# Patient Record
Sex: FEMALE | Race: WHITE | Employment: OTHER | URBAN - NONMETROPOLITAN AREA
[De-identification: names, ages, dates, MRNs, and addresses within clinical notes are randomized per-mention and may not be internally consistent; named-entity substitution may affect disease eponyms.]

---

## 2017-01-18 ENCOUNTER — OFFICE VISIT (OUTPATIENT)
Dept: PRIMARY CARE CLINIC | Age: 82
End: 2017-01-18
Payer: MEDICARE

## 2017-01-18 VITALS
SYSTOLIC BLOOD PRESSURE: 120 MMHG | WEIGHT: 118 LBS | TEMPERATURE: 98 F | BODY MASS INDEX: 20.25 KG/M2 | RESPIRATION RATE: 20 BRPM | OXYGEN SATURATION: 98 % | DIASTOLIC BLOOD PRESSURE: 78 MMHG

## 2017-01-18 DIAGNOSIS — M81.0 OSTEOPOROSIS: ICD-10-CM

## 2017-01-18 DIAGNOSIS — E11.9 TYPE 2 DIABETES MELLITUS WITHOUT COMPLICATION, WITHOUT LONG-TERM CURRENT USE OF INSULIN (HCC): Primary | ICD-10-CM

## 2017-01-18 DIAGNOSIS — E55.9 VITAMIN D DEFICIENCY: ICD-10-CM

## 2017-01-18 DIAGNOSIS — K22.70 BARRETT'S ESOPHAGUS WITHOUT DYSPLASIA: ICD-10-CM

## 2017-01-18 DIAGNOSIS — F33.42 RECURRENT MAJOR DEPRESSIVE DISORDER, IN FULL REMISSION (HCC): ICD-10-CM

## 2017-01-18 DIAGNOSIS — Z86.69 H/O: BELL'S PALSY: ICD-10-CM

## 2017-01-18 PROCEDURE — G8484 FLU IMMUNIZE NO ADMIN: HCPCS | Performed by: FAMILY MEDICINE

## 2017-01-18 PROCEDURE — 82044 UR ALBUMIN SEMIQUANTITATIVE: CPT | Performed by: FAMILY MEDICINE

## 2017-01-18 PROCEDURE — 4005F PHARM THX FOR OP RXD: CPT | Performed by: FAMILY MEDICINE

## 2017-01-18 PROCEDURE — G8419 CALC BMI OUT NRM PARAM NOF/U: HCPCS | Performed by: FAMILY MEDICINE

## 2017-01-18 PROCEDURE — G8427 DOCREV CUR MEDS BY ELIG CLIN: HCPCS | Performed by: FAMILY MEDICINE

## 2017-01-18 PROCEDURE — 1036F TOBACCO NON-USER: CPT | Performed by: FAMILY MEDICINE

## 2017-01-18 PROCEDURE — 99214 OFFICE O/P EST MOD 30 MIN: CPT | Performed by: FAMILY MEDICINE

## 2017-01-18 PROCEDURE — 4040F PNEUMOC VAC/ADMIN/RCVD: CPT | Performed by: FAMILY MEDICINE

## 2017-01-18 PROCEDURE — 1123F ACP DISCUSS/DSCN MKR DOCD: CPT | Performed by: FAMILY MEDICINE

## 2017-01-18 PROCEDURE — G8400 PT W/DXA NO RESULTS DOC: HCPCS | Performed by: FAMILY MEDICINE

## 2017-01-18 PROCEDURE — 1090F PRES/ABSN URINE INCON ASSESS: CPT | Performed by: FAMILY MEDICINE

## 2017-01-18 RX ORDER — VITAMIN B COMPLEX
TABLET ORAL
Qty: 180 TABLET | Refills: 5 | Status: SHIPPED | OUTPATIENT
Start: 2017-01-18 | End: 2017-02-06 | Stop reason: SDUPTHER

## 2017-01-18 RX ORDER — IBANDRONATE SODIUM 150 MG/1
150 TABLET, FILM COATED ORAL
Qty: 30 TABLET | Refills: 3 | Status: CANCELLED | OUTPATIENT
Start: 2017-01-18

## 2017-01-18 ASSESSMENT — ENCOUNTER SYMPTOMS
SORE THROAT: 0
CHEST TIGHTNESS: 0
TROUBLE SWALLOWING: 0
EYE PAIN: 0
VOMITING: 0
CONSTIPATION: 0
COUGH: 0
COLOR CHANGE: 0
SHORTNESS OF BREATH: 0
WHEEZING: 0
DIARRHEA: 0
NAUSEA: 0
ABDOMINAL PAIN: 0
RHINORRHEA: 0
PHOTOPHOBIA: 0

## 2017-01-19 ENCOUNTER — TELEPHONE (OUTPATIENT)
Dept: GASTROENTEROLOGY | Age: 82
End: 2017-01-19

## 2017-01-19 RX ORDER — ERGOCALCIFEROL 1.25 MG/1
CAPSULE ORAL
Qty: 12 CAPSULE | Refills: 3 | Status: SHIPPED | OUTPATIENT
Start: 2017-01-19 | End: 2017-09-08 | Stop reason: SDUPTHER

## 2017-01-20 LAB
CREATININE URINE POCT: 200
MICROALBUMIN/CREAT 24H UR: 10 MG/G{CREAT}

## 2017-02-03 ENCOUNTER — TELEPHONE (OUTPATIENT)
Dept: PRIMARY CARE CLINIC | Age: 82
End: 2017-02-03

## 2017-02-03 DIAGNOSIS — E63.9 NUTRITION DISORDER: Primary | ICD-10-CM

## 2017-02-06 DIAGNOSIS — E11.9 DIABETES MELLITUS TYPE 2, NONINSULIN DEPENDENT (HCC): Primary | ICD-10-CM

## 2017-02-06 RX ORDER — VITAMIN B COMPLEX
TABLET ORAL
Qty: 180 TABLET | Refills: 5 | Status: SHIPPED | OUTPATIENT
Start: 2017-02-06 | End: 2017-09-08 | Stop reason: SDUPTHER

## 2017-02-21 ENCOUNTER — OFFICE VISIT (OUTPATIENT)
Dept: PRIMARY CARE CLINIC | Age: 82
End: 2017-02-21
Payer: MEDICARE

## 2017-02-21 DIAGNOSIS — E53.8 VITAMIN B 12 DEFICIENCY: Primary | ICD-10-CM

## 2017-02-21 PROCEDURE — 96372 THER/PROPH/DIAG INJ SC/IM: CPT | Performed by: FAMILY MEDICINE

## 2017-02-21 RX ORDER — CYANOCOBALAMIN 1000 UG/ML
1000 INJECTION INTRAMUSCULAR; SUBCUTANEOUS ONCE
Status: COMPLETED | OUTPATIENT
Start: 2017-02-21 | End: 2017-02-21

## 2017-02-21 RX ADMIN — CYANOCOBALAMIN 1000 MCG: 1000 INJECTION INTRAMUSCULAR; SUBCUTANEOUS at 08:43

## 2017-03-28 ENCOUNTER — OFFICE VISIT (OUTPATIENT)
Dept: PRIMARY CARE CLINIC | Age: 82
End: 2017-03-28
Payer: MEDICARE

## 2017-03-28 VITALS
OXYGEN SATURATION: 98 % | WEIGHT: 118 LBS | RESPIRATION RATE: 20 BRPM | HEIGHT: 64 IN | DIASTOLIC BLOOD PRESSURE: 64 MMHG | HEART RATE: 88 BPM | TEMPERATURE: 98 F | BODY MASS INDEX: 20.14 KG/M2 | SYSTOLIC BLOOD PRESSURE: 122 MMHG

## 2017-03-28 DIAGNOSIS — Z00.00 ROUTINE GENERAL MEDICAL EXAMINATION AT A HEALTH CARE FACILITY: Primary | ICD-10-CM

## 2017-03-28 DIAGNOSIS — E53.8 VITAMIN B12 DEFICIENCY: ICD-10-CM

## 2017-03-28 DIAGNOSIS — R26.81 GAIT INSTABILITY: ICD-10-CM

## 2017-03-28 DIAGNOSIS — E11.9 TYPE 2 DIABETES MELLITUS WITHOUT COMPLICATION, WITHOUT LONG-TERM CURRENT USE OF INSULIN (HCC): ICD-10-CM

## 2017-03-28 DIAGNOSIS — I10 ESSENTIAL HYPERTENSION: ICD-10-CM

## 2017-03-28 DIAGNOSIS — M25.559 PELVIC JOINT PAIN, UNSPECIFIED LATERALITY: ICD-10-CM

## 2017-03-28 LAB — HBA1C MFR BLD: 6 %

## 2017-03-28 PROCEDURE — 99214 OFFICE O/P EST MOD 30 MIN: CPT | Performed by: FAMILY MEDICINE

## 2017-03-28 PROCEDURE — 1123F ACP DISCUSS/DSCN MKR DOCD: CPT | Performed by: FAMILY MEDICINE

## 2017-03-28 PROCEDURE — G0439 PPPS, SUBSEQ VISIT: HCPCS | Performed by: FAMILY MEDICINE

## 2017-03-28 PROCEDURE — 4040F PNEUMOC VAC/ADMIN/RCVD: CPT | Performed by: FAMILY MEDICINE

## 2017-03-28 PROCEDURE — 96372 THER/PROPH/DIAG INJ SC/IM: CPT | Performed by: FAMILY MEDICINE

## 2017-03-28 PROCEDURE — G8484 FLU IMMUNIZE NO ADMIN: HCPCS | Performed by: FAMILY MEDICINE

## 2017-03-28 PROCEDURE — G8400 PT W/DXA NO RESULTS DOC: HCPCS | Performed by: FAMILY MEDICINE

## 2017-03-28 PROCEDURE — 1090F PRES/ABSN URINE INCON ASSESS: CPT | Performed by: FAMILY MEDICINE

## 2017-03-28 PROCEDURE — 1036F TOBACCO NON-USER: CPT | Performed by: FAMILY MEDICINE

## 2017-03-28 PROCEDURE — G8418 CALC BMI BLW LOW PARAM F/U: HCPCS | Performed by: FAMILY MEDICINE

## 2017-03-28 PROCEDURE — 83036 HEMOGLOBIN GLYCOSYLATED A1C: CPT | Performed by: FAMILY MEDICINE

## 2017-03-28 PROCEDURE — G8427 DOCREV CUR MEDS BY ELIG CLIN: HCPCS | Performed by: FAMILY MEDICINE

## 2017-03-28 RX ORDER — ATENOLOL 25 MG/1
12.5 TABLET ORAL DAILY
Qty: 30 TABLET | Refills: 0
Start: 2017-03-28 | End: 2017-09-08 | Stop reason: SDUPTHER

## 2017-03-28 RX ORDER — SIMVASTATIN 10 MG
10 TABLET ORAL EVERY EVENING
Qty: 90 TABLET | Refills: 1 | Status: SHIPPED | OUTPATIENT
Start: 2017-03-28 | End: 2017-03-28

## 2017-03-28 RX ORDER — CYANOCOBALAMIN 1000 UG/ML
1000 INJECTION INTRAMUSCULAR; SUBCUTANEOUS ONCE
Status: COMPLETED | OUTPATIENT
Start: 2017-03-28 | End: 2017-03-28

## 2017-03-28 RX ORDER — SIMVASTATIN 10 MG
10 TABLET ORAL EVERY EVENING
Qty: 30 TABLET | Refills: 3 | Status: SHIPPED | OUTPATIENT
Start: 2017-03-28 | End: 2017-09-08

## 2017-03-28 RX ADMIN — CYANOCOBALAMIN 1000 MCG: 1000 INJECTION INTRAMUSCULAR; SUBCUTANEOUS at 14:32

## 2017-03-29 PROBLEM — I10 ESSENTIAL HYPERTENSION: Status: ACTIVE | Noted: 2017-03-29

## 2017-03-29 PROBLEM — E53.8 VITAMIN B12 DEFICIENCY: Status: ACTIVE | Noted: 2017-03-29

## 2017-03-29 ASSESSMENT — ENCOUNTER SYMPTOMS
CHEST TIGHTNESS: 0
SORE THROAT: 0
COLOR CHANGE: 0
EYE PAIN: 0
WHEEZING: 0
RHINORRHEA: 0
CONSTIPATION: 0
COUGH: 0
NAUSEA: 0
VOMITING: 0
SHORTNESS OF BREATH: 0
DIARRHEA: 0
TROUBLE SWALLOWING: 0
ABDOMINAL PAIN: 0
PHOTOPHOBIA: 0

## 2017-05-11 ENCOUNTER — NURSE ONLY (OUTPATIENT)
Dept: PRIMARY CARE CLINIC | Age: 82
End: 2017-05-11
Payer: MEDICARE

## 2017-05-11 DIAGNOSIS — E53.8 VITAMIN B12 DEFICIENCY: Primary | ICD-10-CM

## 2017-05-11 PROCEDURE — 96372 THER/PROPH/DIAG INJ SC/IM: CPT | Performed by: NURSE PRACTITIONER

## 2017-05-11 RX ORDER — CYANOCOBALAMIN 1000 UG/ML
1000 INJECTION INTRAMUSCULAR; SUBCUTANEOUS ONCE
Status: COMPLETED | OUTPATIENT
Start: 2017-05-11 | End: 2017-05-11

## 2017-05-11 RX ADMIN — CYANOCOBALAMIN 1000 MCG: 1000 INJECTION INTRAMUSCULAR; SUBCUTANEOUS at 13:12

## 2017-08-25 DIAGNOSIS — Z12.31 ENCOUNTER FOR SCREENING MAMMOGRAM FOR MALIGNANT NEOPLASM OF BREAST: ICD-10-CM

## 2017-08-25 DIAGNOSIS — E11.9 TYPE 2 DIABETES MELLITUS WITHOUT COMPLICATION, WITHOUT LONG-TERM CURRENT USE OF INSULIN (HCC): ICD-10-CM

## 2017-08-25 DIAGNOSIS — E53.8 VITAMIN B12 DEFICIENCY: ICD-10-CM

## 2017-08-25 LAB
CREATININE URINE: 25.9 MG/DL (ref 4.2–622)
MICROALBUMIN UR-MCNC: <1.2 MG/DL (ref 0–19)
MICROALBUMIN/CREAT UR-RTO: NORMAL MG/G
VITAMIN B-12: 284 PG/ML (ref 211–946)

## 2017-08-29 ENCOUNTER — TELEPHONE (OUTPATIENT)
Dept: PRIMARY CARE CLINIC | Age: 82
End: 2017-08-29

## 2017-08-30 ENCOUNTER — OFFICE VISIT (OUTPATIENT)
Dept: PRIMARY CARE CLINIC | Age: 82
End: 2017-08-30
Payer: MEDICARE

## 2017-08-30 DIAGNOSIS — E53.8 VITAMIN B12 DEFICIENCY: Primary | ICD-10-CM

## 2017-08-30 PROCEDURE — 96372 THER/PROPH/DIAG INJ SC/IM: CPT | Performed by: NURSE PRACTITIONER

## 2017-08-30 RX ORDER — CYANOCOBALAMIN 1000 UG/ML
1000 INJECTION INTRAMUSCULAR; SUBCUTANEOUS ONCE
Status: COMPLETED | OUTPATIENT
Start: 2017-08-30 | End: 2017-08-30

## 2017-08-30 RX ADMIN — CYANOCOBALAMIN 1000 MCG: 1000 INJECTION INTRAMUSCULAR; SUBCUTANEOUS at 08:16

## 2017-09-01 RX ORDER — NITROGLYCERIN 0.3 MG/1
TABLET SUBLINGUAL
Qty: 30 TABLET | Refills: 0 | Status: SHIPPED | OUTPATIENT
Start: 2017-09-01 | End: 2017-10-09 | Stop reason: ALTCHOICE

## 2017-09-08 ENCOUNTER — OFFICE VISIT (OUTPATIENT)
Dept: PRIMARY CARE CLINIC | Age: 82
End: 2017-09-08
Payer: MEDICARE

## 2017-09-08 VITALS
SYSTOLIC BLOOD PRESSURE: 112 MMHG | OXYGEN SATURATION: 93 % | DIASTOLIC BLOOD PRESSURE: 82 MMHG | HEIGHT: 64 IN | WEIGHT: 124 LBS | TEMPERATURE: 97.7 F | HEART RATE: 67 BPM | BODY MASS INDEX: 21.17 KG/M2

## 2017-09-08 DIAGNOSIS — E53.8 VITAMIN B12 DEFICIENCY: Primary | ICD-10-CM

## 2017-09-08 DIAGNOSIS — E11.9 TYPE 2 DIABETES MELLITUS WITHOUT COMPLICATION, WITHOUT LONG-TERM CURRENT USE OF INSULIN (HCC): ICD-10-CM

## 2017-09-08 DIAGNOSIS — G51.0 BELL'S PALSY: ICD-10-CM

## 2017-09-08 PROCEDURE — G8400 PT W/DXA NO RESULTS DOC: HCPCS | Performed by: FAMILY MEDICINE

## 2017-09-08 PROCEDURE — G8427 DOCREV CUR MEDS BY ELIG CLIN: HCPCS | Performed by: FAMILY MEDICINE

## 2017-09-08 PROCEDURE — 4040F PNEUMOC VAC/ADMIN/RCVD: CPT | Performed by: FAMILY MEDICINE

## 2017-09-08 PROCEDURE — 1090F PRES/ABSN URINE INCON ASSESS: CPT | Performed by: FAMILY MEDICINE

## 2017-09-08 PROCEDURE — 1123F ACP DISCUSS/DSCN MKR DOCD: CPT | Performed by: FAMILY MEDICINE

## 2017-09-08 PROCEDURE — G8420 CALC BMI NORM PARAMETERS: HCPCS | Performed by: FAMILY MEDICINE

## 2017-09-08 PROCEDURE — 1036F TOBACCO NON-USER: CPT | Performed by: FAMILY MEDICINE

## 2017-09-08 PROCEDURE — 99214 OFFICE O/P EST MOD 30 MIN: CPT | Performed by: FAMILY MEDICINE

## 2017-09-08 RX ORDER — ATENOLOL 25 MG/1
12.5 TABLET ORAL DAILY
Qty: 30 TABLET | Refills: 0 | Status: ON HOLD | OUTPATIENT
Start: 2017-09-08 | End: 2020-12-22 | Stop reason: HOSPADM

## 2017-09-08 RX ORDER — NITROGLYCERIN 0.4 MG/1
0.4 TABLET SUBLINGUAL EVERY 5 MIN PRN
Qty: 25 TABLET | Refills: 3 | Status: SHIPPED | OUTPATIENT
Start: 2017-09-08

## 2017-09-08 RX ORDER — VITAMIN B COMPLEX
TABLET ORAL
Qty: 180 TABLET | Refills: 5 | Status: SHIPPED | OUTPATIENT
Start: 2017-09-08 | End: 2020-03-24 | Stop reason: SDUPTHER

## 2017-09-08 RX ORDER — SIMVASTATIN 10 MG
10 TABLET ORAL EVERY EVENING
Qty: 90 TABLET | Refills: 3 | Status: SHIPPED | OUTPATIENT
Start: 2017-09-08 | End: 2018-02-09 | Stop reason: SDUPTHER

## 2017-09-08 RX ORDER — PREDNISONE 20 MG/1
TABLET ORAL
Qty: 30 TABLET | Refills: 0 | Status: SHIPPED | OUTPATIENT
Start: 2017-09-08 | End: 2018-05-02

## 2017-09-08 RX ORDER — CYANOCOBALAMIN 1000 UG/ML
INJECTION INTRAMUSCULAR; SUBCUTANEOUS
Qty: 10 ML | Refills: 0 | Status: SHIPPED | OUTPATIENT
Start: 2017-09-08 | End: 2018-05-02

## 2017-09-08 RX ORDER — ERGOCALCIFEROL 1.25 MG/1
CAPSULE ORAL
Qty: 12 CAPSULE | Refills: 3 | Status: SHIPPED | OUTPATIENT
Start: 2017-09-08 | End: 2020-03-09

## 2017-09-08 ASSESSMENT — ENCOUNTER SYMPTOMS
EYE ITCHING: 0
SHORTNESS OF BREATH: 0
ABDOMINAL PAIN: 0
NAUSEA: 0
COLOR CHANGE: 0
SORE THROAT: 0
RHINORRHEA: 0
COUGH: 0

## 2017-10-04 ENCOUNTER — TELEPHONE (OUTPATIENT)
Dept: CARDIOLOGY | Facility: CLINIC | Age: 82
End: 2017-10-04

## 2017-10-04 NOTE — TELEPHONE ENCOUNTER
PT CAME IN OFFICE TO DAY ASK IF WE COULD CLEAR HER BP  MACHINE FROM HOME. SHE FEELS HER  HR IS RUNNING LOW.  I ADVISED HER TO CALL THE NUMBER ON THE BOX TO CLEAR MACHINE.  HER BP /72 HR 59 98% O2.      SHE STATES SHE FEELS FLUTTERS SOMETIMES.      IS GOING OUT OF TOWN AT THE END OF THE MONTH TILL THE FIRST OF THE YEAR.    APPT MADE WITH TATIANA FOR NEXT WEEK

## 2017-10-05 ENCOUNTER — OFFICE VISIT (OUTPATIENT)
Dept: PRIMARY CARE CLINIC | Age: 82
End: 2017-10-05
Payer: MEDICARE

## 2017-10-05 DIAGNOSIS — Z23 FLU VACCINE NEED: Primary | ICD-10-CM

## 2017-10-05 PROCEDURE — 1036F TOBACCO NON-USER: CPT | Performed by: FAMILY MEDICINE

## 2017-10-05 PROCEDURE — 90688 IIV4 VACCINE SPLT 0.5 ML IM: CPT | Performed by: FAMILY MEDICINE

## 2017-10-05 PROCEDURE — G0008 ADMIN INFLUENZA VIRUS VAC: HCPCS | Performed by: FAMILY MEDICINE

## 2017-10-05 NOTE — PROGRESS NOTES
After obtaining consent, and per orders of Dr. Allison Sullivan, injection of Flu given in Left deltoid by David Navarro. Patient instructed to remain in clinic for 20 minutes afterwards, and to report any adverse reaction to me immediately.

## 2017-10-09 ENCOUNTER — OFFICE VISIT (OUTPATIENT)
Dept: CARDIOLOGY | Facility: CLINIC | Age: 82
End: 2017-10-09

## 2017-10-09 ENCOUNTER — OFFICE VISIT (OUTPATIENT)
Dept: PRIMARY CARE CLINIC | Age: 82
End: 2017-10-09
Payer: MEDICARE

## 2017-10-09 VITALS
HEIGHT: 56 IN | BODY MASS INDEX: 27.67 KG/M2 | SYSTOLIC BLOOD PRESSURE: 150 MMHG | HEART RATE: 55 BPM | WEIGHT: 123 LBS | DIASTOLIC BLOOD PRESSURE: 78 MMHG

## 2017-10-09 DIAGNOSIS — R00.2 PALPITATIONS: ICD-10-CM

## 2017-10-09 DIAGNOSIS — R94.31 ABNORMAL ECG: ICD-10-CM

## 2017-10-09 DIAGNOSIS — E11.8 TYPE 2 DIABETES MELLITUS WITH COMPLICATION, UNSPECIFIED LONG TERM INSULIN USE STATUS: ICD-10-CM

## 2017-10-09 DIAGNOSIS — E78.2 MIXED HYPERLIPIDEMIA: Primary | ICD-10-CM

## 2017-10-09 DIAGNOSIS — I10 ESSENTIAL HYPERTENSION: ICD-10-CM

## 2017-10-09 DIAGNOSIS — E53.8 VITAMIN B12 DEFICIENCY: Primary | ICD-10-CM

## 2017-10-09 DIAGNOSIS — R07.2 PRECORDIAL PAIN: ICD-10-CM

## 2017-10-09 PROCEDURE — 99214 OFFICE O/P EST MOD 30 MIN: CPT | Performed by: INTERNAL MEDICINE

## 2017-10-09 PROCEDURE — G8428 CUR MEDS NOT DOCUMENT: HCPCS | Performed by: FAMILY MEDICINE

## 2017-10-09 PROCEDURE — 4040F PNEUMOC VAC/ADMIN/RCVD: CPT | Performed by: FAMILY MEDICINE

## 2017-10-09 PROCEDURE — 96372 THER/PROPH/DIAG INJ SC/IM: CPT | Performed by: FAMILY MEDICINE

## 2017-10-09 PROCEDURE — G8420 CALC BMI NORM PARAMETERS: HCPCS | Performed by: FAMILY MEDICINE

## 2017-10-09 PROCEDURE — 93000 ELECTROCARDIOGRAM COMPLETE: CPT | Performed by: INTERNAL MEDICINE

## 2017-10-09 RX ORDER — ERGOCALCIFEROL 1.25 MG/1
CAPSULE ORAL WEEKLY
COMMUNITY
Start: 2017-09-08 | End: 2018-09-18 | Stop reason: ALTCHOICE

## 2017-10-09 RX ORDER — VENLAFAXINE HYDROCHLORIDE 37.5 MG/1
37.5 CAPSULE, EXTENDED RELEASE ORAL DAILY
COMMUNITY

## 2017-10-09 RX ORDER — CYANOCOBALAMIN 1000 UG/ML
1000 INJECTION INTRAMUSCULAR; SUBCUTANEOUS ONCE
Status: COMPLETED | OUTPATIENT
Start: 2017-10-09 | End: 2017-10-09

## 2017-10-09 RX ORDER — SIMVASTATIN 10 MG
10 TABLET ORAL DAILY
COMMUNITY
Start: 2017-09-08 | End: 2020-12-30 | Stop reason: ALTCHOICE

## 2017-10-09 RX ORDER — MULTIVIT-MIN 60/IRON FUM/FOLIC 27 MG-1 MG
TABLET ORAL
Refills: 3 | COMMUNITY
Start: 2017-10-06 | End: 2017-10-09 | Stop reason: SDUPTHER

## 2017-10-09 RX ADMIN — CYANOCOBALAMIN 1000 MCG: 1000 INJECTION INTRAMUSCULAR; SUBCUTANEOUS at 15:27

## 2017-10-09 NOTE — PROGRESS NOTES
After obtaining consent, and per orders of Dr. Pita Jones, injection of B-12 (2 of 4) given in Left upper quad. gluteus by Moon Mendez. Patient instructed to remain in clinic for 20 minutes afterwards, and to report any adverse reaction to me immediately.

## 2017-10-09 NOTE — PROGRESS NOTES
Dalia Betts  [id number]  1935  81 y.o.  female    Referring Provider: ELIZABETH Isaac    Reason for Follow-up Visit:coronary artery disease   Chest pain at rest , moderate substernal, pressure like. Lasts less than 5 minutes  No diaphoresis  No nausea  No radiation  Occurs at rest  Mild associated dyspnea    Compliant with medications  Abnormal stress test in past with small ischemia on MPS      History of present illness:  Dalia Betts is a 81 y.o. yo female with history of Essential Hypertension   who presents today for   Chief Complaint   Patient presents with   • Chest Pain     1 yr f/u - discomfort   .    History  Past Medical History:   Diagnosis Date   • Bell palsy    • Chest pain    • Diabetes mellitus    • HLD (hyperlipidemia)    • HTN (hypertension)    • MI (myocardial infarction)    • Palpitations    ,   Past Surgical History:   Procedure Laterality Date   • APPENDECTOMY     • CHOLECYSTECTOMY     • ELBOW FUSION     • EYE SURGERY     • HERNIA REPAIR     • HYSTERECTOMY     • SINUSOTOMY     ,   Family History   Problem Relation Age of Onset   • Pancreatitis Mother    • Heart disease Father    ,   Social History   Substance Use Topics   • Smoking status: Never Smoker   • Smokeless tobacco: Never Used   • Alcohol use No   ,     Medications  Current Outpatient Prescriptions   Medication Sig Dispense Refill   • atenolol (TENORMIN) 25 MG tablet Take 12.5 mg by mouth Daily. 12.5 mg daily     • donepezil (ARICEPT) 5 MG tablet Take 5 mg by mouth As Needed.     • Fructose-Dextrose-Phosphor Acd (EMETROL PO) Take 10 mL by mouth As Needed.     • gabapentin (NEURONTIN) 300 MG capsule Take 300 mg by mouth Daily.     • LORazepam (ATIVAN) 0.5 MG tablet Take 0.5 mg by mouth Every 8 (Eight) Hours As Needed for anxiety.     • metFORMIN (GLUCOPHAGE) 500 MG tablet Take 500 mg by mouth Daily.     • nitroglycerin (NITROSTAT) 0.4 MG SL tablet Place 0.4 mg under the tongue Every 5 (Five) Minutes As Needed for chest  "pain. Take no more than 3 doses in 15 minutes.     • polyethyl glycol-propyl glycol (SYSTANE) 0.4-0.3 % solution ophthalmic solution Every 1 (One) Hour As Needed.     • PRENATAL VIT-FE FUMARATE-FA PO Take  by mouth.     • Psyllium 30.9 % powder Take  by mouth As Needed.     • Red Yeast Rice Extract (RED YEAST RICE PO) Take 600 mg by mouth Daily.     • simvastatin (ZOCOR) 10 MG tablet Take 10 mg by mouth Daily.     • venlafaxine XR (EFFEXOR-XR) 37.5 MG 24 hr capsule Take 37.5 mg by mouth Daily.     • vitamin D (ERGOCALCIFEROL) 05258 units capsule capsule 1 (One) Time Per Week.       No current facility-administered medications for this visit.        Allergies:  Phenergan [promethazine hcl]    Review of Systems  Review of Systems   Constitution: Negative.   HENT: Negative.    Eyes: Negative.    Cardiovascular: Positive for chest pain. Negative for claudication, cyanosis, dyspnea on exertion, irregular heartbeat, leg swelling, near-syncope, orthopnea, palpitations, paroxysmal nocturnal dyspnea and syncope.   Respiratory: Negative.    Endocrine: Negative.    Hematologic/Lymphatic: Negative.    Skin: Negative.    Gastrointestinal: Negative for anorexia.   Genitourinary: Negative.    Neurological: Negative.    Psychiatric/Behavioral: Negative.        Objective     Physical Exam:  /78  Pulse 55  Ht 56\" (142.2 cm)  Wt 123 lb (55.8 kg)  BMI 27.58 kg/m2  Physical Exam   Constitutional: She appears well-developed.   HENT:   Head: Normocephalic.   Neck: Normal carotid pulses and no JVD present. No tracheal tenderness present. Carotid bruit is not present. No tracheal deviation and no edema present.   Cardiovascular: Regular rhythm and normal pulses.    Murmur heard.   Systolic murmur is present with a grade of 1/6   Pulmonary/Chest: Effort normal. No stridor.   Abdominal: Soft.   Neurological: She is alert. She has normal strength. No cranial nerve deficit or sensory deficit.   Skin: Skin is warm.   Psychiatric: She " has a normal mood and affect. Her speech is normal and behavior is normal.       Results Review:       ECG 12 Lead  Date/Time: 10/9/2017 9:44 AM  Performed by: GEORGETTE SIMPSON  Authorized by: GEORGETTE SIMPSON   Comparison: compared with previous ECG from 4/28/2016  Comparison to previous ECG: New inferior T wave changes  Rhythm: sinus bradycardia  Conduction: conduction normal  ST Segments: ST segments normal  QRS axis: normal  Clinical impression: non-specific ECG            Assessment/Plan   Dalia was seen today for chest pain.    Diagnoses and all orders for this visit:    Mixed hyperlipidemia    Essential hypertension    Palpitations    Precordial pain  -     Adult Stress Echo W/ Cont or Stress Agent if Necessary Per Protocol; Future    Abnormal ECG  -     Adult Stress Echo W/ Cont or Stress Agent if Necessary Per Protocol; Future    Abnormal ECG inferior T wave changes  -     Adult Stress Echo W/ Cont or Stress Agent if Necessary Per Protocol; Future    Type 2 diabetes mellitus with complication, unspecified long term insulin use status    Other orders  -     ECG 12 Lead         No significant pedal edema. Compliant with medications and diet. Latest labs and medications reviewed.    Plan:      Start ECASA 81 mg daily regimen given risk factors and monitor for any signs of bleeding including red or dark stools. Fall precautions.    Patient is asked to monitor BP at home or work, several times per month and return with written values at next office visit.  Stress echo (Treadmill stress echo test ,says can easily run on treadmill with no fall or injury risk)   She refuses chemical stress test  Close follow up with you as scheduled.  Intensive factor modifications.  See order list.    Counseled regarding disease appropriate diet, fluid, caffeine, stimulants and sodium intake as well as importance of compliance to diet, exercise and regular follow up.  Avoid NSAIDS and COX2 inhibitors. Use Acetaminophen PRN.    Return in  about 4 weeks (around 11/6/2017).

## 2017-10-16 ENCOUNTER — HOSPITAL ENCOUNTER (OUTPATIENT)
Dept: CARDIOLOGY | Facility: HOSPITAL | Age: 82
Discharge: HOME | End: 2017-10-16
Attending: INTERNAL MEDICINE | Admitting: INTERNAL MEDICINE

## 2017-10-16 VITALS — DIASTOLIC BLOOD PRESSURE: 74 MMHG | SYSTOLIC BLOOD PRESSURE: 141 MMHG | HEART RATE: 47 BPM

## 2017-10-16 DIAGNOSIS — R07.2 PRECORDIAL PAIN: ICD-10-CM

## 2017-10-16 DIAGNOSIS — R94.31 ABNORMAL ECG: ICD-10-CM

## 2017-10-16 LAB
BH CV STRESS BP STAGE 1: NORMAL
BH CV STRESS DURATION MIN STAGE 1: 3
BH CV STRESS DURATION SEC STAGE 1: 0
BH CV STRESS ECHO POST STRESS EJECTION FRACTION EF: 65 %
BH CV STRESS GRADE STAGE 1: 10
BH CV STRESS HR STAGE 1: 87
BH CV STRESS METS STAGE 1: 2.3
BH CV STRESS PROTOCOL 1: NORMAL
BH CV STRESS PROTOCOL 2 BP STAGE 1: NORMAL
BH CV STRESS PROTOCOL 2 BP STAGE 2: NORMAL
BH CV STRESS PROTOCOL 2 BP STAGE 3: 102
BH CV STRESS PROTOCOL 2 BP STAGE 4: 117
BH CV STRESS PROTOCOL 2 BP STAGE 5: 120
BH CV STRESS PROTOCOL 2 DOSE DOBUTAMINE STAGE 1: 10
BH CV STRESS PROTOCOL 2 DOSE DOBUTAMINE STAGE 2: 20
BH CV STRESS PROTOCOL 2 DOSE DOBUTAMINE STAGE 3: 30
BH CV STRESS PROTOCOL 2 DOSE DOBUTAMINE STAGE 4: 40
BH CV STRESS PROTOCOL 2 DURATION MIN STAGE 1: 3
BH CV STRESS PROTOCOL 2 DURATION MIN STAGE 2: 3
BH CV STRESS PROTOCOL 2 DURATION MIN STAGE 3: 3
BH CV STRESS PROTOCOL 2 DURATION MIN STAGE 4: 3
BH CV STRESS PROTOCOL 2 DURATION MIN STAGE 5: 2 MIN
BH CV STRESS PROTOCOL 2 DURATION SEC STAGE 1: 0
BH CV STRESS PROTOCOL 2 DURATION SEC STAGE 2: 0
BH CV STRESS PROTOCOL 2 DURATION SEC STAGE 3: 0
BH CV STRESS PROTOCOL 2 DURATION SEC STAGE 4: 0
BH CV STRESS PROTOCOL 2 DURATION SEC STAGE 5: 42 SEC
BH CV STRESS PROTOCOL 2 HR STAGE 1: 49
BH CV STRESS PROTOCOL 2 HR STAGE 2: 57
BH CV STRESS PROTOCOL 2 HR STAGE 3: NORMAL
BH CV STRESS PROTOCOL 2 HR STAGE 4: NORMAL
BH CV STRESS PROTOCOL 2 HR STAGE 5: NORMAL BPM
BH CV STRESS PROTOCOL 2 STAGE 1: 1
BH CV STRESS PROTOCOL 2 STAGE 2: 2
BH CV STRESS PROTOCOL 2 STAGE 3: 3
BH CV STRESS PROTOCOL 2 STAGE 4: 4
BH CV STRESS PROTOCOL 2 STAGE 5: 5
BH CV STRESS PROTOCOL 2: NORMAL
BH CV STRESS RECOVERY BP: NORMAL MMHG
BH CV STRESS RECOVERY HR: 79 BPM
BH CV STRESS SPEED STAGE 1: 1.7
BH CV STRESS STAGE 1: 1
LV EF 2D ECHO EST: 55 %
MAXIMAL PREDICTED HEART RATE: 140 BPM
PERCENT MAX PREDICTED HR: 85.71 %
STRESS BASELINE BP: NORMAL MMHG
STRESS BASELINE HR: 46 BPM
STRESS PERCENT HR: 101 %
STRESS POST EXERCISE DUR MIN: 14 MIN
STRESS POST EXERCISE DUR SEC: 42 SEC
STRESS POST PEAK BP: NORMAL MMHG
STRESS POST PEAK HR: 120 BPM
STRESS TARGET HR: 119 BPM

## 2017-10-16 PROCEDURE — 93350 STRESS TTE ONLY: CPT | Performed by: INTERNAL MEDICINE

## 2017-10-16 PROCEDURE — 93017 CV STRESS TEST TRACING ONLY: CPT

## 2017-10-16 PROCEDURE — 93352 ADMIN ECG CONTRAST AGENT: CPT | Performed by: INTERNAL MEDICINE

## 2017-10-16 PROCEDURE — 25010000002 PERFLUTREN 6.52 MG/ML SUSPENSION: Performed by: INTERNAL MEDICINE

## 2017-10-16 PROCEDURE — 93018 CV STRESS TEST I&R ONLY: CPT | Performed by: INTERNAL MEDICINE

## 2017-10-16 PROCEDURE — 93350 STRESS TTE ONLY: CPT

## 2017-10-16 PROCEDURE — 25010000003 DOBUTAMINE PER 250 MG: Performed by: INTERNAL MEDICINE

## 2017-10-16 RX ORDER — DOBUTAMINE HYDROCHLORIDE 100 MG/100ML
10-50 INJECTION INTRAVENOUS CONTINUOUS
Status: DISCONTINUED | OUTPATIENT
Start: 2017-10-16 | End: 2017-10-17 | Stop reason: HOSPADM

## 2017-10-16 RX ADMIN — ATROPINE SULFATE 2 MG: 0.1 INJECTION, SOLUTION INTRAVENOUS at 10:22

## 2017-10-16 RX ADMIN — PERFLUTREN 8.48 MG: 6.52 INJECTION, SUSPENSION INTRAVENOUS at 09:20

## 2017-10-16 RX ADMIN — DOBUTAMINE 10 MCG/KG/MIN: 12.5 INJECTION, SOLUTION, CONCENTRATE INTRAVENOUS at 10:10

## 2017-10-18 ENCOUNTER — OFFICE VISIT (OUTPATIENT)
Dept: PRIMARY CARE CLINIC | Age: 82
End: 2017-10-18
Payer: MEDICARE

## 2017-10-18 DIAGNOSIS — E53.8 VITAMIN B12 DEFICIENCY: Primary | ICD-10-CM

## 2017-10-18 PROCEDURE — 96372 THER/PROPH/DIAG INJ SC/IM: CPT | Performed by: FAMILY MEDICINE

## 2017-10-18 RX ORDER — CYANOCOBALAMIN 1000 UG/ML
1000 INJECTION INTRAMUSCULAR; SUBCUTANEOUS ONCE
Status: COMPLETED | OUTPATIENT
Start: 2017-10-18 | End: 2017-10-18

## 2017-10-18 RX ADMIN — CYANOCOBALAMIN 1000 MCG: 1000 INJECTION INTRAMUSCULAR; SUBCUTANEOUS at 08:19

## 2017-10-18 NOTE — PROGRESS NOTES
After obtaining consent, and per orders of Dr. Popeye Padilla, injection of B-12 given in Right upper quad. gluteus by Fredis Treviño. Patient instructed to remain in clinic for 20 minutes afterwards, and to report any adverse reaction to me immediately.

## 2017-10-25 ENCOUNTER — OFFICE VISIT (OUTPATIENT)
Dept: PRIMARY CARE CLINIC | Age: 82
End: 2017-10-25

## 2017-10-25 DIAGNOSIS — E53.8 VITAMIN B 12 DEFICIENCY: Primary | ICD-10-CM

## 2017-10-30 ENCOUNTER — OFFICE VISIT (OUTPATIENT)
Dept: CARDIOLOGY | Facility: CLINIC | Age: 82
End: 2017-10-30

## 2017-10-30 VITALS
WEIGHT: 124 LBS | DIASTOLIC BLOOD PRESSURE: 72 MMHG | BODY MASS INDEX: 27.9 KG/M2 | HEIGHT: 56 IN | SYSTOLIC BLOOD PRESSURE: 132 MMHG | HEART RATE: 68 BPM | OXYGEN SATURATION: 96 %

## 2017-10-30 DIAGNOSIS — R07.2 PRECORDIAL PAIN: ICD-10-CM

## 2017-10-30 DIAGNOSIS — E11.8 TYPE 2 DIABETES MELLITUS WITH COMPLICATION, UNSPECIFIED LONG TERM INSULIN USE STATUS: ICD-10-CM

## 2017-10-30 DIAGNOSIS — R00.2 PALPITATIONS: ICD-10-CM

## 2017-10-30 DIAGNOSIS — K21.9 GASTROESOPHAGEAL REFLUX DISEASE WITHOUT ESOPHAGITIS: ICD-10-CM

## 2017-10-30 DIAGNOSIS — R94.31 ABNORMAL ECG: ICD-10-CM

## 2017-10-30 DIAGNOSIS — I10 ESSENTIAL HYPERTENSION: ICD-10-CM

## 2017-10-30 DIAGNOSIS — E78.2 MIXED HYPERLIPIDEMIA: Primary | ICD-10-CM

## 2017-10-30 PROCEDURE — 99214 OFFICE O/P EST MOD 30 MIN: CPT | Performed by: INTERNAL MEDICINE

## 2017-10-30 RX ORDER — DONEPEZIL HYDROCHLORIDE 10 MG/1
TABLET, FILM COATED ORAL
COMMUNITY
Start: 2017-10-23 | End: 2017-10-30 | Stop reason: DRUGHIGH

## 2017-10-30 NOTE — PROGRESS NOTES
Dalia Betts  [id number]  1935  81 y.o.  female    Referring Provider: Les Henderson MD    Reason for Follow-up Visit:Here for follow up after cardiac testing.    Subjective    Overall feeling well   No chest pain or shortness of breath   No palpitations  Compliant with medications and dietary advice  Good effort tolerance  Palpitations much better on beta blocker  No presyncope or syncope      Compliant with medications  Abnormal stress test in past with small ischemia on MPS in past      History of present illness:  Dalia Betts is a 81 y.o. yo female with history of Essential Hypertension   who presents today for   Chief Complaint   Patient presents with   • Coronary Artery Disease     3 wk f/u - results   .    History  Past Medical History:   Diagnosis Date   • Bell palsy    • Chest pain    • Diabetes mellitus    • HLD (hyperlipidemia)    • HTN (hypertension)    • MI (myocardial infarction)    • Palpitations    ,   Past Surgical History:   Procedure Laterality Date   • APPENDECTOMY     • CHOLECYSTECTOMY     • ELBOW FUSION     • EYE SURGERY     • HERNIA REPAIR     • HYSTERECTOMY     • SINUSOTOMY     ,   Family History   Problem Relation Age of Onset   • Pancreatitis Mother    • Heart disease Father    ,   Social History   Substance Use Topics   • Smoking status: Never Smoker   • Smokeless tobacco: Never Used   • Alcohol use No   ,     Medications  Current Outpatient Prescriptions   Medication Sig Dispense Refill   • donepezil (ARICEPT) 5 MG tablet Take 5 mg by mouth As Needed.     • Fructose-Dextrose-Phosphor Acd (EMETROL PO) Take 10 mL by mouth As Needed.     • gabapentin (NEURONTIN) 300 MG capsule Take 300 mg by mouth Daily.     • LORazepam (ATIVAN) 0.5 MG tablet Take 0.5 mg by mouth Every 8 (Eight) Hours As Needed for anxiety.     • metFORMIN (GLUCOPHAGE) 500 MG tablet Take 500 mg by mouth Daily.     • nitroglycerin (NITROSTAT) 0.4 MG SL tablet Place 0.4 mg under the tongue Every 5 (Five) Minutes As  "Needed for chest pain. Take no more than 3 doses in 15 minutes.     • polyethyl glycol-propyl glycol (SYSTANE) 0.4-0.3 % solution ophthalmic solution Every 1 (One) Hour As Needed.     • PRENATAL VIT-FE FUMARATE-FA PO Take  by mouth.     • Psyllium 30.9 % powder Take  by mouth As Needed.     • Red Yeast Rice Extract (RED YEAST RICE PO) Take 600 mg by mouth Daily.     • simvastatin (ZOCOR) 10 MG tablet Take 10 mg by mouth Daily.     • venlafaxine XR (EFFEXOR-XR) 37.5 MG 24 hr capsule Take 37.5 mg by mouth Daily.     • vitamin D (ERGOCALCIFEROL) 22208 units capsule capsule 1 (One) Time Per Week.     • metoprolol tartrate (LOPRESSOR) 25 MG tablet Take 1 tablet by mouth 2 (Two) Times a Day. 180 tablet 3     No current facility-administered medications for this visit.        Allergies:  Phenergan [promethazine hcl]    Review of Systems  Review of Systems   Constitution: Negative.   HENT: Negative.    Eyes: Negative.    Cardiovascular: Positive for chest pain. Negative for claudication, cyanosis, dyspnea on exertion, irregular heartbeat, leg swelling, near-syncope, orthopnea, palpitations, paroxysmal nocturnal dyspnea and syncope.   Respiratory: Negative.    Endocrine: Negative.    Hematologic/Lymphatic: Negative.    Skin: Negative.    Gastrointestinal: Negative for anorexia.   Genitourinary: Negative.    Neurological: Negative.    Psychiatric/Behavioral: Negative.        Objective     Physical Exam:  /72  Pulse 68  Ht 56\" (142.2 cm)  Wt 124 lb (56.2 kg)  SpO2 96%  BMI 27.8 kg/m2  Physical Exam   Constitutional: She appears well-developed.   HENT:   Head: Normocephalic.   Neck: Normal carotid pulses and no JVD present. No tracheal tenderness present. Carotid bruit is not present. No tracheal deviation and no edema present.   Cardiovascular: Regular rhythm and normal pulses.    Murmur heard.   Systolic murmur is present with a grade of 1/6   Pulmonary/Chest: Effort normal. No stridor.   Abdominal: Soft. "   Neurological: She is alert. She has normal strength. No cranial nerve deficit or sensory deficit.   Skin: Skin is warm.   Psychiatric: She has a normal mood and affect. Her speech is normal and behavior is normal.       Results Review:     Procedures    Assessment/Plan   Dalia was seen today for coronary artery disease.    Diagnoses and all orders for this visit:    Mixed hyperlipidemia    Essential hypertension    Palpitations    Type 2 diabetes mellitus with complication, unspecified long term insulin use status    Precordial pain    Abnormal ECG    Abnormal ECG inferior T wave changes    Gastroesophageal reflux disease without esophagitis    Other orders  -     metoprolol tartrate (LOPRESSOR) 25 MG tablet; Take 1 tablet by mouth 2 (Two) Times a Day.        No significant pedal edema. Compliant with medications and diet. Latest labs and medications reviewed.  Low risk stress echo with normal LVEF by echo cardiogram.       Plan:    No additional cardiac testing required at this point in time.   Suggest GI workup for non cardiac chest pain  Start over the counter antacids. Start Zantac 150 mg BID.  Start ECASA 81 mg daily regimen given risk factors and monitor for any signs of bleeding including red or dark stools. Fall precautions.    Patient is asked to monitor BP at home or work, several times per month and return with written values at next office visit.  Close follow up with you as scheduled.  Intensive factor modifications.  See order list.    Counseled regarding disease appropriate diet, fluid, caffeine, stimulants and sodium intake as well as importance of compliance to diet, exercise and regular follow up.  Avoid NSAIDS and COX2 inhibitors. Use Acetaminophen PRN.  Small frequent meals  Do not eat for 4 hours prior to sleeping  Avoid excessive spicy and hot food intake, restrict caffeine and chocolate intake  Sleep with head end of bed elevated 6 inches   · Change the factors that you can control. Ask your  caregiver for guidance concerning weight loss, quitting smoking, and alcohol consumption.  · Avoid foods and drinks that make your symptoms worse, such as:  ¨ Caffeine or alcoholic drinks.  ¨ Chocolate.  ¨ Peppermint or mint flavorings.  ¨ Garlic and onions.  ¨ Spicy foods.  ¨ Citrus fruits, such as oranges, jaret, or limes.  ¨ Tomato-based foods such as sauce, chili, salsa, and pizza.  ¨ Fried and fatty foods.  · Avoid lying down for the 3 hours prior to your bedtime or prior to taking a nap.  · Eat small, frequent meals instead of large meals.  · Wear loose-fitting clothing. Do not wear anything tight around your waist that causes pressure on your stomach.  · Raise the head of your bed 6 to 8 inches with wood blocks to help you sleep. Extra pillows will not help.  · Only take over-the-counter or prescription medicines for pain, discomfort, or fever as directed by your caregiver.  · Do not take aspirin, ibuprofen, or other nonsteroidal anti-inflammatory drugs (NSAIDs).     Return in about 6 months (around 4/30/2018).

## 2017-10-31 ENCOUNTER — TELEPHONE (OUTPATIENT)
Dept: CARDIOLOGY | Facility: CLINIC | Age: 82
End: 2017-10-31

## 2017-11-01 RX ORDER — ATENOLOL 25 MG/1
25 TABLET ORAL DAILY
Qty: 90 TABLET | Refills: 3 | Status: SHIPPED | OUTPATIENT
Start: 2017-11-01 | End: 2018-09-18 | Stop reason: SDUPTHER

## 2017-11-03 ENCOUNTER — OFFICE VISIT (OUTPATIENT)
Dept: PRIMARY CARE CLINIC | Age: 82
End: 2017-11-03
Payer: MEDICARE

## 2017-11-03 VITALS
BODY MASS INDEX: 22.71 KG/M2 | HEIGHT: 64 IN | OXYGEN SATURATION: 99 % | RESPIRATION RATE: 18 BRPM | TEMPERATURE: 97.4 F | WEIGHT: 133 LBS | HEART RATE: 52 BPM | DIASTOLIC BLOOD PRESSURE: 74 MMHG | SYSTOLIC BLOOD PRESSURE: 110 MMHG

## 2017-11-03 DIAGNOSIS — Z86.69 HISTORY OF BELL'S PALSY: ICD-10-CM

## 2017-11-03 DIAGNOSIS — E11.9 TYPE 2 DIABETES MELLITUS WITHOUT COMPLICATION, WITHOUT LONG-TERM CURRENT USE OF INSULIN (HCC): Primary | ICD-10-CM

## 2017-11-03 DIAGNOSIS — E53.8 VITAMIN B12 DEFICIENCY: ICD-10-CM

## 2017-11-03 LAB — HBA1C MFR BLD: 6.6 %

## 2017-11-03 PROCEDURE — G8484 FLU IMMUNIZE NO ADMIN: HCPCS | Performed by: NURSE PRACTITIONER

## 2017-11-03 PROCEDURE — 1036F TOBACCO NON-USER: CPT | Performed by: NURSE PRACTITIONER

## 2017-11-03 PROCEDURE — G8400 PT W/DXA NO RESULTS DOC: HCPCS | Performed by: NURSE PRACTITIONER

## 2017-11-03 PROCEDURE — 1090F PRES/ABSN URINE INCON ASSESS: CPT | Performed by: NURSE PRACTITIONER

## 2017-11-03 PROCEDURE — G8427 DOCREV CUR MEDS BY ELIG CLIN: HCPCS | Performed by: NURSE PRACTITIONER

## 2017-11-03 PROCEDURE — 1123F ACP DISCUSS/DSCN MKR DOCD: CPT | Performed by: NURSE PRACTITIONER

## 2017-11-03 PROCEDURE — 83036 HEMOGLOBIN GLYCOSYLATED A1C: CPT | Performed by: NURSE PRACTITIONER

## 2017-11-03 PROCEDURE — 4040F PNEUMOC VAC/ADMIN/RCVD: CPT | Performed by: NURSE PRACTITIONER

## 2017-11-03 PROCEDURE — G8420 CALC BMI NORM PARAMETERS: HCPCS | Performed by: NURSE PRACTITIONER

## 2017-11-03 PROCEDURE — 96372 THER/PROPH/DIAG INJ SC/IM: CPT | Performed by: NURSE PRACTITIONER

## 2017-11-03 PROCEDURE — 99214 OFFICE O/P EST MOD 30 MIN: CPT | Performed by: NURSE PRACTITIONER

## 2017-11-03 RX ORDER — CYANOCOBALAMIN 1000 UG/ML
1000 INJECTION INTRAMUSCULAR; SUBCUTANEOUS ONCE
Qty: 1 ML | Refills: 3 | Status: SHIPPED | OUTPATIENT
Start: 2017-11-03 | End: 2018-05-02

## 2017-11-03 RX ORDER — CYANOCOBALAMIN 1000 UG/ML
1000 INJECTION INTRAMUSCULAR; SUBCUTANEOUS ONCE
Status: COMPLETED | OUTPATIENT
Start: 2017-11-03 | End: 2017-11-03

## 2017-11-03 RX ADMIN — CYANOCOBALAMIN 1000 MCG: 1000 INJECTION INTRAMUSCULAR; SUBCUTANEOUS at 08:51

## 2017-11-03 NOTE — PROGRESS NOTES
After obtaining consent, and per orders of DILIA Velez, injection of cyanocobalamin 1,000mcg given in left deltoid by Marsha . Patient instructed to remain in clinic for 20 minutes afterwards, and to report any adverse reaction to me immediately.

## 2017-11-06 ASSESSMENT — ENCOUNTER SYMPTOMS
COLOR CHANGE: 0
COUGH: 0
RHINORRHEA: 0
SHORTNESS OF BREATH: 0
ABDOMINAL PAIN: 0
NAUSEA: 0
SORE THROAT: 0
EYE ITCHING: 0

## 2017-12-12 RX ORDER — CYANOCOBALAMIN 1000 UG/ML
1000 INJECTION INTRAMUSCULAR; SUBCUTANEOUS ONCE
Status: DISCONTINUED | OUTPATIENT
Start: 2017-12-12 | End: 2019-09-24

## 2018-01-05 ENCOUNTER — TELEPHONE (OUTPATIENT)
Dept: PRIMARY CARE CLINIC | Age: 83
End: 2018-01-05

## 2018-01-08 ENCOUNTER — HOSPITAL ENCOUNTER (OUTPATIENT)
Dept: DIABETES SERVICES | Facility: HOSPITAL | Age: 83
Discharge: HOME | End: 2018-01-08

## 2018-02-05 ENCOUNTER — TELEPHONE (OUTPATIENT)
Dept: PRIMARY CARE CLINIC | Age: 83
End: 2018-02-05

## 2018-02-05 RX ORDER — CYANOCOBALAMIN 1000 UG/ML
INJECTION INTRAMUSCULAR; SUBCUTANEOUS
Qty: 10 ML | Refills: 0 | Status: CANCELLED | OUTPATIENT
Start: 2018-02-05

## 2018-02-05 NOTE — TELEPHONE ENCOUNTER
Pt came into the office stating that she is going to [facility] this weekend. She will be gone for about a month. She is needing a script for her B 12 shot and also a script to administer the B-12 shot. Vol plus vitamin script. She also needs simvastatin (ZOCOR) 10 MG tablet. She is leaving on Saturday and needs these written scripts as soon as possible.

## 2018-02-06 RX ORDER — MULTIVIT-MIN 60/IRON FUM/FOLIC 27 MG-1 MG
1 TABLET ORAL DAILY
Qty: 30 TABLET | Refills: 5 | Status: SHIPPED | OUTPATIENT
Start: 2018-02-06 | End: 2018-02-09 | Stop reason: SDUPTHER

## 2018-02-06 RX ORDER — CYANOCOBALAMIN 1000 UG/ML
1000 INJECTION INTRAMUSCULAR; SUBCUTANEOUS
Qty: 10 ML | Refills: 0 | Status: SHIPPED | OUTPATIENT
Start: 2018-02-06 | End: 2018-02-09 | Stop reason: SDUPTHER

## 2018-02-06 RX ORDER — SIMVASTATIN 10 MG
10 TABLET ORAL EVERY EVENING
Qty: 90 TABLET | Refills: 3 | Status: CANCELLED | OUTPATIENT
Start: 2018-02-06

## 2018-02-06 RX ORDER — OSELTAMIVIR PHOSPHATE 75 MG/1
75 CAPSULE ORAL DAILY
Qty: 10 CAPSULE | Refills: 0 | Status: SHIPPED | OUTPATIENT
Start: 2018-02-06 | End: 2018-02-16

## 2018-02-09 RX ORDER — MULTIVIT-MIN 60/IRON FUM/FOLIC 27 MG-1 MG
1 TABLET ORAL DAILY
Qty: 30 TABLET | Refills: 5 | Status: SHIPPED | OUTPATIENT
Start: 2018-02-09 | End: 2018-02-09 | Stop reason: SDUPTHER

## 2018-02-09 RX ORDER — CYANOCOBALAMIN 1000 UG/ML
1000 INJECTION INTRAMUSCULAR; SUBCUTANEOUS
Qty: 10 ML | Refills: 0 | Status: SHIPPED | OUTPATIENT
Start: 2018-02-09 | End: 2019-09-24

## 2018-02-09 RX ORDER — MULTIVIT-MIN 60/IRON FUM/FOLIC 27 MG-1 MG
1 TABLET ORAL DAILY
Qty: 30 TABLET | Refills: 5 | Status: SHIPPED | OUTPATIENT
Start: 2018-02-09 | End: 2020-06-21

## 2018-02-09 RX ORDER — SIMVASTATIN 10 MG
10 TABLET ORAL EVERY EVENING
Qty: 90 TABLET | Refills: 3 | Status: SHIPPED | OUTPATIENT
Start: 2018-02-09 | End: 2018-02-09 | Stop reason: SDUPTHER

## 2018-02-09 RX ORDER — SIMVASTATIN 10 MG
10 TABLET ORAL EVERY EVENING
Qty: 90 TABLET | Refills: 3 | Status: SHIPPED | OUTPATIENT
Start: 2018-02-09 | End: 2018-05-02 | Stop reason: SDUPTHER

## 2018-02-26 ENCOUNTER — TELEPHONE (OUTPATIENT)
Dept: PRIMARY CARE CLINIC | Age: 83
End: 2018-02-26

## 2018-02-26 NOTE — TELEPHONE ENCOUNTER
[facility] called,patient on vacation and she would like the pharmacy to give her Vit B shot.  Informed that is fine by office

## 2018-03-10 LAB
AVERAGE GLUCOSE: 152
HBA1C MFR BLD: 6.6 %

## 2018-05-01 ENCOUNTER — OFFICE VISIT (OUTPATIENT)
Dept: CARDIOLOGY | Facility: CLINIC | Age: 83
End: 2018-05-01

## 2018-05-01 VITALS
SYSTOLIC BLOOD PRESSURE: 122 MMHG | BODY MASS INDEX: 21.51 KG/M2 | DIASTOLIC BLOOD PRESSURE: 60 MMHG | HEIGHT: 63 IN | HEART RATE: 98 BPM | WEIGHT: 121.4 LBS

## 2018-05-01 DIAGNOSIS — R00.1 SINUS BRADYCARDIA: ICD-10-CM

## 2018-05-01 DIAGNOSIS — I10 ESSENTIAL HYPERTENSION: ICD-10-CM

## 2018-05-01 DIAGNOSIS — E11.9 CONTROLLED TYPE 2 DIABETES MELLITUS WITHOUT COMPLICATION, WITHOUT LONG-TERM CURRENT USE OF INSULIN (HCC): ICD-10-CM

## 2018-05-01 DIAGNOSIS — R94.31 ABNORMAL ECG: ICD-10-CM

## 2018-05-01 DIAGNOSIS — E78.2 MIXED HYPERLIPIDEMIA: Primary | ICD-10-CM

## 2018-05-01 DIAGNOSIS — R00.2 PALPITATIONS: ICD-10-CM

## 2018-05-01 DIAGNOSIS — K21.9 GASTROESOPHAGEAL REFLUX DISEASE WITHOUT ESOPHAGITIS: ICD-10-CM

## 2018-05-01 PROCEDURE — 99214 OFFICE O/P EST MOD 30 MIN: CPT | Performed by: INTERNAL MEDICINE

## 2018-05-01 PROCEDURE — 93000 ELECTROCARDIOGRAM COMPLETE: CPT | Performed by: INTERNAL MEDICINE

## 2018-05-01 RX ORDER — MEMANTINE HYDROCHLORIDE 5 MG/1
5 TABLET ORAL DAILY
COMMUNITY
Start: 2018-01-23

## 2018-05-01 RX ORDER — ASPIRIN 81 MG/1
81 TABLET ORAL DAILY
COMMUNITY

## 2018-05-01 NOTE — PROGRESS NOTES
Dalia Betts  [id number]  1935  82 y.o.  female    Referring Provider: Les Henderson MD    Reason for Follow-up Visit: Here for routine follow up  Prior palpitations  Essential Hypertension       Subjective    Overall feeling well   No chest pain or shortness of breath   Occasional palpitations  No significant pedal edema  Compliant with medications and dietary advice  Good effort tolerance  No presyncope or syncope  Compliant with medications  Good effort tolerance          History of present illness:  Dalia Betts is a 82 y.o. yo female with history of Essential Hypertension   who presents today for   Chief Complaint   Patient presents with   • Coronary Artery Disease     6 Month follow up    .    History  Past Medical History:   Diagnosis Date   • Bell palsy    • Chest pain    • Diabetes mellitus    • HLD (hyperlipidemia)    • HTN (hypertension)    • MI (myocardial infarction)    • Palpitations    ,   Past Surgical History:   Procedure Laterality Date   • APPENDECTOMY     • CHOLECYSTECTOMY     • ELBOW FUSION     • EYE SURGERY     • HERNIA REPAIR     • HYSTERECTOMY     • SINUSOTOMY     ,   Family History   Problem Relation Age of Onset   • Pancreatitis Mother    • Heart disease Father    ,   Social History   Substance Use Topics   • Smoking status: Never Smoker   • Smokeless tobacco: Never Used   • Alcohol use No   ,     Medications  Current Outpatient Prescriptions   Medication Sig Dispense Refill   • aspirin 81 MG EC tablet Take 81 mg by mouth Daily.     • atenolol (TENORMIN) 25 MG tablet Take 1 tablet by mouth Daily. 90 tablet 3   • Cyanocobalamin (VITAMIN B12 SL) Place  under the tongue.     • donepezil (ARICEPT) 5 MG tablet Take 5 mg by mouth As Needed.     • Fructose-Dextrose-Phosphor Acd (EMETROL PO) Take 10 mL by mouth As Needed.     • gabapentin (NEURONTIN) 300 MG capsule Take 300 mg by mouth Daily.     • LORazepam (ATIVAN) 0.5 MG tablet Take 0.5 mg by mouth Every 8 (Eight) Hours As Needed for  "anxiety.     • memantine (NAMENDA) 5 MG tablet      • metFORMIN (GLUCOPHAGE) 500 MG tablet Take 500 mg by mouth Daily.     • nitroglycerin (NITROSTAT) 0.4 MG SL tablet Place 0.4 mg under the tongue Every 5 (Five) Minutes As Needed for chest pain. Take no more than 3 doses in 15 minutes.     • polyethyl glycol-propyl glycol (SYSTANE) 0.4-0.3 % solution ophthalmic solution Every 1 (One) Hour As Needed.     • PRENATAL VIT-FE FUMARATE-FA PO Take  by mouth.     • Psyllium 30.9 % powder Take  by mouth As Needed.     • simvastatin (ZOCOR) 10 MG tablet Take 10 mg by mouth Daily.     • venlafaxine XR (EFFEXOR-XR) 37.5 MG 24 hr capsule Take 37.5 mg by mouth Daily.     • vitamin D (ERGOCALCIFEROL) 42103 units capsule capsule 1 (One) Time Per Week.     • Red Yeast Rice Extract (RED YEAST RICE PO) Take 600 mg by mouth Daily.       No current facility-administered medications for this visit.        Allergies:  Phenergan [promethazine hcl]    Review of Systems  Review of Systems   Constitution: Negative.   HENT: Negative.    Eyes: Negative.    Cardiovascular: Positive for palpitations. Negative for chest pain, claudication, cyanosis, dyspnea on exertion, irregular heartbeat, leg swelling, near-syncope, orthopnea, paroxysmal nocturnal dyspnea and syncope.   Respiratory: Negative.    Endocrine: Negative.    Hematologic/Lymphatic: Negative.    Skin: Negative.    Gastrointestinal: Negative for anorexia.   Genitourinary: Negative.    Neurological: Negative.    Psychiatric/Behavioral: Negative.        Objective     Physical Exam:  /60 (BP Location: Left arm, Patient Position: Sitting)   Pulse 98   Ht 160 cm (63\")   Wt 55.1 kg (121 lb 6.4 oz)   BMI 21.51 kg/m²   Physical Exam   Constitutional: She appears well-developed.   HENT:   Head: Normocephalic.   Neck: Normal carotid pulses and no JVD present. No tracheal tenderness present. Carotid bruit is not present. No tracheal deviation and no edema present.   Cardiovascular: " Regular rhythm and normal pulses.    Murmur heard.   Systolic murmur is present with a grade of 1/6   Pulmonary/Chest: Effort normal. No stridor.   Abdominal: Soft.   Neurological: She is alert. She has normal strength. No cranial nerve deficit or sensory deficit.   Skin: Skin is warm.   Psychiatric: She has a normal mood and affect. Her speech is normal and behavior is normal.       Results Review:       ECG 12 Lead  Date/Time: 5/1/2018 9:31 AM  Performed by: GEORGETTE SIMPSON  Authorized by: GEORGETTE SIMPSON   Comparison: compared with previous ECG from 10/9/2017  Comparison to previous ECG: Sinus rate decreased from 55 to 46 beats per minute   Rhythm: sinus bradycardia  Rate: bradycardic  Conduction: conduction normal  ST Segments: ST segments normal  T Waves: T waves normal  QRS axis: normal  Other: no other findings  Clinical impression: abnormal ECG            Assessment/Plan   Dalia was seen today for coronary artery disease.    Diagnoses and all orders for this visit:    Mixed hyperlipidemia  -     Lipid Panel; Future    Essential hypertension  -     ECG 12 Lead  -     CBC & Differential; Future  -     Comprehensive Metabolic Panel; Future    Sinus bradycardia    Palpitations  -     ECG 12 Lead  -     TSH; Future  -     Holter Monitor - 24 Hour; Future    Controlled type 2 diabetes mellitus without complication, without long-term current use of insulin  -     Hemoglobin A1c; Future    Gastroesophageal reflux disease without esophagitis    Abnormal ECG        No significant pedal edema. Compliant with medications and diet. Latest labs and medications reviewed.  Low risk stress echo with normal LVEF by echo cardiogram.       Plan:    Decrease Atenolol from 25 to 12.5 mg daily    Holter in 2 weeks    Low salt/ HTN/ Heart healthy carbohydrate restricted cardiac diet as applicable to this patient's current diagnoses.   This handout has relevant information regarding shopping for food, preparing meals, what to eat at  "restaurants, tracking of food intake, information regarding sodium intake and salt content, how to read food labels, knowing what to eat, tips reagarding physical activity, calorie count and calorie expenditure. What foods to avoid. Information regarding alcoholic drinks along with \"good\" and \"bad\" fats.  Relevant printed educational materials given pertinent to above diagnoses      The patient is advised to reduce or avoid caffeine or other cardiac stimulants.      The patient was advised that NSAID-type medications have three  very important potential side effects: cardiovascular complications, gastrointestinal irritation including hemorrhage and renal injuries.  Do not use anti-inflammatories such as Motrin/ibuprofen, Alleve/naprosyn, Mobic and like medications Use Tylenol instead.The patient expresses understanding of these issues and questions were answered.   Monitor for any signs of bleeding including red or dark stools. Fall precautions.       Monitor for any signs of bleeding including red or dark stools. Fall precautions.   Patient is asked to monitor BP at home or work, several times per month and return with written values at next office visit.     Orders Placed This Encounter   Procedures   • Comprehensive Metabolic Panel     Standing Status:   Future     Standing Expiration Date:   5/1/2019   • Hemoglobin A1c     Standing Status:   Future     Standing Expiration Date:   5/1/2019   • TSH     Standing Status:   Future     Standing Expiration Date:   5/1/2019   • Lipid Panel     Standing Status:   Future     Standing Expiration Date:   5/1/2019   • Holter Monitor - 24 Hour     Standing Status:   Future     Standing Expiration Date:   5/1/2019     Order Specific Question:   Reason for exam?     Answer:   Palpitations   • ECG 12 Lead     This order was created via procedure documentation   • CBC & Differential     Standing Status:   Future     Standing Expiration Date:   5/1/2019     Order Specific " Question:   Manual Differential     Answer:   No    Patient wants me to order labs    Gave a copy of my notes and relevant tests/ prior ECG etc for the patient to review and follow specific advise and relevant findings if any, prognosis, along with my current and future plans.       Return in about 8 weeks (around 6/26/2018).

## 2018-05-02 ENCOUNTER — OFFICE VISIT (OUTPATIENT)
Dept: PRIMARY CARE CLINIC | Age: 83
End: 2018-05-02
Payer: MEDICARE

## 2018-05-02 ENCOUNTER — LAB (OUTPATIENT)
Dept: LAB | Facility: HOSPITAL | Age: 83
End: 2018-05-02
Attending: INTERNAL MEDICINE

## 2018-05-02 VITALS
HEIGHT: 63 IN | BODY MASS INDEX: 21.44 KG/M2 | DIASTOLIC BLOOD PRESSURE: 80 MMHG | OXYGEN SATURATION: 98 % | SYSTOLIC BLOOD PRESSURE: 110 MMHG | HEART RATE: 53 BPM | TEMPERATURE: 98 F | RESPIRATION RATE: 20 BRPM | WEIGHT: 121 LBS

## 2018-05-02 DIAGNOSIS — K59.04 CHRONIC IDIOPATHIC CONSTIPATION: ICD-10-CM

## 2018-05-02 DIAGNOSIS — E11.9 CONTROLLED TYPE 2 DIABETES MELLITUS WITHOUT COMPLICATION, WITHOUT LONG-TERM CURRENT USE OF INSULIN (HCC): ICD-10-CM

## 2018-05-02 DIAGNOSIS — M21.612 BILATERAL BUNIONS: ICD-10-CM

## 2018-05-02 DIAGNOSIS — R00.2 PALPITATIONS: ICD-10-CM

## 2018-05-02 DIAGNOSIS — E11.42 DIABETIC PERIPHERAL NEUROPATHY ASSOCIATED WITH TYPE 2 DIABETES MELLITUS (HCC): ICD-10-CM

## 2018-05-02 DIAGNOSIS — Z00.00 ROUTINE GENERAL MEDICAL EXAMINATION AT A HEALTH CARE FACILITY: Primary | ICD-10-CM

## 2018-05-02 DIAGNOSIS — M21.611 BILATERAL BUNIONS: ICD-10-CM

## 2018-05-02 DIAGNOSIS — R09.89 POOR PERIPHERAL CIRCULATION: ICD-10-CM

## 2018-05-02 DIAGNOSIS — I10 ESSENTIAL HYPERTENSION: ICD-10-CM

## 2018-05-02 DIAGNOSIS — E78.2 MIXED HYPERLIPIDEMIA: ICD-10-CM

## 2018-05-02 LAB
ALBUMIN SERPL-MCNC: 4.4 G/DL (ref 3.5–5)
ALBUMIN/GLOB SERPL: 1.6 G/DL (ref 1.1–2.5)
ALP SERPL-CCNC: 51 U/L (ref 24–120)
ALT SERPL W P-5'-P-CCNC: 22 U/L (ref 0–54)
ANION GAP SERPL CALCULATED.3IONS-SCNC: 10 MMOL/L (ref 4–13)
ARTICHOKE IGE QN: 81 MG/DL (ref 0–99)
AST SERPL-CCNC: 25 U/L (ref 7–45)
BASOPHILS # BLD AUTO: 0.04 10*3/MM3 (ref 0–0.2)
BASOPHILS NFR BLD AUTO: 0.4 % (ref 0–2)
BILIRUB SERPL-MCNC: 0.4 MG/DL (ref 0.1–1)
BUN BLD-MCNC: 14 MG/DL (ref 5–21)
BUN/CREAT SERPL: 18.9 (ref 7–25)
CALCIUM SPEC-SCNC: 9.8 MG/DL (ref 8.4–10.4)
CHLORIDE SERPL-SCNC: 104 MMOL/L (ref 98–110)
CHOLEST SERPL-MCNC: 165 MG/DL (ref 130–200)
CO2 SERPL-SCNC: 29 MMOL/L (ref 24–31)
CREAT BLD-MCNC: 0.74 MG/DL (ref 0.5–1.4)
DEPRECATED RDW RBC AUTO: 43.1 FL (ref 40–54)
EOSINOPHIL # BLD AUTO: 0.05 10*3/MM3 (ref 0–0.7)
EOSINOPHIL NFR BLD AUTO: 0.5 % (ref 0–4)
ERYTHROCYTE [DISTWIDTH] IN BLOOD BY AUTOMATED COUNT: 13.2 % (ref 12–15)
GFR SERPL CREATININE-BSD FRML MDRD: 75 ML/MIN/1.73
GLOBULIN UR ELPH-MCNC: 2.7 GM/DL
GLUCOSE BLD-MCNC: 101 MG/DL (ref 70–100)
HBA1C MFR BLD: 6 %
HCT VFR BLD AUTO: 40.2 % (ref 37–47)
HDLC SERPL-MCNC: 50 MG/DL
HGB BLD-MCNC: 13.1 G/DL (ref 12–16)
IMM GRANULOCYTES # BLD: 0.03 10*3/MM3 (ref 0–0.03)
IMM GRANULOCYTES NFR BLD: 0.3 % (ref 0–5)
LDLC/HDLC SERPL: 1.18 {RATIO}
LYMPHOCYTES # BLD AUTO: 4.26 10*3/MM3 (ref 0.72–4.86)
LYMPHOCYTES NFR BLD AUTO: 46.7 % (ref 15–45)
MCH RBC QN AUTO: 29.1 PG (ref 28–32)
MCHC RBC AUTO-ENTMCNC: 32.6 G/DL (ref 33–36)
MCV RBC AUTO: 89.3 FL (ref 82–98)
MONOCYTES # BLD AUTO: 0.72 10*3/MM3 (ref 0.19–1.3)
MONOCYTES NFR BLD AUTO: 7.9 % (ref 4–12)
NEUTROPHILS # BLD AUTO: 4.02 10*3/MM3 (ref 1.87–8.4)
NEUTROPHILS NFR BLD AUTO: 44.2 % (ref 39–78)
NRBC BLD MANUAL-RTO: 0 /100 WBC (ref 0–0)
PLATELET # BLD AUTO: 301 10*3/MM3 (ref 130–400)
PMV BLD AUTO: 11 FL (ref 6–12)
POTASSIUM BLD-SCNC: 4.1 MMOL/L (ref 3.5–5.3)
PROT SERPL-MCNC: 7.1 G/DL (ref 6.3–8.7)
RBC # BLD AUTO: 4.5 10*6/MM3 (ref 4.2–5.4)
SODIUM BLD-SCNC: 143 MMOL/L (ref 135–145)
TRIGL SERPL-MCNC: 281 MG/DL (ref 0–149)
TSH SERPL DL<=0.05 MIU/L-ACNC: 1.97 MIU/ML (ref 0.47–4.68)
WBC NRBC COR # BLD: 9.12 10*3/MM3 (ref 4.8–10.8)

## 2018-05-02 PROCEDURE — G0439 PPPS, SUBSEQ VISIT: HCPCS | Performed by: FAMILY MEDICINE

## 2018-05-02 PROCEDURE — 36415 COLL VENOUS BLD VENIPUNCTURE: CPT

## 2018-05-02 PROCEDURE — 85025 COMPLETE CBC W/AUTO DIFF WBC: CPT | Performed by: INTERNAL MEDICINE

## 2018-05-02 PROCEDURE — G8400 PT W/DXA NO RESULTS DOC: HCPCS | Performed by: FAMILY MEDICINE

## 2018-05-02 PROCEDURE — 83036 HEMOGLOBIN GLYCOSYLATED A1C: CPT | Performed by: FAMILY MEDICINE

## 2018-05-02 PROCEDURE — 1123F ACP DISCUSS/DSCN MKR DOCD: CPT | Performed by: FAMILY MEDICINE

## 2018-05-02 PROCEDURE — G8420 CALC BMI NORM PARAMETERS: HCPCS | Performed by: FAMILY MEDICINE

## 2018-05-02 PROCEDURE — 84443 ASSAY THYROID STIM HORMONE: CPT | Performed by: INTERNAL MEDICINE

## 2018-05-02 PROCEDURE — 4040F PNEUMOC VAC/ADMIN/RCVD: CPT | Performed by: FAMILY MEDICINE

## 2018-05-02 PROCEDURE — G8427 DOCREV CUR MEDS BY ELIG CLIN: HCPCS | Performed by: FAMILY MEDICINE

## 2018-05-02 PROCEDURE — 80053 COMPREHEN METABOLIC PANEL: CPT | Performed by: INTERNAL MEDICINE

## 2018-05-02 PROCEDURE — 83036 HEMOGLOBIN GLYCOSYLATED A1C: CPT | Performed by: INTERNAL MEDICINE

## 2018-05-02 PROCEDURE — 99213 OFFICE O/P EST LOW 20 MIN: CPT | Performed by: FAMILY MEDICINE

## 2018-05-02 PROCEDURE — 1090F PRES/ABSN URINE INCON ASSESS: CPT | Performed by: FAMILY MEDICINE

## 2018-05-02 PROCEDURE — 1036F TOBACCO NON-USER: CPT | Performed by: FAMILY MEDICINE

## 2018-05-02 PROCEDURE — 80061 LIPID PANEL: CPT | Performed by: INTERNAL MEDICINE

## 2018-05-02 RX ORDER — SIMVASTATIN 10 MG
10 TABLET ORAL EVERY EVENING
Qty: 90 TABLET | Refills: 3 | Status: SHIPPED | OUTPATIENT
Start: 2018-05-02 | End: 2019-03-27 | Stop reason: SDUPTHER

## 2018-05-02 ASSESSMENT — ENCOUNTER SYMPTOMS
CONSTIPATION: 0
SORE THROAT: 0
COUGH: 0
RHINORRHEA: 0
CHEST TIGHTNESS: 0
ABDOMINAL PAIN: 0
COLOR CHANGE: 0
VOMITING: 0
EYE ITCHING: 0
WHEEZING: 0
DIARRHEA: 0
NAUSEA: 0
SHORTNESS OF BREATH: 0

## 2018-05-02 ASSESSMENT — PATIENT HEALTH QUESTIONNAIRE - PHQ9: SUM OF ALL RESPONSES TO PHQ QUESTIONS 1-9: 0

## 2018-05-02 ASSESSMENT — ANXIETY QUESTIONNAIRES: GAD7 TOTAL SCORE: 0

## 2018-05-02 ASSESSMENT — LIFESTYLE VARIABLES: HOW OFTEN DO YOU HAVE A DRINK CONTAINING ALCOHOL: 0

## 2018-06-01 DIAGNOSIS — E13.8 DIABETES MELLITUS OF OTHER TYPE WITH COMPLICATION, UNSPECIFIED LONG TERM INSULIN USE STATUS: Primary | ICD-10-CM

## 2018-07-23 ENCOUNTER — TELEPHONE (OUTPATIENT)
Dept: CARDIOLOGY | Facility: CLINIC | Age: 83
End: 2018-07-23

## 2018-07-23 NOTE — TELEPHONE ENCOUNTER
PT WOULD LIKE TO KNOW THE HOLTER RESULTS DONE ON 07/11/2018.  RESULTS ARE IN MEDIA TAB    PLEASE ADVISE

## 2018-07-24 NOTE — TELEPHONE ENCOUNTER
· Average HR: 71. Min HR: 44. Max HR: 115.     · The predominant rhythm noted during the testing period was sinus rhythm.  · Total ectopy burden during the monitoring period; PVCs:  22      and APCs: 200  · Total ectopy burden during the monitoring period; PVCs:  <0.1% and APCs: 0.2 %          · No significant  ventricular tachy or edi arrhythmia.  · 1    supraventricular tachycardia episodes  7  beats at maximum rate of   124   BPM at 4:33 pm  · No correlated arrhythmia  · No significant pauses  · Entire report was reviewed    Keep follow up as scheduled or PRN sooner if any new or worsening problem.

## 2018-08-07 RX ORDER — CYANOCOBALAMIN 1000 UG/ML
INJECTION INTRAMUSCULAR; SUBCUTANEOUS
Qty: 10 ML | Refills: 0 | Status: SHIPPED | OUTPATIENT
Start: 2018-08-07 | End: 2019-09-24

## 2018-09-18 ENCOUNTER — OFFICE VISIT (OUTPATIENT)
Dept: CARDIOLOGY | Facility: CLINIC | Age: 83
End: 2018-09-18

## 2018-09-18 VITALS
DIASTOLIC BLOOD PRESSURE: 69 MMHG | WEIGHT: 121 LBS | OXYGEN SATURATION: 98 % | HEIGHT: 64 IN | SYSTOLIC BLOOD PRESSURE: 130 MMHG | BODY MASS INDEX: 20.66 KG/M2 | HEART RATE: 57 BPM

## 2018-09-18 DIAGNOSIS — R00.1 SINUS BRADYCARDIA: ICD-10-CM

## 2018-09-18 DIAGNOSIS — R94.31 ABNORMAL ECG: ICD-10-CM

## 2018-09-18 DIAGNOSIS — K21.9 GASTROESOPHAGEAL REFLUX DISEASE WITHOUT ESOPHAGITIS: ICD-10-CM

## 2018-09-18 DIAGNOSIS — E78.2 MIXED HYPERLIPIDEMIA: ICD-10-CM

## 2018-09-18 DIAGNOSIS — E11.9 CONTROLLED TYPE 2 DIABETES MELLITUS WITHOUT COMPLICATION, WITHOUT LONG-TERM CURRENT USE OF INSULIN (HCC): ICD-10-CM

## 2018-09-18 DIAGNOSIS — R00.2 PALPITATIONS: Primary | ICD-10-CM

## 2018-09-18 DIAGNOSIS — I10 ESSENTIAL HYPERTENSION: ICD-10-CM

## 2018-09-18 PROCEDURE — 93000 ELECTROCARDIOGRAM COMPLETE: CPT | Performed by: INTERNAL MEDICINE

## 2018-09-18 PROCEDURE — 99214 OFFICE O/P EST MOD 30 MIN: CPT | Performed by: INTERNAL MEDICINE

## 2018-09-18 RX ORDER — ATENOLOL 25 MG/1
25 TABLET ORAL DAILY
Qty: 90 TABLET | Refills: 3 | Status: SHIPPED | OUTPATIENT
Start: 2018-09-18 | End: 2019-09-23 | Stop reason: SDUPTHER

## 2018-09-18 RX ORDER — NITROGLYCERIN 0.4 MG/1
0.4 TABLET SUBLINGUAL
Qty: 100 TABLET | Refills: 11 | Status: SHIPPED | OUTPATIENT
Start: 2018-09-18 | End: 2020-04-27

## 2018-09-18 NOTE — PROGRESS NOTES
Dalia Betts  [id number]  1935  82 y.o.  female    Referring Provider: Les Henderson MD    Reason for Follow-up Visit: Here for routine follow up  Prior palpitations, now better  Essential Hypertension       Subjective    Overall feeling well   No chest pain or shortness of breath   Occasional palpitations  No significant pedal edema  Compliant with medications and dietary advice  Good effort tolerance  No presyncope or syncope  Compliant with medications  Good effort tolerance      History of present illness:  Dalia Betts is a 82 y.o. yo female with history of Essential Hypertension   who presents today for   Chief Complaint   Patient presents with   • Palpitations     4 mo f/u - recent holter & labs   .    History  Past Medical History:   Diagnosis Date   • Bell palsy    • Chest pain    • Diabetes mellitus (CMS/HCC)    • HLD (hyperlipidemia)    • HTN (hypertension)    • MI (myocardial infarction)    • Palpitations    ,   Past Surgical History:   Procedure Laterality Date   • APPENDECTOMY     • CHOLECYSTECTOMY     • ELBOW FUSION     • EYE SURGERY      cataract extraction x 2   • HERNIA REPAIR     • HYSTERECTOMY     • SINUSOTOMY     ,   Family History   Problem Relation Age of Onset   • Pancreatitis Mother    • Heart disease Father    ,   Social History   Substance Use Topics   • Smoking status: Never Smoker   • Smokeless tobacco: Never Used   • Alcohol use No   ,     Medications  Current Outpatient Prescriptions   Medication Sig Dispense Refill   • aspirin 81 MG EC tablet Take 81 mg by mouth Daily.     • atenolol (TENORMIN) 25 MG tablet Take 1 tablet by mouth Daily. 90 tablet 3   • Cyanocobalamin (B-12 COMPLIANCE INJECTION IJ) Inject  as directed Every 30 (Thirty) Days.     • donepezil (ARICEPT) 5 MG tablet Take 5 mg by mouth As Needed.     • Fructose-Dextrose-Phosphor Acd (EMETROL PO) Take 10 mL by mouth As Needed.     • gabapentin (NEURONTIN) 300 MG capsule Take 300 mg by mouth Daily.     •  "LORazepam (ATIVAN) 0.5 MG tablet Take 0.5 mg by mouth Every 8 (Eight) Hours As Needed for anxiety.     • memantine (NAMENDA) 5 MG tablet Take 5 mg by mouth Daily.     • metFORMIN (GLUCOPHAGE) 500 MG tablet Take 500 mg by mouth Daily.     • nitroglycerin (NITROSTAT) 0.4 MG SL tablet Place 1 tablet under the tongue Every 5 (Five) Minutes As Needed for Chest Pain. Take no more than 3 doses in 15 minutes. 100 tablet 11   • polyethyl glycol-propyl glycol (SYSTANE) 0.4-0.3 % solution ophthalmic solution Every 1 (One) Hour As Needed.     • PRENATAL VIT-FE FUMARATE-FA PO Take  by mouth 1 (One) Time Per Week.     • Psyllium 30.9 % powder Take  by mouth As Needed.     • simvastatin (ZOCOR) 10 MG tablet Take 10 mg by mouth Daily.     • venlafaxine XR (EFFEXOR-XR) 37.5 MG 24 hr capsule Take 37.5 mg by mouth Daily.       No current facility-administered medications for this visit.        Allergies:  Phenergan [promethazine hcl]    Review of Systems  Review of Systems   Constitution: Negative for weakness and malaise/fatigue.   HENT: Negative.    Eyes: Negative.    Cardiovascular: Positive for palpitations. Negative for chest pain, claudication, cyanosis, dyspnea on exertion, irregular heartbeat, leg swelling, near-syncope, orthopnea, paroxysmal nocturnal dyspnea and syncope.   Respiratory: Negative.    Endocrine: Negative.    Hematologic/Lymphatic: Negative.    Skin: Negative.    Gastrointestinal: Negative for anorexia.   Genitourinary: Negative.    Psychiatric/Behavioral: Negative.        Objective     Physical Exam:  /69   Pulse 57   Ht 162.6 cm (64\")   Wt 54.9 kg (121 lb)   SpO2 98%   BMI 20.77 kg/m²   Physical Exam   Constitutional: She appears well-developed.   HENT:   Head: Normocephalic.   Neck: Normal carotid pulses and no JVD present. No tracheal tenderness present. Carotid bruit is not present. No tracheal deviation and no edema present.   Cardiovascular: Regular rhythm and normal pulses.    Murmur heard.   " Systolic murmur is present with a grade of 1/6   Pulmonary/Chest: Effort normal. No stridor.   Abdominal: Soft.   Neurological: She is alert. She has normal strength. No cranial nerve deficit or sensory deficit.   Skin: Skin is warm.   Psychiatric: She has a normal mood and affect. Her speech is normal and behavior is normal.       Results Review:       ECG 12 Lead  Date/Time: 9/18/2018 9:19 AM  Performed by: GEORGETTE SIMPSON  Authorized by: GEORGETTE SIMPSON   Comparison: compared with previous ECG from 5/1/2018  Similar to previous ECG  Comparison to previous ECG: Ventricular rate increased from  46  to 51    beats per minute    Rhythm: sinus bradycardia  Rate: bradycardic  Conduction: conduction normal  ST Segments: ST segments normal  T Waves: T waves normal  QRS axis: normal  Other: no other findings  Clinical impression: abnormal ECG            Assessment/Plan   Dalia was seen today for palpitations.    Diagnoses and all orders for this visit:    Palpitations    Abnormal ECG    Sinus bradycardia    Mixed hyperlipidemia    Controlled type 2 diabetes mellitus without complication, without long-term current use of insulin (CMS/[facility])    Gastroesophageal reflux disease without esophagitis    Essential hypertension    Other orders  -     ECG 12 Lead  -     atenolol (TENORMIN) 25 MG tablet; Take 1 tablet by mouth Daily.  -     nitroglycerin (NITROSTAT) 0.4 MG SL tablet; Place 1 tablet under the tongue Every 5 (Five) Minutes As Needed for Chest Pain. Take no more than 3 doses in 15 minutes.        No significant pedal edema. Compliant with medications and diet. Latest labs and medications reviewed.  Low risk stress echo with normal LVEF by echo cardiogram.       Plan:    She is now taking Atenolol 25 daily and at times 12.5 mg daily       Low salt/ HTN/ Heart healthy carbohydrate restricted cardiac diet as applicable to this patient's current diagnoses.   This handout has relevant information regarding shopping for food,  "preparing meals, what to eat at restaurants, tracking of food intake, information regarding sodium intake and salt content, how to read food labels, knowing what to eat, tips reagarding physical activity, calorie count and calorie expenditure. What foods to avoid. Information regarding alcoholic drinks along with \"good\" and \"bad\" fats.  Reiterated prior recommendations     The patient is advised to reduce or avoid caffeine or other cardiac stimulants.     The patient was advised that NSAID-type medications have three  very important potential side effects: cardiovascular complications, gastrointestinal irritation including hemorrhage and renal injuries.  Do not use anti-inflammatories such as Motrin/ibuprofen, Alleve/naprosyn, Mobic and like medications Use Tylenol instead.The patient expresses understanding of these issues and questions were answered.   Monitor for any signs of bleeding including red or dark stools. Fall precautions.       Monitor for any signs of bleeding including red or dark stools. Fall precautions.   Patient is asked to monitor BP at home or work, several times per month and return with written values at next office visit.     Advised staying uptodate with immunizations per established standard guidelines.    Reviewed available prior notes, consults, prior visits, laboratory findings, radiology And cardiology relevant reports. Updated chart as applicable. I have reviewed the patient's medical history in detail and updated the computerized patient record as relevant.      Offered to give patient  a copy of my notes and relevant tests/ prior ECG etc for the patient to review and follow specific advise and relevant findings if any, prognosis, along with my current and future plans.      Questions were encouraged, asked and answered to the patient's  understanding and satisfaction. Questions if any regarding current medications and side effects, need for refills and importance of compliance to " medications stressed.    Reviewed available prior notes, consults, prior visits, laboratory findings, radiology and cardiology relevant reports. Updated chart as applicable. I have reviewed the patient's medical history in detail and updated the computerized patient record as relevant.    Updated patient regarding any new or relevant abnormalities on review of records or any new findings on physical exam. Mentioned to patient about purpose of visit and desirable health short and long term goals and objectives.    Monitor CBC, CMP, TSH (as indicated) and Lipid Panel by primary     No additional cardiac testing required at this point in time.           Return in about 6 months (around 3/18/2019).

## 2018-12-07 ENCOUNTER — OFFICE VISIT (OUTPATIENT)
Dept: PRIMARY CARE CLINIC | Age: 83
End: 2018-12-07
Payer: MEDICARE

## 2018-12-07 VITALS
OXYGEN SATURATION: 93 % | HEART RATE: 63 BPM | RESPIRATION RATE: 18 BRPM | HEIGHT: 64 IN | BODY MASS INDEX: 20.49 KG/M2 | SYSTOLIC BLOOD PRESSURE: 110 MMHG | WEIGHT: 120 LBS | TEMPERATURE: 98 F | DIASTOLIC BLOOD PRESSURE: 68 MMHG

## 2018-12-07 DIAGNOSIS — R14.0 GASSINESS: ICD-10-CM

## 2018-12-07 DIAGNOSIS — I10 ESSENTIAL HYPERTENSION: ICD-10-CM

## 2018-12-07 DIAGNOSIS — E11.42 DIABETIC PERIPHERAL NEUROPATHY ASSOCIATED WITH TYPE 2 DIABETES MELLITUS (HCC): ICD-10-CM

## 2018-12-07 DIAGNOSIS — K57.30 DIVERTICULOSIS OF LARGE INTESTINE WITHOUT HEMORRHAGE: ICD-10-CM

## 2018-12-07 DIAGNOSIS — R09.89 POOR PERIPHERAL CIRCULATION: Primary | ICD-10-CM

## 2018-12-07 PROCEDURE — 1123F ACP DISCUSS/DSCN MKR DOCD: CPT | Performed by: FAMILY MEDICINE

## 2018-12-07 PROCEDURE — 99214 OFFICE O/P EST MOD 30 MIN: CPT | Performed by: FAMILY MEDICINE

## 2018-12-07 PROCEDURE — 1101F PT FALLS ASSESS-DOCD LE1/YR: CPT | Performed by: FAMILY MEDICINE

## 2018-12-07 PROCEDURE — G8428 CUR MEDS NOT DOCUMENT: HCPCS | Performed by: FAMILY MEDICINE

## 2018-12-07 PROCEDURE — G8400 PT W/DXA NO RESULTS DOC: HCPCS | Performed by: FAMILY MEDICINE

## 2018-12-07 PROCEDURE — 4040F PNEUMOC VAC/ADMIN/RCVD: CPT | Performed by: FAMILY MEDICINE

## 2018-12-07 PROCEDURE — G8420 CALC BMI NORM PARAMETERS: HCPCS | Performed by: FAMILY MEDICINE

## 2018-12-07 PROCEDURE — G8482 FLU IMMUNIZE ORDER/ADMIN: HCPCS | Performed by: FAMILY MEDICINE

## 2018-12-07 PROCEDURE — 1090F PRES/ABSN URINE INCON ASSESS: CPT | Performed by: FAMILY MEDICINE

## 2018-12-07 PROCEDURE — 1036F TOBACCO NON-USER: CPT | Performed by: FAMILY MEDICINE

## 2018-12-07 RX ORDER — LORAZEPAM 0.5 MG/1
0.5 TABLET ORAL
COMMUNITY
End: 2018-12-07

## 2018-12-07 RX ORDER — DONEPEZIL HYDROCHLORIDE 5 MG/1
5 TABLET, FILM COATED ORAL DAILY
COMMUNITY

## 2018-12-07 RX ORDER — DOCUSATE SODIUM 100 MG/1
100 CAPSULE, LIQUID FILLED ORAL 2 TIMES DAILY
Qty: 60 CAPSULE | Refills: 5 | Status: SHIPPED | OUTPATIENT
Start: 2018-12-07 | End: 2019-01-06

## 2018-12-07 ASSESSMENT — ENCOUNTER SYMPTOMS
CONSTIPATION: 1
WHEEZING: 0
NAUSEA: 0
CHEST TIGHTNESS: 0
SHORTNESS OF BREATH: 0
VOMITING: 0
COUGH: 0
DIARRHEA: 0
ABDOMINAL PAIN: 1

## 2018-12-07 NOTE — PROGRESS NOTES
Dwight Melendez is a 81 y.o. female who presents today for   Chief Complaint   Patient presents with    Other       HPI  Patient is here for f/u circulation, dibaetes, and htn. Those are doing well. She is c/o GI issues w/ cramping and gassiness. Notes that she has h/o diverticular disease. No current bleeding. H/o mult abd surgeries. Notes inc belching and flatuence     No change in PMH, family, social, or surgical history unless mentioned above. Review of Systems   Constitutional: Negative for chills and fever. Respiratory: Negative for cough, chest tightness, shortness of breath and wheezing. Cardiovascular: Negative for chest pain, palpitations and leg swelling. Gastrointestinal: Positive for abdominal pain and constipation. Negative for diarrhea, nausea and vomiting. Genitourinary: Negative for difficulty urinating, dysuria and frequency. Past Medical History:   Diagnosis Date    Abdominal pain     Anxiety     Dr Sukhjinder Harper Bell's palsy 2003    Colon polyp     Depression     Dr Sukhjinder Harper Diverticulosis     Female bladder prolapse     Hernia, abdominal     History of adenomatous polyp of colon     Hypertriglyceridemia     Neuropathy     Osteopenia     Type II or unspecified type diabetes mellitus without mention of complication, not stated as uncontrolled     Vitamin B 12 deficiency        Current Outpatient Prescriptions   Medication Sig Dispense Refill    docusate sodium (COLACE) 100 MG capsule Take 1 capsule by mouth 2 times daily 60 capsule 5    triamcinolone (KENALOG) 0.1 % ointment Apply topically 2 times daily for 7 days 80 g 1    atenolol (TENORMIN) 25 MG tablet Take 0.5 tablets by mouth daily 30 tablet 0    venlafaxine (EFFEXOR-XR) 37.5 MG XR capsule Take 37.5 mg by mouth daily.       gabapentin (NEURONTIN) 300 MG capsule Take 300 mg by mouth nightly       donepezil (ARICEPT) 5 MG tablet Take 5 mg by mouth      Cyanocobalamin (B-12) 1000 MCG/ML KIT Administer IM

## 2018-12-14 DIAGNOSIS — R09.89 POOR PERIPHERAL CIRCULATION: ICD-10-CM

## 2018-12-14 DIAGNOSIS — I10 ESSENTIAL HYPERTENSION: ICD-10-CM

## 2018-12-14 DIAGNOSIS — E11.42 DIABETIC PERIPHERAL NEUROPATHY ASSOCIATED WITH TYPE 2 DIABETES MELLITUS (HCC): ICD-10-CM

## 2018-12-14 LAB
ALBUMIN SERPL-MCNC: 4.3 G/DL (ref 3.5–5.2)
ALP BLD-CCNC: 55 U/L (ref 35–104)
ALT SERPL-CCNC: 10 U/L (ref 5–33)
ANION GAP SERPL CALCULATED.3IONS-SCNC: 15 MMOL/L (ref 7–19)
AST SERPL-CCNC: 10 U/L (ref 5–32)
BILIRUB SERPL-MCNC: <0.2 MG/DL (ref 0.2–1.2)
BUN BLDV-MCNC: 11 MG/DL (ref 8–23)
CALCIUM SERPL-MCNC: 10.3 MG/DL (ref 8.8–10.2)
CHLORIDE BLD-SCNC: 102 MMOL/L (ref 98–111)
CHOLESTEROL, TOTAL: 171 MG/DL (ref 160–199)
CO2: 27 MMOL/L (ref 22–29)
CREAT SERPL-MCNC: 0.7 MG/DL (ref 0.5–0.9)
GFR NON-AFRICAN AMERICAN: >60
GLUCOSE BLD-MCNC: 123 MG/DL (ref 74–109)
HBA1C MFR BLD: 6.3 % (ref 4–6)
HCT VFR BLD CALC: 43 % (ref 37–47)
HDLC SERPL-MCNC: 51 MG/DL (ref 65–121)
HEMOGLOBIN: 13.6 G/DL (ref 12–16)
LDL CHOLESTEROL CALCULATED: 81 MG/DL
MCH RBC QN AUTO: 29.8 PG (ref 27–31)
MCHC RBC AUTO-ENTMCNC: 31.6 G/DL (ref 33–37)
MCV RBC AUTO: 94.3 FL (ref 81–99)
PDW BLD-RTO: 13 % (ref 11.5–14.5)
PLATELET # BLD: 372 K/UL (ref 130–400)
PMV BLD AUTO: 10 FL (ref 9.4–12.3)
POTASSIUM SERPL-SCNC: 4.6 MMOL/L (ref 3.5–5)
RBC # BLD: 4.56 M/UL (ref 4.2–5.4)
SODIUM BLD-SCNC: 144 MMOL/L (ref 136–145)
TOTAL PROTEIN: 7.5 G/DL (ref 6.6–8.7)
TRIGL SERPL-MCNC: 195 MG/DL (ref 0–149)
WBC # BLD: 8 K/UL (ref 4.8–10.8)

## 2018-12-15 LAB — TSH, 3RD GENERATION: 3.34 MU/L (ref 0.3–4)

## 2018-12-18 PROCEDURE — 90662 IIV NO PRSV INCREASED AG IM: CPT | Performed by: FAMILY MEDICINE

## 2018-12-18 PROCEDURE — G0008 ADMIN INFLUENZA VIRUS VAC: HCPCS | Performed by: FAMILY MEDICINE

## 2018-12-18 NOTE — PROGRESS NOTES
After obtaining consent, and per orders of Dr Juaquin Barrios , injection of influenza  was given in the Left Deltoid  by Darren Bernal  Pt tolerated well. Pt had no reaction. Pt was released from our office. Clinical judgement was used in reviewing patients medications, allergies, medical history, HM and instruction/education on the above injection. All questions where answered.

## 2019-01-08 DIAGNOSIS — E53.8 VITAMIN B 12 DEFICIENCY: Primary | ICD-10-CM

## 2019-01-17 ENCOUNTER — OFFICE VISIT (OUTPATIENT)
Dept: PRIMARY CARE CLINIC | Age: 84
End: 2019-01-17
Payer: MEDICARE

## 2019-01-17 DIAGNOSIS — E53.9 VITAMIN B DEFICIENCY: Primary | ICD-10-CM

## 2019-01-17 PROCEDURE — 96372 THER/PROPH/DIAG INJ SC/IM: CPT | Performed by: FAMILY MEDICINE

## 2019-01-17 RX ORDER — CYANOCOBALAMIN 1000 UG/ML
1000 INJECTION INTRAMUSCULAR; SUBCUTANEOUS ONCE
Status: COMPLETED | OUTPATIENT
Start: 2019-01-17 | End: 2019-01-17

## 2019-01-17 RX ADMIN — CYANOCOBALAMIN 1000 MCG: 1000 INJECTION INTRAMUSCULAR; SUBCUTANEOUS at 14:11

## 2019-01-28 ENCOUNTER — TELEPHONE (OUTPATIENT)
Dept: PRIMARY CARE CLINIC | Age: 84
End: 2019-01-28

## 2019-01-30 RX ORDER — ERGOCALCIFEROL 1.25 MG/1
50000 CAPSULE ORAL WEEKLY
Qty: 12 CAPSULE | Refills: 1 | Status: SHIPPED | OUTPATIENT
Start: 2019-01-30 | End: 2019-07-30 | Stop reason: SDUPTHER

## 2019-02-11 ENCOUNTER — OFFICE VISIT (OUTPATIENT)
Dept: PRIMARY CARE CLINIC | Age: 84
End: 2019-02-11
Payer: MEDICARE

## 2019-02-11 DIAGNOSIS — E53.8 VITAMIN B 12 DEFICIENCY: ICD-10-CM

## 2019-02-11 DIAGNOSIS — E53.8 VITAMIN B12 DEFICIENCY: Primary | ICD-10-CM

## 2019-02-25 PROCEDURE — 96372 THER/PROPH/DIAG INJ SC/IM: CPT | Performed by: FAMILY MEDICINE

## 2019-02-25 RX ADMIN — CYANOCOBALAMIN 1000 MCG: 1000 INJECTION INTRAMUSCULAR; SUBCUTANEOUS at 10:30

## 2019-02-27 RX ORDER — CYANOCOBALAMIN 1000 UG/ML
1000 INJECTION INTRAMUSCULAR; SUBCUTANEOUS ONCE
Status: COMPLETED | OUTPATIENT
Start: 2019-02-27 | End: 2019-02-25

## 2019-03-22 ENCOUNTER — OFFICE VISIT (OUTPATIENT)
Dept: PRIMARY CARE CLINIC | Age: 84
End: 2019-03-22
Payer: MEDICARE

## 2019-03-22 DIAGNOSIS — E53.8 B12 DEFICIENCY: Primary | ICD-10-CM

## 2019-03-22 PROCEDURE — 96372 THER/PROPH/DIAG INJ SC/IM: CPT | Performed by: NURSE PRACTITIONER

## 2019-03-22 RX ORDER — CYANOCOBALAMIN 1000 UG/ML
1000 INJECTION INTRAMUSCULAR; SUBCUTANEOUS ONCE
Status: COMPLETED | OUTPATIENT
Start: 2019-03-22 | End: 2019-03-22

## 2019-03-22 RX ADMIN — CYANOCOBALAMIN 1000 MCG: 1000 INJECTION INTRAMUSCULAR; SUBCUTANEOUS at 11:54

## 2019-03-27 ENCOUNTER — OFFICE VISIT (OUTPATIENT)
Dept: PRIMARY CARE CLINIC | Age: 84
End: 2019-03-27
Payer: MEDICARE

## 2019-03-27 VITALS
RESPIRATION RATE: 14 BRPM | TEMPERATURE: 97 F | HEART RATE: 59 BPM | BODY MASS INDEX: 21.51 KG/M2 | SYSTOLIC BLOOD PRESSURE: 108 MMHG | HEIGHT: 64 IN | WEIGHT: 126 LBS | DIASTOLIC BLOOD PRESSURE: 70 MMHG | OXYGEN SATURATION: 98 %

## 2019-03-27 DIAGNOSIS — Z86.69 HISTORY OF BELL'S PALSY: ICD-10-CM

## 2019-03-27 DIAGNOSIS — E11.42 DIABETIC PERIPHERAL NEUROPATHY ASSOCIATED WITH TYPE 2 DIABETES MELLITUS (HCC): Primary | ICD-10-CM

## 2019-03-27 DIAGNOSIS — R09.89 POOR PERIPHERAL CIRCULATION: ICD-10-CM

## 2019-03-27 DIAGNOSIS — R09.81 NASAL CONGESTION: ICD-10-CM

## 2019-03-27 DIAGNOSIS — E53.8 VITAMIN B 12 DEFICIENCY: ICD-10-CM

## 2019-03-27 PROCEDURE — G8420 CALC BMI NORM PARAMETERS: HCPCS | Performed by: FAMILY MEDICINE

## 2019-03-27 PROCEDURE — G8400 PT W/DXA NO RESULTS DOC: HCPCS | Performed by: FAMILY MEDICINE

## 2019-03-27 PROCEDURE — 4040F PNEUMOC VAC/ADMIN/RCVD: CPT | Performed by: FAMILY MEDICINE

## 2019-03-27 PROCEDURE — G8482 FLU IMMUNIZE ORDER/ADMIN: HCPCS | Performed by: FAMILY MEDICINE

## 2019-03-27 PROCEDURE — 1036F TOBACCO NON-USER: CPT | Performed by: FAMILY MEDICINE

## 2019-03-27 PROCEDURE — 1123F ACP DISCUSS/DSCN MKR DOCD: CPT | Performed by: FAMILY MEDICINE

## 2019-03-27 PROCEDURE — 99214 OFFICE O/P EST MOD 30 MIN: CPT | Performed by: FAMILY MEDICINE

## 2019-03-27 PROCEDURE — 1090F PRES/ABSN URINE INCON ASSESS: CPT | Performed by: FAMILY MEDICINE

## 2019-03-27 PROCEDURE — G8427 DOCREV CUR MEDS BY ELIG CLIN: HCPCS | Performed by: FAMILY MEDICINE

## 2019-03-27 RX ORDER — SIMVASTATIN 10 MG
10 TABLET ORAL EVERY EVENING
Qty: 90 TABLET | Refills: 3 | Status: SHIPPED | OUTPATIENT
Start: 2019-03-27 | End: 2019-03-27 | Stop reason: SDUPTHER

## 2019-03-27 RX ORDER — SIMVASTATIN 10 MG
10 TABLET ORAL EVERY EVENING
Qty: 15 TABLET | Refills: 0 | Status: SHIPPED | OUTPATIENT
Start: 2019-03-27 | End: 2019-05-06 | Stop reason: SDUPTHER

## 2019-03-27 RX ORDER — MEMANTINE HYDROCHLORIDE 5 MG/1
5 TABLET ORAL DAILY
COMMUNITY
Start: 2018-01-23

## 2019-03-27 RX ORDER — CYANOCOBALAMIN 1000 UG/ML
1000 INJECTION INTRAMUSCULAR; SUBCUTANEOUS ONCE
Qty: 1 ML | Refills: 0 | Status: SHIPPED | OUTPATIENT
Start: 2019-03-27 | End: 2019-03-27

## 2019-03-27 RX ORDER — FOLIC ACID 1 MG/1
1 TABLET ORAL DAILY
Qty: 90 TABLET | Refills: 1 | Status: SHIPPED | OUTPATIENT
Start: 2019-03-27 | End: 2020-01-24 | Stop reason: SDUPTHER

## 2019-03-27 RX ORDER — CYANOCOBALAMIN 1000 UG/ML
1000 INJECTION INTRAMUSCULAR; SUBCUTANEOUS
Qty: 10 ML | Refills: 5 | Status: SHIPPED | OUTPATIENT
Start: 2019-03-27 | End: 2019-09-24

## 2019-03-27 RX ORDER — DOCUSATE SODIUM 100 MG/1
CAPSULE, LIQUID FILLED ORAL
Refills: 5 | Status: ON HOLD | COMMUNITY
Start: 2019-03-06 | End: 2020-12-04 | Stop reason: SDUPTHER

## 2019-03-27 ASSESSMENT — PATIENT HEALTH QUESTIONNAIRE - PHQ9
SUM OF ALL RESPONSES TO PHQ9 QUESTIONS 1 & 2: 0
SUM OF ALL RESPONSES TO PHQ QUESTIONS 1-9: 0
1. LITTLE INTEREST OR PLEASURE IN DOING THINGS: 0
2. FEELING DOWN, DEPRESSED OR HOPELESS: 0
SUM OF ALL RESPONSES TO PHQ QUESTIONS 1-9: 0

## 2019-03-27 ASSESSMENT — ENCOUNTER SYMPTOMS
CHEST TIGHTNESS: 0
WHEEZING: 0
NAUSEA: 0
ABDOMINAL PAIN: 0
COUGH: 0
DIARRHEA: 0
SHORTNESS OF BREATH: 0
CONSTIPATION: 0
VOMITING: 0

## 2019-04-03 ENCOUNTER — TELEPHONE (OUTPATIENT)
Dept: PRIMARY CARE CLINIC | Age: 84
End: 2019-04-03

## 2019-04-03 DIAGNOSIS — Z23 NEED FOR PROPHYLACTIC VACCINATION AND INOCULATION AGAINST VARICELLA: Primary | ICD-10-CM

## 2019-04-03 NOTE — TELEPHONE ENCOUNTER
Patient wanted to know if we faxed the [employer] Sh[employer] paperwork to [employer]? I didn't see where we did, so I told her I would ask.

## 2019-04-09 ENCOUNTER — OFFICE VISIT (OUTPATIENT)
Dept: CARDIOLOGY | Facility: CLINIC | Age: 84
End: 2019-04-09

## 2019-04-09 VITALS
WEIGHT: 123 LBS | OXYGEN SATURATION: 97 % | DIASTOLIC BLOOD PRESSURE: 59 MMHG | HEART RATE: 67 BPM | SYSTOLIC BLOOD PRESSURE: 118 MMHG | HEIGHT: 64 IN | BODY MASS INDEX: 21 KG/M2

## 2019-04-09 DIAGNOSIS — R00.2 PALPITATIONS: ICD-10-CM

## 2019-04-09 DIAGNOSIS — R00.1 SINUS BRADYCARDIA: ICD-10-CM

## 2019-04-09 DIAGNOSIS — I10 ESSENTIAL HYPERTENSION: ICD-10-CM

## 2019-04-09 DIAGNOSIS — K21.9 GASTROESOPHAGEAL REFLUX DISEASE WITHOUT ESOPHAGITIS: ICD-10-CM

## 2019-04-09 DIAGNOSIS — E78.2 MIXED HYPERLIPIDEMIA: ICD-10-CM

## 2019-04-09 DIAGNOSIS — R94.31 ABNORMAL ECG: Primary | ICD-10-CM

## 2019-04-09 DIAGNOSIS — J33.9 NASAL POLYP: ICD-10-CM

## 2019-04-09 DIAGNOSIS — E11.9 CONTROLLED TYPE 2 DIABETES MELLITUS WITHOUT COMPLICATION, WITHOUT LONG-TERM CURRENT USE OF INSULIN (HCC): ICD-10-CM

## 2019-04-09 PROCEDURE — 93000 ELECTROCARDIOGRAM COMPLETE: CPT | Performed by: INTERNAL MEDICINE

## 2019-04-09 PROCEDURE — 99213 OFFICE O/P EST LOW 20 MIN: CPT | Performed by: INTERNAL MEDICINE

## 2019-04-09 RX ORDER — ERGOCALCIFEROL 1.25 MG/1
50000 CAPSULE ORAL WEEKLY
COMMUNITY

## 2019-04-09 NOTE — PROGRESS NOTES
aDlia Betts  [id number]  1935  83 y.o.  female    Referring Provider: Les Henderson MD    Reason for Follow-up Visit: Here for routine follow up      Prior palpitations, now better  Essential Hypertension  Walking much more       Subjective    Overall feeling well   No chest pain or shortness of breath     No palpitations  No significant pedal edema    Compliant with medications and dietary advice  Good effort tolerance    No presyncope or syncope  Compliant with medications     Wants to see ENT for right nasal polyp    BP well controlled at home.       History of present illness:  Dalia Betts is a 83 y.o. yo female with history of Essential Hypertension   who presents today for   Chief Complaint   Patient presents with   • Palpitations     6 mo f/u   .    History  Past Medical History:   Diagnosis Date   • Bell palsy    • Chest pain    • Diabetes mellitus (CMS/HCC)    • HLD (hyperlipidemia)    • HTN (hypertension)    • MI (myocardial infarction) (CMS/HCC)    • Palpitations    ,   Past Surgical History:   Procedure Laterality Date   • APPENDECTOMY     • CHOLECYSTECTOMY     • ELBOW FUSION     • EYE SURGERY      cataract extraction x 2   • HERNIA REPAIR     • HYSTERECTOMY     • SINUSOTOMY     ,   Family History   Problem Relation Age of Onset   • Pancreatitis Mother    • Heart disease Father    ,   Social History     Tobacco Use   • Smoking status: Never Smoker   • Smokeless tobacco: Never Used   Substance Use Topics   • Alcohol use: No   • Drug use: No   ,     Medications  Current Outpatient Medications   Medication Sig Dispense Refill   • aspirin 81 MG EC tablet Take 81 mg by mouth Daily.     • atenolol (TENORMIN) 25 MG tablet Take 1 tablet by mouth Daily. 90 tablet 3   • Bisacodyl (DUCODYL PO) Take  by mouth.     • Cyanocobalamin (B-12 COMPLIANCE INJECTION IJ) Inject  as directed Every 30 (Thirty) Days.     • donepezil (ARICEPT) 5 MG tablet Take 5 mg by mouth As Needed.     • FOLIC ACID PO Take  by  "mouth.     • Fructose-Dextrose-Phosphor Acd (EMETROL PO) Take 10 mL by mouth As Needed.     • gabapentin (NEURONTIN) 300 MG capsule Take 300 mg by mouth Daily.     • LORazepam (ATIVAN) 0.5 MG tablet Take 0.5 mg by mouth Every 8 (Eight) Hours As Needed for anxiety.     • memantine (NAMENDA) 5 MG tablet Take 5 mg by mouth Daily.     • metFORMIN (GLUCOPHAGE) 500 MG tablet Take 500 mg by mouth Daily.     • nitroglycerin (NITROSTAT) 0.4 MG SL tablet Place 1 tablet under the tongue Every 5 (Five) Minutes As Needed for Chest Pain. Take no more than 3 doses in 15 minutes. 100 tablet 11   • polyethyl glycol-propyl glycol (SYSTANE) 0.4-0.3 % solution ophthalmic solution Every 1 (One) Hour As Needed.     • Psyllium 30.9 % powder Take  by mouth As Needed.     • simvastatin (ZOCOR) 10 MG tablet Take 10 mg by mouth Daily.     • venlafaxine XR (EFFEXOR-XR) 37.5 MG 24 hr capsule Take 37.5 mg by mouth Daily.     • vitamin D (ERGOCALCIFEROL) 35891 units capsule capsule Take 50,000 Units by mouth 1 (One) Time Per Week.       No current facility-administered medications for this visit.        Allergies:  Phenergan [promethazine hcl]    Review of Systems  Review of Systems   Constitution: Negative for weakness and malaise/fatigue.   HENT: Negative.    Eyes: Negative.    Cardiovascular: Positive for palpitations. Negative for chest pain, claudication, cyanosis, dyspnea on exertion, irregular heartbeat, leg swelling, near-syncope, orthopnea, paroxysmal nocturnal dyspnea and syncope.   Respiratory: Negative.    Endocrine: Negative.    Hematologic/Lymphatic: Negative.    Skin: Negative.    Gastrointestinal: Negative for anorexia.   Genitourinary: Negative.    Psychiatric/Behavioral: Negative.        Objective     Physical Exam:  /59   Pulse 67   Ht 162.6 cm (64\")   Wt 55.8 kg (123 lb)   SpO2 97%   BMI 21.11 kg/m²   Physical Exam   Constitutional: She appears well-developed.   HENT:   Head: Normocephalic.   Neck: Normal carotid " pulses and no JVD present. No tracheal tenderness present. Carotid bruit is not present. No tracheal deviation and no edema present.   Cardiovascular: Regular rhythm and normal pulses.   Murmur heard.   Systolic murmur is present with a grade of 1/6.  Pulmonary/Chest: Effort normal. No stridor.   Abdominal: Soft.   Neurological: She is alert. She has normal strength. No cranial nerve deficit or sensory deficit.   Skin: Skin is warm.   Psychiatric: She has a normal mood and affect. Her speech is normal and behavior is normal.       Results Review:    Results for orders placed during the hospital encounter of 10/16/17   Adult Stress Echo W/ Cont or Stress Agent if Necessary Per Protocol    Narrative · Left ventricular systolic function is normal. Estimated EF = 55%.  · Normal stress echo with no significant echocardiographic evidence for   myocardial ischemia.             ECG 12 Lead  Date/Time: 4/9/2019 10:14 AM  Performed by: Selvin Cordova MD  Authorized by: Selvin Cordova MD   Comparison: compared with previous ECG from 9/18/2018  Comparison to previous ECG: Ventricular rate increased from   51   to    58   beats per minute   Rate: bradycardic  Conduction: conduction normal  ST Segments: ST segments normal  T Waves: T waves normal  QRS axis: normal    Clinical impression: abnormal EKG            Assessment/Plan   Dalia was seen today for palpitations.    Diagnoses and all orders for this visit:    Abnormal ECG    Controlled type 2 diabetes mellitus without complication, without long-term current use of insulin (CMS/[facility])    Mixed hyperlipidemia    Essential hypertension    Palpitations    Sinus bradycardia    Gastroesophageal reflux disease without esophagitis    Nasal polyp  -     Ambulatory Referral to ENT (Otolaryngology)    Other orders  -     ECG 12 Lead           Plan:    Continue taking current dose of Atenolol     _________________________________________________________________________________________________________________________________________  Health maintenance and recommendations      Low salt/ HTN/ Heart healthy carbohydrate restricted cardiac diet   The patient is advised to reduce or avoid caffeine or other cardiac stimulants.     The patient was advised that NSAID-type medications        Monitor for any signs of bleeding including red or dark stools. Fall precautions.        Advised staying uptodate with immunizations per established standard guidelines.      Offered to give patient  a copy      Questions were encouraged, asked and answered to the patient's  understanding and satisfaction. Questions if any regarding current medications and side effects, need for refills and importance of compliance to medications stressed.    Reviewed available prior notes, consults, prior visits, laboratory findings, radiology and cardiology relevant reports. Updated chart as applicable. I have reviewed the patient's medical history in detail and updated the computerized patient record as relevant.      Updated patient regarding any new or relevant abnormalities on review of records or any new findings on physical exam. Mentioned to patient about purpose of visit and desirable health short and long term goals and objectives.    Primary to monitor CBC CMP Lipid panel and TSH as applicable    ___________________________________________________________________________________________________________________________________________              Return in about 6 months (around 10/9/2019).  Per patient request

## 2019-04-12 NOTE — TELEPHONE ENCOUNTER
[employer] called and said that the patient needs a Diabetic Shoe Exam. Pt said she already had one done, but I didn't see it in the progress notes from her last visit with Dr Vaishali Landrum.

## 2019-05-21 ENCOUNTER — OFFICE VISIT (OUTPATIENT)
Dept: PRIMARY CARE CLINIC | Age: 84
End: 2019-05-21
Payer: MEDICARE

## 2019-05-21 VITALS
DIASTOLIC BLOOD PRESSURE: 80 MMHG | HEIGHT: 65 IN | TEMPERATURE: 98 F | WEIGHT: 122 LBS | OXYGEN SATURATION: 98 % | BODY MASS INDEX: 20.33 KG/M2 | SYSTOLIC BLOOD PRESSURE: 128 MMHG | HEART RATE: 72 BPM

## 2019-05-21 DIAGNOSIS — Z00.00 ROUTINE GENERAL MEDICAL EXAMINATION AT A HEALTH CARE FACILITY: Primary | ICD-10-CM

## 2019-05-21 DIAGNOSIS — Z71.89 ADVANCE CARE PLANNING: ICD-10-CM

## 2019-05-21 PROCEDURE — 1123F ACP DISCUSS/DSCN MKR DOCD: CPT | Performed by: NURSE PRACTITIONER

## 2019-05-21 PROCEDURE — G0438 PPPS, INITIAL VISIT: HCPCS | Performed by: NURSE PRACTITIONER

## 2019-05-21 PROCEDURE — 96372 THER/PROPH/DIAG INJ SC/IM: CPT | Performed by: NURSE PRACTITIONER

## 2019-05-21 PROCEDURE — 99497 ADVNCD CARE PLAN 30 MIN: CPT | Performed by: NURSE PRACTITIONER

## 2019-05-21 PROCEDURE — 4040F PNEUMOC VAC/ADMIN/RCVD: CPT | Performed by: NURSE PRACTITIONER

## 2019-05-21 RX ORDER — CYANOCOBALAMIN 1000 UG/ML
1000 INJECTION INTRAMUSCULAR; SUBCUTANEOUS ONCE
Qty: 1 ML | Refills: 0 | Status: CANCELLED | OUTPATIENT
Start: 2019-05-21 | End: 2019-05-21

## 2019-05-21 RX ORDER — CYANOCOBALAMIN 1000 UG/ML
1000 INJECTION INTRAMUSCULAR; SUBCUTANEOUS ONCE
Status: COMPLETED | OUTPATIENT
Start: 2019-05-21 | End: 2019-05-21

## 2019-05-21 RX ADMIN — CYANOCOBALAMIN 1000 MCG: 1000 INJECTION INTRAMUSCULAR; SUBCUTANEOUS at 07:38

## 2019-05-21 ASSESSMENT — ANXIETY QUESTIONNAIRES: GAD7 TOTAL SCORE: 0

## 2019-05-21 ASSESSMENT — PATIENT HEALTH QUESTIONNAIRE - PHQ9
SUM OF ALL RESPONSES TO PHQ QUESTIONS 1-9: 0
SUM OF ALL RESPONSES TO PHQ QUESTIONS 1-9: 0

## 2019-05-21 ASSESSMENT — LIFESTYLE VARIABLES: HOW OFTEN DO YOU HAVE A DRINK CONTAINING ALCOHOL: 0

## 2019-05-21 NOTE — PROGRESS NOTES
Medicare Annual Wellness Visit  Name: Kari Bertrand Date: 2019   MRN: [id number] Sex: Female   Age: 81 y.o. Ethnicity: Non-/Non    : 1935 Race: Jey Wilkerson is here for Medicare AWV    Screenings for behavioral, psychosocial and functional/safety risks, and cognitive dysfunction are all negative except as indicated below. These results, as well as other patient data from the [address] form, are documented in Flowsheets linked to this Encounter. Allergies   Allergen Reactions    Phenergan [Promethazine] Other (See Comments)     Makes \"Crazy\"    Promethazine Hcl    et  Prior to Visit Medications    Medication Sig Taking? Authorizing Provider   simvastatin (ZOCOR) 10 MG tablet Take 1 tablet by mouth every evening Yes Hyun Boudreaux MD   memantine (NAMENDA) 5 MG tablet Take 5 mg by mouth daily Yes Historical Provider, MD   docusate sodium (COLACE) 100 MG capsule  Yes Historical Provider, MD   folic acid (FOLVITE) 1 MG tablet Take 1 tablet by mouth daily Yes Hyun Boudreaux MD   sitaGLIPtan-metformin (JANUMET)  MG per tablet Take 1 tablet by mouth daily Yes Hyun Boudreaux MD   donepezil (ARICEPT) 5 MG tablet Take 5 mg by mouth Yes Historical Provider, MD   Cyanocobalamin (B-12) 1000 MCG/ML KIT Administer IM once a month. Needles and syringe size 27 gauge 1/2\" - 12 needles Yes Hyun Boudreaux MD   cyanocobalamin 1000 MCG/ML injection Administer 1 mL intramuscular once per month.  Yes Hyun Boudreaux MD   metFORMIN (GLUCOPHAGE) 500 MG tablet Take 1 tablet by mouth daily (with breakfast) Yes Hyun Boudreaux MD   Diabetic Shoe MISC by Does not apply route Diabetic shoes bilateral Yes Hyun Boudreaux MD   vitamin D (ERGOCALCIFEROL) 30581 units CAPS capsule TAKE 1 CAPSULE ONCE A WEEK Yes Hyun Boudreaux MD   Calcium Carb-Cholecalciferol (CALCIUM-VITAMIN D) 500-400 MG-UNIT TABS Take 2 tabs daily Yes Hyun Boudreaux MD   atenolol (TENORMIN) 25 MG tablet Take 0.5 tablets by mouth daily Yes Frederick Gonzales MD   nitroGLYCERIN (NITROSTAT) 0.4 MG SL tablet Place 1 tablet under the tongue every 5 minutes as needed for Chest pain up to max of 3 total doses. If no relief after 1 dose, call 911. Yes Frederick Gonzales MD   Red Yeast Rice 600 MG CAPS Take by mouth Yes Historical Provider, MD   triamcinolone (KENALOG) 0.1 % cream Apply topically 2 times daily. Yes DILIA Osuna CNP   Prenatal Vit-Fe Fumarate-FA (PRENATAL PO) Take by mouth once a week  Yes Historical Provider, MD   Psyllium (METAMUCIL PO) Take by mouth daily Yes Historical Provider, MD   Fructose-Dextrose-Phosphor Acd (EMETROL PO) Take 10 mLs by mouth daily as needed (nausea)  Yes Historical Provider, MD   Tetrahydrozoline HCl (EYE DROPS OP) Apply to eye Indications: systane  Yes Historical Provider, MD   venlafaxine (EFFEXOR-XR) 37.5 MG XR capsule Take 37.5 mg by mouth daily. Yes Historical Provider, MD   gabapentin (NEURONTIN) 300 MG capsule Take 300 mg by mouth nightly  Yes Historical Provider, MD   zoster recombinant adjuvanted vaccine (SHINGRIX) 50 MCG/0.5ML SUSR injection 50 MCG IM then repeat 2-6 months.   Frederick Gonzales MD   Diabetic Shoe MISC by Does not apply route Diabetic shoes bilateral  Frederick Gonzales MD   cyanocobalamin 1000 MCG/ML injection Inject 1 mL into the muscle every 30 days  Frederick Gonzales MD   vitamin D (ERGOCALCIFEROL) 71507 units CAPS capsule Take 1 capsule by mouth once a week  Freedrick Gonzales MD   cyanocobalamin 1000 MCG/ML injection Inject 1 mL into the muscle every 30 days  Frederick Gonzales MD   prenatal vitamin (VOL-PLUS) 27-1 MG TABS TABLET Take 1 tablet by mouth daily  Frederick Gonzales MD   UNABLE TO FIND Take 1 tablet by mouth daily  DILIA Osuna CNP    by mouth every evening Yes Frederick Gonzales MD   memantine (NAMENDA) 5 MG tablet Take 5 mg by mouth daily Yes Historical Provider, MD   docusate sodium (COLACE) 100 MG capsule  Yes Historical Provider, MD   folic acid (FOLVITE) 1 MG tablet Take 1 tablet by mouth daily Yes Frederick Gonzales MD   sitaGLIPtan-metformin (JANUMET)  MG per tablet Take 1 tablet by mouth daily Yes Frederick Gonzales MD   donepezil (ARICEPT) 5 MG tablet Take 5 mg by mouth Yes Historical Provider, MD   Cyanocobalamin (B-12) 1000 MCG/ML KIT Administer IM once a month. Needles and syringe size 27 gauge 1/2\" - 12 needles Yes Frederick Gonzales MD   cyanocobalamin 1000 MCG/ML injection Administer 1 mL intramuscular once per month. Yes Frederick Gonzales MD   metFORMIN (GLUCOPHAGE) 500 MG tablet Take 1 tablet by mouth daily (with breakfast) Yes Frederick Gonzales MD   Diabetic Shoe MISC by Does not apply route Diabetic shoes bilateral Yes Frederick Gonzales MD   vitamin D (ERGOCALCIFEROL) 06052 units CAPS capsule TAKE 1 CAPSULE ONCE A WEEK Yes Frederick Gonzales MD   Calcium Carb-Cholecalciferol (CALCIUM-VITAMIN D) 500-400 MG-UNIT TABS Take 2 tabs daily Yes Frederick Gonzales MD   atenolol (TENORMIN) 25 MG tablet Take 0.5 tablets by mouth daily Yes Frederick Gonzales MD   nitroGLYCERIN (NITROSTAT) 0.4 MG SL tablet Place 1 tablet under the tongue every 5 minutes as needed for Chest pain up to max of 3 total doses. If no relief after 1 dose, call 911. Yes Frederick Gonzales MD   Red Yeast Rice 600 MG CAPS Take by mouth Yes Historical Provider, MD   triamcinolone (KENALOG) 0.1 % cream Apply topically 2 times daily. Yes Nu Yao APRN - CNP   Prenatal Vit-Fe Fumarate-FA (PRENATAL PO) Take by mouth once a week  Yes Historical Provider, MD   Psyllium (METAMUCIL PO) Take by mouth daily Yes Historical Provider, MD   Fructose-Dextrose-Phosphor Acd (EMETROL PO) Take 10 mLs by mouth daily as needed (nausea)  Yes Historical Provider, MD   Tetrahydrozoline HCl (EYE DROPS OP) Apply to eye Indications: systane  Yes Historical Provider, MD   venlafaxine (EFFEXOR-XR) 37.5 MG XR capsule Take 37.5 mg by mouth daily.  Yes Historical Provider, MD   gabapentin (NEURONTIN) 300 MG capsule Take 300 mg by mouth nightly  Yes Historical Provider, MD   zoster recombinant adjuvanted vaccine (SHINGRIX) 50 MCG/0.5ML SUSR injection 50 MCG IM then repeat 2-6 months.   Kameron Hightower MD   Diabetic Shoe MISC by Does not apply route Diabetic shoes bilateral  Kameron Hightower MD   cyanocobalamin 1000 MCG/ML injection Inject 1 mL into the muscle every 30 days  Kameron Hightower MD   vitamin D (ERGOCALCIFEROL) 15015 units CAPS capsule Take 1 capsule by mouth once a week  Kameron Hightower MD   cyanocobalamin 1000 MCG/ML injection Inject 1 mL into the muscle every 30 days  Kameron Hightower MD   prenatal vitamin (VOL-PLUS) 27-1 MG TABS TABLET Take 1 tablet by mouth daily  Kameron Hightower MD   UNABLE TO FIND Take 1 tablet by mouth daily  Mohit Gomez, APRN - CNP      Diagnosis Date    Abdominal pain     Anxiety     Dr Adam Barcenas Reis's palsy 2003    Colon polyp     Depression     Dr Adam Barcenas Diverticulosis     Female bladder prolapse     Hernia, abdominal     History of adenomatous polyp of colon     Hypertriglyceridemia     Neuropathy     Osteopenia     Type II or unspecified type diabetes mellitus without mention of complication, not stated as uncontrolled     Vitamin B 12 deficiency      Past Surgical History:   Procedure Laterality Date    APPENDECTOMY      BLADDER REPAIR  1984    states stiched her bladder up but was not a suspension-Dr Garcia    CATARACT REMOVAL  12/2014    CATARACT REMOVAL  01/2015    CHOLECYSTECTOMY  age 22    COLONOSCOPY  02/15/2012    Dr Inocencio SALCIDO, diverticulosis coli, small internal hemorrhoids, 5 yr recall    COLONOSCOPY  3/1998    negative    COLONOSCOPY  5/1/07     Dr Alfonso Mcmahon, diverticulosis coli/small internal hemorrhoids    COLONOSCOPY  9/16/04    Dr Blood-diverticulosis coli, ileocecal valve lipoma    COLONOSCOPY N/A 1/25/2016    Dr Inez SALCIDO, 5 yr recall   Celio Munoz Bilateral     cataract   [place]    Dr Bright Casas,    [employer] Brise[employer]da Anguiano partner   [address] [facility]    total-Dr. José Limon NASAL POLYP SURGERY      UMBILICAL HERNIA REPAIR N/A 2/15/2016    OPEN REPAIR OF RECURRENT INCISIONAL HERNIA WITH MESH  performed [facility] ENDOSCOPY  02/16/2012    Dr White-Barretts/gastritis    UPPER GASTROINTESTINAL ENDOSCOPY  2/1998    Dr Karen Anguiano, negative    UPPER GASTROINTESTINAL ENDOSCOPY  1/2/07    Dr Karen Anguiano, gastritis    UPPER GASTROINTESTINAL ENDOSCOPY N/A 1/11/2016     Lum Hench junction w/minimal chronic inflammation       Family History   Problem Relation Age of Onset    Cancer Mother         Pancreatic    Other Mother     Cancer Father         melanoma    Colon Cancer Neg Hx     Colon Polyps Neg Hx     Esophageal Cancer Neg Hx     Liver Cancer Neg Hx     Liver Disease Neg Hx     Rectal Cancer Neg Hx     Stomach Cancer Neg Hx        CareTeam (Including outside providers/suppliers regularly involved in providing care):   Patient Care Team:  Danilo Alvarez MD as PCP - General (Family Medicine)  Danilo Alvarez MD as PCP - [facility] Attributed Provider  DILIA Silva as Nurse Practitioner (Family Nurse Practitioner)  Yordy Spencer MD as Consulting Physician (Cardiology)    Wt Readings from Last 3 Encounters:   05/21/19 122 lb (55.3 kg)   03/27/19 126 lb (57.2 kg)   12/07/18 120 lb (54.4 kg)     Vitals:    05/21/19 0653   BP: 128/80   Pulse: 72   Temp: 98 °F (36.7 °C)   TempSrc: Temporal   SpO2: 98%   Weight: 122 lb (55.3 kg)   Height: 5' 5\" (1.651 m)     Body mass index is 20.3 kg/m². Based upon direct observation of the patient, evaluation of cognition reveals recent and remote memory intact.     General Appearance: alert and oriented to person, place and time, well developed and well- nourished, in no acute distress  Skin: warm and dry, no rash or erythema  Head: normocephalic and atraumatic  Eyes: pupils equal, round, and reactive to light, extraocular eye movements intact, conjunctivae normal  ENT: tympanic membrane, external ear and ear canal normal bilaterally, nose without deformity, nasal mucosa and turbinates normal without polyps  Neck: supple and non-tender without mass, no thyromegaly or thyroid nodules, no cervical lymphadenopathy  Pulmonary/Chest: clear to auscultation bilaterally- no wheezes, rales or rhonchi, normal air movement, no respiratory distress  Cardiovascular: normal rate, regular rhythm, normal S1 and S2, no murmurs, rubs, clicks, or gallops, distal pulses intact, no carotid bruits  Abdomen: soft, non-tender, non-distended, normal bowel sounds, no masses or organomegaly  Extremities: no cyanosis, clubbing or edema  Musculoskeletal: normal range of motion, no joint swelling, deformity or tenderness  Neurologic: reflexes normal and symmetric, no cranial nerve deficit, gait, coordination and speech normal    Patient's complete Health Risk Assessment and screening values have been reviewed and are found in Flowsheets. The following problems were reviewed today and where indicated follow up appointments were made and/or referrals ordered.     Positive Risk Factor Screenings with Interventions:     Hearing/Vision:  Hearing/Vision  Do you or your family notice any trouble with your hearing?: (!) Yes  Do you have difficulty driving, watching TV, or doing any of your daily activities because of your eyesight?: No  Have you had an eye exam within the past year?: Yes  Hearing/Vision Interventions:  · Hearing concerns:  patient declines any further evaluation/treatment for hearing issues    Safety:  Safety  Do you have working smoke detectors?: Yes  Have all throw rugs been removed or fastened?: Yes  Do you have non-slip mats in all bathtubs?: (!) No  Do all of your stairways have a railing or banister?: Yes  Are your doorways, halls and stairs free of clutter?: Yes  Do you always fasten your seatbelt when you are in a car?: Yes  Safety Interventions:  · Home safety tips provided    Personalized Preventive Plan   Current Health Maintenance Status  Immunization History   Administered Date(s) Administered    Influenza Whole 10/21/2015    Influenza, High Dose (Fluzone 65 yrs and older) 12/18/2018    Influenza, Genetta Kiang, 3 Years and older, IM (Fluzone 3 yrs and older or Afluria 5 yrs and older) 10/13/2016, 10/05/2017    Pneumococcal 13-valent Conjugate ([id number]) 10/21/2015, 10/18/2016    Pneumococcal Polysaccharide ([id number]) 11/08/2017    Tdap (Boostrix, Adacel) 12/18/2016        Health Maintenance   Topic Date Due    Shingles Vaccine (1 of 2) 11/21/1986    Potassium monitoring  12/14/2019    Creatinine monitoring  12/14/2019    Colon cancer screen colonoscopy  01/25/2021    DTaP/Tdap/Td vaccine (2 - Td) 12/18/2026    DEXA (modify frequency per FRAX score)  Completed    Flu vaccine  Completed    Pneumococcal 65+ years Vaccine  Completed     Recommendations for Preventive Services Due: see orders and patient instructions/AVS.  . Recommended screening schedule for the next 5-10 years is provided to the patient in written form: see Patient Instructions/AVS.      Advance Care Planning   Advanced Care Planning: Discussed the patients choices for care and treatment in case of a health event that adversely affects decision-making abilities. Also discussed the patients long-term treatment options. Reviewed the process of designating a Health Care Surrogate as defined by the [place] [address] [address]. Reviewed the [facility] Will Directive process and the kinds of life-sustaining treatments the patient would like to receive should they become terminally ill or permanently unconscious. Explained the state requirement to complete the forms in the presence of two eligible witnesses OR in the presence of a . Patient was asked to provide a copy of the signed forms to the practice office.   Time spent (minutes): 15

## 2019-05-21 NOTE — PROGRESS NOTES
After obtaining consent, and per orders of [facility], injection of B12 given in Left deltoid by Agustin Otero. Patient instructed to remain in clinic for 20 minutes afterwards, and to report any adverse reaction to me immediately.

## 2019-05-21 NOTE — PATIENT INSTRUCTIONS
Advance Directives: Care Instructions  Your Care Instructions  An advance directive is a legal way to state your wishes at the end of your life. It tells your family and your doctor what to do if you can no longer say what you want. There are two main types of advance directives. You can change them any time that your wishes change. · A living will tells your family and your doctor your wishes about life support and other treatment. · A durable power of  for health care lets you name a person to make treatment decisions for you when you can't speak for yourself. This person is called a health care agent. If you do not have an advance directive, decisions about your medical care may be made by a doctor or a  who doesn't know you. It may help to think of an advance directive as a gift to the people who care for you. If you have one, they won't have to make tough decisions by themselves. Follow-up care is a key part of your treatment and safety. Be sure to make and go to all appointments, and call your doctor if you are having problems. It's also a good idea to know your test results and keep a list of the medicines you take. How can you care for yourself at home? · Discuss your wishes with your loved ones and your doctor. This way, there are no surprises. · Many states have a unique form. Or you might use a universal form that has been approved by many states. This kind of form can sometimes be completed and stored online. Your electronic copy will then be available wherever you have a connection to the Internet. In most cases, doctors will respect your wishes even if you have a form from a different state. · You don't need a  to do an advance directive. But you may want to get legal advice. · Think about these questions when you prepare an advance directive:  ? Who do you want to make decisions about your medical care if you are not able to?  Many people choose a family member or close friend. ? Do you know enough about life support methods that might be used? If not, talk to your doctor so you understand. ? What are you most afraid of that might happen? You might be afraid of having pain, losing your independence, or being kept alive by machines. ? Where would you prefer to die? Choices include your home, a hospital, or a nursing home. ? Would you like to have information about hospice care to support you and your family? ? Do you want to donate organs when you die? ? Do you want certain Church practices performed before you die? If so, put your wishes in the advance directive. · Read your advance directive every year, and make changes as needed. When should you call for help? Be sure to contact your doctor if you have any questions. Where can you learn more? Go to [email or web address] and sign in to your [employer] account. Enter R264 in the [employer] box to learn more about \"Advance Directives: Care Instructions. \"     If you do not have an account, please click on the \"Sign Up Now\" link. Current as of: April 18, 2018  Content Version: 12.0  © 6231-5607 Healthwise, Incorporated. Care instructions adapted under license by [facility] ([facility]). If you have questions about a medical condition or this instruction, always ask your healthcare professional. [address] any warranty or liability for your use of this information. Personalized Preventive Plan for Monica Session - 5/21/2019  Medicare offers a range of preventive health benefits. Some of the tests and screenings are paid in full while other may be subject to a deductible, co-insurance, and/or copay. Some of these benefits include a comprehensive review of your medical history including lifestyle, illnesses that may run in your family, and various assessments and screenings as appropriate.     After reviewing your medical record and screening and assessments performed today your provider may have ordered immunizations, labs, imaging, and/or referrals for you. A list of these orders (if applicable) as well as your Preventive Care list are included within your After Visit Summary for your review. Other Preventive Recommendations:    · A preventive eye exam performed by an eye specialist is recommended every 1-2 years to screen for glaucoma; cataracts, macular degeneration, and other eye disorders. · A preventive dental visit is recommended every 6 months. · Try to get at least 150 minutes of exercise per week or 10,000 steps per day on a pedometer . · Order or download the FREE \"Exercise & Physical Activity: Your Everyday Guide\" from [employer]. Call 5-148.718.1427 or search [employer] online. · You need 8311-7634 mg of calcium and 9245-6141 IU of vitamin D per day. It is possible to meet your calcium requirement with diet alone, but a vitamin D supplement is usually necessary to meet this goal.  · When exposed to the sun, use a sunscreen that protects against both UVA and UVB radiation with an SPF of 30 or greater. Reapply every 2 to 3 hours or after sweating, drying off with a towel, or swimming. · Always wear a seat belt when traveling in a car. Always wear a helmet when riding a bicycle.

## 2019-07-29 NOTE — TELEPHONE ENCOUNTER
Ivy Estrella called to request a refill on her medication.       Last office visit : 5/21/2019   Next office visit : 9/18/2019     Requested Prescriptions     Signed Prescriptions Disp Refills    metFORMIN (GLUCOPHAGE) 500 MG tablet 90 tablet 0     Sig: Take 1 tablet by mouth daily (with breakfast)     Authorizing Provider: Chidi Moore     Ordering User: Cantondarlyn Nava MA

## 2019-07-30 RX ORDER — ERGOCALCIFEROL 1.25 MG/1
50000 CAPSULE ORAL WEEKLY
Qty: 12 CAPSULE | Refills: 1 | Status: SHIPPED | OUTPATIENT
Start: 2019-07-30 | End: 2020-03-09

## 2019-08-08 RX ORDER — SIMVASTATIN 10 MG
10 TABLET ORAL EVERY EVENING
Qty: 30 TABLET | Refills: 2 | Status: SHIPPED | OUTPATIENT
Start: 2019-08-08 | End: 2019-09-24 | Stop reason: SDUPTHER

## 2019-09-24 ENCOUNTER — OFFICE VISIT (OUTPATIENT)
Dept: PRIMARY CARE CLINIC | Age: 84
End: 2019-09-24
Payer: MEDICARE

## 2019-09-24 VITALS
SYSTOLIC BLOOD PRESSURE: 130 MMHG | BODY MASS INDEX: 19.66 KG/M2 | HEART RATE: 66 BPM | RESPIRATION RATE: 20 BRPM | DIASTOLIC BLOOD PRESSURE: 72 MMHG | WEIGHT: 118 LBS | OXYGEN SATURATION: 98 % | HEIGHT: 65 IN

## 2019-09-24 DIAGNOSIS — R53.82 CHRONIC FATIGUE: ICD-10-CM

## 2019-09-24 DIAGNOSIS — E11.42 DIABETIC PERIPHERAL NEUROPATHY ASSOCIATED WITH TYPE 2 DIABETES MELLITUS (HCC): ICD-10-CM

## 2019-09-24 DIAGNOSIS — R07.81 RIB PAIN ON LEFT SIDE: ICD-10-CM

## 2019-09-24 DIAGNOSIS — F02.80 LATE ONSET ALZHEIMER'S DISEASE WITHOUT BEHAVIORAL DISTURBANCE (HCC): ICD-10-CM

## 2019-09-24 DIAGNOSIS — K59.09 CHRONIC CONSTIPATION: ICD-10-CM

## 2019-09-24 DIAGNOSIS — R53.82 CHRONIC FATIGUE: Primary | ICD-10-CM

## 2019-09-24 DIAGNOSIS — R09.89 POOR PERIPHERAL CIRCULATION: ICD-10-CM

## 2019-09-24 DIAGNOSIS — I10 ESSENTIAL HYPERTENSION: ICD-10-CM

## 2019-09-24 DIAGNOSIS — E55.9 VITAMIN D DEFICIENCY: ICD-10-CM

## 2019-09-24 DIAGNOSIS — L72.3 SEBACEOUS CYST: ICD-10-CM

## 2019-09-24 DIAGNOSIS — G30.1 LATE ONSET ALZHEIMER'S DISEASE WITHOUT BEHAVIORAL DISTURBANCE (HCC): ICD-10-CM

## 2019-09-24 LAB
ALBUMIN SERPL-MCNC: 4.4 G/DL (ref 3.5–5.2)
ALP BLD-CCNC: 72 U/L (ref 35–104)
ALT SERPL-CCNC: 11 U/L (ref 5–33)
ANION GAP SERPL CALCULATED.3IONS-SCNC: 13 MMOL/L (ref 7–19)
AST SERPL-CCNC: 12 U/L (ref 5–32)
BILIRUB SERPL-MCNC: 0.3 MG/DL (ref 0.2–1.2)
BUN BLDV-MCNC: 11 MG/DL (ref 8–23)
CALCIUM SERPL-MCNC: 10.1 MG/DL (ref 8.8–10.2)
CHLORIDE BLD-SCNC: 107 MMOL/L (ref 98–111)
CHOLESTEROL, TOTAL: 175 MG/DL (ref 160–199)
CO2: 24 MMOL/L (ref 22–29)
CREAT SERPL-MCNC: 0.7 MG/DL (ref 0.5–0.9)
FOLATE: >20 NG/ML (ref 4.8–37.3)
GFR NON-AFRICAN AMERICAN: >60
GLUCOSE BLD-MCNC: 138 MG/DL (ref 74–109)
HBA1C MFR BLD: 6.3 % (ref 4–6)
HCT VFR BLD CALC: 42.9 % (ref 37–47)
HDLC SERPL-MCNC: 52 MG/DL (ref 65–121)
HEMOGLOBIN: 13 G/DL (ref 12–16)
LDL CHOLESTEROL CALCULATED: 75 MG/DL
MCH RBC QN AUTO: 30 PG (ref 27–31)
MCHC RBC AUTO-ENTMCNC: 30.3 G/DL (ref 33–37)
MCV RBC AUTO: 98.8 FL (ref 81–99)
PDW BLD-RTO: 13.6 % (ref 11.5–14.5)
PLATELET # BLD: 284 K/UL (ref 130–400)
PMV BLD AUTO: 10.5 FL (ref 9.4–12.3)
POTASSIUM SERPL-SCNC: 4.3 MMOL/L (ref 3.5–5)
RBC # BLD: 4.34 M/UL (ref 4.2–5.4)
SODIUM BLD-SCNC: 144 MMOL/L (ref 136–145)
TOTAL PROTEIN: 7.1 G/DL (ref 6.6–8.7)
TRIGL SERPL-MCNC: 242 MG/DL (ref 0–149)
VITAMIN B-12: >2000 PG/ML (ref 211–946)
VITAMIN D 25-HYDROXY: 45.7 NG/ML
WBC # BLD: 8.1 K/UL (ref 4.8–10.8)

## 2019-09-24 PROCEDURE — G8420 CALC BMI NORM PARAMETERS: HCPCS | Performed by: FAMILY MEDICINE

## 2019-09-24 PROCEDURE — 96372 THER/PROPH/DIAG INJ SC/IM: CPT | Performed by: FAMILY MEDICINE

## 2019-09-24 PROCEDURE — 4040F PNEUMOC VAC/ADMIN/RCVD: CPT | Performed by: FAMILY MEDICINE

## 2019-09-24 PROCEDURE — 1123F ACP DISCUSS/DSCN MKR DOCD: CPT | Performed by: FAMILY MEDICINE

## 2019-09-24 PROCEDURE — 99214 OFFICE O/P EST MOD 30 MIN: CPT | Performed by: FAMILY MEDICINE

## 2019-09-24 PROCEDURE — G8400 PT W/DXA NO RESULTS DOC: HCPCS | Performed by: FAMILY MEDICINE

## 2019-09-24 PROCEDURE — 1090F PRES/ABSN URINE INCON ASSESS: CPT | Performed by: FAMILY MEDICINE

## 2019-09-24 PROCEDURE — G8427 DOCREV CUR MEDS BY ELIG CLIN: HCPCS | Performed by: FAMILY MEDICINE

## 2019-09-24 PROCEDURE — 1036F TOBACCO NON-USER: CPT | Performed by: FAMILY MEDICINE

## 2019-09-24 RX ORDER — CYANOCOBALAMIN 1000 UG/ML
1000 INJECTION INTRAMUSCULAR; SUBCUTANEOUS ONCE
Status: COMPLETED | OUTPATIENT
Start: 2019-09-24 | End: 2019-09-24

## 2019-09-24 RX ORDER — SIMVASTATIN 10 MG
10 TABLET ORAL EVERY EVENING
Qty: 30 TABLET | Refills: 2 | Status: SHIPPED | OUTPATIENT
Start: 2019-09-24 | End: 2020-01-24 | Stop reason: SDUPTHER

## 2019-09-24 RX ORDER — ATENOLOL 25 MG/1
TABLET ORAL
Qty: 90 TABLET | Refills: 3 | Status: SHIPPED | OUTPATIENT
Start: 2019-09-24 | End: 2020-09-29

## 2019-09-24 RX ADMIN — CYANOCOBALAMIN 1000 MCG: 1000 INJECTION INTRAMUSCULAR; SUBCUTANEOUS at 08:55

## 2019-09-24 ASSESSMENT — ENCOUNTER SYMPTOMS
WHEEZING: 0
NAUSEA: 0
SHORTNESS OF BREATH: 0
ABDOMINAL PAIN: 1
VOMITING: 0
CONSTIPATION: 1
CHEST TIGHTNESS: 0
COUGH: 0
DIARRHEA: 0

## 2019-09-24 NOTE — PROGRESS NOTES
Disease Neg Hx     Rectal Cancer Neg Hx     Stomach Cancer Neg Hx        /72   Pulse 66   Resp 20   Ht 5' 5\" (1.651 m)   Wt 118 lb (53.5 kg)   SpO2 98%   BMI 19.64 kg/m²     Physical Exam   Constitutional: She is oriented to person, place, and time. She appears well-developed and well-nourished. No distress. HENT:   Head: Normocephalic and atraumatic. Cardiovascular: Normal rate, regular rhythm and normal heart sounds. No murmur heard. Pulmonary/Chest: Effort normal and breath sounds normal. No respiratory distress. She has no wheezes. She has no rales. Chest wall is not dull to percussion. She exhibits bony tenderness (mild L lower nipple line ribs ). She exhibits no swelling. Abdominal: Soft. Bowel sounds are normal. She exhibits no distension. There is tenderness in the left upper quadrant. There is no CVA tenderness. Musculoskeletal:   No pretibial edema b/l. Neurological: She is alert and oriented to person, place, and time. Skin: Skin is warm and dry. She is not diaphoretic. No cyanosis. Sebaceious cyst on scalp: 2 mm raised mass that is about 1 in wide. Psychiatric: Her speech is normal and behavior is normal. Judgment normal. Her mood appears anxious. Cognition and memory are normal. She expresses no homicidal and no suicidal ideation. Nursing note and vitals reviewed. Assessment:    ICD-10-CM    1. Chronic fatigue R53.82 Vitamin B12 & Folate   2. 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) E11.42 POCT glycosylated hemoglobin (Hb A1C)   3. Sebaceous cyst L72.3    4. Chronic constipation K59.09    5. Vitamin D deficiency E55.9 Vitamin D 25 Hydroxy   6. Rib pain on left side R07.81    7. Late onset Alzheimer's disease without behavioral disturbance G30.1     F02.80        Plan:   Extensive reassurance today as patient has extensive anxiety about her health. Her physical health overall is greater than the average 81year-old.

## 2019-09-24 NOTE — PATIENT INSTRUCTIONS
Get your labs checked in [address] of [address] building. Please arrive 15 minutes early to next follow up appointment in 6 months or schedule an appointment sooner if needed.

## 2019-09-26 LAB — TSH, 3RD GENERATION: 2.75 MU/L (ref 0.3–4)

## 2019-10-07 ENCOUNTER — OFFICE VISIT (OUTPATIENT)
Dept: PRIMARY CARE CLINIC | Age: 84
End: 2019-10-07
Payer: MEDICARE

## 2019-10-07 VITALS
WEIGHT: 121 LBS | HEART RATE: 61 BPM | BODY MASS INDEX: 20.14 KG/M2 | OXYGEN SATURATION: 99 % | DIASTOLIC BLOOD PRESSURE: 62 MMHG | RESPIRATION RATE: 20 BRPM | TEMPERATURE: 98 F | SYSTOLIC BLOOD PRESSURE: 100 MMHG

## 2019-10-07 DIAGNOSIS — Z23 NEED FOR INFLUENZA VACCINATION: ICD-10-CM

## 2019-10-07 DIAGNOSIS — R41.0 ACUTE DELIRIUM: Primary | ICD-10-CM

## 2019-10-07 PROCEDURE — 1036F TOBACCO NON-USER: CPT | Performed by: FAMILY MEDICINE

## 2019-10-07 PROCEDURE — G8427 DOCREV CUR MEDS BY ELIG CLIN: HCPCS | Performed by: FAMILY MEDICINE

## 2019-10-07 PROCEDURE — G0008 ADMIN INFLUENZA VIRUS VAC: HCPCS | Performed by: FAMILY MEDICINE

## 2019-10-07 PROCEDURE — G8420 CALC BMI NORM PARAMETERS: HCPCS | Performed by: FAMILY MEDICINE

## 2019-10-07 PROCEDURE — 99213 OFFICE O/P EST LOW 20 MIN: CPT | Performed by: FAMILY MEDICINE

## 2019-10-07 PROCEDURE — 1090F PRES/ABSN URINE INCON ASSESS: CPT | Performed by: FAMILY MEDICINE

## 2019-10-07 PROCEDURE — 90686 IIV4 VACC NO PRSV 0.5 ML IM: CPT | Performed by: FAMILY MEDICINE

## 2019-10-07 PROCEDURE — G8400 PT W/DXA NO RESULTS DOC: HCPCS | Performed by: FAMILY MEDICINE

## 2019-10-07 PROCEDURE — G8482 FLU IMMUNIZE ORDER/ADMIN: HCPCS | Performed by: FAMILY MEDICINE

## 2019-10-07 PROCEDURE — 81002 URINALYSIS NONAUTO W/O SCOPE: CPT | Performed by: FAMILY MEDICINE

## 2019-10-07 PROCEDURE — 1123F ACP DISCUSS/DSCN MKR DOCD: CPT | Performed by: FAMILY MEDICINE

## 2019-10-07 PROCEDURE — 4040F PNEUMOC VAC/ADMIN/RCVD: CPT | Performed by: FAMILY MEDICINE

## 2019-10-07 ASSESSMENT — ENCOUNTER SYMPTOMS
WHEEZING: 0
SHORTNESS OF BREATH: 0
NAUSEA: 0
ABDOMINAL PAIN: 0
COUGH: 0
VOMITING: 0
CONSTIPATION: 0
CHEST TIGHTNESS: 0
DIARRHEA: 0

## 2019-10-08 LAB
BILIRUBIN, POC: NORMAL
BLOOD URINE, POC: NORMAL
CLARITY, POC: CLEAR
COLOR, POC: YELLOW
GLUCOSE URINE, POC: NORMAL
KETONES, POC: NORMAL
LEUKOCYTE EST, POC: NORMAL
NITRITE, POC: NORMAL
PH, POC: 5.5
PROTEIN, POC: NORMAL
SPECIFIC GRAVITY, POC: 1.01
UROBILINOGEN, POC: 0.2

## 2019-10-30 ENCOUNTER — OFFICE VISIT (OUTPATIENT)
Dept: CARDIOLOGY | Facility: CLINIC | Age: 84
End: 2019-10-30

## 2019-10-30 VITALS
BODY MASS INDEX: 19.97 KG/M2 | DIASTOLIC BLOOD PRESSURE: 64 MMHG | SYSTOLIC BLOOD PRESSURE: 138 MMHG | WEIGHT: 117 LBS | OXYGEN SATURATION: 99 % | HEART RATE: 48 BPM | HEIGHT: 64 IN

## 2019-10-30 DIAGNOSIS — R00.1 SINUS BRADYCARDIA: Primary | ICD-10-CM

## 2019-10-30 DIAGNOSIS — E78.2 MIXED HYPERLIPIDEMIA: ICD-10-CM

## 2019-10-30 DIAGNOSIS — R00.2 PALPITATIONS: ICD-10-CM

## 2019-10-30 DIAGNOSIS — K21.9 GASTROESOPHAGEAL REFLUX DISEASE WITHOUT ESOPHAGITIS: ICD-10-CM

## 2019-10-30 DIAGNOSIS — E53.8 VITAMIN B 12 DEFICIENCY: Primary | ICD-10-CM

## 2019-10-30 DIAGNOSIS — I10 ESSENTIAL HYPERTENSION: ICD-10-CM

## 2019-10-30 DIAGNOSIS — R94.31 ABNORMAL ECG: ICD-10-CM

## 2019-10-30 DIAGNOSIS — E53.8 VITAMIN B 12 DEFICIENCY: ICD-10-CM

## 2019-10-30 DIAGNOSIS — E11.9 CONTROLLED TYPE 2 DIABETES MELLITUS WITHOUT COMPLICATION, WITHOUT LONG-TERM CURRENT USE OF INSULIN (HCC): ICD-10-CM

## 2019-10-30 LAB — VITAMIN B-12: 426 PG/ML (ref 211–946)

## 2019-10-30 PROCEDURE — 93000 ELECTROCARDIOGRAM COMPLETE: CPT | Performed by: INTERNAL MEDICINE

## 2019-10-30 PROCEDURE — 99214 OFFICE O/P EST MOD 30 MIN: CPT | Performed by: INTERNAL MEDICINE

## 2019-10-30 NOTE — PROGRESS NOTES
Dalia Betts  [id number]  1935  83 y.o.  female    Referring Provider: Les Henderson MD    Reason for Follow-up Visit: Here for routine follow up      Prior palpitations, now better  Essential Hypertension  Walking as before       Subjective    Feels same overall as during last visit  No new events or complaints since last visit   Overall the patient feels no major change from baseline symptoms   Similar symptoms as during last visit      No chest pain or shortness of breath     No significant pedal edema    Compliant with medications and dietary advice  Good effort tolerance    No presyncope or syncope  Compliant with medication    BP well controlled at home.       Occasional palpitations, once every several weeks lasting for less than 1 minute  No associated symptoms of dizziness, weakness, chest pain,  shortness of breath      Abnormal ECG  With new inferior T wave abnormalities        History of present illness:  Dalia Betts is a 83 y.o. yo female with history of Essential Hypertension   who presents today for   Chief Complaint   Patient presents with   • Palpitations     6 MON FU    • Migraine   .    History  Past Medical History:   Diagnosis Date   • Bell palsy    • Chest pain    • Diabetes mellitus (CMS/HCC)    • HLD (hyperlipidemia)    • HTN (hypertension)    • MI (myocardial infarction) (CMS/HCC)    • Palpitations    ,   Past Surgical History:   Procedure Laterality Date   • APPENDECTOMY     • CHOLECYSTECTOMY     • ELBOW FUSION     • EYE SURGERY      cataract extraction x 2   • HERNIA REPAIR     • HYSTERECTOMY     • SINUSOTOMY     ,   Family History   Problem Relation Age of Onset   • Pancreatitis Mother    • Heart disease Father    ,   Social History     Tobacco Use   • Smoking status: Never Smoker   • Smokeless tobacco: Never Used   Substance Use Topics   • Alcohol use: No   • Drug use: No   ,     Medications  Current Outpatient Medications   Medication Sig Dispense Refill   • aspirin 81 MG  EC tablet Take 81 mg by mouth 2 (Two) Times a Day.     • atenolol (TENORMIN) 25 MG tablet TAKE ONE TABLET BY MOUTH EVERY DAY 90 tablet 3   • Bisacodyl (DUCODYL PO) Take  by mouth.     • Cyanocobalamin (B-12 COMPLIANCE INJECTION IJ) Inject  as directed Every 30 (Thirty) Days.     • donepezil (ARICEPT) 5 MG tablet Take 5 mg by mouth As Needed.     • FOLIC ACID PO Take  by mouth.     • Fructose-Dextrose-Phosphor Acd (EMETROL PO) Take 10 mL by mouth As Needed.     • gabapentin (NEURONTIN) 300 MG capsule Take 300 mg by mouth Daily.     • LORazepam (ATIVAN) 0.5 MG tablet Take 0.5 mg by mouth Every 8 (Eight) Hours As Needed for anxiety.     • memantine (NAMENDA) 5 MG tablet Take 5 mg by mouth Daily.     • metFORMIN (GLUCOPHAGE) 500 MG tablet Take 500 mg by mouth Daily.     • nitroglycerin (NITROSTAT) 0.4 MG SL tablet Place 1 tablet under the tongue Every 5 (Five) Minutes As Needed for Chest Pain. Take no more than 3 doses in 15 minutes. 100 tablet 11   • polyethyl glycol-propyl glycol (SYSTANE) 0.4-0.3 % solution ophthalmic solution Every 1 (One) Hour As Needed.     • Psyllium 30.9 % powder Take  by mouth As Needed.     • simvastatin (ZOCOR) 10 MG tablet Take 10 mg by mouth Daily.     • venlafaxine XR (EFFEXOR-XR) 37.5 MG 24 hr capsule Take 37.5 mg by mouth Daily.     • vitamin D (ERGOCALCIFEROL) 92290 units capsule capsule Take 50,000 Units by mouth 1 (One) Time Per Week.       No current facility-administered medications for this visit.        Allergies:  Phenergan [promethazine hcl]    Review of Systems  Review of Systems   Constitution: Negative for weakness and malaise/fatigue.   HENT: Negative.    Eyes: Negative.    Cardiovascular: Positive for palpitations. Negative for chest pain, claudication, cyanosis, dyspnea on exertion, irregular heartbeat, leg swelling, near-syncope, orthopnea, paroxysmal nocturnal dyspnea and syncope.   Respiratory: Negative.    Endocrine: Negative.    Hematologic/Lymphatic: Negative.   "  Skin: Negative.    Gastrointestinal: Negative for anorexia.   Genitourinary: Negative.    Psychiatric/Behavioral: Negative.        Objective     Physical Exam:  /64   Pulse (!) 48   Ht 162.6 cm (64.02\")   Wt 53.1 kg (117 lb)   SpO2 99%   BMI 20.07 kg/m²   Physical Exam   Constitutional: She appears well-developed and well-nourished.   HENT:   Head: Normocephalic.   Eyes: Lids are normal.   Neck: Normal carotid pulses and no JVD present. No tracheal tenderness present. Carotid bruit is not present. No tracheal deviation and no edema present.   Cardiovascular: Regular rhythm, S1 normal, S2 normal and normal pulses.   Murmur heard.   Systolic murmur is present with a grade of 1/6.  Pulmonary/Chest: Effort normal. No stridor.   Abdominal: Soft. Normal appearance. She exhibits no distension. There is no hepatosplenomegaly. There is no tenderness.   Neurological: She is alert. She has normal strength. No cranial nerve deficit or sensory deficit.   Skin: Skin is warm.   Psychiatric: She has a normal mood and affect. Her speech is normal and behavior is normal. Thought content normal. Cognition and memory are normal.       Results Review:    Results for orders placed during the hospital encounter of 10/16/17   Adult Stress Echo W/ Cont or Stress Agent if Necessary Per Protocol    Narrative · Left ventricular systolic function is normal. Estimated EF = 55%.  · Normal stress echo with no significant echocardiographic evidence for   myocardial ischemia.             ECG 12 Lead  Date/Time: 10/30/2019 11:01 AM  Performed by: Selvin Cordova MD  Authorized by: Selvin Cordova MD   Comparison: compared with previous ECG from 4/9/2019  Comparison to previous ECG: new T wave inversion inferiorly  Ventricular rate decreased from  58    to    49   beats per minute    Rhythm: sinus bradycardia  Rate: bradycardic  Conduction: conduction normal  ST Segments: ST segments normal  T inversion: III and aVF  QRS axis: " normal    Clinical impression: abnormal EKG            Assessment/Plan   Dalia was seen today for palpitations and migraine.    Diagnoses and all orders for this visit:    Sinus bradycardia    Palpitations  -     Holter Monitor - 72 Hour Up To 21 Days; Future    Essential hypertension    Mixed hyperlipidemia    Controlled type 2 diabetes mellitus without complication, without long-term current use of insulin (CMS/[facility])    Abnormal ECG  -     Adult Stress Echo W/ Cont or Stress Agent if Necessary Per Protocol; Future    Gastroesophageal reflux disease without esophagitis    Other orders  -     ECG 12 Lead           Plan:    Orders Placed This Encounter   Procedures   • Holter Monitor - 72 Hour Up To 21 Days     Standing Status:   Future     Standing Expiration Date:   10/29/2020     Order Specific Question:   Reason for exam?     Answer:   Palpitations   • ECG 12 Lead     This order was created via procedure documentation   • Adult Stress Echo W/ Cont or Stress Agent if Necessary Per Protocol     Standing Status:   Future     Standing Expiration Date:   10/29/2020     Order Specific Question:   What stress agent will be used?     Answer:   Dobutamine     Order Specific Question:   Reason for Dobutamine?     Answer:   Unable to Exercise     Order Specific Question:   Reason for exam?     Answer:   Abnormal EKG        _________________________________________________________________________________________________________________________________________  Health maintenance and recommendations    Similar recommendations as last visit       Offered to give patient  a copy      Questions were encouraged, asked and answered to the patient's  understanding and satisfaction. Questions if any regarding current medications and side effects, need for refills and importance of compliance to medications stressed.    Reviewed available prior notes, consults, prior visits, laboratory findings, radiology and cardiology relevant  reports. Updated chart as applicable. I have reviewed the patient's medical history in detail and updated the computerized patient record as relevant.      Updated patient regarding any new or relevant abnormalities on review of records or any new findings on physical exam. Mentioned to patient about purpose of visit and desirable health short and long term goals and objectives.    Primary to monitor CBC CMP Lipid panel and TSH as applicable    ___________________________________________________________________________________________________________________________________________              Return in about 3 months (around 1/30/2020).

## 2019-11-01 ENCOUNTER — OFFICE VISIT (OUTPATIENT)
Dept: PRIMARY CARE CLINIC | Age: 84
End: 2019-11-01
Payer: MEDICARE

## 2019-11-01 DIAGNOSIS — E53.8 VITAMIN B 12 DEFICIENCY: Primary | ICD-10-CM

## 2019-11-01 PROCEDURE — 96372 THER/PROPH/DIAG INJ SC/IM: CPT | Performed by: NURSE PRACTITIONER

## 2019-11-01 RX ORDER — CYANOCOBALAMIN 1000 UG/ML
1000 INJECTION INTRAMUSCULAR; SUBCUTANEOUS ONCE
Status: COMPLETED | OUTPATIENT
Start: 2019-11-01 | End: 2019-11-01

## 2019-11-01 RX ADMIN — CYANOCOBALAMIN 1000 MCG: 1000 INJECTION INTRAMUSCULAR; SUBCUTANEOUS at 13:20

## 2020-01-13 ENCOUNTER — OFFICE VISIT (OUTPATIENT)
Dept: PRIMARY CARE CLINIC | Age: 85
End: 2020-01-13
Payer: MEDICARE

## 2020-01-13 ENCOUNTER — APPOINTMENT (OUTPATIENT)
Dept: CARDIOLOGY | Facility: HOSPITAL | Age: 85
End: 2020-01-13

## 2020-01-13 PROCEDURE — 96372 THER/PROPH/DIAG INJ SC/IM: CPT | Performed by: NURSE PRACTITIONER

## 2020-01-13 RX ORDER — CYANOCOBALAMIN 1000 UG/ML
1000 INJECTION INTRAMUSCULAR; SUBCUTANEOUS ONCE
Status: COMPLETED | OUTPATIENT
Start: 2020-01-13 | End: 2020-01-13

## 2020-01-13 RX ADMIN — CYANOCOBALAMIN 1000 MCG: 1000 INJECTION INTRAMUSCULAR; SUBCUTANEOUS at 08:18

## 2020-01-13 NOTE — PROGRESS NOTES
After obtaining consent, and per orders of Jethro Tompkins, APRN injection of B-12 given in Right hip by Annabel Cheadle. Patient instructed to remain in clinic for 20 minutes afterwards, and to report any adverse reaction to me immediately.

## 2020-01-15 ENCOUNTER — APPOINTMENT (OUTPATIENT)
Dept: CARDIOLOGY | Facility: HOSPITAL | Age: 85
End: 2020-01-15

## 2020-01-24 RX ORDER — SIMVASTATIN 10 MG
10 TABLET ORAL EVERY EVENING
Qty: 90 TABLET | Refills: 0 | Status: SHIPPED | OUTPATIENT
Start: 2020-01-24 | End: 2020-03-24 | Stop reason: SDUPTHER

## 2020-01-24 RX ORDER — FOLIC ACID 1 MG/1
1 TABLET ORAL DAILY
Qty: 90 TABLET | Refills: 0 | Status: SHIPPED | OUTPATIENT
Start: 2020-01-24 | End: 2020-03-24 | Stop reason: SDUPTHER

## 2020-01-30 ENCOUNTER — OFFICE VISIT (OUTPATIENT)
Dept: CARDIOLOGY | Facility: CLINIC | Age: 85
End: 2020-01-30

## 2020-01-30 ENCOUNTER — DOCUMENTATION (OUTPATIENT)
Dept: CARDIOLOGY | Facility: CLINIC | Age: 85
End: 2020-01-30

## 2020-01-30 VITALS
DIASTOLIC BLOOD PRESSURE: 60 MMHG | BODY MASS INDEX: 20.16 KG/M2 | OXYGEN SATURATION: 99 % | SYSTOLIC BLOOD PRESSURE: 130 MMHG | WEIGHT: 121 LBS | HEIGHT: 65 IN | HEART RATE: 52 BPM

## 2020-01-30 DIAGNOSIS — R00.2 PALPITATIONS: ICD-10-CM

## 2020-01-30 DIAGNOSIS — E11.9 CONTROLLED TYPE 2 DIABETES MELLITUS WITHOUT COMPLICATION, WITHOUT LONG-TERM CURRENT USE OF INSULIN (HCC): ICD-10-CM

## 2020-01-30 DIAGNOSIS — R10.9 ABDOMINAL PAIN, UNSPECIFIED ABDOMINAL LOCATION: ICD-10-CM

## 2020-01-30 DIAGNOSIS — I10 ESSENTIAL HYPERTENSION: ICD-10-CM

## 2020-01-30 DIAGNOSIS — R94.31 ABNORMAL ECG: Primary | ICD-10-CM

## 2020-01-30 DIAGNOSIS — E78.2 MIXED HYPERLIPIDEMIA: ICD-10-CM

## 2020-01-30 DIAGNOSIS — R00.1 SINUS BRADYCARDIA: ICD-10-CM

## 2020-01-30 PROCEDURE — 93000 ELECTROCARDIOGRAM COMPLETE: CPT | Performed by: INTERNAL MEDICINE

## 2020-01-30 PROCEDURE — 99214 OFFICE O/P EST MOD 30 MIN: CPT | Performed by: INTERNAL MEDICINE

## 2020-01-30 NOTE — PROGRESS NOTES
Patient cancelled the heart monitor appt. Patient stated she was out of town and denied wanting to wear the monitor at that time. Patient just wants to have an EKG instead. She does not think the monitor is needed at this time.

## 2020-01-30 NOTE — PROGRESS NOTES
Patient Cancelled her Heart monitor report, patient was out of town and denied wearing the monitor. She stated she that she just wanted and EKG instead.

## 2020-01-30 NOTE — PROGRESS NOTES
Dalia Betts  [id number]  1935  84 y.o.  female    Referring Provider: Les Henderson MD    Reason for Follow-up Visit: Here for routine follow up   Prior palpitations, now better  Essential Hypertension  Walking as before except got tangled up and fell and bruised her right arm   No fractures and seen in ortho institute       Subjective    Feels same overall as during last visit  No new events or complaints since last visit   Overall the patient feels no major change from baseline symptoms   Similar symptoms as during last visit      No chest pain or shortness of breath     No significant pedal edema    Compliant with medications and dietary advice  Good effort tolerance    No presyncope or syncope  Compliant with medication    BP well controlled at home.       Much less  palpitations, once every several weeks lasting for less than 1 minute  No associated symptoms of dizziness, weakness, chest pain,  shortness of breath      Abnormal ECG  With new inferior T wave abnormalities  She declined stress test and Echo    Some midline intermittent abdominal pain and she is very worried that she has an abdominal aortic aneurysm       History of present illness:  Dalia Betts is a 84 y.o. yo female with history of Essential Hypertension   who presents today for   Chief Complaint   Patient presents with   • Palpitations     2 month follow up    .    History  Past Medical History:   Diagnosis Date   • Bell palsy    • Chest pain    • Diabetes mellitus (CMS/HCC)    • HLD (hyperlipidemia)    • HTN (hypertension)    • MI (myocardial infarction) (CMS/HCC)    • Palpitations    ,   Past Surgical History:   Procedure Laterality Date   • APPENDECTOMY     • CHOLECYSTECTOMY     • ELBOW FUSION     • EYE SURGERY      cataract extraction x 2   • HERNIA REPAIR     • HYSTERECTOMY     • SINUSOTOMY     ,   Family History   Problem Relation Age of Onset   • Pancreatitis Mother    • Heart disease Father    ,   Social History      Tobacco Use   • Smoking status: Never Smoker   • Smokeless tobacco: Never Used   Substance Use Topics   • Alcohol use: No   • Drug use: No   ,     Medications  Current Outpatient Medications   Medication Sig Dispense Refill   • aspirin 81 MG EC tablet Take 81 mg by mouth 2 (Two) Times a Day.     • atenolol (TENORMIN) 25 MG tablet TAKE ONE TABLET BY MOUTH EVERY DAY 90 tablet 3   • Bisacodyl (DUCODYL PO) Take  by mouth.     • Cyanocobalamin (B-12 COMPLIANCE INJECTION IJ) Inject  as directed Every 30 (Thirty) Days.     • donepezil (ARICEPT) 5 MG tablet Take 5 mg by mouth As Needed.     • FOLIC ACID PO Take  by mouth.     • Fructose-Dextrose-Phosphor Acd (EMETROL PO) Take 10 mL by mouth As Needed.     • gabapentin (NEURONTIN) 300 MG capsule Take 300 mg by mouth Daily.     • LORazepam (ATIVAN) 0.5 MG tablet Take 0.5 mg by mouth Every 8 (Eight) Hours As Needed for anxiety.     • memantine (NAMENDA) 5 MG tablet Take 5 mg by mouth Daily.     • metFORMIN (GLUCOPHAGE) 500 MG tablet Take 500 mg by mouth Daily.     • nitroglycerin (NITROSTAT) 0.4 MG SL tablet Place 1 tablet under the tongue Every 5 (Five) Minutes As Needed for Chest Pain. Take no more than 3 doses in 15 minutes. 100 tablet 11   • polyethyl glycol-propyl glycol (SYSTANE) 0.4-0.3 % solution ophthalmic solution Every 1 (One) Hour As Needed.     • Psyllium 30.9 % powder Take  by mouth As Needed.     • simvastatin (ZOCOR) 10 MG tablet Take 10 mg by mouth Daily.     • venlafaxine XR (EFFEXOR-XR) 37.5 MG 24 hr capsule Take 37.5 mg by mouth Daily.     • vitamin D (ERGOCALCIFEROL) 52062 units capsule capsule Take 50,000 Units by mouth 1 (One) Time Per Week.       No current facility-administered medications for this visit.        Allergies:  Phenergan [promethazine hcl]    Review of Systems  Review of Systems   Constitution: Negative for malaise/fatigue.   HENT: Negative.    Eyes: Negative.    Cardiovascular: Positive for palpitations. Negative for chest pain,  "claudication, cyanosis, dyspnea on exertion, irregular heartbeat, leg swelling, near-syncope, orthopnea, paroxysmal nocturnal dyspnea and syncope.   Respiratory: Negative.    Endocrine: Negative.    Hematologic/Lymphatic: Negative.    Skin: Negative.    Gastrointestinal: Positive for abdominal pain. Negative for anorexia.   Genitourinary: Negative.    Neurological: Negative for weakness.   Psychiatric/Behavioral: Negative.        Objective     Physical Exam:  /60   Pulse 52   Ht 165.1 cm (65\")   Wt 54.9 kg (121 lb)   SpO2 99%   BMI 20.14 kg/m²   Physical Exam   Constitutional: She appears well-developed and well-nourished.   HENT:   Head: Normocephalic.   Eyes: Lids are normal.   Neck: Normal carotid pulses and no JVD present. No tracheal tenderness present. Carotid bruit is not present. No tracheal deviation and no edema present.   Cardiovascular: Regular rhythm, S1 normal, S2 normal and normal pulses.   Murmur heard.   Systolic murmur is present with a grade of 1/6.  Pulmonary/Chest: Effort normal. No stridor.   Abdominal: Soft. Normal appearance. She exhibits no distension. There is no hepatosplenomegaly. There is no tenderness.   Neurological: She is alert. She has normal strength. No cranial nerve deficit or sensory deficit.   Skin: Skin is warm.   Psychiatric: She has a normal mood and affect. Her speech is normal and behavior is normal. Thought content normal. Cognition and memory are normal.       Results Review:    Results for orders placed during the hospital encounter of 10/16/17   Adult Stress Echo W/ Cont or Stress Agent if Necessary Per Protocol    Narrative · Left ventricular systolic function is normal. Estimated EF = 55%.  · Normal stress echo with no significant echocardiographic evidence for   myocardial ischemia.             ECG 12 Lead  Date/Time: 1/30/2020 11:47 AM  Performed by: Selvin Cordova MD  Authorized by: Selvin Cordova MD   Comparison: compared with previous ECG from " 10/30/2019  Similar to previous ECG  Rhythm: sinus bradycardia  Rate: bradycardic  Conduction: conduction normal  T inversion: III and aVF  QRS axis: normal  Other findings: left ventricular hypertrophy with strain    Clinical impression: abnormal EKG            Assessment/Plan   Dalia was seen today for palpitations.    Diagnoses and all orders for this visit:    Abnormal ECG    Controlled type 2 diabetes mellitus without complication, without long-term current use of insulin (CMS/[facility])    Mixed hyperlipidemia    Essential hypertension    Palpitations    Sinus bradycardia    Abdominal pain, unspecified abdominal location  -     US AAA Screen Limited; Future    Other orders  -     ECG 12 Lead           Plan:        Orders Placed This Encounter   Procedures   • US AAA Screen Limited     Standing Status:   Future     Standing Expiration Date:   1/30/2021     Order Specific Question:   Is the patient a male between 65-75 years old and have smoked at least 100 cigarettes in their lifetime OR a male or female Medicare patient who has a family history of abdominal aoritc aneurysm?     Answer:   No     Order Specific Question:   Reason for Exam:     Answer:   abdominal pain   • ECG 12 Lead     This order was created via procedure documentation      She wants to defer any cardiac tests       _________________________________________________________________________________________________________________________________________  Health maintenance and recommendations    Similar recommendations as last visit       Offered to give patient  a copy      Questions were encouraged, asked and answered to the patient's  understanding and satisfaction. Questions if any regarding current medications and side effects, need for refills and importance of compliance to medications stressed.    Reviewed available prior notes, consults, prior visits, laboratory findings, radiology and cardiology relevant reports. Updated chart as applicable.  I have reviewed the patient's medical history in detail and updated the computerized patient record as relevant.      Updated patient regarding any new or relevant abnormalities on review of records or any new findings on physical exam. Mentioned to patient about purpose of visit and desirable health short and long term goals and objectives.    Primary to monitor CBC CMP Lipid panel and TSH as applicable    ___________________________________________________________________________________________________________________________________________         Return in about 6 months (around 7/30/2020).

## 2020-02-06 ENCOUNTER — APPOINTMENT (OUTPATIENT)
Dept: ULTRASOUND IMAGING | Facility: HOSPITAL | Age: 85
End: 2020-02-06

## 2020-02-11 ENCOUNTER — OFFICE VISIT (OUTPATIENT)
Dept: PRIMARY CARE CLINIC | Age: 85
End: 2020-02-11
Payer: MEDICARE

## 2020-02-11 PROCEDURE — 96372 THER/PROPH/DIAG INJ SC/IM: CPT | Performed by: FAMILY MEDICINE

## 2020-02-11 RX ORDER — CYANOCOBALAMIN 1000 UG/ML
1000 INJECTION INTRAMUSCULAR; SUBCUTANEOUS ONCE
Status: COMPLETED | OUTPATIENT
Start: 2020-02-11 | End: 2020-02-11

## 2020-02-11 RX ADMIN — CYANOCOBALAMIN 1000 MCG: 1000 INJECTION INTRAMUSCULAR; SUBCUTANEOUS at 07:56

## 2020-03-09 RX ORDER — ERGOCALCIFEROL 1.25 MG/1
50000 CAPSULE ORAL WEEKLY
Qty: 12 CAPSULE | Refills: 1 | Status: SHIPPED | OUTPATIENT
Start: 2020-03-09 | End: 2020-09-29

## 2020-03-24 ENCOUNTER — OFFICE VISIT (OUTPATIENT)
Dept: PRIMARY CARE CLINIC | Age: 85
End: 2020-03-24
Payer: MEDICARE

## 2020-03-24 PROCEDURE — 96372 THER/PROPH/DIAG INJ SC/IM: CPT | Performed by: FAMILY MEDICINE

## 2020-03-24 RX ORDER — PRENATAL WITH FERROUS FUM AND FOLIC ACID 3080; 920; 120; 400; 22; 1.84; 3; 20; 10; 1; 12; 200; 27; 25; 2 [IU]/1; [IU]/1; MG/1; [IU]/1; MG/1; MG/1; MG/1; MG/1; MG/1; MG/1; UG/1; MG/1; MG/1; MG/1; MG/1
1 TABLET ORAL WEEKLY
Qty: 90 TABLET | Refills: 0 | Status: ON HOLD | OUTPATIENT
Start: 2020-03-24 | End: 2020-12-22 | Stop reason: SDUPTHER

## 2020-03-24 RX ORDER — SIMVASTATIN 10 MG
10 TABLET ORAL EVERY EVENING
Qty: 90 TABLET | Refills: 0 | Status: SHIPPED | OUTPATIENT
Start: 2020-03-24 | End: 2020-06-08 | Stop reason: SDUPTHER

## 2020-03-24 RX ORDER — SITAGLIPTIN AND METFORMIN HYDROCHLORIDE 1000; 50 MG/1; MG/1
1 TABLET, FILM COATED, EXTENDED RELEASE ORAL DAILY
Qty: 90 TABLET | Refills: 1 | Status: SHIPPED | OUTPATIENT
Start: 2020-03-24 | End: 2020-06-08 | Stop reason: ALTCHOICE

## 2020-03-24 RX ORDER — VITAMIN B COMPLEX
TABLET ORAL
Qty: 180 TABLET | Refills: 5 | Status: ON HOLD | OUTPATIENT
Start: 2020-03-24 | End: 2020-06-22 | Stop reason: ALTCHOICE

## 2020-03-24 RX ORDER — CYANOCOBALAMIN 1000 UG/ML
1000 INJECTION INTRAMUSCULAR; SUBCUTANEOUS ONCE
Status: COMPLETED | OUTPATIENT
Start: 2020-03-24 | End: 2020-03-24

## 2020-03-24 RX ORDER — FOLIC ACID 1 MG/1
1 TABLET ORAL DAILY
Qty: 90 TABLET | Refills: 0 | Status: SHIPPED | OUTPATIENT
Start: 2020-03-24 | End: 2020-09-23 | Stop reason: SDUPTHER

## 2020-03-24 RX ADMIN — CYANOCOBALAMIN 1000 MCG: 1000 INJECTION INTRAMUSCULAR; SUBCUTANEOUS at 08:42

## 2020-03-24 NOTE — PROGRESS NOTES
Pt stated \"It is detrimental to my health that I have Janumet to take when I have trouble urinating\"  She requested a refill on Rima Millard she stated that she gets the 1000 mg tablets and cuts them in half and only takes them when she urinates a lot. Please advise if ok to refill     Requested Prescriptions     Pending Prescriptions Disp Refills    SITagliptin-metFORMIN (JANUMET XR)  MG TB24 per extended release tablet 90 tablet 1     Sig: Take 1 tablet by mouth daily     Signed Prescriptions Disp Refills    blood glucose test strips (ASCENSIA AUTODISC VI;ONE TOUCH ULTRA TEST VI) strip 100 each 3     Si each by In Vitro route daily As needed.  metFORMIN (GLUCOPHAGE) 500 MG tablet 90 tablet 0     Sig: Take 1 tablet by mouth daily (with breakfast)    simvastatin (ZOCOR) 10 MG tablet 90 tablet 0     Sig: Take 1 tablet by mouth every evening    folic acid (FOLVITE) 1 MG tablet 90 tablet 0     Sig: Take 1 tablet by mouth daily    Calcium Carb-Cholecalciferol (CALCIUM-VITAMIN D) 500-400 MG-UNIT TABS 180 tablet 5     Sig: Take 2 tabs daily    Prenatal Vit-Fe Fumarate-FA (PRENATAL VITAMIN) 27-1 MG TABS tablet 90 tablet 0     Sig: Take 1 tablet by mouth once a week           Pt has a wound on her left buttocks she stated \"I been taking care of it for about 2 months or longer now\"  Offered pt a wound care referral and pt stated \"no thank you  I will wait for my appointment with Dr Marilyn Anderson"  Offered to set pt up with my chart e-visit or Video visit both were declined. Future Appointments   Date Time Provider Ernie Plascencia   3/24/2020  8:45 AM Faviola Aden MD [facility]   2020  7:30 AM [facility]   2020  9:00 [facility]      After obtaining consent, and per orders of Dr Lia Lopez , injection of B12 was given in the R Gluteal by Glady[employer]ine Safe  Pt tolerated well. Pt had no reaction. Pt was released from our office.

## 2020-03-31 ENCOUNTER — HOSPITAL ENCOUNTER (OUTPATIENT)
Dept: WOUND CARE | Age: 85
Discharge: HOME | End: 2020-03-31
Payer: MEDICARE

## 2020-03-31 VITALS
SYSTOLIC BLOOD PRESSURE: 129 MMHG | DIASTOLIC BLOOD PRESSURE: 65 MMHG | WEIGHT: 120 LBS | TEMPERATURE: 96.8 F | HEART RATE: 55 BPM | HEIGHT: 65 IN | BODY MASS INDEX: 19.99 KG/M2 | RESPIRATION RATE: 20 BRPM

## 2020-03-31 PROBLEM — S31.829A: Status: ACTIVE | Noted: 2020-03-31

## 2020-03-31 PROCEDURE — 99213 OFFICE O/P EST LOW 20 MIN: CPT

## 2020-03-31 PROCEDURE — 99213 OFFICE O/P EST LOW 20 MIN: CPT | Performed by: NURSE PRACTITIONER

## 2020-03-31 RX ORDER — LORAZEPAM 0.5 MG/1
0.5 TABLET ORAL DAILY PRN
Status: ON HOLD | COMMUNITY
Start: 2020-03-24 | End: 2020-12-22 | Stop reason: HOSPADM

## 2020-03-31 ASSESSMENT — VISUAL ACUITY: OU: 1

## 2020-03-31 NOTE — PROGRESS NOTES
1.25 MG (81916 UT) CAPS capsule Take 1 capsule by mouth once a week 12 capsule 1    memantine (NAMENDA) 5 MG tablet Take 5 mg by mouth daily      docusate sodium (COLACE) 100 MG capsule   5    donepezil (ARICEPT) 5 MG tablet Take 5 mg by mouth      prenatal vitamin (VOL-PLUS) 27-1 MG TABS TABLET Take 1 tablet by mouth daily 30 tablet 5    atenolol (TENORMIN) 25 MG tablet Take 0.5 tablets by mouth daily 30 tablet 0    Red Yeast Rice 600 MG CAPS Take by mouth      Psyllium (METAMUCIL PO) Take by mouth daily      Tetrahydrozoline HCl (EYE DROPS OP) Apply to eye Indications: systane       venlafaxine (EFFEXOR-XR) 37.5 MG XR capsule Take 37.5 mg by mouth daily.  gabapentin (NEURONTIN) 300 MG capsule Take 300 mg by mouth nightly       LORazepam (ATIVAN) 0.5 MG tablet Take 1 tablet by mouth daily as needed.  blood glucose test strips (ASCENSIA AUTODISC VI;ONE TOUCH ULTRA TEST VI) strip 1 each by In Vitro route daily As needed. 100 each 3    zoster recombinant adjuvanted vaccine (SHINGRIX) 50 MCG/0.5ML SUSR injection 50 MCG IM then repeat 2-6 months. 0.5 mL 1    Diabetic Shoe MISC by Does not apply route Diabetic shoes bilateral 1 each 0    Diabetic Shoe MISC by Does not apply route Diabetic shoes bilateral 1 each 0    nitroGLYCERIN (NITROSTAT) 0.4 MG SL tablet Place 1 tablet under the tongue every 5 minutes as needed for Chest pain up to max of 3 total doses. If no relief after 1 dose, call 911. 25 tablet 3    UNABLE TO FIND Take 1 tablet by mouth daily 90 tablet 3    triamcinolone (KENALOG) 0.1 % cream Apply topically 2 times daily. 80 g 5    Fructose-Dextrose-Phosphor Acd (EMETROL PO) Take 10 mLs by mouth daily as needed (nausea)        No current facility-administered medications for this encounter.       Allergies: Phenergan [promethazine] and Promethazine hcl  Past Medical History:   Diagnosis Date    Abdominal pain     Anxiety     Dr Milagros Levy Reis's palsy 2003    Colon polyp     Depression     Dr Gerri Pizano Diverticulosis     Female bladder prolapse     Hernia, abdominal     History of adenomatous polyp of colon     Hypertriglyceridemia     Neuropathy     Osteopenia     Type II or unspecified type diabetes mellitus without mention of complication, not stated as uncontrolled     Vitamin B 12 deficiency        Past Surgical History:   Procedure Laterality Date    APPENDECTOMY      BLADDER REPAIR  1984    states stiched her bladder up but was not a suspension-Dr Garcia    CATARACT REMOVAL  12/2014    CATARACT REMOVAL  01/2015    CHOLECYSTECTOMY  age 22    COLONOSCOPY  02/15/2012    Dr Bryan SALCIDO, diverticulosis coli, small internal hemorrhoids, 5 yr recall    COLONOSCOPY  3/1998    negative    COLONOSCOPY  5/1/07     Dr Talon Ashley, diverticulosis coli/small internal hemorrhoids    COLONOSCOPY  9/16/04    Dr Blood-diverticulosis coli, ileocecal valve lipoma    COLONOSCOPY N/A 1/25/2016    Dr Paul SALCIDO, 5 yr recall   Alphia Filler Bilateral     cataract   Mliss Aliya Ashley partner   [address] N/A 2/15/2016    OPEN REPAIR OF RECURRENT INCISIONAL HERNIA WITH MESH  performed by Mel Rowe MD at [facility] ENDOSCOPY  02/16/2012    Dr White-Barretts/gastritis    UPPER GASTROINTESTINAL ENDOSCOPY  2/1998    Dr Talon Ashley, negative    UPPER GASTROINTESTINAL ENDOSCOPY  1/2/07    Dr Talon Ashley, gastritis    UPPER GASTROINTESTINAL ENDOSCOPY N/A 1/11/2016    Dr Debbie Michelle junction w/minimal chronic inflammation     Family History   Problem Relation Age of Onset    Cancer Mother         Pancreatic    Other Mother     Cancer Father         melanoma    Colon Cancer Neg Hx     Colon Polyps Neg Hx     Esophageal Cancer Neg Hx     Liver Cancer Neg Hx     Liver Disease

## 2020-04-08 NOTE — PROGRESS NOTES
I was able to contact the patient via telephone. She states that her wound is healed up, she is so grateful for her care. She was using Calmoseptine topically to her wound on her buttock. We will not make a future appointment but she can come as needed for the future.

## 2020-04-27 RX ORDER — NITROGLYCERIN 0.4 MG/1
0.4 TABLET SUBLINGUAL
Qty: 100 TABLET | Refills: 11 | Status: SHIPPED | OUTPATIENT
Start: 2020-04-27 | End: 2021-12-07 | Stop reason: SDUPTHER

## 2020-05-05 ENCOUNTER — VIRTUAL VISIT (OUTPATIENT)
Dept: PRIMARY CARE CLINIC | Age: 85
End: 2020-05-05
Payer: MEDICARE

## 2020-05-05 VITALS — BODY MASS INDEX: 19.99 KG/M2 | HEIGHT: 65 IN | WEIGHT: 120 LBS

## 2020-05-05 PROCEDURE — 1036F TOBACCO NON-USER: CPT | Performed by: FAMILY MEDICINE

## 2020-05-05 PROCEDURE — 1090F PRES/ABSN URINE INCON ASSESS: CPT | Performed by: FAMILY MEDICINE

## 2020-05-05 PROCEDURE — G8420 CALC BMI NORM PARAMETERS: HCPCS | Performed by: FAMILY MEDICINE

## 2020-05-05 PROCEDURE — 1123F ACP DISCUSS/DSCN MKR DOCD: CPT | Performed by: FAMILY MEDICINE

## 2020-05-05 PROCEDURE — G8400 PT W/DXA NO RESULTS DOC: HCPCS | Performed by: FAMILY MEDICINE

## 2020-05-05 PROCEDURE — G8427 DOCREV CUR MEDS BY ELIG CLIN: HCPCS | Performed by: FAMILY MEDICINE

## 2020-05-05 PROCEDURE — 99214 OFFICE O/P EST MOD 30 MIN: CPT | Performed by: FAMILY MEDICINE

## 2020-05-05 PROCEDURE — 4040F PNEUMOC VAC/ADMIN/RCVD: CPT | Performed by: FAMILY MEDICINE

## 2020-05-05 RX ORDER — LANCETS 30 GAUGE
1 EACH MISCELLANEOUS 3 TIMES DAILY
Qty: 600 EACH | Refills: 1 | Status: ON HOLD | OUTPATIENT
Start: 2020-05-05 | End: 2020-12-22 | Stop reason: SDUPTHER

## 2020-05-05 ASSESSMENT — PATIENT HEALTH QUESTIONNAIRE - PHQ9
SUM OF ALL RESPONSES TO PHQ QUESTIONS 1-9: 0
1. LITTLE INTEREST OR PLEASURE IN DOING THINGS: 0
SUM OF ALL RESPONSES TO PHQ QUESTIONS 1-9: 0
2. FEELING DOWN, DEPRESSED OR HOPELESS: 0
SUM OF ALL RESPONSES TO PHQ9 QUESTIONS 1 & 2: 0

## 2020-05-05 ASSESSMENT — ENCOUNTER SYMPTOMS
WHEEZING: 0
CONSTIPATION: 0
ABDOMINAL PAIN: 0
COUGH: 0
DIARRHEA: 0
SHORTNESS OF BREATH: 0
CHEST TIGHTNESS: 0
VOMITING: 0
NAUSEA: 0

## 2020-05-05 NOTE — PROGRESS NOTES
2020    TELEHEALTH EVALUATION -- Audio/Visual (During ZJHPE-54 public health emergency)    HPI:    Roly Steen (:  1935) has requested an audio/video evaluation for the following concern(s):    Patient presents today via video visit for f/u diabetes. She is doing well but has multiple concerns on COVID and diabetes. She notes that she has had some issues w/ diverticular disease but no recent issues. She has been controlled on her a1c. Notes her DM shoes are due this May. She has bunions and lack of sensation of the feet. Review of Systems   Constitutional: Negative for chills and fever. Respiratory: Negative for cough, chest tightness, shortness of breath and wheezing. Cardiovascular: Negative for chest pain, palpitations and leg swelling. Gastrointestinal: Negative for abdominal pain, constipation, diarrhea, nausea and vomiting. Genitourinary: Negative for difficulty urinating, dysuria and frequency. Prior to Visit Medications    Medication Sig Taking? Authorizing Provider   blood glucose test strips (ASCENSIA AUTODISC VI;ONE TOUCH ULTRA TEST VI) strip 1 each by In Vitro route daily As needed. Yes Terry Ryan MD   Lancets MISC 1 each by Does not apply route 3 times daily Yes Terry Ryan MD   Diabetic Shoe MISC by Does not apply route DISPENSE ONE PAIR DIABETIC SHOES AND THREE PAIRS OF HEAT MOLDED INSERTS Yes Terry Ryan MD   LORazepam (ATIVAN) 0.5 MG tablet Take 1 tablet by mouth daily as needed. Yes Historical Provider, MD   blood glucose test strips (ASCENSIA AUTODISC VI;ONE TOUCH ULTRA TEST VI) strip 1 each by In Vitro route daily As needed.  Yes Terry Ryan MD   simvastatin (ZOCOR) 10 MG tablet Take 1 tablet by mouth every evening Yes Terry Ryan MD   folic acid (FOLVITE) 1 MG tablet Take 1 tablet by mouth daily Yes Terry Ryan MD   Calcium Carb-Cholecalciferol (CALCIUM-VITAMIN D) 500-400 MG-UNIT TABS Take 2 tabs daily Yes Terry Ryan MD   Prenatal Vit-Fe Fumarate-FA (PRENATAL VITAMIN) 27-1 MG TABS tablet Take 1 tablet by mouth once a week Yes Vani Rossi MD   SITagliptin-metFORMIN (JANUMET XR)  MG TB24 per extended release tablet Take 1 tablet by mouth daily Yes Vani Rossi MD   vitamin D (ERGOCALCIFEROL) 1.25 MG (76294 UT) CAPS capsule Take 1 capsule by mouth once a week Yes Vani Rossi MD   memantine (NAMENDA) 5 MG tablet Take 5 mg by mouth daily Yes Historical Provider, MD   docusate sodium (COLACE) 100 MG capsule  Yes Historical Provider, MD   Diabetic Shoe MISC by Does not apply route Diabetic shoes bilateral Yes Vani Rossi MD   donepezil (ARICEPT) 5 MG tablet Take 5 mg by mouth Yes Historical Provider, MD   Diabetic Shoe MISC by Does not apply route Diabetic shoes bilateral Yes Vani Rossi MD   prenatal vitamin (VOL-PLUS) 27-1 MG TABS TABLET Take 1 tablet by mouth daily Yes Vani Rossi MD   atenolol (TENORMIN) 25 MG tablet Take 0.5 tablets by mouth daily Yes Vani Rossi MD   nitroGLYCERIN (NITROSTAT) 0.4 MG SL tablet Place 1 tablet under the tongue every 5 minutes as needed for Chest pain up to max of 3 total doses. If no relief after 1 dose, call 911. Yes Vani Rossi MD   UNABLE TO FIND Take 1 tablet by mouth daily Yes DILIA Carson CNP   Red Yeast Rice 600 MG CAPS Take by mouth Yes Historical Provider, MD   triamcinolone (KENALOG) 0.1 % cream Apply topically 2 times daily. Yes DILIA Carson CNP   Psyllium (METAMUCIL PO) Take by mouth daily Yes Historical Provider, MD   Fructose-Dextrose-Phosphor Acd (EMETROL PO) Take 10 mLs by mouth daily as needed (nausea)  Yes Historical Provider, MD   Tetrahydrozoline HCl (EYE DROPS OP) Apply to eye Indications: systane  Yes Historical Provider, MD   venlafaxine (EFFEXOR-XR) 37.5 MG XR capsule Take 37.5 mg by mouth daily.  Yes Historical Provider, MD   gabapentin (NEURONTIN) 300 MG capsule Take 300 mg by mouth nightly  Yes Historical Provider, MD       Social History     Tobacco Use    Smoking status: Never Smoker    Smokeless tobacco: Never Used   Substance Use Topics    Alcohol use: No    Drug use: No        Allergies   Allergen Reactions    Phenergan [Promethazine] Other (See Comments)     Makes \"Crazy\"    Promethazine Hcl    ,   Past Medical History:   Diagnosis Date    Abdominal pain     Anxiety     Dr Rajan Blackman palsy 2003    Colon polyp     Depression     Dr Gerri Pizano Diverticulosis     Female bladder prolapse     Hernia, abdominal     History of adenomatous polyp of colon     Hypertriglyceridemia     Neuropathy     Osteopenia     Type II or unspecified type diabetes mellitus without mention of complication, not stated as uncontrolled     Vitamin B 12 deficiency    ,   Past Surgical History:   Procedure Laterality Date    APPENDECTOMY      BLADDER REPAIR  1984    states stiched her bladder up but was not a suspension-Dr Garcia    CATARACT REMOVAL  12/2014    CATARACT REMOVAL  01/2015    CHOLECYSTECTOMY  age 22    COLONOSCOPY  02/15/2012    Dr Bryan SALCIDO, diverticulosis coli, small internal hemorrhoids, 5 yr recall    COLONOSCOPY  3/1998    negative    COLONOSCOPY  5/1/07     Dr Talon Ashley, diverticulosis coli/small internal hemorrhoids    COLONOSCOPY  9/16/04    Dr Blood-diverticulosis coli, ileocecal valve lipoma    COLONOSCOPY N/A 1/25/2016    Dr Isma SALCIDO, 5 yr recall   Alphia Filler Bilateral     [facility]    Dr Trang Ashley partner   [address] N/A 2/15/2016    OPEN REPAIR OF RECURRENT INCISIONAL HERNIA WITH MESH  performed by Mel Rowe MD at [address]  02/16/2012    Dr White-Barretts/gastritis    UPPER GASTROINTESTINAL ENDOSCOPY  2/1998    Dr Talon Ashley, negative    UPPER GASTROINTESTINAL ENDOSCOPY D 25 Hydroxy   4. 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) E11.42 Vitamin B12 & Folate   5. Essential hypertension I10    6. Poor peripheral circulation R09.89    7. Vitamin B 12 deficiency E53.8    8. Counseling, unspecified Z71.9        Patient received approximately 5 minutes of counseling on COVID-19 pandemic in regards to hygiene, symptoms, following public guidelines, and precautions to take. Chronic conditions controlled, bowel health discussed and continue current diet changes. Pt agrees to continue tx for her DM and I willcontinue to monitor. Needs DM shoes and exam is considered the same as pt's don't have improvement in sensation, bunions are present and she benefits from shoes. As of such, in my medical expert opinion, pt should continue shoes without in person exam due to COVID crisis and risk of COVID transmission in face to face encounter. Orders Placed This Encounter   Medications    blood glucose test strips (ASCENSIA AUTODISC VI;ONE TOUCH ULTRA TEST VI) strip     Si each by In Vitro route daily As needed. Dispense:  100 each     Refill:  3    Lancets MISC     Si each by Does not apply route 3 times daily     Dispense:  600 each     Refill:  1    Diabetic Shoe MISC     Sig: by Does not apply route DISPENSE ONE PAIR DIABETIC SHOES AND THREE PAIRS OF HEAT MOLDED INSERTS     Dispense:  1 each     Refill:  0       Orders Placed This Encounter   Procedures    Vitamin D 25 Hydroxy     Standing Status:   Future     Standing Expiration Date:   2021    Vitamin B12 & Folate     Standing Status:   Future     Standing Expiration Date:   2021       Return in about 3 months (around 2020) for medicare AWV, foot exam - OV. Tanja Ferrera is a 81 y.o. female being evaluated by a Virtual Visit (video visit) encounter to address concerns as mentioned above. A caregiver was present when appropriate.  Due to this being a TeleHealth encounter (During TQYFQ-06 public health emergency), evaluation of the following organ systems was limited: Vitals/Constitutional/EENT/Resp/CV/GI//MS/Neuro/Skin/Heme-Lymph-Imm. Pursuant to the emergency declaration under the [address] [place] [address], [address] [place] [address] and the [employer] and [employer] Act, this Virtual Visit was conducted with patient's (and/or legal guardian's) consent, to reduce the patient's risk of exposure to COVID-19 and provide necessary medical care. The patient (and/or legal guardian) has also been advised to contact this office for worsening conditions or problems, and seek emergency medical treatment and/or call 911 if deemed necessary. Services were provided through a video synchronous discussion virtually to substitute for in-person clinic visit. Patient and provider were located at their individual homes or provider at secure site for business. --Ying Michaels MD on 5/5/2020 at 8:55 AM    An electronic signature was used to authenticate this note.

## 2020-05-06 NOTE — TELEPHONE ENCOUNTER
PT NOTIFIED   mouth daily Yes Historical Provider, MD   multivitamin [facility]) per tablet Take 1 tablet by mouth daily. Yes Historical Provider, MD   vitamin B-12 (CYANOCOBALAMIN) 1000 MCG tablet Take 1,000 mcg by mouth daily. Yes Historical Provider, MD   FISH OIL Take 1 capsule by mouth daily  Yes Historical Provider, MD       Social History     Tobacco Use    Smoking status: Never Smoker    Smokeless tobacco: Never Used   Substance Use Topics    Alcohol use: Yes     Comment: Moderate     Drug use: No        No Known Allergies,   Past Medical History:   Diagnosis Date    BPH (benign prostatic hyperplasia)     GERD (gastroesophageal reflux disease)     Headache(784.0)     Schatzki's ring     Varicose veins    ,   Past Surgical History:   Procedure Laterality Date    COLONOSCOPY  2007 and  2015  2018    fam  hx  of  sibling    nanci     KNEE ARTHROSCOPY Left 1998   ,   Social History     Tobacco Use    Smoking status: Never Smoker    Smokeless tobacco: Never Used   Substance Use Topics    Alcohol use: Yes     Comment: Moderate     Drug use: No   ,   Family History   Problem Relation Age of Onset    Cancer Sister         colon  cancer    Cancer Brother         colon cancer    Heart Disease Mother     Stroke Mother     Diabetes Father        PHYSICAL EXAMINATION:  Constitutional: [x] Appears well-developed and well-nourished [x] No apparent distress      [] Abnormal-   Mental status  [x] Alert and awake  [x] Oriented to person/place/time [x]Able to follow commands      Eyes:  EOM    [x]  Normal  [] Abnormal-  Sclera  [x]  Normal  [] Abnormal -         Discharge [x]  None visible  [] Abnormal -    HENT:   [x] Normocephalic, atraumatic.   [] Abnormal   [x] Mouth/Throat: Mucous membranes are moist.     External Ears [x] Normal  [] Abnormal-     Neck: [x] No visualized mass     Pulmonary/Chest: [x] Respiratory effort normal.  [x] No visualized signs of difficulty breathing or respiratory distress        [] mLs into the muscle once for 1 dose Bring vial to biopsy appointment. Dispense: 2 mL; Refill: 0  - ciprofloxacin (CIPRO) 500 MG tablet; Take 1 tablet by mouth 2 times daily for 3 days  Dispense: 6 tablet; Refill: 0      Return in about 1 month (around 6/6/2020) for TRUS biopsy. Lillette Nyhan is a 60 y.o. male being evaluated by a Virtual Visit (video visit) encounter to address concerns as mentioned above. A caregiver was present when appropriate. Due to this being a TeleHealth encounter (During [place] [address] public health emergency), evaluation of the following organ systems was limited: Vitals/Constitutional/EENT/Resp/CV/GI//MS/Neuro/Skin/Heme-Lymph-Imm. Pursuant to the emergency declaration under the [address] [place] [address], [address] [place] [address] authority and the [employer] and [employer] Act, this Virtual Visit was conducted with patient's (and/or legal guardian's) consent, to reduce the patient's risk of exposure to COVID-19 and provide necessary medical care. The patient (and/or legal guardian) has also been advised to contact this office for worsening conditions or problems, and seek emergency medical treatment and/or call 911 if deemed necessary. Patient identification was verified at the start of the visit: Yes    Total time spent on this encounter: 25 min    Services were provided through a video synchronous discussion virtually to substitute for in-person clinic visit. Patient and provider were located at their individual homes. --RONNELL Alva CNP on 5/6/2020 at 8:57 AM    An electronic signature was used to authenticate this note.

## 2020-05-06 NOTE — TELEPHONE ENCOUNTER
Cher Pallas called requesting a refill of the below medication which has been pended for you:     Requested Prescriptions     Signed Prescriptions Disp Refills    blood glucose test strips (ASCENSIA AUTODISC VI;ONE TOUCH ULTRA TEST VI) strip 100 each 3     Si each by In Vitro route daily As needed.      Authorizing Provider: Jerilyn Acuna     Ordering User: Jocy Sims       Last Appointment Date: 3/24/2020  Next Appointment Date: 2020    Allergies   Allergen Reactions    Phenergan [Promethazine] Other (See Comments)     Makes \"Crazy\"    Promethazine Hcl

## 2020-06-08 ENCOUNTER — VIRTUAL VISIT (OUTPATIENT)
Dept: PRIMARY CARE CLINIC | Age: 85
End: 2020-06-08
Payer: MEDICARE

## 2020-06-08 PROBLEM — I73.9 PERIPHERAL VASCULAR DISEASE, UNSPECIFIED (HCC): Status: ACTIVE | Noted: 2020-06-08

## 2020-06-08 PROCEDURE — 1090F PRES/ABSN URINE INCON ASSESS: CPT | Performed by: FAMILY MEDICINE

## 2020-06-08 PROCEDURE — 1036F TOBACCO NON-USER: CPT | Performed by: FAMILY MEDICINE

## 2020-06-08 PROCEDURE — 4040F PNEUMOC VAC/ADMIN/RCVD: CPT | Performed by: FAMILY MEDICINE

## 2020-06-08 PROCEDURE — 99214 OFFICE O/P EST MOD 30 MIN: CPT | Performed by: FAMILY MEDICINE

## 2020-06-08 PROCEDURE — G8428 CUR MEDS NOT DOCUMENT: HCPCS | Performed by: FAMILY MEDICINE

## 2020-06-08 PROCEDURE — 1123F ACP DISCUSS/DSCN MKR DOCD: CPT | Performed by: FAMILY MEDICINE

## 2020-06-08 PROCEDURE — G8420 CALC BMI NORM PARAMETERS: HCPCS | Performed by: FAMILY MEDICINE

## 2020-06-08 PROCEDURE — G8400 PT W/DXA NO RESULTS DOC: HCPCS | Performed by: FAMILY MEDICINE

## 2020-06-08 RX ORDER — SIMVASTATIN 10 MG
10 TABLET ORAL EVERY EVENING
Qty: 90 TABLET | Refills: 3 | Status: ON HOLD | OUTPATIENT
Start: 2020-06-08 | End: 2020-12-22 | Stop reason: HOSPADM

## 2020-06-08 RX ORDER — SIMVASTATIN 10 MG
10 TABLET ORAL EVERY EVENING
Qty: 90 TABLET | Refills: 3 | Status: SHIPPED | OUTPATIENT
Start: 2020-06-08 | End: 2020-06-08 | Stop reason: SDUPTHER

## 2020-06-08 RX ORDER — SITAGLIPTIN AND METFORMIN HYDROCHLORIDE 500; 50 MG/1; MG/1
1 TABLET, FILM COATED ORAL 2 TIMES DAILY WITH MEALS
Qty: 180 TABLET | Refills: 1 | Status: SHIPPED | OUTPATIENT
Start: 2020-06-08 | End: 2020-06-08 | Stop reason: SDUPTHER

## 2020-06-08 RX ORDER — SITAGLIPTIN AND METFORMIN HYDROCHLORIDE 500; 50 MG/1; MG/1
1 TABLET, FILM COATED ORAL 2 TIMES DAILY WITH MEALS
Qty: 180 TABLET | Refills: 1 | Status: SHIPPED | OUTPATIENT
Start: 2020-06-08 | End: 2020-12-03 | Stop reason: ALTCHOICE

## 2020-06-08 ASSESSMENT — ENCOUNTER SYMPTOMS
CHEST TIGHTNESS: 0
NAUSEA: 0
VOMITING: 0
ABDOMINAL PAIN: 0
COUGH: 0
SHORTNESS OF BREATH: 0
DIARRHEA: 0
WHEEZING: 0
CONSTIPATION: 0

## 2020-06-08 NOTE — PROGRESS NOTES
2020    TELEHEALTH EVALUATION -- Audio/Visual (During DFTQY-68 public health emergency)    HPI:    Danii Hamilton (:  1935) has requested an audio/video evaluation for the following concern(s):    Patient presents today via video visit for medication questions. Pt states she is in need of her vitamin B12 injection. She inquires about paperwork for her diabetic shoes. She notes she will be travelling to [facility] soon and inquires about getting B12 injections at a pharmacy there. Pt reports currently following a diabetic diet and that her BS recording in the morning has decreased. She inquires about metformin dosage. Pt states she walks ~2 miles per day. Review of Systems   Constitutional: Negative for chills and fever. Respiratory: Negative for cough, chest tightness, shortness of breath and wheezing. Cardiovascular: Negative for chest pain, palpitations and leg swelling. Gastrointestinal: Negative for abdominal pain, constipation, diarrhea, nausea and vomiting. Genitourinary: Negative for difficulty urinating, dysuria and frequency. Prior to Visit Medications    Medication Sig Taking? Authorizing Provider   sitaGLIPtan-metformin (JANUMET)  MG per tablet Take 1 tablet by mouth 2 times daily (with meals) Yes Mikayla Lee MD   simvastatin (ZOCOR) 10 MG tablet Take 1 tablet by mouth every evening Yes Mikayla Lee MD   blood glucose test strips (ASCENSIA AUTODISC VI;ONE TOUCH ULTRA TEST VI) strip 1 each by In Vitro route daily As needed. Mikayla Lee MD   blood glucose test strips (ASCENSIA AUTODISC VI;ONE TOUCH ULTRA TEST VI) strip 1 each by In Vitro route daily As needed.   Mikayla Lee MD   Lancets MISC 1 each by Does not apply route 3 times daily  Mikayla Lee MD   Diabetic Shoe MISC by Does not apply route DISPENSE ONE PAIR DIABETIC SHOES AND THREE PAIRS OF HEAT MOLDED INSERTS  Mikayla Lee MD   LORazepam (ATIVAN) 0.5 MG tablet Take 1 tablet by mouth daily as needed. Historical Provider, MD   blood glucose test strips (ASCENSIA AUTODISC VI;ONE TOUCH ULTRA TEST VI) strip 1 each by In Vitro route daily As needed. Korin Choudhary MD   folic acid (FOLVITE) 1 MG tablet Take 1 tablet by mouth daily  Korin Choudhary MD   Calcium Carb-Cholecalciferol (CALCIUM-VITAMIN D) 500-400 MG-UNIT TABS Take 2 tabs daily  Korin Choudhary MD   Prenatal Vit-Fe Fumarate-FA (PRENATAL VITAMIN) 27-1 MG TABS tablet Take 1 tablet by mouth once a week  Korin Choudhary MD   vitamin D (ERGOCALCIFEROL) 1.25 MG (04274 UT) CAPS capsule Take 1 capsule by mouth once a week  Korin Choudhary MD   memantine (NAMENDA) 5 MG tablet Take 5 mg by mouth daily  Historical Provider, MD   docusate sodium (COLACE) 100 MG capsule   Historical Provider, MD   Diabetic Shoe MISC by Does not apply route Diabetic shoes bilateral  Korin Choudhary MD   donepezil (ARICEPT) 5 MG tablet Take 5 mg by mouth  Historical Provider, MD   Diabetic Shoe MISC by Does not apply route Diabetic shoes bilateral  Korin Choudhary MD   prenatal vitamin (VOL-PLUS) 27-1 MG TABS TABLET Take 1 tablet by mouth daily  Korin Choudhary MD   atenolol (TENORMIN) 25 MG tablet Take 0.5 tablets by mouth daily  Korin Choudhary MD   nitroGLYCERIN (NITROSTAT) 0.4 MG SL tablet Place 1 tablet under the tongue every 5 minutes as needed for Chest pain up to max of 3 total doses. If no relief after 1 dose, call 911. Korin Choudhary MD   UNABLE TO FIND Take 1 tablet by mouth daily  DILIA Gentile CNP   Red Yeast Rice 600 MG CAPS Take by mouth  Historical Provider, MD   triamcinolone (KENALOG) 0.1 % cream Apply topically 2 times daily.   DILIA Gentile CNP   Psyllium (METAMUCIL PO) Take by mouth daily  Historical Provider, MD   Fructose-Dextrose-Phosphor Acd (EMETROL PO) Take 10 mLs by mouth daily as needed (nausea)   Historical Provider, MD   Tetrahydrozoline HCl (EYE DROPS OP) Apply to eye Indications: systane   Historical Provider, MD hirschxine

## 2020-06-10 ENCOUNTER — OFFICE VISIT (OUTPATIENT)
Dept: CARDIOLOGY | Facility: CLINIC | Age: 85
End: 2020-06-10

## 2020-06-10 ENCOUNTER — OFFICE VISIT (OUTPATIENT)
Dept: PRIMARY CARE CLINIC | Age: 85
End: 2020-06-10
Payer: MEDICARE

## 2020-06-10 VITALS
DIASTOLIC BLOOD PRESSURE: 62 MMHG | BODY MASS INDEX: 18.66 KG/M2 | SYSTOLIC BLOOD PRESSURE: 120 MMHG | OXYGEN SATURATION: 97 % | WEIGHT: 112 LBS | HEART RATE: 56 BPM | HEIGHT: 65 IN

## 2020-06-10 DIAGNOSIS — K21.9 GASTROESOPHAGEAL REFLUX DISEASE WITHOUT ESOPHAGITIS: ICD-10-CM

## 2020-06-10 DIAGNOSIS — E11.9 CONTROLLED TYPE 2 DIABETES MELLITUS WITHOUT COMPLICATION, WITHOUT LONG-TERM CURRENT USE OF INSULIN (HCC): ICD-10-CM

## 2020-06-10 DIAGNOSIS — I10 ESSENTIAL HYPERTENSION: ICD-10-CM

## 2020-06-10 DIAGNOSIS — J33.9 NASAL POLYP: ICD-10-CM

## 2020-06-10 DIAGNOSIS — E78.2 MIXED HYPERLIPIDEMIA: ICD-10-CM

## 2020-06-10 DIAGNOSIS — E53.8 VITAMIN B 12 DEFICIENCY: ICD-10-CM

## 2020-06-10 DIAGNOSIS — R09.89 POOR PERIPHERAL CIRCULATION: ICD-10-CM

## 2020-06-10 DIAGNOSIS — R94.31 ABNORMAL ECG: ICD-10-CM

## 2020-06-10 DIAGNOSIS — R00.2 PALPITATIONS: Primary | ICD-10-CM

## 2020-06-10 DIAGNOSIS — R00.1 SINUS BRADYCARDIA: ICD-10-CM

## 2020-06-10 DIAGNOSIS — E55.9 VITAMIN D DEFICIENCY: ICD-10-CM

## 2020-06-10 DIAGNOSIS — E11.42 DIABETIC PERIPHERAL NEUROPATHY ASSOCIATED WITH TYPE 2 DIABETES MELLITUS (HCC): ICD-10-CM

## 2020-06-10 LAB
CHOLESTEROL, TOTAL: 177 MG/DL (ref 160–199)
CREATININE URINE: 127.1 MG/DL (ref 4.2–622)
FOLATE: >20 NG/ML (ref 4.8–37.3)
HBA1C MFR BLD: 5.8 % (ref 4–6)
HCT VFR BLD CALC: 43.6 % (ref 37–47)
HDLC SERPL-MCNC: 61 MG/DL (ref 65–121)
HEMOGLOBIN: 14 G/DL (ref 12–16)
LDL CHOLESTEROL CALCULATED: 91 MG/DL
MCH RBC QN AUTO: 30 PG (ref 27–31)
MCHC RBC AUTO-ENTMCNC: 32.1 G/DL (ref 33–37)
MCV RBC AUTO: 93.4 FL (ref 81–99)
MICROALBUMIN UR-MCNC: <1.2 MG/DL (ref 0–19)
MICROALBUMIN/CREAT UR-RTO: NORMAL MG/G
PDW BLD-RTO: 13.1 % (ref 11.5–14.5)
PLATELET # BLD: 338 K/UL (ref 130–400)
PMV BLD AUTO: 10.5 FL (ref 9.4–12.3)
RBC # BLD: 4.67 M/UL (ref 4.2–5.4)
TRIGL SERPL-MCNC: 125 MG/DL (ref 0–149)
TSH SERPL DL<=0.05 MIU/L-ACNC: 2.06 UIU/ML (ref 0.27–4.2)
VITAMIN B-12: 557 PG/ML (ref 211–946)
VITAMIN D 25-HYDROXY: 83.5 NG/ML
WBC # BLD: 8.3 K/UL (ref 4.8–10.8)

## 2020-06-10 PROCEDURE — 99214 OFFICE O/P EST MOD 30 MIN: CPT | Performed by: INTERNAL MEDICINE

## 2020-06-10 PROCEDURE — G8420 CALC BMI NORM PARAMETERS: HCPCS | Performed by: NURSE PRACTITIONER

## 2020-06-10 PROCEDURE — 1036F TOBACCO NON-USER: CPT | Performed by: NURSE PRACTITIONER

## 2020-06-10 PROCEDURE — 4040F PNEUMOC VAC/ADMIN/RCVD: CPT | Performed by: NURSE PRACTITIONER

## 2020-06-10 PROCEDURE — 1123F ACP DISCUSS/DSCN MKR DOCD: CPT | Performed by: NURSE PRACTITIONER

## 2020-06-10 PROCEDURE — 99212 OFFICE O/P EST SF 10 MIN: CPT | Performed by: NURSE PRACTITIONER

## 2020-06-10 PROCEDURE — G8428 CUR MEDS NOT DOCUMENT: HCPCS | Performed by: NURSE PRACTITIONER

## 2020-06-10 PROCEDURE — 93000 ELECTROCARDIOGRAM COMPLETE: CPT | Performed by: INTERNAL MEDICINE

## 2020-06-10 PROCEDURE — 1090F PRES/ABSN URINE INCON ASSESS: CPT | Performed by: NURSE PRACTITIONER

## 2020-06-10 PROCEDURE — G8400 PT W/DXA NO RESULTS DOC: HCPCS | Performed by: NURSE PRACTITIONER

## 2020-06-10 RX ORDER — MULTIVIT-MIN 60/IRON FUM/FOLIC 27 MG-1 MG
1 TABLET ORAL
COMMUNITY
Start: 2018-02-09 | End: 2021-12-06

## 2020-06-10 NOTE — PROGRESS NOTES
Dalia Betts  [id number]  1935  84 y.o.  female    Referring Provider: Les Henderson MD    Reason for Follow-up Visit: Here for routine follow up   Prior palpitations, now better  Essential Hypertension  Wants to get permission to travel to [place] to meet family       Subjective    Overall feels well    No new events or complaints since last visit   Overall the patient feels no major change from baseline symptoms   Similar symptoms as during last visit     Tolerating current medications well with no untoward side effects   Compliant with prescribed medication regimen. Tries to adhere to cardiac diet.        Walking more     ECG today marked sinus bradycardia  Rate 48 BPM   No chest pain or shortness of breath     No significant pedal edema    Compliant with medications and dietary advice  Good effort tolerance    No presyncope or syncope  Compliant with medication    BP well controlled at home.       Much less  palpitations, once every several weeks lasting for less than a few seconds on beta blocker therapy   No associated symptoms of dizziness, weakness, chest pain,  shortness of breath      Abnormal ECG  With new inferior T wave abnormalities  She declined stress test and Echo in past     Some midline intermittent abdominal pain and she was very worried that she has an abdominal aortic aneurysm   abdominal aortic aneurysm screening was ordered but she wants to wait now      History of present illness:  Dalia Betts is a 84 y.o. yo female with history of Essential Hypertension   who presents today for   Chief Complaint   Patient presents with   • Palpitations     5 MON    .    History  Past Medical History:   Diagnosis Date   • Bell palsy    • Chest pain    • Diabetes mellitus (CMS/HCC)    • HLD (hyperlipidemia)    • HTN (hypertension)    • MI (myocardial infarction) (CMS/HCC)    • Palpitations    ,   Past Surgical History:   Procedure Laterality Date   • APPENDECTOMY     • CHOLECYSTECTOMY     •  ELBOW FUSION     • EYE SURGERY      cataract extraction x 2   • HERNIA REPAIR     • HYSTERECTOMY     • SINUSOTOMY     ,   Family History   Problem Relation Age of Onset   • Pancreatitis Mother    • Heart disease Father    ,   Social History     Tobacco Use   • Smoking status: Never Smoker   • Smokeless tobacco: Never Used   Substance Use Topics   • Alcohol use: No   • Drug use: No   ,     Medications  Current Outpatient Medications   Medication Sig Dispense Refill   • aspirin 81 MG EC tablet Take 81 mg by mouth 2 (Two) Times a Day.     • atenolol (TENORMIN) 25 MG tablet TAKE ONE TABLET BY MOUTH EVERY DAY 90 tablet 3   • Bisacodyl (DUCODYL PO) Take  by mouth.     • Cyanocobalamin (B-12 COMPLIANCE INJECTION IJ) Inject  as directed Every 30 (Thirty) Days.     • donepezil (ARICEPT) 5 MG tablet Take 5 mg by mouth As Needed.     • FOLIC ACID PO Take  by mouth.     • Fructose-Dextrose-Phosphor Acd (EMETROL PO) Take 10 mL by mouth As Needed.     • gabapentin (NEURONTIN) 300 MG capsule Take 300 mg by mouth Daily.     • LORazepam (ATIVAN) 0.5 MG tablet Take 0.5 mg by mouth Every 8 (Eight) Hours As Needed for anxiety.     • memantine (NAMENDA) 5 MG tablet Take 5 mg by mouth Daily.     • metFORMIN (GLUCOPHAGE) 500 MG tablet Take 500 mg by mouth Daily.     • nitroglycerin (NITROSTAT) 0.4 MG SL tablet Place 1 tablet under the tongue Every 5 (Five) Minutes As Needed for Chest Pain. Take no more than 3 doses in 15 minutes. 100 tablet 11   • polyethyl glycol-propyl glycol (SYSTANE) 0.4-0.3 % solution ophthalmic solution Every 1 (One) Hour As Needed.     • Prenatal Vit-Fe Fumarate-FA (VOL-PLUS) 27-1 MG tablet Take 1 tablet by mouth.     • Psyllium 30.9 % powder Take  by mouth As Needed.     • simvastatin (ZOCOR) 10 MG tablet Take 10 mg by mouth Daily.     • venlafaxine XR (EFFEXOR-XR) 37.5 MG 24 hr capsule Take 37.5 mg by mouth Daily.     • vitamin D (ERGOCALCIFEROL) 93099 units capsule capsule Take 50,000 Units by mouth 1 (One) Time  "Per Week.       No current facility-administered medications for this visit.        Allergies:  Phenergan [promethazine hcl]    Review of Systems  Review of Systems   Constitution: Negative for malaise/fatigue.   HENT: Negative.    Eyes: Negative.    Cardiovascular: Positive for palpitations. Negative for chest pain, claudication, cyanosis, dyspnea on exertion, irregular heartbeat, leg swelling, near-syncope, orthopnea, paroxysmal nocturnal dyspnea and syncope.   Respiratory: Negative.    Endocrine: Negative.    Hematologic/Lymphatic: Negative.    Skin: Negative.    Gastrointestinal: Positive for abdominal pain. Negative for anorexia.   Genitourinary: Negative.    Neurological: Negative for weakness.   Psychiatric/Behavioral: Negative.        Objective     Physical Exam:  /62   Pulse 56   Ht 165.1 cm (65\")   Wt 50.8 kg (112 lb)   SpO2 97%   BMI 18.64 kg/m²   Physical Exam   Constitutional: She appears well-developed and well-nourished.   HENT:   Head: Normocephalic.   Eyes: Lids are normal.   Neck: Normal carotid pulses and no JVD present. No tracheal tenderness present. Carotid bruit is not present. No tracheal deviation and no edema present.   Cardiovascular: Regular rhythm, S1 normal, S2 normal and normal pulses.   Murmur heard.   Systolic murmur is present with a grade of 1/6.  Pulmonary/Chest: Effort normal. No stridor.   Abdominal: Soft. Normal appearance. She exhibits no distension. There is no hepatosplenomegaly. There is no tenderness.   Neurological: She is alert. She has normal strength. No cranial nerve deficit or sensory deficit.   Skin: Skin is warm.   Psychiatric: She has a normal mood and affect. Her speech is normal and behavior is normal. Thought content normal. Cognition and memory are normal.       Results Review:    Results for orders placed during the hospital encounter of 10/16/17   Adult Stress Echo W/ Cont or Stress Agent if Necessary Per Protocol    Narrative · Left ventricular " systolic function is normal. Estimated EF = 55%.  · Normal stress echo with no significant echocardiographic evidence for   myocardial ischemia.        LEXISCAN CARDIOLITE STRESS TEST-     DATE OF EXAM- 5/10/2016     INDICATION- Chest pain.     PROCEDURE- The patient underwent Lexiscan Cardiolite stress test. Rest   dose of Cardiolite 10.5 mCi and stress dose 32.7 mCi. Injection of   Lexiscan did not cause chest pain and had shortness of breath. No   diagnostic electrocardiographic abnormalities noted. Raw data shows   slight anterior soft tissue attenuation artifact. Overall image quality   is good. Perfusion scan shows a small area of decreased uptake in the   inferior apical region seen in stress not in rest suggesting a small   inferior apical ischemia. Gated scan shows ejection fraction above 65%.     IMPRESSION-  1. Ejection fraction above 65%.  2. Small inferior apical ischemia.  3. Anterior soft tissue attenuation.           Reading Radiologist- GEORGETTE SIMPSON       Releasing Radiologist- GEORGETTE SIMPSON       Released Date Time- 05/12/16 1347       - C.L.A.   ------------------------------------------------------------------------------            ECG 12 Lead  Date/Time: 6/10/2020 1:30 PM  Performed by: Georgette Simpson MD  Authorized by: Georgette Simpson MD   Comparison: compared with previous ECG from 1/30/2020  Comparison to previous ECG: Ventricular rate decreased from  50   to 48  beats per minute    Rhythm: sinus bradycardia  Rate: bradycardic  QRS axis: normal  Other findings: poor R wave progression    Clinical impression: abnormal EKG            Assessment/Plan   Dalia was seen today for palpitations.    Diagnoses and all orders for this visit:    Palpitations    Sinus bradycardia    Nasal polyp    Essential hypertension    Mixed hyperlipidemia    Gastroesophageal reflux disease without esophagitis    Controlled type 2 diabetes mellitus without complication, without long-term current use of  insulin (CMS/HCC)    Abnormal ECG    Other orders  -     ECG 12 Lead            _________________________________________________________________________________________________________________________________________  Health maintenance and recommendations    Similar recommendations as last visit       Offered to give patient  a copy      Questions were encouraged, asked and answered to the patient's  understanding and satisfaction. Questions if any regarding current medications and side effects, need for refills and importance of compliance to medications stressed.    Reviewed available prior notes, consults, prior visits, laboratory findings, radiology and cardiology relevant reports. Updated chart as applicable. I have reviewed the patient's medical history in detail and updated the computerized patient record as relevant.      Updated patient regarding any new or relevant abnormalities on review of records or any new findings on physical exam. Mentioned to patient about purpose of visit and desirable health short and long term goals and objectives.    Primary to monitor CBC CMP Lipid panel and TSH as applicable    ___________________________________________________________________________________________________________________________________________          Plan:        Decrease beta blocker therapy by 50% to Atenolol 12.5 mg daily  No additional cardiac testing required at this point in time.      OK to travel with usual Covid precautions     She wants to defer abdominal aortic aneurysm screening for now   Discussed with the patient and all questioned fully answered. She will call me if any problems arise.           Return in about 6 months (around 12/10/2020).

## 2020-06-21 ENCOUNTER — HOSPITAL ENCOUNTER (EMERGENCY)
Age: 85
Discharge: HOME | End: 2020-06-21
Attending: EMERGENCY MEDICINE
Payer: MEDICARE

## 2020-06-21 ENCOUNTER — APPOINTMENT (OUTPATIENT)
Dept: GENERAL RADIOLOGY | Age: 85
End: 2020-06-21
Payer: MEDICARE

## 2020-06-21 VITALS
OXYGEN SATURATION: 97 % | SYSTOLIC BLOOD PRESSURE: 131 MMHG | TEMPERATURE: 97.6 F | HEIGHT: 63 IN | WEIGHT: 112 LBS | RESPIRATION RATE: 20 BRPM | BODY MASS INDEX: 19.84 KG/M2 | DIASTOLIC BLOOD PRESSURE: 57 MMHG | HEART RATE: 46 BPM

## 2020-06-21 PROCEDURE — 99283 EMERGENCY DEPT VISIT LOW MDM: CPT

## 2020-06-21 PROCEDURE — 71045 X-RAY EXAM CHEST 1 VIEW: CPT

## 2020-06-21 PROCEDURE — 73030 X-RAY EXAM OF SHOULDER: CPT

## 2020-06-21 PROCEDURE — 93005 ELECTROCARDIOGRAM TRACING: CPT | Performed by: EMERGENCY MEDICINE

## 2020-06-21 RX ORDER — HYDROCODONE BITARTRATE AND ACETAMINOPHEN 5; 325 MG/1; MG/1
1 TABLET ORAL EVERY 6 HOURS PRN
Qty: 12 TABLET | Refills: 0 | Status: SHIPPED | OUTPATIENT
Start: 2020-06-21 | End: 2020-06-24

## 2020-06-21 ASSESSMENT — ENCOUNTER SYMPTOMS
RHINORRHEA: 0
ABDOMINAL PAIN: 0
NAUSEA: 0
SORE THROAT: 0
COUGH: 0
SHORTNESS OF BREATH: 0
VOMITING: 0
DIARRHEA: 0
BACK PAIN: 0

## 2020-06-21 ASSESSMENT — PAIN SCALES - GENERAL: PAINLEVEL_OUTOF10: 7

## 2020-06-21 ASSESSMENT — PAIN DESCRIPTION - LOCATION: LOCATION: SHOULDER

## 2020-06-21 ASSESSMENT — PAIN DESCRIPTION - ORIENTATION: ORIENTATION: LEFT

## 2020-06-21 ASSESSMENT — PAIN DESCRIPTION - DESCRIPTORS: DESCRIPTORS: CONSTANT

## 2020-06-21 NOTE — ED NOTES
Sling applied to left arm. Pt tolerated well. Pt instructed on how to use sling.  Pt verbalized understanding      Barby Cintron RN  06/21/20 5868

## 2020-06-21 NOTE — ED PROVIDER NOTES
[facility] [place] [facility] EMERGENCY DEPT  eMERGENCY dEPARTMENT eNCOUnter      Pt Name: Linda Ham  MRN: [id number]  [place] 1935  Date of evaluation: 6/21/2020  Provider: Devang Murdock MD    CHIEF COMPLAINT       Chief Complaint   Patient presents with   Quintero Fall     pt fell over her chair this morning cleaning her house.  Shoulder Pain     left shoulder pain. HISTORY OF PRESENT ILLNESS   (Location/Symptom, Timing/Onset,Context/Setting, Quality, Duration, Modifying Factors, Severity)  Note limiting factors. Linda Ham is a 81 y.o. female who presents to the emergency department for a fall. Patient states she was up early this morning cleaning her house in preparation of traveling next week and tripped over a stool. She did not hit her head or lose consciousness. She complains of left shoulder region pain. She has been ambulatory. Heart rate is in the mid 40s on my evaluation tells me her heart rate is typically similar to this sometimes around 50 and she judges how much atenolol she takes based on her heart rate and she did take a full tablet today around noon but on med list only supposed to take half tablet. She denies any symptoms such as lightheadedness dizziness or syncope. No chest pain or shortness of breath or palpitations. HPI    NursingNotes were reviewed. REVIEW OF SYSTEMS    (2-9 systems for level 4, 10 or more for level 5)     Review of Systems   Constitutional: Negative for chills and fever. HENT: Negative for rhinorrhea and sore throat. Respiratory: Negative for cough and shortness of breath. Cardiovascular: Negative for chest pain. Gastrointestinal: Negative for abdominal pain, diarrhea, nausea and vomiting. Genitourinary: Negative for dysuria and frequency. Musculoskeletal: Negative for back pain, gait problem and neck pain. Neurological: Negative for dizziness and headaches. All other systems reviewed and are negative.            PAST MEDICALHISTORY     Past Medical History:   Diagnosis Date    Abdominal pain     Anxiety     Dr Kareen Rand palsy 2003    Colon polyp     Depression     Dr Diego De La Cruz Diverticulosis     Female bladder prolapse     Hernia, abdominal     History of adenomatous polyp of colon     Hypertriglyceridemia     Neuropathy     Osteopenia     Type II or unspecified type diabetes mellitus without mention of complication, not stated as uncontrolled     Vitamin B 12 deficiency          SURGICAL HISTORY       Past Surgical History:   Procedure Laterality Date    APPENDECTOMY      BLADDER REPAIR  1984    states stiched her bladder up but was not a suspension-Dr Garcia    CATARACT REMOVAL  12/2014    CATARACT REMOVAL  01/2015    CHOLECYSTECTOMY  age 22    COLONOSCOPY  02/15/2012    Dr Ray SALCIDO, diverticulosis coli, small internal hemorrhoids, 5 yr recall    COLONOSCOPY  3/1998    negative    COLONOSCOPY  5/1/07     Dr Marcelle Ritchie, diverticulosis coli/small internal hemorrhoids    COLONOSCOPY  9/16/04    Dr Blood-diverticulosis coli, ileocecal valve lipoma    COLONOSCOPY N/A 1/25/2016    Dr Amie SALCIDO, 5 yr recall   [address] Bilateral     cataract   Corby Ritchie partner   [address] [facility] N/A 2/15/2016    OPEN REPAIR OF RECURRENT INCISIONAL HERNIA WITH MESH  performed by Chris Burgos MD at [facility]  02/16/2012    Dr White-Horacio/gastritis    UPPER GASTROINTESTINAL ENDOSCOPY  2/1998    Dr Marcelle Ritchie, negative    UPPER GASTROINTESTINAL ENDOSCOPY  1/2/07    Dr Marcelle Ritchie, gastritis    UPPER GASTROINTESTINAL ENDOSCOPY N/A 1/11/2016    Dr Fabian Pretty junction w/minimal chronic inflammation         CURRENT MEDICATIONS     Previous Medications    ATENOLOL (TENORMIN) 25 MG TABLET    Take 0.5 tablets by mouth daily VENLAFAXINE (EFFEXOR-XR) 37.5 MG XR CAPSULE    Take 37.5 mg by mouth daily. VITAMIN D (ERGOCALCIFEROL) 1.25 MG (33479 UT) CAPS CAPSULE    Take 1 capsule by mouth once a week       ALLERGIES     Phenergan [promethazine] and Promethazine hcl    FAMILY HISTORY       Family History   Problem Relation Age of Onset    Cancer Mother         Pancreatic    Other Mother     Cancer Father         melanoma    Colon Cancer Neg Hx     Colon Polyps Neg Hx     Esophageal Cancer Neg Hx     Liver Cancer Neg Hx     Liver Disease Neg Hx     Rectal Cancer Neg Hx     Stomach Cancer Neg Hx           SOCIAL HISTORY       Social History     Socioeconomic History    Marital status:       Spouse name: None    Number of children: None    Years of education: None    Highest education level: None   Occupational History    None   Social Needs    Financial resource strain: None    Food insecurity     Worry: None     Inability: None    Transportation needs     Medical: None     Non-medical: None   Tobacco Use    Smoking status: Never Smoker    Smokeless tobacco: Never Used   Substance and Sexual Activity    Alcohol use: No    Drug use: No    Sexual activity: Yes     Partners: Male   Lifestyle    Physical activity     Days per week: None     Minutes per session: None    Stress: None   Relationships    Social connections     Talks on phone: None     Gets together: None     Attends Mosque service: None     Active member of club or organization: None     Attends meetings of clubs or organizations: None     Relationship status: None    Intimate partner violence     Fear of current or ex partner: None     Emotionally abused: None     Physically abused: None     Forced sexual activity: None   Other Topics Concern    None   Social History Narrative    None       SCREENINGS             PHYSICAL EXAM    (up to 7 for level 4, 8 or more for level 5)     ED Triage Vitals [06/21/20 6043]   BP Temp Temp Source Pulse

## 2020-06-22 ENCOUNTER — HOSPITAL ENCOUNTER (OUTPATIENT)
Age: 85
Setting detail: OUTPATIENT SURGERY
Discharge: HOME | End: 2020-06-22
Attending: ORTHOPAEDIC SURGERY | Admitting: ORTHOPAEDIC SURGERY
Payer: MEDICARE

## 2020-06-22 ENCOUNTER — ANESTHESIA EVENT (OUTPATIENT)
Dept: OPERATING ROOM | Age: 85
End: 2020-06-22
Payer: MEDICARE

## 2020-06-22 ENCOUNTER — HOSPITAL ENCOUNTER (OUTPATIENT)
Age: 85
Setting detail: SPECIMEN
Discharge: HOME | End: 2020-06-22
Payer: MEDICARE

## 2020-06-22 ENCOUNTER — APPOINTMENT (OUTPATIENT)
Dept: GENERAL RADIOLOGY | Age: 85
End: 2020-06-22
Attending: ORTHOPAEDIC SURGERY
Payer: MEDICARE

## 2020-06-22 ENCOUNTER — ANESTHESIA (OUTPATIENT)
Dept: OPERATING ROOM | Age: 85
End: 2020-06-22
Payer: MEDICARE

## 2020-06-22 VITALS
TEMPERATURE: 97.9 F | BODY MASS INDEX: 19.49 KG/M2 | RESPIRATION RATE: 16 BRPM | OXYGEN SATURATION: 95 % | DIASTOLIC BLOOD PRESSURE: 71 MMHG | WEIGHT: 110 LBS | HEIGHT: 63 IN | SYSTOLIC BLOOD PRESSURE: 167 MMHG | HEART RATE: 77 BPM

## 2020-06-22 VITALS
TEMPERATURE: 96.3 F | SYSTOLIC BLOOD PRESSURE: 186 MMHG | DIASTOLIC BLOOD PRESSURE: 82 MMHG | RESPIRATION RATE: 2 BRPM | OXYGEN SATURATION: 79 %

## 2020-06-22 LAB
ANION GAP SERPL CALCULATED.3IONS-SCNC: 9 MMOL/L (ref 7–19)
BUN BLDV-MCNC: 10 MG/DL (ref 8–23)
CALCIUM SERPL-MCNC: 10 MG/DL (ref 8.8–10.2)
CHLORIDE BLD-SCNC: 101 MMOL/L (ref 98–111)
CO2: 28 MMOL/L (ref 22–29)
CREAT SERPL-MCNC: 0.6 MG/DL (ref 0.5–0.9)
GFR NON-AFRICAN AMERICAN: >60
GLUCOSE BLD-MCNC: 100 MG/DL (ref 74–109)
POTASSIUM SERPL-SCNC: 4.1 MMOL/L (ref 3.5–4.9)
SARS-COV-2, NAAT: NOT DETECTED
SODIUM BLD-SCNC: 138 MMOL/L (ref 136–145)

## 2020-06-22 PROCEDURE — 73000 X-RAY EXAM OF COLLAR BONE: CPT

## 2020-06-22 PROCEDURE — 6360000002 HC RX W HCPCS: Performed by: ANESTHESIOLOGY

## 2020-06-22 PROCEDURE — 2580000003 HC RX 258: Performed by: ORTHOPAEDIC SURGERY

## 2020-06-22 PROCEDURE — 2709999900 HC NON-CHARGEABLE SUPPLY: Performed by: ORTHOPAEDIC SURGERY

## 2020-06-22 PROCEDURE — 3700000000 HC ANESTHESIA ATTENDED CARE: Performed by: ORTHOPAEDIC SURGERY

## 2020-06-22 PROCEDURE — 36415 COLL VENOUS BLD VENIPUNCTURE: CPT

## 2020-06-22 PROCEDURE — 6360000002 HC RX W HCPCS: Performed by: ORTHOPAEDIC SURGERY

## 2020-06-22 PROCEDURE — 7100000000 HC PACU RECOVERY - FIRST 15 MIN: Performed by: ORTHOPAEDIC SURGERY

## 2020-06-22 PROCEDURE — 80048 BASIC METABOLIC PNL TOTAL CA: CPT

## 2020-06-22 PROCEDURE — 3600000014 HC SURGERY LEVEL 4 ADDTL 15MIN: Performed by: ORTHOPAEDIC SURGERY

## 2020-06-22 PROCEDURE — 76942 ECHO GUIDE FOR BIOPSY: CPT | Performed by: NURSE ANESTHETIST, CERTIFIED REGISTERED

## 2020-06-22 PROCEDURE — L3650 SO 8 ABD RESTRAINT PRE OTS: HCPCS | Performed by: ORTHOPAEDIC SURGERY

## 2020-06-22 PROCEDURE — 7100000010 HC PHASE II RECOVERY - FIRST 15 MIN: Performed by: ORTHOPAEDIC SURGERY

## 2020-06-22 PROCEDURE — 6360000002 HC RX W HCPCS: Performed by: NURSE ANESTHETIST, CERTIFIED REGISTERED

## 2020-06-22 PROCEDURE — 7100000001 HC PACU RECOVERY - ADDTL 15 MIN: Performed by: ORTHOPAEDIC SURGERY

## 2020-06-22 PROCEDURE — U0002 COVID-19 LAB TEST NON-CDC: HCPCS

## 2020-06-22 PROCEDURE — C1713 ANCHOR/SCREW BN/BN,TIS/BN: HCPCS | Performed by: ORTHOPAEDIC SURGERY

## 2020-06-22 PROCEDURE — 3209999900 FLUORO FOR SURGICAL PROCEDURES

## 2020-06-22 PROCEDURE — 2500000003 HC RX 250 WO HCPCS: Performed by: NURSE ANESTHETIST, CERTIFIED REGISTERED

## 2020-06-22 PROCEDURE — 3600000004 HC SURGERY LEVEL 4 BASE: Performed by: ORTHOPAEDIC SURGERY

## 2020-06-22 PROCEDURE — 3700000001 HC ADD 15 MINUTES (ANESTHESIA): Performed by: ORTHOPAEDIC SURGERY

## 2020-06-22 DEVICE — SCREW BNE L14MM DIA2.7MM SM HEX HD DIA2.5MM CORT S STL ST: Type: IMPLANTABLE DEVICE | Site: CLAVICLE | Status: FUNCTIONAL

## 2020-06-22 DEVICE — IMPLANTABLE DEVICE: Type: IMPLANTABLE DEVICE | Site: CLAVICLE | Status: FUNCTIONAL

## 2020-06-22 DEVICE — SCREW BNE L12MM DIA2.7MM SM HEX HD DIA2.5MM CORT S STL ST: Type: IMPLANTABLE DEVICE | Site: CLAVICLE | Status: FUNCTIONAL

## 2020-06-22 RX ORDER — LIDOCAINE HYDROCHLORIDE 10 MG/ML
INJECTION, SOLUTION INFILTRATION; PERINEURAL PRN
Status: DISCONTINUED | OUTPATIENT
Start: 2020-06-22 | End: 2020-06-22 | Stop reason: SDUPTHER

## 2020-06-22 RX ORDER — HYDROMORPHONE HYDROCHLORIDE 1 MG/ML
0.5 INJECTION, SOLUTION INTRAMUSCULAR; INTRAVENOUS; SUBCUTANEOUS EVERY 5 MIN PRN
Status: DISCONTINUED | OUTPATIENT
Start: 2020-06-22 | End: 2020-06-22 | Stop reason: HOSPADM

## 2020-06-22 RX ORDER — SODIUM CHLORIDE 0.9 % (FLUSH) 0.9 %
10 SYRINGE (ML) INJECTION PRN
Status: DISCONTINUED | OUTPATIENT
Start: 2020-06-22 | End: 2020-06-22 | Stop reason: HOSPADM

## 2020-06-22 RX ORDER — SODIUM CHLORIDE, SODIUM LACTATE, POTASSIUM CHLORIDE, CALCIUM CHLORIDE 600; 310; 30; 20 MG/100ML; MG/100ML; MG/100ML; MG/100ML
INJECTION, SOLUTION INTRAVENOUS CONTINUOUS
Status: DISCONTINUED | OUTPATIENT
Start: 2020-06-22 | End: 2020-06-22 | Stop reason: HOSPADM

## 2020-06-22 RX ORDER — DIPHENHYDRAMINE HYDROCHLORIDE 50 MG/ML
12.5 INJECTION INTRAMUSCULAR; INTRAVENOUS
Status: DISCONTINUED | OUTPATIENT
Start: 2020-06-22 | End: 2020-06-22 | Stop reason: HOSPADM

## 2020-06-22 RX ORDER — SODIUM CHLORIDE 9 MG/ML
INJECTION, SOLUTION INTRAVENOUS CONTINUOUS
Status: DISCONTINUED | OUTPATIENT
Start: 2020-06-22 | End: 2020-06-22 | Stop reason: HOSPADM

## 2020-06-22 RX ORDER — LIDOCAINE HYDROCHLORIDE 10 MG/ML
1 INJECTION, SOLUTION EPIDURAL; INFILTRATION; INTRACAUDAL; PERINEURAL
Status: DISCONTINUED | OUTPATIENT
Start: 2020-06-22 | End: 2020-06-22 | Stop reason: HOSPADM

## 2020-06-22 RX ORDER — FENTANYL CITRATE 50 UG/ML
25 INJECTION, SOLUTION INTRAMUSCULAR; INTRAVENOUS
Status: DISCONTINUED | OUTPATIENT
Start: 2020-06-22 | End: 2020-06-22 | Stop reason: HOSPADM

## 2020-06-22 RX ORDER — HYDROCODONE BITARTRATE AND ACETAMINOPHEN 5; 325 MG/1; MG/1
1 TABLET ORAL EVERY 6 HOURS PRN
Qty: 30 TABLET | Refills: 0 | Status: SHIPPED | OUTPATIENT
Start: 2020-06-22 | End: 2020-06-29

## 2020-06-22 RX ORDER — DEXAMETHASONE SODIUM PHOSPHATE 10 MG/ML
INJECTION, SOLUTION INTRAMUSCULAR; INTRAVENOUS PRN
Status: DISCONTINUED | OUTPATIENT
Start: 2020-06-22 | End: 2020-06-22 | Stop reason: SDUPTHER

## 2020-06-22 RX ORDER — ROPIVACAINE HYDROCHLORIDE 5 MG/ML
INJECTION, SOLUTION EPIDURAL; INFILTRATION; PERINEURAL
Status: COMPLETED | OUTPATIENT
Start: 2020-06-22 | End: 2020-06-22

## 2020-06-22 RX ORDER — ONDANSETRON 4 MG/1
4 TABLET, FILM COATED ORAL DAILY PRN
Qty: 20 TABLET | Refills: 0 | Status: ON HOLD | OUTPATIENT
Start: 2020-06-22 | End: 2020-12-04 | Stop reason: SDUPTHER

## 2020-06-22 RX ORDER — HYDRALAZINE HYDROCHLORIDE 20 MG/ML
5 INJECTION INTRAMUSCULAR; INTRAVENOUS EVERY 10 MIN PRN
Status: DISCONTINUED | OUTPATIENT
Start: 2020-06-22 | End: 2020-06-22 | Stop reason: HOSPADM

## 2020-06-22 RX ORDER — MORPHINE SULFATE 4 MG/ML
4 INJECTION, SOLUTION INTRAMUSCULAR; INTRAVENOUS EVERY 5 MIN PRN
Status: DISCONTINUED | OUTPATIENT
Start: 2020-06-22 | End: 2020-06-22 | Stop reason: HOSPADM

## 2020-06-22 RX ORDER — HYDROMORPHONE HYDROCHLORIDE 1 MG/ML
0.25 INJECTION, SOLUTION INTRAMUSCULAR; INTRAVENOUS; SUBCUTANEOUS EVERY 5 MIN PRN
Status: DISCONTINUED | OUTPATIENT
Start: 2020-06-22 | End: 2020-06-22 | Stop reason: HOSPADM

## 2020-06-22 RX ORDER — LABETALOL HYDROCHLORIDE 5 MG/ML
5 INJECTION, SOLUTION INTRAVENOUS EVERY 10 MIN PRN
Status: DISCONTINUED | OUTPATIENT
Start: 2020-06-22 | End: 2020-06-22 | Stop reason: HOSPADM

## 2020-06-22 RX ORDER — ENALAPRILAT 2.5 MG/2ML
1.25 INJECTION INTRAVENOUS
Status: DISCONTINUED | OUTPATIENT
Start: 2020-06-22 | End: 2020-06-22 | Stop reason: HOSPADM

## 2020-06-22 RX ORDER — ONDANSETRON 2 MG/ML
INJECTION INTRAMUSCULAR; INTRAVENOUS PRN
Status: DISCONTINUED | OUTPATIENT
Start: 2020-06-22 | End: 2020-06-22 | Stop reason: SDUPTHER

## 2020-06-22 RX ORDER — PROPOFOL 10 MG/ML
INJECTION, EMULSION INTRAVENOUS PRN
Status: DISCONTINUED | OUTPATIENT
Start: 2020-06-22 | End: 2020-06-22 | Stop reason: SDUPTHER

## 2020-06-22 RX ORDER — ROCURONIUM BROMIDE 10 MG/ML
INJECTION, SOLUTION INTRAVENOUS PRN
Status: DISCONTINUED | OUTPATIENT
Start: 2020-06-22 | End: 2020-06-22 | Stop reason: SDUPTHER

## 2020-06-22 RX ORDER — SODIUM CHLORIDE 0.9 % (FLUSH) 0.9 %
10 SYRINGE (ML) INJECTION EVERY 12 HOURS SCHEDULED
Status: DISCONTINUED | OUTPATIENT
Start: 2020-06-22 | End: 2020-06-22 | Stop reason: HOSPADM

## 2020-06-22 RX ORDER — MEPERIDINE HYDROCHLORIDE 50 MG/ML
12.5 INJECTION INTRAMUSCULAR; INTRAVENOUS; SUBCUTANEOUS EVERY 5 MIN PRN
Status: DISCONTINUED | OUTPATIENT
Start: 2020-06-22 | End: 2020-06-22 | Stop reason: HOSPADM

## 2020-06-22 RX ORDER — MORPHINE SULFATE 4 MG/ML
2 INJECTION, SOLUTION INTRAMUSCULAR; INTRAVENOUS EVERY 5 MIN PRN
Status: DISCONTINUED | OUTPATIENT
Start: 2020-06-22 | End: 2020-06-22 | Stop reason: HOSPADM

## 2020-06-22 RX ORDER — METOCLOPRAMIDE HYDROCHLORIDE 5 MG/ML
10 INJECTION INTRAMUSCULAR; INTRAVENOUS
Status: DISCONTINUED | OUTPATIENT
Start: 2020-06-22 | End: 2020-06-22 | Stop reason: HOSPADM

## 2020-06-22 RX ORDER — FENTANYL CITRATE 50 UG/ML
INJECTION, SOLUTION INTRAMUSCULAR; INTRAVENOUS PRN
Status: DISCONTINUED | OUTPATIENT
Start: 2020-06-22 | End: 2020-06-22 | Stop reason: SDUPTHER

## 2020-06-22 RX ORDER — FENTANYL CITRATE 50 UG/ML
50 INJECTION, SOLUTION INTRAMUSCULAR; INTRAVENOUS
Status: DISCONTINUED | OUTPATIENT
Start: 2020-06-22 | End: 2020-06-22 | Stop reason: HOSPADM

## 2020-06-22 RX ORDER — MIDAZOLAM HYDROCHLORIDE 1 MG/ML
2 INJECTION INTRAMUSCULAR; INTRAVENOUS
Status: COMPLETED | OUTPATIENT
Start: 2020-06-22 | End: 2020-06-22

## 2020-06-22 RX ORDER — EPHEDRINE SULFATE 50 MG/ML
INJECTION, SOLUTION INTRAVENOUS PRN
Status: DISCONTINUED | OUTPATIENT
Start: 2020-06-22 | End: 2020-06-22 | Stop reason: SDUPTHER

## 2020-06-22 RX ADMIN — DEXAMETHASONE SODIUM PHOSPHATE 8 MG: 10 INJECTION, SOLUTION INTRAMUSCULAR; INTRAVENOUS at 12:32

## 2020-06-22 RX ADMIN — PROPOFOL 100 MG: 10 INJECTION, EMULSION INTRAVENOUS at 12:27

## 2020-06-22 RX ADMIN — SODIUM CHLORIDE, SODIUM LACTATE, POTASSIUM CHLORIDE, AND CALCIUM CHLORIDE: 600; 310; 30; 20 INJECTION, SOLUTION INTRAVENOUS at 10:34

## 2020-06-22 RX ADMIN — FENTANYL CITRATE 50 MCG: 50 INJECTION INTRAMUSCULAR; INTRAVENOUS at 12:51

## 2020-06-22 RX ADMIN — EPHEDRINE SULFATE 10 MG: 50 INJECTION INTRAMUSCULAR; INTRAVENOUS; SUBCUTANEOUS at 13:19

## 2020-06-22 RX ADMIN — LIDOCAINE HYDROCHLORIDE 50 MG: 10 INJECTION, SOLUTION INFILTRATION; PERINEURAL at 12:27

## 2020-06-22 RX ADMIN — FENTANYL CITRATE 50 MCG: 50 INJECTION INTRAMUSCULAR; INTRAVENOUS at 12:27

## 2020-06-22 RX ADMIN — SUGAMMADEX 150 MG: 100 INJECTION, SOLUTION INTRAVENOUS at 13:47

## 2020-06-22 RX ADMIN — MIDAZOLAM HYDROCHLORIDE 1 MG: 2 INJECTION, SOLUTION INTRAMUSCULAR; INTRAVENOUS at 12:03

## 2020-06-22 RX ADMIN — EPHEDRINE SULFATE 10 MG: 50 INJECTION INTRAMUSCULAR; INTRAVENOUS; SUBCUTANEOUS at 12:47

## 2020-06-22 RX ADMIN — ROPIVACAINE HYDROCHLORIDE 20 ML: 5 INJECTION, SOLUTION EPIDURAL; INFILTRATION; PERINEURAL at 12:09

## 2020-06-22 RX ADMIN — WATER 1 G: 1 INJECTION INTRAMUSCULAR; INTRAVENOUS; SUBCUTANEOUS at 12:32

## 2020-06-22 RX ADMIN — ONDANSETRON HYDROCHLORIDE 4 MG: 2 INJECTION, SOLUTION INTRAMUSCULAR; INTRAVENOUS at 13:38

## 2020-06-22 RX ADMIN — ROCURONIUM BROMIDE 50 MG: 10 INJECTION, SOLUTION INTRAVENOUS at 12:27

## 2020-06-22 ASSESSMENT — PAIN DESCRIPTION - DESCRIPTORS: DESCRIPTORS: ACHING

## 2020-06-22 ASSESSMENT — ENCOUNTER SYMPTOMS: SHORTNESS OF BREATH: 0

## 2020-06-22 ASSESSMENT — PAIN SCALES - GENERAL
PAINLEVEL_OUTOF10: 0

## 2020-06-22 ASSESSMENT — LIFESTYLE VARIABLES: SMOKING_STATUS: 0

## 2020-06-22 NOTE — ANESTHESIA PRE PROCEDURE
Eufemia Trevino MD   blood glucose test strips (ASCENSIA AUTODISC VI;ONE TOUCH ULTRA TEST VI) strip 1 each by In Vitro route daily As needed. 5/5/20   Nohemi Gomes MD   Lancets MISC 1 each by Does not apply route 3 times daily 5/5/20   Nohemi Gomes MD   Diabetic Shoe MISC by Does not apply route DISPENSE ONE PAIR DIABETIC SHOES AND THREE PAIRS OF HEAT MOLDED INSERTS 5/5/20   Nohemi Gomes MD   blood glucose test strips (ASCENSIA AUTODISC VI;ONE TOUCH ULTRA TEST VI) strip 1 each by In Vitro route daily As needed. 3/24/20   Nohemi Gomes MD   vitamin D (ERGOCALCIFEROL) 1.25 MG (16286 UT) CAPS capsule Take 1 capsule by mouth once a week  Patient taking differently: Take 50,000 Units by mouth once a week Waldemar 3/9/20   Nohemi Gomes MD   docusate sodium (COLACE) 100 MG capsule  3/6/19   Historical Provider, MD   Diabetic Shoe MISC by Does not apply route Diabetic shoes bilateral 3/27/19   Nohemi Gomes MD   Diabetic Shoe MISC by Does not apply route Diabetic shoes bilateral 5/2/18   Nohemi Gomes MD   nitroGLYCERIN (NITROSTAT) 0.4 MG SL tablet Place 1 tablet under the tongue every 5 minutes as needed for Chest pain up to max of 3 total doses.  If no relief after 1 dose, call 911. 9/8/17   Nohemi Gomes MD   UNABLE TO FIND Take 1 tablet by mouth daily 10/13/16   Mansi Cheng, DILIA - CNP   Psyllium (METAMUCIL PO) Take by mouth daily    Historical Provider, MD   Fructose-Dextrose-Phosphor Acd (EMETROL PO) Take 10 mLs by mouth daily as needed (nausea)     Historical Provider, MD   Tetrahydrozoline HCl (EYE DROPS OP) Apply 2 drops to eye 2 times daily Indications: systane     Historical Provider, MD       Current medications:    Current Facility-Administered Medications   Medication Dose Route Frequency Provider Last Rate Last Dose    lactated ringers infusion   Intravenous Continuous Octaviano Mosher  mL/hr at 06/22/20 1034      ceFAZolin (ANCEF) 1 g in sterile water 10 mL IV syringe  1 g Intravenous Once Reddy Lucero Amanda White MD           Allergies:     Allergies   Allergen Reactions    Phenergan [Promethazine] Other (See Comments)     Makes \"Crazy\"       Problem List:    Patient Active Problem List   Diagnosis Code    Vitamin B 12 deficiency E53.8    Essential hypertension I10    Poor peripheral circulation R09.89    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) E11.42    History of Bell's palsy Z86.69    Late onset Alzheimer's disease without behavioral disturbance (HCC) G30.1, F02.80    Open wnd of buttock, left, initial encounter M17.290T    Peripheral vascular disease, unspecified ([facility] [address] I73.9       Past Medical History:        Diagnosis Date    Abdominal pain     Anxiety     Dr Sharee Campa Reis's palsy 2003    Colon polyp     Depression     Dr Sharee Campa Diverticulosis     Female bladder prolapse     Hernia, abdominal     History of adenomatous polyp of colon     Hypertriglyceridemia     Neuropathy     Osteopenia     Type II or unspecified type diabetes mellitus without mention of complication, not stated as uncontrolled     Vitamin B 12 deficiency        Past Surgical History:        Procedure Laterality Date    APPENDECTOMY      BLADDER REPAIR  1984    states stiched her bladder up but was not a suspension-Dr Garcia    CATARACT REMOVAL  12/2014    CATARACT REMOVAL  01/2015    CHOLECYSTECTOMY  age 22    COLONOSCOPY  02/15/2012    Dr Alina Escalante AP, diverticulosis coli, small internal hemorrhoids, 5 yr recall    COLONOSCOPY  3/1998    negative    COLONOSCOPY  5/1/07     Dr Emily Bateman, diverticulosis coli/small internal hemorrhoids    COLONOSCOPY  9/16/04    Dr Blood-diverticulosis coli, ileocecal valve lipoma    COLONOSCOPY N/A 1/25/2016    Dr Sophie Garcia AP, 5 yr recall   [place] [address] [address] Bilateral     cataract   [facility]    Dr Daryl Diaz Repress partner   [address] [facility]    total-Dr. Pat Edwards

## 2020-06-22 NOTE — H&P
Pt Name: Amrita Kelley  MRN: [id number]  YOB: 1935  Date: 6/22/2020      HPI: 82 yo female fell at home and sustained a displaced midshaft clavicle fracture.       Past Medical/Surgical History:   Past Medical History:   Diagnosis Date    Abdominal pain     Anxiety     Dr Daniel Torres Reis's palsy 2003    Colon polyp     Depression     Dr Daniel Torres Diverticulosis     Female bladder prolapse     Hernia, abdominal     History of adenomatous polyp of colon     Hypertriglyceridemia     Neuropathy     Osteopenia     Type II or unspecified type diabetes mellitus without mention of complication, not stated as uncontrolled     Vitamin B 12 deficiency      Past Surgical History:   Procedure Laterality Date    APPENDECTOMY      BLADDER REPAIR  1984    states stiched her bladder up but was not a suspension-Dr Garcia    CATARACT REMOVAL  12/2014    CATARACT REMOVAL  01/2015    CHOLECYSTECTOMY  age 22    COLONOSCOPY  02/15/2012    Dr Frankey Generous AP, diverticulosis coli, small internal hemorrhoids, 5 yr recall    COLONOSCOPY  3/1998    negative    COLONOSCOPY  5/1/07     Dr Jessie Manriquez, diverticulosis coli/small internal hemorrhoids    COLONOSCOPY  9/16/04    Dr Blood-diverticulosis coli, ileocecal valve lipoma    COLONOSCOPY N/A 1/25/2016    Dr Cruz SALCIDO, 5 yr recall   Devyn Boehringer Bilateral     cataract   [facility]    Dr Tatiana Manriquez partner   [address]  N/A 2/15/2016    OPEN REPAIR OF RECURRENT INCISIONAL HERNIA WITH MESH  performed by Tiffany Caraballo MD at [address] [facility]  02/16/2012    Dr White-Barretts/gastritis    UPPER GASTROINTESTINAL ENDOSCOPY  2/1998    Dr Jessie Manriquez, negative    UPPER GASTROINTESTINAL ENDOSCOPY  1/2/07    Dr Jessie Manriquez, gastritis    UPPER GASTROINTESTINAL ENDOSCOPY N/A 1/11/2016     Lancets MISC 1 each by Does not apply route 3 times daily 5/5/20   Mikayla Lee MD   Diabetic Shoe MISC by Does not apply route DISPENSE ONE PAIR DIABETIC SHOES AND THREE PAIRS OF HEAT MOLDED INSERTS 5/5/20   Mikayla Lee MD   blood glucose test strips (ASCENSIA AUTODISC VI;ONE TOUCH ULTRA TEST VI) strip 1 each by In Vitro route daily As needed. 3/24/20   Mikayla Lee MD   vitamin D (ERGOCALCIFEROL) 1.25 MG (99401 UT) CAPS capsule Take 1 capsule by mouth once a week  Patient taking differently: Take 50,000 Units by mouth once a week Waldemar 3/9/20   Mikayla Lee MD   docusate sodium (COLACE) 100 MG capsule  3/6/19   Historical Provider, MD   Diabetic Shoe MISC by Does not apply route Diabetic shoes bilateral 3/27/19   Mikayla Lee MD   Diabetic Shoe MISC by Does not apply route Diabetic shoes bilateral 5/2/18   Mikayla Lee MD   nitroGLYCERIN (NITROSTAT) 0.4 MG SL tablet Place 1 tablet under the tongue every 5 minutes as needed for Chest pain up to max of 3 total doses. If no relief after 1 dose, call 911. 9/8/17   Mikayla Lee MD   UNABLE TO FIND Take 1 tablet by mouth daily 10/13/16   DILIA Briseno - CNP   Psyllium (METAMUCIL PO) Take by mouth daily    Historical Provider, MD   Fructose-Dextrose-Phosphor Acd (EMETROL PO) Take 10 mLs by mouth daily as needed (nausea)     Historical Provider, MD   Tetrahydrozoline HCl (EYE DROPS OP) Apply 2 drops to eye 2 times daily Indications: systane     Historical Provider, MD       Allergies:    Allergies   Allergen Reactions    Phenergan [Promethazine] Other (See Comments)     Makes \"Crazy\"       Review of systems:     * Constitutional: negative     * HEENT: negative     * Skin: negative     * Cardiac: negative     * Respiratory: negative     * GI: negative     * : negative     * Neuro: negative     * CNS: negative     * Extremities: negative     * Endcrine: negative     Physical Exam:   Ht 5' 3\" (1.6 m)   Wt 110 lb (49.9 kg)   BMI 19.49 kg/m²

## 2020-06-22 NOTE — ANESTHESIA POSTPROCEDURE EVALUATION
Department of Anesthesiology  Postprocedure Note    Patient: Linda Ham  MRN: [id number]  YOB: 1935  Date of evaluation: 6/22/2020  Time:  2:16 PM     Procedure Summary     Date:  06/22/20 Room / Location:  [address]    Anesthesia Start:  8616 Anesthesia Stop:  0312    Procedure:  OPEN REDUCTION INTERNAL FIXATION LEFT CLAVICLE (Left Chest) Diagnosis:  (G30.065I)    Surgeon:  Debbie Buitrago MD Responsible Provider:  DILIA Osorio CRNA    Anesthesia Type:  general, regional ASA Status:  3          Anesthesia Type: general, regional    Ronak Phase I:      Ronak Phase II:      Last vitals: Reviewed and per EMR flowsheets.        Anesthesia Post Evaluation    Patient location during evaluation: PACU  Patient participation: complete - patient participated  Level of consciousness: awake and alert  Pain score: 0  Airway patency: patent  Nausea & Vomiting: no nausea and no vomiting  Complications: no  Cardiovascular status: blood pressure returned to baseline  Respiratory status: acceptable, spontaneous ventilation and nasal cannula  Hydration status: stable

## 2020-06-23 LAB
EKG P AXIS: 21 DEGREES
EKG P-R INTERVAL: 156 MS
EKG Q-T INTERVAL: 514 MS
EKG QRS DURATION: 96 MS
EKG QTC CALCULATION (BAZETT): 481 MS
EKG T AXIS: 1 DEGREES

## 2020-06-23 PROCEDURE — 93010 ELECTROCARDIOGRAM REPORT: CPT | Performed by: INTERNAL MEDICINE

## 2020-06-23 NOTE — OP NOTE
as the planned procedure and the  administration of antibiotics. The patient was prepped and draped in  the standard sterile fashion using ChloraPrep. Once fully prepped and  draped, a standard incision was carried out over the clavicle. Careful  dissection was carried through the subcutaneous tissue and  full-thickness periosteal flaps were elevated. The patient did  demonstrate comminution of the fracture site with two separate butterfly  fragments. Initially, the fracture was provisionally reduced and held  with K-wires. A Katerina size 14 2.7 mm pelvic Recon plate was then  contoured to the clavicle. We were then able to get at least four  Vicryl screws on both sides of the fracture site. Orthogonal views  demonstrated an anatomic reduction. At this point in time, the wound was copiously irrigated with bulb  syringe lavage. The full-thickness periosteal flaps were closed with #1  Vicryl suture. Skin was then approximated with Vicryl and closed with  Prineo wound closure system. The patient was placed in a sling. She  awoke from anesthesia without difficulty and was transferred to the PACU  in stable condition. All sponge, needle, and instrument counts were  correct at the end of the procedure.         Zacarias Beyer MD    D: 06/22/2020 14:37:45      T: 06/22/2020 14:41:05     [id number]  Job#: [id number]     Doc#: [id number]    CC:

## 2020-08-03 RX ORDER — CYANOCOBALAMIN 1000 UG/ML
1000 INJECTION INTRAMUSCULAR; SUBCUTANEOUS ONCE
Qty: 1 ML | Refills: 0 | Status: ON HOLD | OUTPATIENT
Start: 2020-08-03 | End: 2020-12-22 | Stop reason: HOSPADM

## 2020-09-17 ENCOUNTER — OFFICE VISIT (OUTPATIENT)
Dept: PRIMARY CARE CLINIC | Age: 85
End: 2020-09-17
Payer: MEDICARE

## 2020-09-17 PROCEDURE — 96372 THER/PROPH/DIAG INJ SC/IM: CPT | Performed by: FAMILY MEDICINE

## 2020-09-17 RX ORDER — CYANOCOBALAMIN 1000 UG/ML
1000 INJECTION INTRAMUSCULAR; SUBCUTANEOUS ONCE
Status: COMPLETED | OUTPATIENT
Start: 2020-09-17 | End: 2020-09-17

## 2020-09-17 RX ADMIN — CYANOCOBALAMIN 1000 MCG: 1000 INJECTION INTRAMUSCULAR; SUBCUTANEOUS at 11:50

## 2020-09-17 NOTE — PROGRESS NOTES
After obtaining consent, and per orders injection of B12 given in Left deltoid by Gilmer Walter. Patient instructed to remain in clinic for 20 minutes afterwards, and to report any adverse reaction to me immediately.

## 2020-09-23 RX ORDER — FOLIC ACID 1 MG/1
1 TABLET ORAL DAILY
Qty: 90 TABLET | Refills: 1 | Status: SHIPPED | OUTPATIENT
Start: 2020-09-23 | End: 2020-10-13 | Stop reason: ALTCHOICE

## 2020-09-28 ENCOUNTER — TELEPHONE (OUTPATIENT)
Dept: PRIMARY CARE CLINIC | Age: 85
End: 2020-09-28

## 2020-09-28 NOTE — TELEPHONE ENCOUNTER
Contacted Pt in regards to DM foot exam.  Pt informed she must have a in office visit for insurance to cover her shoes.   Pt has appointment scheduled on 10/12 for in office with

## 2020-09-29 RX ORDER — ATENOLOL 25 MG/1
TABLET ORAL
Qty: 90 TABLET | Refills: 4 | Status: SHIPPED | OUTPATIENT
Start: 2020-09-29 | End: 2021-03-04

## 2020-09-29 RX ORDER — ERGOCALCIFEROL 1.25 MG/1
50000 CAPSULE ORAL WEEKLY
Qty: 4 CAPSULE | Refills: 1 | Status: SHIPPED | OUTPATIENT
Start: 2020-09-29 | End: 2021-01-06 | Stop reason: SDUPTHER

## 2020-10-13 ENCOUNTER — OFFICE VISIT (OUTPATIENT)
Dept: PRIMARY CARE CLINIC | Age: 85
End: 2020-10-13
Payer: MEDICARE

## 2020-10-13 VITALS
DIASTOLIC BLOOD PRESSURE: 70 MMHG | OXYGEN SATURATION: 97 % | SYSTOLIC BLOOD PRESSURE: 132 MMHG | WEIGHT: 103 LBS | TEMPERATURE: 96.2 F | HEART RATE: 56 BPM | HEIGHT: 63 IN | BODY MASS INDEX: 18.25 KG/M2

## 2020-10-13 DIAGNOSIS — E53.8 VITAMIN B 12 DEFICIENCY: ICD-10-CM

## 2020-10-13 DIAGNOSIS — E55.9 VITAMIN D DEFICIENCY: ICD-10-CM

## 2020-10-13 PROBLEM — F02.80 LATE ONSET ALZHEIMER'S DISEASE WITHOUT BEHAVIORAL DISTURBANCE (HCC): Chronic | Status: ACTIVE | Noted: 2019-09-24

## 2020-10-13 PROBLEM — S31.829A: Status: RESOLVED | Noted: 2020-03-31 | Resolved: 2020-10-13

## 2020-10-13 PROBLEM — R09.89 POOR PERIPHERAL CIRCULATION: Status: RESOLVED | Noted: 2018-05-02 | Resolved: 2020-10-13

## 2020-10-13 PROBLEM — M21.622 TAILOR'S BUNION OF BOTH FEET: Status: ACTIVE | Noted: 2020-10-13

## 2020-10-13 PROBLEM — E11.42 DIABETIC PERIPHERAL NEUROPATHY ASSOCIATED WITH TYPE 2 DIABETES MELLITUS (HCC): Chronic | Status: ACTIVE | Noted: 2018-05-02

## 2020-10-13 PROBLEM — G30.1 LATE ONSET ALZHEIMER'S DISEASE WITHOUT BEHAVIORAL DISTURBANCE (HCC): Chronic | Status: ACTIVE | Noted: 2019-09-24

## 2020-10-13 PROBLEM — I10 ESSENTIAL HYPERTENSION: Chronic | Status: ACTIVE | Noted: 2017-03-29

## 2020-10-13 PROBLEM — M21.621 TAILOR'S BUNION OF BOTH FEET: Status: ACTIVE | Noted: 2020-10-13

## 2020-10-13 PROBLEM — I73.9 PERIPHERAL VASCULAR DISEASE, UNSPECIFIED (HCC): Chronic | Status: ACTIVE | Noted: 2020-06-08

## 2020-10-13 LAB
FOLATE: >20 NG/ML (ref 4.8–37.3)
VITAMIN B-12: 594 PG/ML (ref 211–946)
VITAMIN D 25-HYDROXY: 70.2 NG/ML

## 2020-10-13 PROCEDURE — 1036F TOBACCO NON-USER: CPT | Performed by: FAMILY MEDICINE

## 2020-10-13 PROCEDURE — 1090F PRES/ABSN URINE INCON ASSESS: CPT | Performed by: FAMILY MEDICINE

## 2020-10-13 PROCEDURE — G8419 CALC BMI OUT NRM PARAM NOF/U: HCPCS | Performed by: FAMILY MEDICINE

## 2020-10-13 PROCEDURE — G8400 PT W/DXA NO RESULTS DOC: HCPCS | Performed by: FAMILY MEDICINE

## 2020-10-13 PROCEDURE — G0439 PPPS, SUBSEQ VISIT: HCPCS | Performed by: FAMILY MEDICINE

## 2020-10-13 PROCEDURE — 99213 OFFICE O/P EST LOW 20 MIN: CPT | Performed by: FAMILY MEDICINE

## 2020-10-13 PROCEDURE — 4040F PNEUMOC VAC/ADMIN/RCVD: CPT | Performed by: FAMILY MEDICINE

## 2020-10-13 PROCEDURE — G8484 FLU IMMUNIZE NO ADMIN: HCPCS | Performed by: FAMILY MEDICINE

## 2020-10-13 PROCEDURE — 1123F ACP DISCUSS/DSCN MKR DOCD: CPT | Performed by: FAMILY MEDICINE

## 2020-10-13 PROCEDURE — G8427 DOCREV CUR MEDS BY ELIG CLIN: HCPCS | Performed by: FAMILY MEDICINE

## 2020-10-13 ASSESSMENT — PATIENT HEALTH QUESTIONNAIRE - PHQ9
2. FEELING DOWN, DEPRESSED OR HOPELESS: 0
1. LITTLE INTEREST OR PLEASURE IN DOING THINGS: 0
SUM OF ALL RESPONSES TO PHQ9 QUESTIONS 1 & 2: 0
SUM OF ALL RESPONSES TO PHQ QUESTIONS 1-9: 0
SUM OF ALL RESPONSES TO PHQ QUESTIONS 1-9: 0

## 2020-10-13 ASSESSMENT — ENCOUNTER SYMPTOMS
COUGH: 0
ABDOMINAL PAIN: 0
WHEEZING: 0
CHEST TIGHTNESS: 0
NAUSEA: 0
SHORTNESS OF BREATH: 0
VOMITING: 0
CONSTIPATION: 0
DIARRHEA: 0

## 2020-10-13 ASSESSMENT — LIFESTYLE VARIABLES: HOW OFTEN DO YOU HAVE A DRINK CONTAINING ALCOHOL: 0

## 2020-10-13 NOTE — PROGRESS NOTES
Rashid Macdonald is a 81 y.o. female who presents today for   Chief Complaint   Patient presents with    Medicare AWV       HPI  Patient is here for annual wellness visit as well as follow-up for chronic disease. Patient has had some weight loss. She is worried about nutrition. She would like to have vitamin levels checked. She would like to have specific amounts prescribed if needed as well. Patient also notes that she does have a history of peripheral neuropathy. She does have bunions on the feet bilaterally. She has been using diabetic shoes to help prevent wounds and sores. She does have a callus she notes. Patient does have history of late onset Alzheimer's disease that has been mild overall. Does have a history of peripheral vascular disease as well. No change in PMH, family, social, or surgical history unless mentioned above. Review of Systems   Constitutional: Negative for chills and fever. Respiratory: Negative for cough, chest tightness, shortness of breath and wheezing. Cardiovascular: Negative for chest pain, palpitations and leg swelling. Gastrointestinal: Negative for abdominal pain, constipation, diarrhea, nausea and vomiting. Genitourinary: Negative for difficulty urinating, dysuria and frequency. Psychiatric/Behavioral: Negative for agitation, dysphoric mood, self-injury, sleep disturbance and suicidal ideas. The patient is not nervous/anxious.         Past Medical History:   Diagnosis Date    Abdominal pain     Anxiety     Dr Jayshree Doran Bell's palsy 2003    Colon polyp     Depression     Dr Jayshree Doran Diverticulosis     Female bladder prolapse     Hernia, abdominal     History of adenomatous polyp of colon     Hypertriglyceridemia     Neuropathy     Osteopenia     Type II or unspecified type diabetes mellitus without mention of complication, not stated as uncontrolled     Vitamin B 12 deficiency        Current Outpatient Medications   Medication Sig Dispense Refill  Diabetic Shoe MISC by Does not apply route DISPENSE ONE PAIR DIABETIC SHOES AND THREE PAIRS OF HEAT MOLDED INSERTS 1 each 0    vitamin D (ERGOCALCIFEROL) 1.25 MG (20760 UT) CAPS capsule Take 1 capsule by mouth once a week 4 capsule 1    metFORMIN (GLUCOPHAGE) 500 MG tablet TAKE 1 TABLET DAILY WITH BREAKFAST 90 tablet 0    cyanocobalamin 1000 MCG/ML injection Inject 1 mL into the muscle once for 1 dose 1 mL 0    sitaGLIPtan-metformin (JANUMET)  MG per tablet Take 1 tablet by mouth 2 times daily (with meals) (Patient taking differently: Take 1 tablet by mouth daily ) 180 tablet 1    simvastatin (ZOCOR) 10 MG tablet Take 1 tablet by mouth every evening 90 tablet 3    blood glucose test strips (ASCENSIA AUTODISC VI;ONE TOUCH ULTRA TEST VI) strip 1 each by In Vitro route daily As needed. 100 each 3    memantine (NAMENDA) 5 MG tablet Take 5 mg by mouth daily      docusate sodium (COLACE) 100 MG capsule   5    Diabetic Shoe MISC by Does not apply route Diabetic shoes bilateral 1 each 0    donepezil (ARICEPT) 5 MG tablet Take 5 mg by mouth daily       Diabetic Shoe MISC by Does not apply route Diabetic shoes bilateral 1 each 0    atenolol (TENORMIN) 25 MG tablet Take 0.5 tablets by mouth daily 30 tablet 0    nitroGLYCERIN (NITROSTAT) 0.4 MG SL tablet Place 1 tablet under the tongue every 5 minutes as needed for Chest pain up to max of 3 total doses. If no relief after 1 dose, call 911. 25 tablet 3    Tetrahydrozoline HCl (EYE DROPS OP) Apply 2 drops to eye 2 times daily Indications: systane       venlafaxine (EFFEXOR-XR) 37.5 MG XR capsule Take 37.5 mg by mouth daily.  gabapentin (NEURONTIN) 300 MG capsule Take 300 mg by mouth nightly       ondansetron (ZOFRAN) 4 MG tablet Take 1 tablet by mouth daily as needed for Nausea or Vomiting 20 tablet 0    blood glucose test strips (ASCENSIA AUTODISC VI;ONE TOUCH ULTRA TEST VI) strip 1 each by In Vitro route daily As needed.  100 each 3    Lancets MISC 1 each by Does not apply route 3 times daily 600 each 1    Diabetic Shoe MISC by Does not apply route DISPENSE ONE PAIR DIABETIC SHOES AND THREE PAIRS OF HEAT MOLDED INSERTS 1 each 0    LORazepam (ATIVAN) 0.5 MG tablet Take 0.5 mg by mouth daily as needed for Anxiety.  blood glucose test strips (ASCENSIA AUTODISC VI;ONE TOUCH ULTRA TEST VI) strip 1 each by In Vitro route daily As needed. 100 each 3    Prenatal Vit-Fe Fumarate-FA (PRENATAL VITAMIN) 27-1 MG TABS tablet Take 1 tablet by mouth once a week 90 tablet 0    UNABLE TO FIND Take 1 tablet by mouth daily 90 tablet 3    Psyllium (METAMUCIL PO) Take by mouth daily      Fructose-Dextrose-Phosphor Acd (EMETROL PO) Take 10 mLs by mouth daily as needed (nausea)        No current facility-administered medications for this visit.         Allergies   Allergen Reactions    Phenergan [Promethazine] Other (See Comments)     Makes \"Crazy\"       Past Surgical History:   Procedure Laterality Date    APPENDECTOMY      BLADDER REPAIR  1984    states stiched her bladder up but was not a suspension-Dr Garcia    CATARACT REMOVAL  12/2014    CATARACT REMOVAL  01/2015    CHOLECYSTECTOMY  age 22    CLAVICLE SURGERY Left 6/22/2020    OPEN REDUCTION INTERNAL FIXATION LEFT CLAVICLE performed by Nessa Chakraborty MD at [address] [place] [address], [place] [address]  02/15/2012    Dr Juan Carlos Romero AP, diverticulosis coli, small internal hemorrhoids, 5 yr recall    COLONOSCOPY  3/1998    negative    COLONOSCOPY  5/1/07     Dr Berenice Shrestha, diverticulosis coli/small internal hemorrhoids    COLONOSCOPY  9/16/04    Dr Blood-diverticulosis coli, ileocecal valve lipoma    COLONOSCOPY N/A 1/25/2016    Dr Jessie Morillo AP, 5 yr recall   Sara Caal Bilateral     cataract   [facility] [place] [facility]    Dr Chey Shrestha partner   [facility] N/A 2/15/2016 OPEN REPAIR OF RECURRENT INCISIONAL HERNIA WITH MESH  performed by Luisa Holter, MD at [facility] ENDOSCOPY  02/16/2012    Dr White-Barretts/gastritis    UPPER GASTROINTESTINAL ENDOSCOPY  2/1998    Dr Darrel Gonzales, negative    UPPER GASTROINTESTINAL ENDOSCOPY  1/2/07    Dr Darrel Gonzales, gastritis    UPPER GASTROINTESTINAL ENDOSCOPY N/A 1/11/2016    Dr Garber Bracket junction w/minimal chronic inflammation       Social History     Tobacco Use    Smoking status: Never Smoker    Smokeless tobacco: Never Used   Substance Use Topics    Alcohol use: No    Drug use: No       Family History   Problem Relation Age of Onset    Cancer Mother         Pancreatic    Other Mother     Cancer Father         melanoma    Colon Cancer Neg Hx     Colon Polyps Neg Hx     Esophageal Cancer Neg Hx     Liver Cancer Neg Hx     Liver Disease Neg Hx     Rectal Cancer Neg Hx     Stomach Cancer Neg Hx        /70 (Site: Left Upper Arm)   Pulse 56   Temp 96.2 °F (35.7 °C)   Ht 5' 3\" (1.6 m)   Wt 103 lb (46.7 kg)   SpO2 97%   BMI 18.25 kg/m²     Physical Exam  Vitals signs and nursing note reviewed. Constitutional:       General: She is not in acute distress. Appearance: She is well-developed. She is not diaphoretic. HENT:      Head: Normocephalic and atraumatic. Cardiovascular:      Rate and Rhythm: Normal rate and regular rhythm. Heart sounds: Normal heart sounds. No murmur. Pulmonary:      Effort: Pulmonary effort is normal. No respiratory distress. Breath sounds: Normal breath sounds. No wheezing or rales. Abdominal:      General: Bowel sounds are normal. There is no distension. Palpations: Abdomen is soft. Tenderness: There is no abdominal tenderness. Musculoskeletal:      Comments: No pretibial edema b/l. Skin:     General: Skin is warm and dry. Neurological:      Mental Status: She is alert and oriented to person, place, and time. Monofilament Exam Reveals:  Pulses: normal  Edema:normal  Skin Lesions: Callus on the first right toe  Bilateral bunions of the first toe  Right Foot:    Left Foot:  Normal sensation at 0   Normal sensation at 0   Diminished sensation at 1-10   Diminished sensation at 1-10   No sensation at 0    No sensation at 0         Assessment:    ICD-10-CM    1. Routine general medical examination at a health care facility  Z00.00    2. Vitamin B 12 deficiency  E53.8 Vitamin B12 & Folate   3. Vitamin D deficiency  E55.9 Vitamin B1     Vitamin D 25 Hydroxy   4. Essential hypertension  I10    5. Late onset Alzheimer's disease without behavioral disturbance (HCC)  G30.1     F02.80    6. 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)  E11.42  DIABETES FOOT EXAM     Diabetic Shoe MISC   7. Tailor's bunion of both feet  M21.621  DIABETES FOOT EXAM    M21.622 Diabetic Shoe MISC       Plan:   Follow-up laboratories for nutrition based on weight loss. Patient needs diabetic shoes and will use them daily as directed.   Orders Placed This Encounter   Procedures    Vitamin B1     Standing Status:   Future     Number of Occurrences:   1     Standing Expiration Date:   10/13/2021    Vitamin B12 & Folate     Standing Status:   Future     Number of Occurrences:   1     Standing Expiration Date:   10/13/2021    Vitamin D 25 Hydroxy     Standing Status:   Future     Number of Occurrences:   1     Standing Expiration Date:   10/13/2021     DIABETES FOOT EXAM     Orders Placed This Encounter   Medications    Diabetic Shoe MISC     Sig: by Does not apply route DISPENSE ONE PAIR DIABETIC SHOES AND THREE PAIRS OF HEAT MOLDED INSERTS     Dispense:  1 each     Refill:  0     Medications Discontinued During This Encounter   Medication Reason    folic acid (Belvie Najjar) 1 MG tablet Therapy completed     Patient Instructions     Personalized Preventive Plan for Nate Burns - 10/13/2020  Medicare offers a range of preventive health benefits. Some of the tests and screenings are paid in full while other may be subject to a deductible, co-insurance, and/or copay. Some of these benefits include a comprehensive review of your medical history including lifestyle, illnesses that may run in your family, and various assessments and screenings as appropriate. After reviewing your medical record and screening and assessments performed today your provider may have ordered immunizations, labs, imaging, and/or referrals for you. A list of these orders (if applicable) as well as your Preventive Care list are included within your After Visit Summary for your review. Other Preventive Recommendations:    · A preventive eye exam performed by an eye specialist is recommended every 1-2 years to screen for glaucoma; cataracts, macular degeneration, and other eye disorders. · A preventive dental visit is recommended every 6 months. · Try to get at least 150 minutes of exercise per week or 10,000 steps per day on a pedometer . · Order or download the FREE \"Exercise & Physical Activity: Your Everyday Guide\" from [employer]. Call 6-956.745.1298 or search [employer] online. · You need 0430-8505 mg of calcium and 0591-1581 IU of vitamin D per day. It is possible to meet your calcium requirement with diet alone, but a vitamin D supplement is usually necessary to meet this goal.  · When exposed to the sun, use a sunscreen that protects against both UVA and UVB radiation with an SPF of 30 or greater. Reapply every 2 to 3 hours or after sweating, drying off with a towel, or swimming. · Always wear a seat belt when traveling in a car. Always wear a helmet when riding a bicycle or motorcycle. Patient given educational handouts and has had all questions answered. Patient voices understanding and agrees to plans along with risks and benefits of plan.  Patient isinstructed to continue prior meds, diet, and exercise plans unless instructed otherwise. Patient agrees to follow up as instructed and sooner if needed. Patient agrees to go to ER if condition becomes emergent. Notesmay be completed with dictation device and spelling errors may occur. Materials may be copied and pasted from a notepad outside of EMR, all of which, I, Dr. Yessenia Lindsey MD, take sole intellectual ownership of and have approved adding to my note. Return in 1 year (on 10/13/2021) for Medicare Annual Wellness Visit in 1 year.

## 2020-10-13 NOTE — PROGRESS NOTES
Medicare Annual Wellness Visit  Name: Cristi Garibay Date: 10/13/2020   MRN: [id number] Sex: Female   Age: 81 y.o. Ethnicity: Non-/Non    : 1935 Race: Lisy Weinberg is here for Medicare AWV    Screenings for behavioral, psychosocial and functional/safety risks, and cognitive dysfunction are all negative except as indicated below. These results, as well as other patient data from the [address] form, are documented in Flowsheets linked to this Encounter. Allergies   Allergen Reactions    Phenergan [Promethazine] Other (See Comments)     Makes \"Crazy\"       Prior to Visit Medications    Medication Sig Taking? Authorizing Provider   Diabetic Shoe MISC by Does not apply route DISPENSE ONE PAIR DIABETIC SHOES AND THREE PAIRS OF HEAT MOLDED INSERTS Yes Evans Coyle MD   vitamin D (ERGOCALCIFEROL) 1.25 MG (10436 UT) CAPS capsule Take 1 capsule by mouth once a week Yes Evans Coyle MD   metFORMIN (GLUCOPHAGE) 500 MG tablet TAKE 1 TABLET DAILY WITH BREAKFAST Yes Evans Coyle MD   cyanocobalamin 1000 MCG/ML injection Inject 1 mL into the muscle once for 1 dose Yes Evans Coyle MD   sitaGLIPtan-metformin (JANUMET)  MG per tablet Take 1 tablet by mouth 2 times daily (with meals)  Patient taking differently: Take 1 tablet by mouth daily  Yes Evans Coyle MD   simvastatin (ZOCOR) 10 MG tablet Take 1 tablet by mouth every evening Yes Evans Coyle MD   blood glucose test strips (ASCENSIA AUTODISC VI;ONE TOUCH ULTRA TEST VI) strip 1 each by In Vitro route daily As needed.  Yes Evans Coyle MD   memantine (NAMENDA) 5 MG tablet Take 5 mg by mouth daily Yes Historical Provider, MD   docusate sodium (COLACE) 100 MG capsule  Yes Historical Provider, MD   Diabetic Shoe MISC by Does not apply route Diabetic shoes bilateral Yes Evans Coyle MD   donepezil (ARICEPT) 5 MG tablet Take 5 mg by mouth daily  Yes Historical Provider, MD   Diabetic Shoe MISC by Does not apply route Diabetic shoes bilateral Yes Mariana Wang MD   atenolol (TENORMIN) 25 MG tablet Take 0.5 tablets by mouth daily Yes Mariana Wang MD   nitroGLYCERIN (NITROSTAT) 0.4 MG SL tablet Place 1 tablet under the tongue every 5 minutes as needed for Chest pain up to max of 3 total doses. If no relief after 1 dose, call 911. Yes Mariana Wang MD   Tetrahydrozoline HCl (EYE DROPS OP) Apply 2 drops to eye 2 times daily Indications: systane  Yes Historical Provider, MD   venlafaxine (EFFEXOR-XR) 37.5 MG XR capsule Take 37.5 mg by mouth daily. Yes Historical Provider, MD   gabapentin (NEURONTIN) 300 MG capsule Take 300 mg by mouth nightly  Yes Historical Provider, MD   ondansetron (ZOFRAN) 4 MG tablet Take 1 tablet by mouth daily as needed for Nausea or Vomiting  Adalgisa Melton MD   blood glucose test strips (ASCENSIA AUTODISC VI;ONE TOUCH ULTRA TEST VI) strip 1 each by In Vitro route daily As needed. Mariana Wang MD   Lancets MISC 1 each by Does not apply route 3 times daily  Mariana Wang MD   Diabetic Shoe MISC by Does not apply route DISPENSE ONE PAIR DIABETIC SHOES AND THREE PAIRS OF HEAT MOLDED INSERTS  Mariana Wang MD   LORazepam (ATIVAN) 0.5 MG tablet Take 0.5 mg by mouth daily as needed for Anxiety. Historical Provider, MD   blood glucose test strips (ASCENSIA AUTODISC VI;ONE TOUCH ULTRA TEST VI) strip 1 each by In Vitro route daily As needed.   Mariana Wang MD   Prenatal Vit-Fe Fumarate-FA (PRENATAL VITAMIN) 27-1 MG TABS tablet Take 1 tablet by mouth once a week  Mariana Wang MD   UNABLE TO FIND Take 1 tablet by mouth daily  Guido Foots, APRN - CNP   Psyllium (METAMUCIL PO) Take by mouth daily  Historical Provider, MD   Fructose-Dextrose-Phosphor Acd (EMETROL PO) Take 10 mLs by mouth daily as needed (nausea)   Historical Provider, MD       Past Medical History:   Diagnosis Date    Abdominal pain     Anxiety     Dr Ravi Landrum Reis's palsy 2003    Colon polyp     Depression     Dr Ravi Landrum Diverticulosis     Female bladder prolapse     Hernia, abdominal     History of adenomatous polyp of colon     Hypertriglyceridemia     Neuropathy     Osteopenia     Type II or unspecified type diabetes mellitus without mention of complication, not stated as uncontrolled     Vitamin B 12 deficiency        Past Surgical History:   Procedure Laterality Date    APPENDECTOMY      BLADDER REPAIR  1984    states stiched her bladder up but was not a suspension-Dr Garcia    CATARACT REMOVAL  12/2014    CATARACT REMOVAL  01/2015    CHOLECYSTECTOMY  age 22    CLAVICLE SURGERY Left 6/22/2020    OPEN REDUCTION INTERNAL FIXATION LEFT CLAVICLE performed by Holden Hill MD at [address] [place] [address]  02/15/2012    Dr Raghu Betts AP, diverticulosis coli, small internal hemorrhoids, 5 yr recall    COLONOSCOPY  3/1998    negative    COLONOSCOPY  5/1/07     Dr Raheem Boateng, diverticulosis coli/small internal hemorrhoids    COLONOSCOPY  9/16/04    Dr Blood-diverticulosis coli, ileocecal valve lipoma    COLONOSCOPY N/A 1/25/2016    Dr Riki Nichole AP, 5 yr recall   Stephan Rajput Bilateral     cataract   Junius Canedinson Boateng partner   [address] N/A 2/15/2016    OPEN REPAIR OF RECURRENT INCISIONAL HERNIA WITH MESH  performed by Hernan Kerr MD at [facility] ENDOSCOPY  02/16/2012    Dr White-Barretts/gastritis    UPPER GASTROINTESTINAL ENDOSCOPY  2/1998    Dr Raheem Boateng, negative    UPPER GASTROINTESTINAL ENDOSCOPY  1/2/07    Dr Raheem Boateng, gastritis    UPPER GASTROINTESTINAL ENDOSCOPY N/A 1/11/2016    Dr Rocio Elliott GE junction w/minimal chronic inflammation       Family History   Problem Relation Age of Onset    Cancer Mother         Pancreatic    Other Mother     Cancer Father         melanoma    Colon Cancer Neg Hx  Colon Polyps Neg Hx     Esophageal Cancer Neg Hx     Liver Cancer Neg Hx     Liver Disease Neg Hx     Rectal Cancer Neg Hx     Stomach Cancer Neg Hx        CareTeam (Including outside providers/suppliers regularly involved in providing care):   Patient Care Team:  Suzan Angeles MD as PCP - General (Family Medicine)  Suzan Angeles MD as PCP - [facility] Empaneled Provider  DILIA Jones as Nurse Practitioner (Family Nurse Practitioner)  Shana Rosario MD as Consulting Physician (Cardiology)    Wt Readings from Last 3 Encounters:   10/13/20 103 lb (46.7 kg)   10/13/20 103 lb (46.7 kg)   06/22/20 110 lb (49.9 kg)     Vitals:    10/13/20 0834   BP: 132/70   Site: Left Upper Arm   Pulse: 56   Temp: 96.2 °F (35.7 °C)   SpO2: 97%   Weight: 103 lb (46.7 kg)   Height: 5' 3\" (1.6 m)     Body mass index is 18.25 kg/m². Based upon direct observation of the patient, evaluation of cognition reveals recent and remote memory intact. Patient's complete Health Risk Assessment and screening values have been reviewed and are found in Flowsheets. The following problems were reviewed today and where indicated follow up appointments were made and/or referrals ordered.     Positive Risk Factor Screenings with Interventions:     Fall Risk:  2 or more falls in past year?: (!) yes  Fall with injury in past year?: (!) yes  Fall Risk Interventions:    · Home safety tips provided    Health Habits/Nutrition:  Health Habits/Nutrition  Do you exercise for at least 20 minutes 2-3 times per week?: Yes  Have you lost any weight without trying in the past 3 months?: No  Do you eat fewer than 2 meals per day?: No  Have you seen a dentist within the past year?: Yes  Body mass index: (!) 18.24  Health Habits/Nutrition Interventions:  · Nutritional issues:  educational materials for healthy, well-balanced diet provided    Personalized Preventive Plan   Current Health Maintenance Status  Immunization History   Administered Date(s) ONE PAIR DIABETIC SHOES AND THREE PAIRS OF HEAT MOLDED INSERTS    Tailor's bunion of both feet  -      DIABETES FOOT EXAM  -     Diabetic Shoe MISC; by Does not apply route DISPENSE ONE PAIR DIABETIC SHOES AND THREE PAIRS OF HEAT MOLDED INSERTS

## 2020-10-16 LAB — VITAMIN B1, PLASMA: 11 NMOL/L (ref 4–15)

## 2020-10-26 ENCOUNTER — OFFICE VISIT (OUTPATIENT)
Dept: PRIMARY CARE CLINIC | Age: 85
End: 2020-10-26
Payer: MEDICARE

## 2020-10-26 PROCEDURE — 96372 THER/PROPH/DIAG INJ SC/IM: CPT | Performed by: FAMILY MEDICINE

## 2020-10-26 RX ORDER — CYANOCOBALAMIN 1000 UG/ML
1000 INJECTION INTRAMUSCULAR; SUBCUTANEOUS ONCE
Status: COMPLETED | OUTPATIENT
Start: 2020-10-26 | End: 2020-10-26

## 2020-10-26 RX ADMIN — CYANOCOBALAMIN 1000 MCG: 1000 INJECTION INTRAMUSCULAR; SUBCUTANEOUS at 08:00

## 2020-10-26 NOTE — PROGRESS NOTES
After obtaining consent, and per orders of Hoa Parks  injection of Vit B 12 was given in the left deltoid by Brandy Prakash  Pt tolerated well and had no reactions.

## 2020-12-01 ENCOUNTER — OFFICE VISIT (OUTPATIENT)
Dept: PRIMARY CARE CLINIC | Age: 85
End: 2020-12-01
Payer: MEDICARE

## 2020-12-01 PROCEDURE — 96372 THER/PROPH/DIAG INJ SC/IM: CPT | Performed by: NURSE PRACTITIONER

## 2020-12-01 RX ORDER — CYANOCOBALAMIN 1000 UG/ML
1000 INJECTION INTRAMUSCULAR; SUBCUTANEOUS ONCE
Status: COMPLETED | OUTPATIENT
Start: 2020-12-01 | End: 2020-12-01

## 2020-12-01 RX ADMIN — CYANOCOBALAMIN 1000 MCG: 1000 INJECTION INTRAMUSCULAR; SUBCUTANEOUS at 07:43

## 2020-12-01 NOTE — PROGRESS NOTES
After obtaining consent, and per orders of DILIA Yang, an injection of B12 was given in Right deltoid by [facility]. Patient tolerated well with no immediate adverse reaction. Patient was released from the office with no concerns.

## 2020-12-03 ENCOUNTER — APPOINTMENT (OUTPATIENT)
Dept: CT IMAGING | Age: 85
DRG: 481 | End: 2020-12-03
Payer: MEDICARE

## 2020-12-03 ENCOUNTER — HOSPITAL ENCOUNTER (INPATIENT)
Age: 85
LOS: 4 days | Discharge: NURSING, REHAB OR LONG-TERM CARE | DRG: 481 | End: 2020-12-07
Attending: EMERGENCY MEDICINE | Admitting: HOSPITALIST
Payer: MEDICARE

## 2020-12-03 ENCOUNTER — APPOINTMENT (OUTPATIENT)
Dept: GENERAL RADIOLOGY | Age: 85
DRG: 481 | End: 2020-12-03
Payer: MEDICARE

## 2020-12-03 ENCOUNTER — ANESTHESIA (OUTPATIENT)
Dept: OPERATING ROOM | Age: 85
DRG: 481 | End: 2020-12-03
Payer: MEDICARE

## 2020-12-03 PROBLEM — S72.91XA FEMUR FRACTURE, RIGHT (HCC): Status: ACTIVE | Noted: 2020-12-03

## 2020-12-03 PROBLEM — S72.001A HIP FX, RIGHT, CLOSED, INITIAL ENCOUNTER (HCC): Status: ACTIVE | Noted: 2020-12-03

## 2020-12-03 LAB
ABO/RH: NORMAL
ALBUMIN SERPL-MCNC: 4 G/DL (ref 3.5–5.2)
ALP BLD-CCNC: 72 U/L (ref 35–104)
ALT SERPL-CCNC: 15 U/L (ref 5–33)
ANION GAP SERPL CALCULATED.3IONS-SCNC: 10 MMOL/L (ref 7–19)
ANTIBODY SCREEN: NORMAL
AST SERPL-CCNC: 17 U/L (ref 5–32)
ATYPICAL LYMPHOCYTE RELATIVE PERCENT: 2 % (ref 0–8)
BASOPHILS ABSOLUTE: 0.1 K/UL (ref 0–0.2)
BASOPHILS RELATIVE PERCENT: 1 % (ref 0–1)
BILIRUB SERPL-MCNC: <0.2 MG/DL (ref 0.2–1.2)
BILIRUBIN URINE: NEGATIVE
BLOOD, URINE: NEGATIVE
BUN BLDV-MCNC: 17 MG/DL (ref 8–23)
CALCIUM SERPL-MCNC: 9.7 MG/DL (ref 8.8–10.2)
CHLORIDE BLD-SCNC: 101 MMOL/L (ref 98–111)
CLARITY: CLEAR
CO2: 24 MMOL/L (ref 22–29)
COLOR: YELLOW
CREAT SERPL-MCNC: 0.5 MG/DL (ref 0.5–0.9)
EOSINOPHILS ABSOLUTE: 0 K/UL (ref 0–0.6)
EOSINOPHILS RELATIVE PERCENT: 0 % (ref 0–5)
GFR AFRICAN AMERICAN: >59
GFR NON-AFRICAN AMERICAN: >60
GLUCOSE BLD-MCNC: 147 MG/DL (ref 74–109)
GLUCOSE URINE: NEGATIVE MG/DL
HCT VFR BLD CALC: 36.7 % (ref 37–47)
HEMOGLOBIN: 11.8 G/DL (ref 12–16)
IMMATURE GRANULOCYTES #: 0 K/UL
KETONES, URINE: NEGATIVE MG/DL
LEUKOCYTE ESTERASE, URINE: NEGATIVE
LYMPHOCYTES ABSOLUTE: 6.8 K/UL (ref 1.1–4.5)
LYMPHOCYTES RELATIVE PERCENT: 60 % (ref 20–40)
MCH RBC QN AUTO: 29.9 PG (ref 27–31)
MCHC RBC AUTO-ENTMCNC: 32.2 G/DL (ref 33–37)
MCV RBC AUTO: 93.1 FL (ref 81–99)
MONOCYTES ABSOLUTE: 0.8 K/UL (ref 0–0.9)
MONOCYTES RELATIVE PERCENT: 7 % (ref 0–10)
NEUTROPHILS ABSOLUTE: 3.3 K/UL (ref 1.5–7.5)
NEUTROPHILS RELATIVE PERCENT: 30 % (ref 50–65)
NITRITE, URINE: NEGATIVE
PDW BLD-RTO: 13.2 % (ref 11.5–14.5)
PH UA: 8.5 (ref 5–8)
PLATELET # BLD: 290 K/UL (ref 130–400)
PLATELET SLIDE REVIEW: ADEQUATE
PMV BLD AUTO: 9.9 FL (ref 9.4–12.3)
POTASSIUM SERPL-SCNC: 4.2 MMOL/L (ref 3.5–5)
PROTEIN UA: NEGATIVE MG/DL
RBC # BLD: 3.94 M/UL (ref 4.2–5.4)
SARS-COV-2, PCR: NOT DETECTED
SODIUM BLD-SCNC: 135 MMOL/L (ref 136–145)
SPECIFIC GRAVITY UA: 1.01 (ref 1–1.03)
TOTAL PROTEIN: 6.4 G/DL (ref 6.6–8.7)
UROBILINOGEN, URINE: 0.2 E.U./DL
WBC # BLD: 11 K/UL (ref 4.8–10.8)

## 2020-12-03 PROCEDURE — 1210000000 HC MED SURG R&B

## 2020-12-03 PROCEDURE — 6360000002 HC RX W HCPCS

## 2020-12-03 PROCEDURE — 2580000003 HC RX 258: Performed by: PHYSICIAN ASSISTANT

## 2020-12-03 PROCEDURE — 99285 EMERGENCY DEPT VISIT HI MDM: CPT

## 2020-12-03 PROCEDURE — 51702 INSERT TEMP BLADDER CATH: CPT

## 2020-12-03 PROCEDURE — 71045 X-RAY EXAM CHEST 1 VIEW: CPT

## 2020-12-03 PROCEDURE — U0003 INFECTIOUS AGENT DETECTION BY NUCLEIC ACID (DNA OR RNA); SEVERE ACUTE RESPIRATORY SYNDROME CORONAVIRUS 2 (SARS-COV-2) (CORONAVIRUS DISEASE [COVID-19]), AMPLIFIED PROBE TECHNIQUE, MAKING USE OF HIGH THROUGHPUT TECHNOLOGIES AS DESCRIBED BY CMS-2020-01-R: HCPCS

## 2020-12-03 PROCEDURE — 96374 THER/PROPH/DIAG INJ IV PUSH: CPT

## 2020-12-03 PROCEDURE — 93005 ELECTROCARDIOGRAM TRACING: CPT | Performed by: ORTHOPAEDIC SURGERY

## 2020-12-03 PROCEDURE — 86850 RBC ANTIBODY SCREEN: CPT

## 2020-12-03 PROCEDURE — 6370000000 HC RX 637 (ALT 250 FOR IP): Performed by: PHYSICIAN ASSISTANT

## 2020-12-03 PROCEDURE — 6360000002 HC RX W HCPCS: Performed by: EMERGENCY MEDICINE

## 2020-12-03 PROCEDURE — 86901 BLOOD TYPING SEROLOGIC RH(D): CPT

## 2020-12-03 PROCEDURE — 36415 COLL VENOUS BLD VENIPUNCTURE: CPT

## 2020-12-03 PROCEDURE — 81003 URINALYSIS AUTO W/O SCOPE: CPT

## 2020-12-03 PROCEDURE — 72170 X-RAY EXAM OF PELVIS: CPT

## 2020-12-03 PROCEDURE — 70450 CT HEAD/BRAIN W/O DYE: CPT

## 2020-12-03 PROCEDURE — 73551 X-RAY EXAM OF FEMUR 1: CPT

## 2020-12-03 PROCEDURE — 86900 BLOOD TYPING SEROLOGIC ABO: CPT

## 2020-12-03 PROCEDURE — 80053 COMPREHEN METABOLIC PANEL: CPT

## 2020-12-03 PROCEDURE — P9016 RBC LEUKOCYTES REDUCED: HCPCS

## 2020-12-03 PROCEDURE — 96375 TX/PRO/DX INJ NEW DRUG ADDON: CPT

## 2020-12-03 PROCEDURE — 93005 ELECTROCARDIOGRAM TRACING: CPT | Performed by: HOSPITALIST

## 2020-12-03 PROCEDURE — 86923 COMPATIBILITY TEST ELECTRIC: CPT

## 2020-12-03 PROCEDURE — 85025 COMPLETE CBC W/AUTO DIFF WBC: CPT

## 2020-12-03 RX ORDER — DEXTROSE MONOHYDRATE 25 G/50ML
12.5 INJECTION, SOLUTION INTRAVENOUS PRN
Status: DISCONTINUED | OUTPATIENT
Start: 2020-12-03 | End: 2020-12-07 | Stop reason: HOSPADM

## 2020-12-03 RX ORDER — MORPHINE SULFATE 4 MG/ML
4 INJECTION, SOLUTION INTRAMUSCULAR; INTRAVENOUS ONCE
Status: COMPLETED | OUTPATIENT
Start: 2020-12-03 | End: 2020-12-03

## 2020-12-03 RX ORDER — OXYCODONE HYDROCHLORIDE AND ACETAMINOPHEN 5; 325 MG/1; MG/1
2 TABLET ORAL EVERY 4 HOURS PRN
Status: DISCONTINUED | OUTPATIENT
Start: 2020-12-03 | End: 2020-12-04

## 2020-12-03 RX ORDER — VENLAFAXINE HYDROCHLORIDE 37.5 MG/1
37.5 CAPSULE, EXTENDED RELEASE ORAL DAILY
Status: DISCONTINUED | OUTPATIENT
Start: 2020-12-04 | End: 2020-12-07 | Stop reason: HOSPADM

## 2020-12-03 RX ORDER — MEMANTINE HYDROCHLORIDE 5 MG/1
5 TABLET ORAL DAILY
Status: DISCONTINUED | OUTPATIENT
Start: 2020-12-04 | End: 2020-12-07 | Stop reason: HOSPADM

## 2020-12-03 RX ORDER — SODIUM CHLORIDE 9 MG/ML
INJECTION, SOLUTION INTRAVENOUS CONTINUOUS
Status: DISCONTINUED | OUTPATIENT
Start: 2020-12-03 | End: 2020-12-07 | Stop reason: HOSPADM

## 2020-12-03 RX ORDER — ONDANSETRON 2 MG/ML
4 INJECTION INTRAMUSCULAR; INTRAVENOUS ONCE
Status: COMPLETED | OUTPATIENT
Start: 2020-12-03 | End: 2020-12-03

## 2020-12-03 RX ORDER — OXYCODONE HYDROCHLORIDE AND ACETAMINOPHEN 5; 325 MG/1; MG/1
1 TABLET ORAL EVERY 4 HOURS PRN
Status: DISCONTINUED | OUTPATIENT
Start: 2020-12-03 | End: 2020-12-07 | Stop reason: HOSPADM

## 2020-12-03 RX ORDER — ATORVASTATIN CALCIUM 10 MG/1
10 TABLET, FILM COATED ORAL DAILY
Status: DISCONTINUED | OUTPATIENT
Start: 2020-12-04 | End: 2020-12-07 | Stop reason: HOSPADM

## 2020-12-03 RX ORDER — SODIUM CHLORIDE 0.9 % (FLUSH) 0.9 %
10 SYRINGE (ML) INJECTION EVERY 12 HOURS SCHEDULED
Status: DISCONTINUED | OUTPATIENT
Start: 2020-12-03 | End: 2020-12-05 | Stop reason: HOSPADM

## 2020-12-03 RX ORDER — ONDANSETRON 2 MG/ML
INJECTION INTRAMUSCULAR; INTRAVENOUS
Status: COMPLETED
Start: 2020-12-03 | End: 2020-12-03

## 2020-12-03 RX ORDER — DEXTROSE MONOHYDRATE 50 MG/ML
100 INJECTION, SOLUTION INTRAVENOUS PRN
Status: DISCONTINUED | OUTPATIENT
Start: 2020-12-03 | End: 2020-12-07 | Stop reason: HOSPADM

## 2020-12-03 RX ORDER — NICOTINE POLACRILEX 4 MG
15 LOZENGE BUCCAL PRN
Status: DISCONTINUED | OUTPATIENT
Start: 2020-12-03 | End: 2020-12-07 | Stop reason: HOSPADM

## 2020-12-03 RX ORDER — ACETAMINOPHEN 325 MG/1
650 TABLET ORAL EVERY 6 HOURS PRN
Status: DISCONTINUED | OUTPATIENT
Start: 2020-12-03 | End: 2020-12-07 | Stop reason: HOSPADM

## 2020-12-03 RX ORDER — GABAPENTIN 300 MG/1
300 CAPSULE ORAL NIGHTLY
Status: DISCONTINUED | OUTPATIENT
Start: 2020-12-03 | End: 2020-12-07 | Stop reason: HOSPADM

## 2020-12-03 RX ORDER — SODIUM CHLORIDE 0.9 % (FLUSH) 0.9 %
10 SYRINGE (ML) INJECTION PRN
Status: DISCONTINUED | OUTPATIENT
Start: 2020-12-03 | End: 2020-12-05 | Stop reason: HOSPADM

## 2020-12-03 RX ORDER — SODIUM CHLORIDE 0.9 % (FLUSH) 0.9 %
10 SYRINGE (ML) INJECTION EVERY 12 HOURS SCHEDULED
Status: DISCONTINUED | OUTPATIENT
Start: 2020-12-03 | End: 2020-12-07 | Stop reason: HOSPADM

## 2020-12-03 RX ORDER — ACETAMINOPHEN 650 MG/1
650 SUPPOSITORY RECTAL EVERY 6 HOURS PRN
Status: DISCONTINUED | OUTPATIENT
Start: 2020-12-03 | End: 2020-12-07 | Stop reason: HOSPADM

## 2020-12-03 RX ORDER — MAGNESIUM SULFATE IN WATER 40 MG/ML
2 INJECTION, SOLUTION INTRAVENOUS PRN
Status: DISCONTINUED | OUTPATIENT
Start: 2020-12-03 | End: 2020-12-07 | Stop reason: HOSPADM

## 2020-12-03 RX ORDER — MORPHINE SULFATE 4 MG/ML
2 INJECTION, SOLUTION INTRAMUSCULAR; INTRAVENOUS EVERY 4 HOURS PRN
Status: DISCONTINUED | OUTPATIENT
Start: 2020-12-03 | End: 2020-12-07 | Stop reason: HOSPADM

## 2020-12-03 RX ORDER — MORPHINE SULFATE 4 MG/ML
4 INJECTION, SOLUTION INTRAMUSCULAR; INTRAVENOUS EVERY 4 HOURS PRN
Status: DISCONTINUED | OUTPATIENT
Start: 2020-12-03 | End: 2020-12-04

## 2020-12-03 RX ORDER — NITROGLYCERIN 0.4 MG/1
0.4 TABLET SUBLINGUAL EVERY 5 MIN PRN
Status: DISCONTINUED | OUTPATIENT
Start: 2020-12-03 | End: 2020-12-07 | Stop reason: HOSPADM

## 2020-12-03 RX ORDER — DONEPEZIL HYDROCHLORIDE 5 MG/1
5 TABLET, FILM COATED ORAL DAILY
Status: DISCONTINUED | OUTPATIENT
Start: 2020-12-04 | End: 2020-12-07 | Stop reason: HOSPADM

## 2020-12-03 RX ORDER — POLYETHYLENE GLYCOL 3350 17 G/17G
17 POWDER, FOR SOLUTION ORAL DAILY PRN
Status: DISCONTINUED | OUTPATIENT
Start: 2020-12-03 | End: 2020-12-07 | Stop reason: HOSPADM

## 2020-12-03 RX ORDER — SODIUM CHLORIDE 0.9 % (FLUSH) 0.9 %
10 SYRINGE (ML) INJECTION PRN
Status: DISCONTINUED | OUTPATIENT
Start: 2020-12-03 | End: 2020-12-07 | Stop reason: HOSPADM

## 2020-12-03 RX ORDER — POTASSIUM CHLORIDE 7.45 MG/ML
10 INJECTION INTRAVENOUS PRN
Status: DISCONTINUED | OUTPATIENT
Start: 2020-12-03 | End: 2020-12-07 | Stop reason: HOSPADM

## 2020-12-03 RX ORDER — POTASSIUM CHLORIDE 20 MEQ/1
40 TABLET, EXTENDED RELEASE ORAL PRN
Status: DISCONTINUED | OUTPATIENT
Start: 2020-12-03 | End: 2020-12-07 | Stop reason: HOSPADM

## 2020-12-03 RX ADMIN — SODIUM CHLORIDE: 9 INJECTION, SOLUTION INTRAVENOUS at 23:13

## 2020-12-03 RX ADMIN — ONDANSETRON HYDROCHLORIDE 4 MG: 2 INJECTION, SOLUTION INTRAMUSCULAR; INTRAVENOUS at 19:01

## 2020-12-03 RX ADMIN — ONDANSETRON 4 MG: 2 INJECTION INTRAMUSCULAR; INTRAVENOUS at 19:01

## 2020-12-03 RX ADMIN — GABAPENTIN 300 MG: 300 CAPSULE ORAL at 23:45

## 2020-12-03 RX ADMIN — MORPHINE SULFATE 4 MG: 4 INJECTION, SOLUTION INTRAMUSCULAR; INTRAVENOUS at 18:56

## 2020-12-03 ASSESSMENT — ENCOUNTER SYMPTOMS
ABDOMINAL PAIN: 0
VOMITING: 0
SHORTNESS OF BREATH: 0
BACK PAIN: 0

## 2020-12-03 ASSESSMENT — PAIN DESCRIPTION - DESCRIPTORS: DESCRIPTORS: CONSTANT

## 2020-12-03 ASSESSMENT — PAIN SCALES - GENERAL
PAINLEVEL_OUTOF10: 10
PAINLEVEL_OUTOF10: 10

## 2020-12-03 ASSESSMENT — PAIN DESCRIPTION - ORIENTATION: ORIENTATION: UPPER;RIGHT

## 2020-12-03 ASSESSMENT — PAIN DESCRIPTION - LOCATION: LOCATION: LEG

## 2020-12-04 ENCOUNTER — APPOINTMENT (OUTPATIENT)
Dept: GENERAL RADIOLOGY | Age: 85
DRG: 481 | End: 2020-12-04
Payer: MEDICARE

## 2020-12-04 ENCOUNTER — ANESTHESIA EVENT (OUTPATIENT)
Dept: OPERATING ROOM | Age: 85
DRG: 481 | End: 2020-12-04
Payer: MEDICARE

## 2020-12-04 VITALS
RESPIRATION RATE: 12 BRPM | TEMPERATURE: 99 F | OXYGEN SATURATION: 97 % | SYSTOLIC BLOOD PRESSURE: 158 MMHG | DIASTOLIC BLOOD PRESSURE: 67 MMHG

## 2020-12-04 LAB
ANION GAP SERPL CALCULATED.3IONS-SCNC: 10 MMOL/L (ref 7–19)
BUN BLDV-MCNC: 14 MG/DL (ref 8–23)
CALCIUM SERPL-MCNC: 8.9 MG/DL (ref 8.8–10.2)
CHLORIDE BLD-SCNC: 104 MMOL/L (ref 98–111)
CO2: 25 MMOL/L (ref 22–29)
CREAT SERPL-MCNC: 0.5 MG/DL (ref 0.5–0.9)
GFR AFRICAN AMERICAN: >59
GFR NON-AFRICAN AMERICAN: >60
GLUCOSE BLD-MCNC: 111 MG/DL (ref 70–99)
GLUCOSE BLD-MCNC: 134 MG/DL (ref 74–109)
GLUCOSE BLD-MCNC: 159 MG/DL (ref 70–99)
HCT VFR BLD CALC: 32.2 % (ref 37–47)
HEMOGLOBIN: 9.9 G/DL (ref 12–16)
MAGNESIUM: 2 MG/DL (ref 1.6–2.4)
MCH RBC QN AUTO: 29.3 PG (ref 27–31)
MCHC RBC AUTO-ENTMCNC: 30.7 G/DL (ref 33–37)
MCV RBC AUTO: 95.3 FL (ref 81–99)
PDW BLD-RTO: 13.3 % (ref 11.5–14.5)
PERFORMED ON: ABNORMAL
PERFORMED ON: ABNORMAL
PLATELET # BLD: 280 K/UL (ref 130–400)
PMV BLD AUTO: 10.5 FL (ref 9.4–12.3)
POTASSIUM REFLEX MAGNESIUM: 4.4 MMOL/L (ref 3.5–5)
RBC # BLD: 3.38 M/UL (ref 4.2–5.4)
SODIUM BLD-SCNC: 139 MMOL/L (ref 136–145)
TSH SERPL DL<=0.05 MIU/L-ACNC: 1.29 UIU/ML (ref 0.27–4.2)
VITAMIN D 25-HYDROXY: 50.8 NG/ML
WBC # BLD: 8.4 K/UL (ref 4.8–10.8)

## 2020-12-04 PROCEDURE — 2709999900 HC NON-CHARGEABLE SUPPLY: Performed by: ORTHOPAEDIC SURGERY

## 2020-12-04 PROCEDURE — 0QH636Z INSERTION OF INTRAMEDULLARY INTERNAL FIXATION DEVICE INTO RIGHT UPPER FEMUR, PERCUTANEOUS APPROACH: ICD-10-PCS | Performed by: ORTHOPAEDIC SURGERY

## 2020-12-04 PROCEDURE — 2500000003 HC RX 250 WO HCPCS: Performed by: ANESTHESIOLOGY

## 2020-12-04 PROCEDURE — 3700000000 HC ANESTHESIA ATTENDED CARE: Performed by: ORTHOPAEDIC SURGERY

## 2020-12-04 PROCEDURE — 84443 ASSAY THYROID STIM HORMONE: CPT

## 2020-12-04 PROCEDURE — 3209999900 FLUORO FOR SURGICAL PROCEDURES

## 2020-12-04 PROCEDURE — 6360000002 HC RX W HCPCS: Performed by: ORTHOPAEDIC SURGERY

## 2020-12-04 PROCEDURE — C1769 GUIDE WIRE: HCPCS | Performed by: ORTHOPAEDIC SURGERY

## 2020-12-04 PROCEDURE — 6360000002 HC RX W HCPCS: Performed by: ANESTHESIOLOGY

## 2020-12-04 PROCEDURE — 6360000002 HC RX W HCPCS: Performed by: PHYSICIAN ASSISTANT

## 2020-12-04 PROCEDURE — 2720000010 HC SURG SUPPLY STERILE: Performed by: ORTHOPAEDIC SURGERY

## 2020-12-04 PROCEDURE — 85027 COMPLETE CBC AUTOMATED: CPT

## 2020-12-04 PROCEDURE — 73552 X-RAY EXAM OF FEMUR 2/>: CPT

## 2020-12-04 PROCEDURE — 36415 COLL VENOUS BLD VENIPUNCTURE: CPT

## 2020-12-04 PROCEDURE — 80048 BASIC METABOLIC PNL TOTAL CA: CPT

## 2020-12-04 PROCEDURE — C1713 ANCHOR/SCREW BN/BN,TIS/BN: HCPCS | Performed by: ORTHOPAEDIC SURGERY

## 2020-12-04 PROCEDURE — 82306 VITAMIN D 25 HYDROXY: CPT

## 2020-12-04 PROCEDURE — 7100000001 HC PACU RECOVERY - ADDTL 15 MIN: Performed by: ORTHOPAEDIC SURGERY

## 2020-12-04 PROCEDURE — 83735 ASSAY OF MAGNESIUM: CPT

## 2020-12-04 PROCEDURE — 3600000004 HC SURGERY LEVEL 4 BASE: Performed by: ORTHOPAEDIC SURGERY

## 2020-12-04 PROCEDURE — 1210000000 HC MED SURG R&B

## 2020-12-04 PROCEDURE — 3700000001 HC ADD 15 MINUTES (ANESTHESIA): Performed by: ORTHOPAEDIC SURGERY

## 2020-12-04 PROCEDURE — 3600000014 HC SURGERY LEVEL 4 ADDTL 15MIN: Performed by: ORTHOPAEDIC SURGERY

## 2020-12-04 PROCEDURE — 2780000010 HC IMPLANT OTHER: Performed by: ORTHOPAEDIC SURGERY

## 2020-12-04 PROCEDURE — 2580000003 HC RX 258: Performed by: ANESTHESIOLOGY

## 2020-12-04 PROCEDURE — 82947 ASSAY GLUCOSE BLOOD QUANT: CPT

## 2020-12-04 PROCEDURE — 7100000000 HC PACU RECOVERY - FIRST 15 MIN: Performed by: ORTHOPAEDIC SURGERY

## 2020-12-04 DEVICE — IMPLANTABLE DEVICE: Type: IMPLANTABLE DEVICE | Site: HIP | Status: FUNCTIONAL

## 2020-12-04 DEVICE — SCREW BNE L38MM DIA5MM TIB LT GRN TI ST CANN LOK FULL THRD: Type: IMPLANTABLE DEVICE | Site: HIP | Status: FUNCTIONAL

## 2020-12-04 DEVICE — SCREW BNE L42MM DIA5MM TIB LT GRN TI ST CANN LOK FULL THRD: Type: IMPLANTABLE DEVICE | Site: HIP | Status: FUNCTIONAL

## 2020-12-04 DEVICE — NAIL IM L400MM DIA11MM 130DEG LNG R PROX FEM GRN TI CANN: Type: IMPLANTABLE DEVICE | Site: HIP | Status: FUNCTIONAL

## 2020-12-04 RX ORDER — OXYCODONE HYDROCHLORIDE AND ACETAMINOPHEN 5; 325 MG/1; MG/1
1 TABLET ORAL EVERY 4 HOURS PRN
Qty: 50 TABLET | Refills: 0 | Status: ON HOLD | OUTPATIENT
Start: 2020-12-04 | End: 2020-12-22 | Stop reason: HOSPADM

## 2020-12-04 RX ORDER — SODIUM CHLORIDE, SODIUM LACTATE, POTASSIUM CHLORIDE, CALCIUM CHLORIDE 600; 310; 30; 20 MG/100ML; MG/100ML; MG/100ML; MG/100ML
INJECTION, SOLUTION INTRAVENOUS CONTINUOUS PRN
Status: DISCONTINUED | OUTPATIENT
Start: 2020-12-04 | End: 2020-12-04 | Stop reason: SDUPTHER

## 2020-12-04 RX ORDER — SODIUM CHLORIDE, SODIUM LACTATE, POTASSIUM CHLORIDE, CALCIUM CHLORIDE 600; 310; 30; 20 MG/100ML; MG/100ML; MG/100ML; MG/100ML
INJECTION, SOLUTION INTRAVENOUS CONTINUOUS
Status: CANCELLED | OUTPATIENT
Start: 2020-12-04

## 2020-12-04 RX ORDER — HYDROMORPHONE HYDROCHLORIDE 1 MG/ML
0.25 INJECTION, SOLUTION INTRAMUSCULAR; INTRAVENOUS; SUBCUTANEOUS EVERY 5 MIN PRN
Status: DISCONTINUED | OUTPATIENT
Start: 2020-12-04 | End: 2020-12-05 | Stop reason: HOSPADM

## 2020-12-04 RX ORDER — DOCUSATE SODIUM 100 MG/1
100 CAPSULE, LIQUID FILLED ORAL 2 TIMES DAILY
Qty: 60 CAPSULE | Refills: 1 | Status: SHIPPED | OUTPATIENT
Start: 2020-12-04 | End: 2021-01-06 | Stop reason: SDUPTHER

## 2020-12-04 RX ORDER — FENTANYL CITRATE 50 UG/ML
50 INJECTION, SOLUTION INTRAMUSCULAR; INTRAVENOUS EVERY 5 MIN PRN
Status: DISCONTINUED | OUTPATIENT
Start: 2020-12-04 | End: 2020-12-05 | Stop reason: HOSPADM

## 2020-12-04 RX ORDER — ONDANSETRON 2 MG/ML
4 INJECTION INTRAMUSCULAR; INTRAVENOUS
Status: COMPLETED | OUTPATIENT
Start: 2020-12-04 | End: 2020-12-04

## 2020-12-04 RX ORDER — ONDANSETRON 4 MG/1
4 TABLET, FILM COATED ORAL EVERY 8 HOURS PRN
Qty: 20 TABLET | Refills: 1 | Status: SHIPPED | OUTPATIENT
Start: 2020-12-04 | End: 2021-05-19

## 2020-12-04 RX ORDER — SODIUM CHLORIDE 0.9 % (FLUSH) 0.9 %
10 SYRINGE (ML) INJECTION EVERY 12 HOURS SCHEDULED
Status: CANCELLED | OUTPATIENT
Start: 2020-12-04

## 2020-12-04 RX ORDER — LIDOCAINE HYDROCHLORIDE 10 MG/ML
INJECTION, SOLUTION EPIDURAL; INFILTRATION; INTRACAUDAL; PERINEURAL PRN
Status: DISCONTINUED | OUTPATIENT
Start: 2020-12-04 | End: 2020-12-04 | Stop reason: SDUPTHER

## 2020-12-04 RX ORDER — ROCURONIUM BROMIDE 10 MG/ML
INJECTION, SOLUTION INTRAVENOUS PRN
Status: DISCONTINUED | OUTPATIENT
Start: 2020-12-04 | End: 2020-12-04 | Stop reason: SDUPTHER

## 2020-12-04 RX ORDER — ONDANSETRON 2 MG/ML
INJECTION INTRAMUSCULAR; INTRAVENOUS PRN
Status: DISCONTINUED | OUTPATIENT
Start: 2020-12-04 | End: 2020-12-04 | Stop reason: SDUPTHER

## 2020-12-04 RX ORDER — FENTANYL CITRATE 50 UG/ML
INJECTION, SOLUTION INTRAMUSCULAR; INTRAVENOUS PRN
Status: DISCONTINUED | OUTPATIENT
Start: 2020-12-04 | End: 2020-12-04 | Stop reason: SDUPTHER

## 2020-12-04 RX ORDER — SODIUM CHLORIDE 0.9 % (FLUSH) 0.9 %
10 SYRINGE (ML) INJECTION PRN
Status: CANCELLED | OUTPATIENT
Start: 2020-12-04

## 2020-12-04 RX ORDER — PROPOFOL 10 MG/ML
INJECTION, EMULSION INTRAVENOUS PRN
Status: DISCONTINUED | OUTPATIENT
Start: 2020-12-04 | End: 2020-12-04 | Stop reason: SDUPTHER

## 2020-12-04 RX ADMIN — FENTANYL CITRATE 50 MCG: 50 INJECTION INTRAMUSCULAR; INTRAVENOUS at 22:23

## 2020-12-04 RX ADMIN — ONDANSETRON HYDROCHLORIDE 4 MG: 2 SOLUTION INTRAMUSCULAR; INTRAVENOUS at 23:51

## 2020-12-04 RX ADMIN — MORPHINE SULFATE 2 MG: 4 INJECTION, SOLUTION INTRAMUSCULAR; INTRAVENOUS at 09:35

## 2020-12-04 RX ADMIN — MORPHINE SULFATE 2 MG: 4 INJECTION, SOLUTION INTRAMUSCULAR; INTRAVENOUS at 13:36

## 2020-12-04 RX ADMIN — SODIUM CHLORIDE, SODIUM LACTATE, POTASSIUM CHLORIDE, AND CALCIUM CHLORIDE: 600; 310; 30; 20 INJECTION, SOLUTION INTRAVENOUS at 21:56

## 2020-12-04 RX ADMIN — FENTANYL CITRATE 50 MCG: 50 INJECTION INTRAMUSCULAR; INTRAVENOUS at 23:12

## 2020-12-04 RX ADMIN — LIDOCAINE HYDROCHLORIDE 50 MG: 10 INJECTION, SOLUTION EPIDURAL; INFILTRATION; INTRACAUDAL; PERINEURAL at 21:59

## 2020-12-04 RX ADMIN — ONDANSETRON HYDROCHLORIDE 4 MG: 2 INJECTION, SOLUTION INTRAMUSCULAR; INTRAVENOUS at 23:04

## 2020-12-04 RX ADMIN — PROPOFOL 90 MG: 10 INJECTION, EMULSION INTRAVENOUS at 21:59

## 2020-12-04 RX ADMIN — ROCURONIUM BROMIDE 50 MG: 10 INJECTION, SOLUTION INTRAVENOUS at 21:59

## 2020-12-04 RX ADMIN — FENTANYL CITRATE 50 MCG: 50 INJECTION INTRAMUSCULAR; INTRAVENOUS at 21:59

## 2020-12-04 RX ADMIN — Medication 2 G: at 22:06

## 2020-12-04 ASSESSMENT — LIFESTYLE VARIABLES: SMOKING_STATUS: 0

## 2020-12-04 ASSESSMENT — PAIN SCALES - GENERAL
PAINLEVEL_OUTOF10: 5
PAINLEVEL_OUTOF10: 10

## 2020-12-04 ASSESSMENT — ENCOUNTER SYMPTOMS: SHORTNESS OF BREATH: 0

## 2020-12-04 NOTE — ED PROVIDER NOTES
[facility]  eMERGENCY dEPARTMENT eNCOUnter      Pt Name: Angélica Ceja  MRN: [id number]  [place] 1935  Date of evaluation: 12/3/2020  Provider: Catalina Lopez MD    CHIEF COMPLAINT       Chief Complaint   Patient presents with   [facility]. pt tripped.  Hip Injury     right hip. shortened and externally rotated.  Head Injury     right side hematoma         HISTORY OF PRESENT ILLNESS   (Location/Symptom, Timing/Onset,Context/Setting, Quality, Duration, Modifying Factors, Severity)  Note limiting factors. Angélica Ceja is a 81 y.o. female who presents to the emergency department for evaluation of a fall that occurred at home tonight. Patient states that she was walking in her home when she tripped and fell forward, landed on her right hip area. States that she noted an acute onset of sharp pain in the right hip and was not able to stand or bear weight after the fall. She states that she did strike her head but does not believe she sustained loss of consciousness. States the pain in her right leg is exacerbated by attempts at movement. She is not currently maintained on any oral anticoagulant medications. Patient denies any pain in her right shoulder area or right chest wall area. Denies any shortness of breath or abdominal pain. HPI    NursingNotes were reviewed. REVIEW OF SYSTEMS    (2-9 systems for level 4, 10 or more for level 5)     Review of Systems   Constitutional: Negative for chills and fever. Respiratory: Negative for shortness of breath. Cardiovascular: Negative for chest pain. Gastrointestinal: Negative for abdominal pain and vomiting. Musculoskeletal: Positive for gait problem and joint swelling. Negative for back pain and neck pain. Neurological: Positive for headaches. Negative for dizziness and syncope. All other systems reviewed and are negative.            PAST MEDICALHISTORY     Past Medical History:   Diagnosis Date    Abdominal pain     Anxiety     Dr Sarath Arana Reis's palsy 2003    Colon polyp     Depression     Dr Sarath Arana Diverticulosis     Female bladder prolapse     Hernia, abdominal     History of adenomatous polyp of colon     Hypertriglyceridemia     Neuropathy     Osteopenia     Type II or unspecified type diabetes mellitus without mention of complication, not stated as uncontrolled     Vitamin B 12 deficiency          SURGICAL HISTORY       Past Surgical History:   Procedure Laterality Date    APPENDECTOMY      BLADDER REPAIR  1984    states stiched her bladder up but was not a suspension-Dr Garcia    CATARACT REMOVAL  12/2014    CATARACT REMOVAL  01/2015    CHOLECYSTECTOMY  age 22    CLAVICLE SURGERY Left 6/22/2020    OPEN REDUCTION INTERNAL FIXATION LEFT CLAVICLE performed by Sharon Pacheco MD at [address] [place] [address]  02/15/2012    Dr Hussain Sanchez AP, diverticulosis coli, small internal hemorrhoids, 5 yr recall    COLONOSCOPY  3/1998    negative    COLONOSCOPY  5/1/07     Dr Kristy Lewis, diverticulosis coli/small internal hemorrhoids    COLONOSCOPY  9/16/04    Dr Blood-diverticulosis coli, ileocecal valve lipoma    COLONOSCOPY N/A 1/25/2016    Dr Celeste Conway AP, 5 yr recall   Marcie Butts Bilateral     cataract   Faustino Flaming    Dr Miracle Lewis partner   [facility]    total-Dr. Hunter Herrera [address]. N/A 2/15/2016    OPEN REPAIR OF RECURRENT INCISIONAL HERNIA WITH MESH  performed by Claudene Blander, MD at [address] [facility]  02/16/2012    Dr White-Barretts/gastritis    UPPER GASTROINTESTINAL ENDOSCOPY  2/1998    Dr Kristy Lewis, negative    UPPER GASTROINTESTINAL ENDOSCOPY  1/2/07    Dr Kristy Leiws, gastritis    UPPER GASTROINTESTINAL ENDOSCOPY N/A 1/11/2016    Dr Eddy Other junction w/minimal chronic inflammation         CURRENT MEDICATIONS     Previous Medications    ATENOLOL (TENORMIN) 25 MG TABLET    Take 0.5 tablets by mouth daily    BLOOD GLUCOSE TEST STRIPS (ASCENSIA AUTODISC VI;ONE TOUCH ULTRA TEST VI) STRIP    1 each by In Vitro route daily As needed. BLOOD GLUCOSE TEST STRIPS (ASCENSIA AUTODISC VI;ONE TOUCH ULTRA TEST VI) STRIP    1 each by In Vitro route daily As needed. BLOOD GLUCOSE TEST STRIPS (ASCENSIA AUTODISC VI;ONE TOUCH ULTRA TEST VI) STRIP    1 each by In Vitro route daily As needed. CYANOCOBALAMIN 1000 MCG/ML INJECTION    Inject 1 mL into the muscle once for 1 dose    DIABETIC SHOE MISC    by Does not apply route Diabetic shoes bilateral    DIABETIC SHOE MISC    by Does not apply route Diabetic shoes bilateral    DIABETIC SHOE MISC    by Does not apply route DISPENSE ONE PAIR DIABETIC SHOES AND THREE PAIRS OF HEAT MOLDED INSERTS    DIABETIC SHOE MISC    by Does not apply route DISPENSE ONE PAIR DIABETIC SHOES AND THREE PAIRS OF HEAT MOLDED INSERTS    DOCUSATE SODIUM (COLACE) 100 MG CAPSULE        DONEPEZIL (ARICEPT) 5 MG TABLET    Take 5 mg by mouth daily     FRUCTOSE-DEXTROSE-PHOSPHOR ACD (EMETROL PO)    Take 10 mLs by mouth daily as needed (nausea)     GABAPENTIN (NEURONTIN) 300 MG CAPSULE    Take 300 mg by mouth nightly     LANCETS MISC    1 each by Does not apply route 3 times daily    LORAZEPAM (ATIVAN) 0.5 MG TABLET    Take 0.5 mg by mouth daily as needed for Anxiety. MEMANTINE (NAMENDA) 5 MG TABLET    Take 5 mg by mouth daily    METFORMIN (GLUCOPHAGE) 500 MG TABLET    TAKE 1 TABLET DAILY WITH BREAKFAST    NITROGLYCERIN (NITROSTAT) 0.4 MG SL TABLET    Place 1 tablet under the tongue every 5 minutes as needed for Chest pain up to max of 3 total doses. If no relief after 1 dose, call 911.     ONDANSETRON (ZOFRAN) 4 MG TABLET    Take 1 tablet by mouth daily as needed for Nausea or Vomiting    PRENATAL VIT-FE FUMARATE-FA (PRENATAL VITAMIN) 27-1 MG TABS TABLET    Take 1 tablet by mouth once a week    PSYLLIUM (METAMUCIL PO)    Take by mouth daily    SIMVASTATIN (ZOCOR) 10 MG TABLET    Take 1 tablet by mouth every evening    TETRAHYDROZOLINE HCL (EYE DROPS OP)    Apply 2 drops to eye 2 times daily Indications: systane     UNABLE TO FIND    Take 1 tablet by mouth daily    VENLAFAXINE (EFFEXOR-XR) 37.5 MG XR CAPSULE    Take 37.5 mg by mouth daily. VITAMIN D (ERGOCALCIFEROL) 1.25 MG (67756 UT) CAPS CAPSULE    Take 1 capsule by mouth once a week       ALLERGIES     Phenergan [promethazine]    FAMILY HISTORY       Family History   Problem Relation Age of Onset    Cancer Mother         Pancreatic    Other Mother     Cancer Father         melanoma    Colon Cancer Neg Hx     Colon Polyps Neg Hx     Esophageal Cancer Neg Hx     Liver Cancer Neg Hx     Liver Disease Neg Hx     Rectal Cancer Neg Hx     Stomach Cancer Neg Hx           SOCIAL HISTORY       Social History     Socioeconomic History    Marital status:       Spouse name: None    Number of children: None    Years of education: None    Highest education level: None   Occupational History    None   Social Needs    Financial resource strain: None    Food insecurity     Worry: None     Inability: None    Transportation needs     Medical: None     Non-medical: None   Tobacco Use    Smoking status: Never Smoker    Smokeless tobacco: Never Used   Substance and Sexual Activity    Alcohol use: No    Drug use: No    Sexual activity: Yes     Partners: Male   Lifestyle    Physical activity     Days per week: None     Minutes per session: None    Stress: None   Relationships    Social connections     Talks on phone: None     Gets together: None     Attends Buddhism service: None     Active member of club or organization: None     Attends meetings of clubs or organizations: None     Relationship status: None    Intimate partner violence     Fear of current or ex partner: None     Emotionally abused: None     Physically abused: None     Forced sexual activity: None   Other Topics Concern    None   Social History Narrative    None       SCREENINGS    [place] Coma Scale  Eye Opening: Spontaneous  Best Verbal Response: Oriented  Best Motor Response: Obeys commands  [place] Coma Scale Score: 15        PHYSICAL EXAM    (up to 7 for level 4, 8 or more for level 5)     ED Triage Vitals [12/03/20 1835]   BP Temp Temp Source Pulse Resp SpO2 Height Weight   (!) 189/62 97.7 °F (36.5 °C) Oral 53 20 98 % 5' 3\" (1.6 m) 115 lb (52.2 kg)       Physical Exam  Vitals signs and nursing note reviewed. HENT:      Head:        Comments: Tenderness and swelling noted to the right frontotemporal area. Mouth/Throat:      Mouth: Mucous membranes are moist. Mucous membranes are not dry. Eyes:      General: No scleral icterus. Pupils: Pupils are equal, round, and reactive to light. Neck:      Trachea: No tracheal deviation. Cardiovascular:      Rate and Rhythm: Normal rate and regular rhythm. Heart sounds: Normal heart sounds. No murmur. Pulmonary:      Effort: Pulmonary effort is normal. No respiratory distress. Breath sounds: Normal breath sounds. No stridor. Abdominal:      General: There is no distension. Palpations: Abdomen is soft. Tenderness: There is no abdominal tenderness. There is no guarding. Musculoskeletal:      Right hip: She exhibits decreased range of motion and tenderness. Comments: Right LE is shortened and externally rotated   Skin:     Capillary Refill: Capillary refill takes less than 2 seconds. Coloration: Skin is not pale. Findings: No rash. Neurological:      General: No focal deficit present. Mental Status: She is alert and oriented to person, place, and time. Psychiatric:         Behavior: Behavior is cooperative.          DIAGNOSTIC RESULTS     EKG: All EKG's areinterpreted by the Emergency Department Physician who either signs or Co-signs this chart in the absence of a cardiologist.    1900: NSR @ 52    RADIOLOGY:  Non-plain film images such as CT, Ultrasound and MRI are read by the radiologist. Plain radiographic images are visualized and preliminarily interpreted bythe emergency physician with the below findings:      XR CHEST PORTABLE   Final Result   . No acute disease. Signed by Dr Noemi Oakley on 12/3/2020 8:49 PM      CT HEAD WO CONTRAST   Final Result   1. Mild cerebral and cerebellar volume loss with chronic microvascular   disease but no evidence of acute intracranial process. Signed by Dr Noemi Oakley on 12/3/2020 8:52 PM      XR FEMUR RIGHT 1 VW   Final Result   1.. Displaced comminuted fracture involving the proximal shaft of the   right femur. There is also involvement of the lesser and greater   trochanter. Signed by Dr Noemi Oakley on 12/3/2020 8:47 PM      XR PELVIS (1-2 VIEWS)   Final Result   1.. Displaced fracture involving the proximal shaft of the right   femur. There is also evidence of involvement of the lesser trochanter   as well as the lower aspect of the greater trochanter of the right   hip. The femoral head remains located within the acetabulum. There is   osteopenia the bones.    Signed by Dr Noemi Oakley on 12/3/2020 8:46 PM              LABS:  Labs Reviewed   CBC WITH AUTO DIFFERENTIAL - Abnormal; Notable for the following components:       Result Value    WBC 11.0 (*)     RBC 3.94 (*)     Hemoglobin 11.8 (*)     Hematocrit 36.7 (*)     MCHC 32.2 (*)     Neutrophils % 30.0 (*)     Lymphocytes % 60.0 (*)     Lymphocytes Absolute 6.8 (*)     All other components within normal limits   COMPREHENSIVE METABOLIC PANEL - Abnormal; Notable for the following components:    Sodium 135 (*)     Glucose 147 (*)     Total Protein 6.4 (*)     All other components within normal limits   URINE RT REFLEX TO CULTURE - Abnormal; Notable for the following components:    pH, UA 8.5 (*)     All other components within normal limits   COVID-19   TYPE AND SCREEN       All other labs were within normal range or not returned as of this dictation. EMERGENCY DEPARTMENT COURSE and DIFFERENTIAL DIAGNOSIS/MDM:   Vitals:    Vitals:    12/03/20 1835 12/03/20 1924 12/03/20 2037   BP: (!) 189/62 (!) 169/64 136/74   Pulse: 53 50 50   Resp: 20 14 17   Temp: 97.7 °F (36.5 °C)     TempSrc: Oral     SpO2: 98% 100% 98%   Weight: 115 lb (52.2 kg)     Height: 5' 3\" (1.6 m)         MDM    Reassessment    Patient is received IV pain medications for pain control here in the ED. Preop laboratory studies were obtained. Preop testing for COVID-19 was also performed here in the ED. CONSULTS:    Case was discussed with Dr. Anibal Pfeiffer regarding orthopedic consultation. Case was discussed with Dr. Boo Aguilar which regarding inpatient admission to the hospitalist service. PROCEDURES:  Unless otherwise noted below, none     Procedures    FINAL IMPRESSION      1. Closed displaced subtrochanteric fracture of right femur, initial encounter ([facility] [address]    2. Closed head injury, initial encounter    3.  Fall, initial encounter          DISPOSITION/PLAN   DISPOSITION Admitted 12/03/2020 07:49:58 PM      (Please note that portions of this note were completed with a voice recognition program.  Efforts were made to edit thedictations but occasionally words are mis-transcribed.)    Chantal Leblanc MD (electronically signed)  Attending Emergency Physician          Chantal Leblanc MD  12/03/20 2055

## 2020-12-04 NOTE — ED NOTES
Bed: 01-A  Expected date:   Expected time:   Means of arrival: [facility]  Comments:  EMS     Manuel Mosquera RN  12/03/20 8964

## 2020-12-04 NOTE — CONSULTS
Orthopaedic Inpatient Consultation    NAME:  Tata Costello   : 1935  MRN: [id number]    12/3/2020  6:34 PM    Requesting Physician: Burgess Amira MD    CHIEF COMPLAINT:  right subtrochanteric femur fracture    HISTORY OF PRESENT ILLNESS:   The patient is a 81 y.o. female who experienced a mechanical fall over some luggage she left in her floor falling on her right hip resulting in the acute onset of right hip pain and mild deformity with the inability to bear weight. Pain is located in the left hip and groin and rated a 2/5, constant, dull, worse with movement, better with rest and medication. There are no associated symptoms.      Past Medical History:        Diagnosis Date    Abdominal pain     Anxiety     Dr Kerri Spicer Bell's palsy     Colon polyp     Depression     Dr Kerri Spicer Diverticulosis     Female bladder prolapse     Hernia, abdominal     History of adenomatous polyp of colon     Hypertriglyceridemia     Neuropathy     Osteopenia     Type II or unspecified type diabetes mellitus without mention of complication, not stated as uncontrolled     Vitamin B 12 deficiency        Past Surgical History:        Procedure Laterality Date    APPENDECTOMY      BLADDER REPAIR      states stiched her bladder up but was not a suspension-Dr Garcia    CATARACT REMOVAL  2014    CATARACT REMOVAL  2015    CHOLECYSTECTOMY  age 22    CLAVICLE SURGERY Left 2020    OPEN REDUCTION INTERNAL FIXATION LEFT CLAVICLE performed by Jaz Vera MD at [address] [place] [address], [address] [facility]  02/15/2012    Dr Urmila SALCIDO, diverticulosis coli, small internal hemorrhoids, 5 yr recall    COLONOSCOPY  3/1998    negative    COLONOSCOPY  07     Dr Stefan Acosta, diverticulosis coli/small internal hemorrhoids    COLONOSCOPY  04    Dr Blood-diverticulosis coli, ileocecal valve lipoma    COLONOSCOPY N/A 2016    Dr Vandana Wong AP, 5 yr recall    ELB[facility] FRACTURE SURGERY  [facility]    EYE SURGERY for Chest pain up to max of 3 total doses. If no relief after 1 dose, call 911. 9/8/17  Yes Margie Burch MD   Psyllium (METAMUCIL PO) Take by mouth daily   Yes Historical Provider, MD   Fructose-Dextrose-Phosphor Acd (EMETROL PO) Take 10 mLs by mouth daily as needed (nausea)    Yes Historical Provider, MD   Tetrahydrozoline HCl (EYE DROPS OP) Apply 2 drops to eye 2 times daily Indications: systane    Yes Historical Provider, MD   venlafaxine (EFFEXOR-XR) 37.5 MG XR capsule Take 37.5 mg by mouth daily. Yes Historical Provider, MD   gabapentin (NEURONTIN) 300 MG capsule Take 300 mg by mouth nightly    Yes Historical Provider, MD   Diabetic Shoe MISC by Does not apply route DISPENSE ONE PAIR DIABETIC SHOES AND THREE PAIRS OF HEAT MOLDED INSERTS 10/13/20   Margie Burch MD   ondansetron [facility]) 4 MG tablet Take 1 tablet by mouth daily as needed for Nausea or Vomiting 6/22/20   Jennifer Rice MD   blood glucose test strips (ASCENSIA AUTODISC VI;ONE TOUCH ULTRA TEST VI) strip 1 each by In Vitro route daily As needed. 5/6/20   Margie Burch MD   Diabetic Shoe MISC by Does not apply route DISPENSE ONE PAIR DIABETIC SHOES AND THREE PAIRS OF HEAT MOLDED INSERTS 5/5/20   Margie Burch MD   LORazepam (ATIVAN) 0.5 MG tablet Take 0.5 mg by mouth daily as needed for Anxiety. 3/24/20   Historical Provider, MD   blood glucose test strips (ASCENSIA AUTODISC VI;ONE TOUCH ULTRA TEST VI) strip 1 each by In Vitro route daily As needed.  3/24/20   Margie Burch MD   Prenatal Vit-Fe Fumarate-FA (PRENATAL VITAMIN) 27-1 MG TABS tablet Take 1 tablet by mouth once a week 3/24/20   Margie Burch MD   Diabetic Shoe MISC by Does not apply route Diabetic shoes bilateral 3/27/19   Margie Burch MD   Diabetic Shoe MISC by Does not apply route Diabetic shoes bilateral 5/2/18   Margie Burch MD   UNABLE TO FIND Take 1 tablet by mouth daily 10/13/16   Alain Rubinstein, APRN - CNP       Allergies:  Phenergan [promethazine]    Social History: Social History     Socioeconomic History    Marital status:      Spouse name: Not on file    Number of children: Not on file    Years of education: Not on file    Highest education level: Not on file   Occupational History    Not on file   Social Needs    Financial resource strain: Not on file    Food insecurity     Worry: Not on file     Inability: Not on file    Transportation needs     Medical: Not on file     Non-medical: Not on file   Tobacco Use    Smoking status: Never Smoker    Smokeless tobacco: Never Used   Substance and Sexual Activity    Alcohol use: No    Drug use: No    Sexual activity: Yes     Partners: Male   Lifestyle    Physical activity     Days per week: Not on file     Minutes per session: Not on file    Stress: Not on file   Relationships    Social connections     Talks on phone: Not on file     Gets together: Not on file     Attends Nondenominational service: Not on file     Active member of club or organization: Not on file     Attends meetings of clubs or organizations: Not on file     Relationship status: Not on file    Intimate partner violence     Fear of current or ex partner: Not on file     Emotionally abused: Not on file     Physically abused: Not on file     Forced sexual activity: Not on file   Other Topics Concern    Not on file   Social History Narrative    Not on file       Family History:   Family History   Problem Relation Age of Onset    Cancer Mother         Pancreatic    Other Mother     Cancer Father         melanoma    Colon Cancer Neg Hx     Colon Polyps Neg Hx     Esophageal Cancer Neg Hx     Liver Cancer Neg Hx     Liver Disease Neg Hx     Rectal Cancer Neg Hx     Stomach Cancer Neg Hx        REVIEW OF SYSTEMS:  14 point review of systems has been reviewed from the patient's emergency room visit, reviewed with the patient on today's date with no new changes.     PHYSICAL EXAM:      Physical Examination:  Vitals:   Vitals:    12/04/20 0201 12/04/20 0747 12/04/20 1038 12/04/20 1426   BP:  (!) 108/54 (!) 106/56 (!) 113/49   Pulse:  62 60 71   Resp: 18 18 18 16   Temp:  98.1 °F (36.7 °C) 97.9 °F (36.6 °C) 97.9 °F (36.6 °C)   TempSrc:  Temporal Temporal Temporal   SpO2: 94% 98% 96% 96%   Weight:       Height:         General:  Appears stated age, no distress. Orientation:  Alert and oriented to time, place, and person. Mood and Affect:  Cooperative and pleasant. Gait:  Resting comfortably in bed. Cardiovascular:  Symmetric 1-2 plus pulses in upper and lower extremities. Lymph:  No cervical or inguinal lymphadenopathy noted. Sensation:  Grossly intact to light touch. DTR:  Normal, no pathologic reflexes. Coordination/balance:  Normal    Musculoskeletal:  Right upper extremity exam:  There is no tenderness to palpation about the shoulder, elbow, wrist or hand. Unrestricted full function motion is present. Stability is normal with provocative tests, 5/5 strength, and skin is normal.      Left upper extremity exam:  There is no tenderness to palpation about the shoulder, elbow, wrist or hand. Unrestricted full function motion is present. Stability is normal with provocative tests, 5/5 strength, and skin is normal.  A well-healed surgical incision over the left clavicle consistent with a previous ORIF. Right lower extremity exam:  There is no tenderness to palpation about the knee, ankle or foot, but there is obvious pain about the right hip. The right lower extremity is flexed and shortened with external rotation. There is pain with any attempted active or passive range of motion through the right hip. The overlying skin is intact and the surrounding muscle compartments are soft and compressible. .     Left lower extremity exam:  There is no tenderness to palpation about the hip, knee, ankle or foot. Unrestricted full function motion is present.   Stability is normal with provocative tests, 5/5 strength, and skin is normal.      DATA:    CBC of the right hip. The femoral head remains located within the acetabulum. There is osteopenia the bones. Signed by Dr Neville Christian on 12/3/2020 8:46 PM    Ct Head Wo Contrast    Result Date: 12/3/2020  CT BRAIN without contrast 12/3/2020 6:23 PM HISTORY: Fall. Right-sided head hematoma. COMPARISON: None DLP: 690 mGy cm Automated exposure control was utilized to diminish patient radiation dose. TECHNIQUE: Serial axial tomographic images of the brain were obtained without the use of intravenous contrast. FINDINGS: The midline structures are nondisplaced. There is mild cerebral and cerebellar volume loss, with an associated increase in the prominence of the ventricles and sulci. The basilar cisterns are normal in size and configuration. There is no evidence of intracranial hemorrhage or mass-effect. There is low attenuation in the periventricular white matter, consistent with chronic ischemic change. There are no abnormal extra-axial fluid collections. There is no evidence of tonsillar herniation. The included orbits and their contents are unremarkable. The visualized paranasal sinuses, mastoid air cells and middle ear cavities are clear. There is a scalp hematoma overlying the right parietal vertex. There is no associated calvarial injury. .     1. Mild cerebral and cerebellar volume loss with chronic microvascular disease but no evidence of acute intracranial process. Signed by Dr Neville Christian on 12/3/2020 8:52 PM    Xr Chest Portable    Result Date: 12/3/2020  EXAMINATION: Chest one view 12/3/2020 HISTORY: Preop FINDINGS: Today's exam is compared to previous study of 6/21/2020. Lungs are fully expanded and clear. There is no effusion or free air present. There is a angulated fracture involving the mid shaft of the left clavicle. This has been fixated with a malleable plate. . No acute disease.  Signed by Dr Neville Christian on 12/3/2020 8:49 PM    Xr Femur Right 1 Vw    Result Date: 12/3/2020  EXAMINATION: Right femur 2 views 12/3/2020 HISTORY: Fall FINDINGS: 2 view exam of the right femur including a crosstable lateral view demonstrates a fracture involving the proximal shaft of the femur. There is also involvement of the lesser trochanter of the hip as well as the lower margin of the greater trochanter. The distal fracture fragment is anteriorly displaced. There is comminution. 1.. Displaced comminuted fracture involving the proximal shaft of the right femur. There is also involvement of the lesser and greater trochanter.  Signed by Dr Lovette Schwab on 12/3/2020 8:47 PM    --------------------------------------------------------------------------------------------------------------------    Assessment: Acute traumatic displaced subtrochanteric fracture of the right femur, initial encounter for closed fracture    Plan:  1) to OR for trochanteric entry femoral nailing of the right femur - we discussed risks, benefits, and alternatives and patient wishes to proceed  2) Admit postop for pain control, PT/OT  3) toe-touch weightbearing of right lower extremity x6 weeks     Electronically signed by Lor Escamilla MD on 12/4/2020 at 3:38 PM

## 2020-12-04 NOTE — ACP (ADVANCE CARE PLANNING)
Advance Care Planning     Advance Care Planning Activator (Inpatient)  Conversation Note      Date of ACP Conversation: 2020    Conversation Conducted with: Patient with Decision Making Capacity  Patient stated that she has the [place]; the main -In-Fact - her  - is , and the successor -in-Fact is her son. Please see [address]     Note:  Patient said she is waiting for surgery thus, unable to discuss Care Preferences    ACP Activator: [id number] Decision Maker:     Current Designated Health Care Decision Maker:   Primary Decision Maker: Shira Gilmore - Child - [phone]        Care Preferences      Length of ACP Conversation in minutes:  15 minutes    Conversation Outcomes:  [x] ACP discussion completed  [x] Existing advance directive reviewed with patient; no changes to patient's previously recorded wishes    Follow-up plan:    [x] Advised patient/agent/surrogate to review completed ACP document and update if needed with changes in condition, patient preferences or care setting      Electronically signed by Carlton Horta MA [facility] on 2020 at 12:11 PM

## 2020-12-04 NOTE — PROGRESS NOTES
Gina Salamanca arrived to room # 56  Presented with: right femur fracture  Mental Status: Patient is oriented. Vitals:    12/03/20 2308   BP: (!) 82/52   Pulse: 60   Resp: 16   Temp: 96.7 °F (35.9 °C)   SpO2: 100%     Patient safety contract and falls prevention contract reviewed with patient No.  Oriented Patient to room. Call light within reach. Yes.   Needs, issues or concerns expressed at this time: no.      Electronically signed by Nguyễn Mckinley on 12/3/2020 at 11:27 PM

## 2020-12-04 NOTE — OP NOTE
Patient Name: Catrachita Carranza  MRN: [id number]  : 1935    [facility]    DATE of SURGERY: 2020    SURGEON: Jose Ledezma MD    ASSISTANT: NONE     PREOPERATIVE DIAGNOSIS: Acute traumatic displaced peritrochanteric fracture of the Right femur, initial encounter for closed fracture    POSTOPERATIVE DIAGNOSIS: Acute traumatic displaced peritrochanteric fracture of the Right femur, initial encounter for closed fracture    PROCEDURE PERFORMED: Cephalomedullary Nailing Right closed displaced peritrochanteric hip fracture      IMPLANTS: Synthes TFN size 11 x 400 mm    ANESTHESIA USED: General endotrachial anesthesia    OPERATIVE INDICATIONS: 81 y.o. female status post fall with the above named diagnosis. Surgical indications include fracture displacement, stabilization of fracture, and mobilization of the patient. Risks include, but are not limited to, anesthesia, bleeding, infection, pain, damage to local structures, need for further surgery, malunion, nonunion, fracture displacement, failure of hardware, intraoperative death. Risks, benefits, and alternative were discussed and the patient wishes to proceed with surgery. ESTIMATED BLOOD LOSS: 100 mL    DRAINS: none     COMPLICATIONS: none    SPECIMENS: none    FINDINGS: see op note    PROCEDURE in DETAIL:  The patient was seen in the preoperative holding room, the informed consent was reviewed and signed, and the correct operative extremity marked with the patients agreement. The patient was transported to the operating room, where a timeout was performed identifying the correct patient and operative site. Perioperative antibiotics were administered prior to incision. Once anesthetized, both feet were placed into well-padded boots and the patient was positioned on the fracture table. Traction and internal rotation were applied to the operative extremity and the fracture was noted to adequately align. A sterile prep and drape was then performed.     A guidepin was placed at the tip of the greater trochanter on the AP plane and in line with the intramedullary canal on the lateral view. The wire was advanced to the level of the lesser trochanter followed by introduction of the starting reamer. The starting reamer was then removed and a flexible ball-tipped guidewire was advanced into the proximal femur and down the diaphysis to the level of the superior pole of the patella under C arm fluoroscopy guidance. A radiolucent measuring guide was placed over the guidewire up to the level of the tip of the greater trochanter. As result, it was determined that a 400 mm length femoral nail would be appropriate. Sequential reaming by 0.5 mm was performed from 8.5 mm up to 12.5 mm accommodating and 11 mm diameter nail. The nail of choice was then placed into the intramedullary canal.  Utilizing the attachment jig, a guidepin was then placed into the central aspect of the femoral head on both the AP and lateral views. Length was measured for the spiral blade and the path of the screw was drilled to the appropriate depth. The spiral blade was placed without complication and the set screw was placed proximally, loosened a 1/4 turn to allow for compression of the fracture. Utilizing C arm fluoroscopy perfect circles technique distally, two distal interlocking screws were placed into the distal locking hole of the nail as well as the static portion of the dynamic distal locking hole of the nail each gaining excellent purchase. C-arm images were used in multiple planes showing adequate alignment of the fracture without hardware complication. Incisions were irrigated, closed in layers, and the skin was sealed with a Dermabond adhesive skin dressing. A sterile dressing was applied, the patient awakened, transported the recovery room in stable condition.     POSTOPERATIVE PLAN:  1) TTWB RLE x 6 weeks  2) DVT prophylaxis x 6 weeks  3) PT/OT     Electronically signed by Sheila Ashley MD on 12/4/2020 at 3:45 PM

## 2020-12-04 NOTE — PROGRESS NOTES
[facility] Hospitalists    Patient:  Lucy Wilson  YOB: 1935  Date of Service: 12/4/2020  MRN: [id number]   Acct: [de-identified]   Primary Care Physician: Sabas Riojas MD  Advance Directive: Full Code  Admit Date: 12/3/2020       Hospital Day: 1  Portions of this note have been copied forward, however, changed to reflect the most current clinical status of this patient. CHIEF COMPLAINT Hip pain    SUBJECTIVE:  Patient states pain is controlled this AM. Denies new complaints. Denies chest pain, N/V, abdominal pain, dyspnea. Cumulative Hospital Course: The patient is an 81 y.o. female with PMH Alzheimer's disease, HTN, DM II who presented to [address] [place] [address] ED complaining of a fall that occurred when she tripped, fell forward hitting the right side of her head and right hip. She has a hematoma on her right frontotemporal area. CT head unremarkable. Work up concerning for a sisplaced comminuted fracture involving the proximal shaft of the right femur. She has been unable to bear weight on her right lower extremity since the fall. Denies history of heart disease, stroke or COPD. Patient admitted to Hospitalist service, made NPO after midnight with consult placed to Orthopedic surgery. Review of Systems:   14 point review of systems is negative except as specifically addressed above. Objective:   VITALS:  BP (!) 106/56   Pulse 60   Temp 97.9 °F (36.6 °C) (Temporal)   Resp 18   Ht 5' 3\" (1.6 m)   Wt 115 lb (52.2 kg)   SpO2 96%   BMI 20.37 kg/m²   24HR INTAKE/OUTPUT:      Intake/Output Summary (Last 24 hours) at 12/4/2020 1234  Last data filed at 12/4/2020 1125  Gross per 24 hour   Intake 120 ml   Output 550 ml   Net -430 ml     General appearance: alert, appears stated age, cooperative and no distress  Head: Normocephalic, without obvious abnormality, atraumatic  Eyes: conjunctivae/corneas clear. PERRL, EOM's intact.    Ears: normal external ears and nose, throat without exudate  Neck: no adenopathy, no carotid bruit, no JVD, supple, symmetrical, trachea midline   Lungs: clear to auscultation bilaterally,no rales or wheezes   Heart: regular rate and rhythm, S1, S2 normal, no murmur  Abdomen: soft, non-tender; non-distended, normal bowel sounds no masses, no organomegaly  Extremities: No lower extremity edema,  No erythema, RLE with limited ROM, TTP and is externally rotated and shortened   Skin: Pale, warm and dry  Lymphatic: No palpable lymph node enlargment  Neurologic: Alert and oriented X 3, normal strength and tone.  No focal deficits  Psychiatric: Alert and oriented, thought content appropriate, normal insight, mood appropriate    Medications:      sodium chloride 75 mL/hr at 12/03/20 2313    dextrose        sodium chloride flush  10 mL Intravenous 2 times per day    ceFAZolin (ANCEF) IVPB  2 g Intravenous On Call to OR    sodium chloride flush  10 mL Intravenous 2 times per day    donepezil  5 mg Oral Daily    gabapentin  300 mg Oral Nightly    memantine  5 mg Oral Daily    atorvastatin  10 mg Oral Daily    venlafaxine  37.5 mg Oral Daily    insulin lispro  0-6 Units Subcutaneous TID     insulin lispro  0-3 Units Subcutaneous Nightly     sodium chloride flush, sodium chloride flush, acetaminophen **OR** acetaminophen, polyethylene glycol, potassium chloride **OR** potassium alternative oral replacement **OR** potassium chloride, magnesium sulfate, oxyCODONE-acetaminophen **OR** [DISCONTINUED] oxyCODONE-acetaminophen, morphine **OR** [DISCONTINUED] morphine, nitroGLYCERIN, glucose, dextrose, glucagon (rDNA), dextrose  Diet NPO Time Specified     Lab and other Data:     Recent Labs     12/03/20 1850 12/04/20  0725   WBC 11.0* 8.4   HGB 11.8* 9.9*    280     Recent Labs     12/03/20 1850 12/04/20  0725   * 139   K 4.2 4.4    104   CO2 24 25   BUN 17 14   CREATININE 0.5 0.5   GLUCOSE 147* 134*     Recent Labs     12/03/20 1850   AST 17   ALT 15   BILITOT <0.2   ALKPHOS 72 UA:  Recent Labs     12/03/20 1950   COLORU YELLOW   PHUR 8.5*   CLARITYU Clear   SPECGRAV 1.012   LEUKOCYTESUR Negative   UROBILINOGEN 0.2   BILIRUBINUR Negative   BLOODU Negative   GLUCOSEU Negative     RAD:   Xr Pelvis (1-2 Views)  1. Displaced fracture involving the proximal shaft of the right femur. There is also evidence of involvement of the lesser trochanter as well as the lower aspect of the greater trochanter of the right hip. The femoral head remains located within the acetabulum. There is osteopenia the bones. Signed by Dr Naman Brody on 12/3/2020 8:46 PM    Ct Head Wo Contrast  1. Mild cerebral and cerebellar volume loss with chronic microvascular disease but no evidence of acute intracranial process. Signed by Dr Naman Brody on 12/3/2020 8:52 PM    Xr Chest Portable  No acute disease. Signed by Dr Naman Brody on 12/3/2020 8:49 PM    Xr Femur Right 1 Vw  1. Displaced comminuted fracture involving the proximal shaft of the right femur. There is also involvement of the lesser and greater trochanter. Signed by Dr Namna Brody on 12/3/2020 8:47 PM    Assessment/Plan   Principal Problem:    Femur fracture, right (HCC)-Ortho consulted, plans for operative intervention this evening, Keep NPOt, hold antiplatelets/anticoagulants. CXR unremarkable.  Pain better controlled today     Active Problems:    Bradycardia-resolved, continue to hold BB, monitor on telemetry        Diabetes mellitus, type II (HCC)-SSI, accuchecks, hypoglycemia tx order, well controlled       Vitamin B 12 deficiency-on replacement as OP       Essential hypertension-monitor, stable       Late onset Alzheimer's disease without behavioral disturbance (HCC)-noted, resume home medications    Antibiotic: Ancef on call to OR    DVT Prophylaxis: SCD    Enoc Hernandez PA-C

## 2020-12-04 NOTE — H&P
[address] [facility]  Hospitalist - History & Physical      PCP: Ying Boykin MD    Date of Admission: 12/3/2020    Date of Service: 12/3/2020    Chief Complaint:  Hip pain    History Of Present Illness: The patient is a 81 y.o. female with PMH Alzheimer's disease, HTN, DM II who presented to [address] [place] [address] ED complaining of a fall that occurred when she tripped, fell forward hitting the right side of her head and right hip. She has a hematoma on her right frontotemporal area. CT head unremarkable. Work up concerning for a sisplaced comminuted fracture involving the proximal shaft of the right femur. She has been unable to bear weight on her right lower extremity since the fall. Denies history of heart disease, stroke or COPD.      Past Medical History:        Diagnosis Date    Abdominal pain     Anxiety     Dr Padmini Lemon Bell's palsy 2003    Colon polyp     Depression     Dr Padmini Lemon Diverticulosis     Female bladder prolapse     Hernia, abdominal     History of adenomatous polyp of colon     Hypertriglyceridemia     Neuropathy     Osteopenia     Type II or unspecified type diabetes mellitus without mention of complication, not stated as uncontrolled     Vitamin B 12 deficiency      Past Surgical History:        Procedure Laterality Date    APPENDECTOMY      BLADDER REPAIR  1984    states stiched her bladder up but was not a suspension-Dr Garcia    CATARACT REMOVAL  12/2014    CATARACT REMOVAL  01/2015    CHOLECYSTECTOMY  age 22    CLAVICLE SURGERY Left 6/22/2020    OPEN REDUCTION INTERNAL FIXATION LEFT CLAVICLE performed by Thaddeus Troy MD at [address] [place] [address]  02/15/2012    Dr Efren SALCIDO, diverticulosis coli, small internal hemorrhoids, 5 yr recall    COLONOSCOPY  3/1998    negative    COLONOSCOPY  5/1/07     Dr Rosalea Runner, diverticulosis coli/small internal hemorrhoids    COLONOSCOPY  9/16/04    Dr Blood-diverticulosis coli, ileocecal valve lipoma    COLONOSCOPY N/A 1/25/2016     Manpreet-tubular AP, 5 yr recall    ELBOW FRACTURE SURGERY  1970s    EYE SURGERY Bilateral     cataract   Patrisha Seen    Dr Pankaj Deras partner   [facility]    total-Dr. Mathew Miranda NASAL POLYP SURGERY      UMBILICAL HERNIA REPAIR N/A 2/15/2016    OPEN REPAIR OF RECURRENT INCISIONAL HERNIA WITH MESH  performed by Kath Vasquez MD at [facility] ENDOSCOPY  02/16/2012    Dr Fischer/gastritis    UPPER GASTROINTESTINAL ENDOSCOPY  2/1998    Dr Stu Deras, negative    UPPER GASTROINTESTINAL ENDOSCOPY  1/2/07    Dr Stu Deras, gastritis    UPPER GASTROINTESTINAL ENDOSCOPY N/A 1/11/2016     Tinnnicole Silvius junction w/minimal chronic inflammation     Home Medications:  Prior to Admission medications    Medication Sig Start Date End Date Taking? Authorizing Provider   vitamin D (ERGOCALCIFEROL) 1.25 MG (60598 UT) CAPS capsule Take 1 capsule by mouth once a week 9/29/20  Yes Phylis Klinefelter, MD   metFORMIN (GLUCOPHAGE) 500 MG tablet TAKE 1 TABLET DAILY WITH BREAKFAST 9/27/20  Yes Phylis Klinefelter, MD   cyanocobalamin 1000 MCG/ML injection Inject 1 mL into the muscle once for 1 dose 8/3/20 12/3/20 Yes Phylis Klinefelter, MD   simvastatin (ZOCOR) 10 MG tablet Take 1 tablet by mouth every evening 6/8/20  Yes Phylis Klinefelter, MD   blood glucose test strips (ASCENSIA AUTODISC VI;ONE TOUCH ULTRA TEST VI) strip 1 each by In Vitro route daily As needed.  5/5/20  Yes Phylis Klinefelter, MD   Lancets MISC 1 each by Does not apply route 3 times daily 5/5/20  Yes Phylis Klinefelter, MD   memantine [facility]) 5 MG tablet Take 5 mg by mouth daily 1/23/18  Yes Historical Provider, MD   docusate sodium (COLACE) 100 MG capsule  3/6/19  Yes Historical Provider, MD   donepezil (ARICEPT) 5 MG tablet Take 5 mg by mouth daily    Yes Historical Provider, MD   atenolol (TENORMIN) 25 MG tablet Take 0.5 tablets by mouth daily 9/8/17  Yes Phylis Klinefelter, MD   nitroGLYCERIN (NITROSTAT) 0.4 MG SL tablet Place 1 tablet under the tongue every 5 minutes as needed for Chest pain up to max of 3 total doses. If no relief after 1 dose, call 911. 9/8/17  Yes Florida Mccurdy MD   Psyllium (METAMUCIL PO) Take by mouth daily   Yes Historical Provider, MD   Fructose-Dextrose-Phosphor Acd (EMETROL PO) Take 10 mLs by mouth daily as needed (nausea)    Yes Historical Provider, MD   Tetrahydrozoline HCl (EYE DROPS OP) Apply 2 drops to eye 2 times daily Indications: systane    Yes Historical Provider, MD   venlafaxine (EFFEXOR-XR) 37.5 MG XR capsule Take 37.5 mg by mouth daily. Yes Historical Provider, MD   gabapentin (NEURONTIN) 300 MG capsule Take 300 mg by mouth nightly    Yes Historical Provider, MD   Diabetic Shoe MISC by Does not apply route DISPENSE ONE PAIR DIABETIC SHOES AND THREE PAIRS OF HEAT MOLDED INSERTS 10/13/20   Florida Mccurdy MD   ondansetron [facility]) 4 MG tablet Take 1 tablet by mouth daily as needed for Nausea or Vomiting 6/22/20   Emma Mata MD   blood glucose test strips (ASCENSIA AUTODISC VI;ONE TOUCH ULTRA TEST VI) strip 1 each by In Vitro route daily As needed. 5/6/20   Florida Mccurdy MD   Diabetic Shoe MISC by Does not apply route DISPENSE ONE PAIR DIABETIC SHOES AND THREE PAIRS OF HEAT MOLDED INSERTS 5/5/20   Florida Mccurdy MD   LORazepam (ATIVAN) 0.5 MG tablet Take 0.5 mg by mouth daily as needed for Anxiety. 3/24/20   Historical Provider, MD   blood glucose test strips (ASCENSIA AUTODISC VI;ONE TOUCH ULTRA TEST VI) strip 1 each by In Vitro route daily As needed.  3/24/20   Florida Mccurdy MD   Prenatal Vit-Fe Fumarate-FA (PRENATAL VITAMIN) 27-1 MG TABS tablet Take 1 tablet by mouth once a week 3/24/20   Florida Mccurdy MD   Diabetic Shoe MISC by Does not apply route Diabetic shoes bilateral 3/27/19   Florida Mccurdy MD   Diabetic Shoe MISC by Does not apply route Diabetic shoes bilateral 5/2/18   Florida Mccurdy MD   UNABLE TO FIND Take 1 tablet by mouth daily 10/13/16   Isai Mendez APRN - CNP     Allergies:    Phenergan [promethazine]    Social History:    The patient currently lives   Tobacco:   reports that she has never smoked. She has never used smokeless tobacco.  Alcohol:   reports no history of alcohol use. Illicit Drugs: Never    Family History:      Problem Relation Age of Onset   [employer] Cancer Mother         Pancreatic    Other Mother     Cancer Father         melanoma    Colon Cancer Neg Hx     Colon Polyps Neg Hx     Esophageal Cancer Neg Hx     Liver Cancer Neg Hx     Liver Disease Neg Hx     Rectal Cancer Neg Hx     Stomach Cancer Neg Hx      Review of Systems:   Constitutional / general:  Denies fever / chills / sweats  Head:  Denies headache / neck stiffness / trauma / visual change  Eyes:  Denies blurry vision / acute visual change or loss / itching / redness  ENT: Denies sore throat / hoarseness / nasal drainage / ear pain  CV:  Denies chest pain / palpitations/ orthopnea   Respiratory:  Denies cough / shortness of breath / sputum / hemoptysis  GI: Denies nausea / vomiting / abdominal pain / diarrhea / constipation  :  Denies dysuria / hesitancy / urgency / hematuria   Neuro: Denies paralysis / syncope / seizure / dysphagia / headache / paresthesias  Musculoskeletal:  Denies muscle weakness /joint stiffness / + pain  Vascular: Denies edema / claudication / varicosities  Heme / endocrine: Denies easy bruising / bleeding / excessive sweating / heat or cold intolerance  Psychiatric:  Denies depression / anxiety / insomnia / mood changes  Skin:  Denies new rashes / lesions / skin hair or nail changes    14 point review of systems is negative except as specifically addressed above.     Physical Examination:  BP (!) 136/54   Pulse (!) 48   Temp 97.7 °F (36.5 °C) (Oral)   Resp 15   Ht 5' 3\" (1.6 m)   Wt 115 lb (52.2 kg)   SpO2 95%   BMI 20.37 kg/m²   General appearance: 82 yo female, frail, no acute distress  Head: Tenderness, hematoma right frontotemporal area  Eyes: conjunctivae/corneas clear. PERRL, EOM's intact. Ears: normal external ears and nose, throat without exudate  Neck: no adenopathy, no carotid bruit, no JVD, supple, symmetrical, trachea midline   Lungs: clear to auscultation bilaterally,no rales or wheezes   Heart: regular rate and rhythm, S1, S2 normal, no murmur  Abdomen:soft, non-tender; non-distended, normal bowel sounds no masses, no organomegaly  Extremities:No lower extremity edema,  No erythema, RLE with limited ROM, TTP and is externally rotated and shortened  Skin: Skin color, texture, turgor normal. No rashes or lesions  Lymphatic: No palpable lymph node enlargment  Neurologic: Alert and oriented X 2, states it is the year 2011, generalized weakness and normal tone. Answers questions, follows commands, speech fluent  Psychiatric: Anxious    Diagnostic Data:  CBC:  Recent Labs     12/03/20 1850   WBC 11.0*   HGB 11.8*   HCT 36.7*        BMP:  Recent Labs     12/03/20  1850   *   K 4.2      CO2 24   BUN 17   CREATININE 0.5   CALCIUM 9.7     Recent Labs     12/03/20  1850   AST 17   ALT 15   BILITOT <0.2   ALKPHOS 72     Urinalysis:  Lab Results   Component Value Date    NITRU Negative 12/03/2020    BLOODU Negative 12/03/2020    SPECGRAV 1.012 12/03/2020    GLUCOSEU Negative 12/03/2020     Xr Pelvis (1-2 Views)  1. . Displaced fracture involving the proximal shaft of the right femur. There is also evidence of involvement of the lesser trochanter as well as the lower aspect of the greater trochanter of the right hip. The femoral head remains located within the acetabulum. There is osteopenia the bones. Signed by Dr Dori Du on 12/3/2020 8:46 PM    Ct Head Wo Contrast  1. Mild cerebral and cerebellar volume loss with chronic microvascular disease but no evidence of acute intracranial process. Signed by Dr Dori Du on 12/3/2020 8:52 PM    Xr Chest Portable No acute disease.  Signed by Dr Dori Du on 12/3/2020 8:49 PM    Xr Femur Right 1 Vw  1.. Displaced comminuted fracture involving the proximal shaft of the right femur. There is also involvement of the lesser and greater trochanter. Signed by Dr Noemi Oakley on 12/3/2020 8:47 PM    Assessment/Plan:  Principal Problem:    Femur fracture, right (HCC)-consult Orthopedic surgery, NPO after midnight, hold antiplatelets/anticoagulants. CXR unremarkable.  PT/INR and EKG in AM    Active Problems:    Bradycardia-hold BB, EKG in AM, monitor on telemetry       Diabetes mellitus, type II (HCC)-SSI, accuchecks, hypoglycemia tx order      Vitamin B 12 deficiency-on replacement as OP      Essential hypertension-monitor      Late onset Alzheimer's disease without behavioral disturbance (HCC)-noted, resume home medications    Further orders per clinical course/attending     Signed:  Neil Whatley PA-C         Attestation Statement     I have independently seen and examined this patient and agree with the asesment and plan by mid level provider                                                           Interval History: fall, right hip pain    Objective:   Vitals: BP (!) 106/57   Pulse 59   Temp 96.6 °F (35.9 °C) (Temporal)   Resp 16   Ht 5' 3\" (1.6 m)   Wt 115 lb (52.2 kg)   SpO2 95%   BMI 20.37 kg/m²   General appearance: alert, appears stated age and cooperative  Lungs: clear to auscultation bilaterally  Heart: regular rate and rhythm, S1, S2 normal, no murmur, click, rub or gallop  Abdomen: soft, non-tender; bowel sounds normal; no masses,  no organomegaly  Extremities: deformity of right LE  Neurologic: Mental status: Alert, oriented, thought content appropriate      Assessment & Plan:    Displaced comminuted fracture involving the proximal shaft of the   right femur- pain control, ivf, npo, ortho consult    Mona Linton MD

## 2020-12-05 ENCOUNTER — APPOINTMENT (OUTPATIENT)
Dept: CT IMAGING | Age: 85
DRG: 481 | End: 2020-12-05
Payer: MEDICARE

## 2020-12-05 LAB
ANION GAP SERPL CALCULATED.3IONS-SCNC: 10 MMOL/L (ref 7–19)
ANION GAP SERPL CALCULATED.3IONS-SCNC: 13 MMOL/L (ref 7–19)
BASOPHILS ABSOLUTE: 0 K/UL (ref 0–0.2)
BASOPHILS RELATIVE PERCENT: 0.2 % (ref 0–1)
BUN BLDV-MCNC: 10 MG/DL (ref 8–23)
BUN BLDV-MCNC: 10 MG/DL (ref 8–23)
CALCIUM SERPL-MCNC: 8.8 MG/DL (ref 8.8–10.2)
CALCIUM SERPL-MCNC: 8.8 MG/DL (ref 8.8–10.2)
CHLORIDE BLD-SCNC: 103 MMOL/L (ref 98–111)
CHLORIDE BLD-SCNC: 104 MMOL/L (ref 98–111)
CO2: 21 MMOL/L (ref 22–29)
CO2: 25 MMOL/L (ref 22–29)
CREAT SERPL-MCNC: 0.5 MG/DL (ref 0.5–0.9)
CREAT SERPL-MCNC: 0.6 MG/DL (ref 0.5–0.9)
D DIMER: >20 UG/ML FEU (ref 0–0.48)
EKG P AXIS: 24 DEGREES
EKG P-R INTERVAL: 114 MS
EKG Q-T INTERVAL: 470 MS
EKG QRS DURATION: 78 MS
EKG QTC CALCULATION (BAZETT): 453 MS
EKG T AXIS: 12 DEGREES
EOSINOPHILS ABSOLUTE: 0 K/UL (ref 0–0.6)
EOSINOPHILS RELATIVE PERCENT: 0.1 % (ref 0–5)
GFR AFRICAN AMERICAN: >59
GFR AFRICAN AMERICAN: >59
GFR NON-AFRICAN AMERICAN: >60
GFR NON-AFRICAN AMERICAN: >60
GLUCOSE BLD-MCNC: 133 MG/DL (ref 70–99)
GLUCOSE BLD-MCNC: 159 MG/DL (ref 74–109)
GLUCOSE BLD-MCNC: 189 MG/DL (ref 70–99)
GLUCOSE BLD-MCNC: 195 MG/DL (ref 70–99)
GLUCOSE BLD-MCNC: 196 MG/DL (ref 74–109)
HCT VFR BLD CALC: 26.1 % (ref 37–47)
HCT VFR BLD CALC: 26.8 % (ref 37–47)
HEMOGLOBIN: 8.4 G/DL (ref 12–16)
HEMOGLOBIN: 8.5 G/DL (ref 12–16)
IMMATURE GRANULOCYTES #: 0.1 K/UL
LYMPHOCYTES ABSOLUTE: 3.2 K/UL (ref 1.1–4.5)
LYMPHOCYTES RELATIVE PERCENT: 23.1 % (ref 20–40)
MAGNESIUM: 1.7 MG/DL (ref 1.6–2.4)
MCH RBC QN AUTO: 29.9 PG (ref 27–31)
MCH RBC QN AUTO: 30.5 PG (ref 27–31)
MCHC RBC AUTO-ENTMCNC: 31.7 G/DL (ref 33–37)
MCHC RBC AUTO-ENTMCNC: 32.2 G/DL (ref 33–37)
MCV RBC AUTO: 94.4 FL (ref 81–99)
MCV RBC AUTO: 94.9 FL (ref 81–99)
MONOCYTES ABSOLUTE: 0.8 K/UL (ref 0–0.9)
MONOCYTES RELATIVE PERCENT: 5.8 % (ref 0–10)
NEUTROPHILS ABSOLUTE: 9.6 K/UL (ref 1.5–7.5)
NEUTROPHILS RELATIVE PERCENT: 70.1 % (ref 50–65)
PDW BLD-RTO: 13.6 % (ref 11.5–14.5)
PDW BLD-RTO: 13.6 % (ref 11.5–14.5)
PERFORMED ON: ABNORMAL
PLATELET # BLD: 249 K/UL (ref 130–400)
PLATELET # BLD: 262 K/UL (ref 130–400)
PMV BLD AUTO: 10.1 FL (ref 9.4–12.3)
PMV BLD AUTO: 10.7 FL (ref 9.4–12.3)
POTASSIUM REFLEX MAGNESIUM: 4.7 MMOL/L (ref 3.5–5)
POTASSIUM SERPL-SCNC: 4.1 MMOL/L (ref 3.5–5)
POTASSIUM SERPL-SCNC: 4.7 MMOL/L (ref 3.5–5)
RBC # BLD: 2.75 M/UL (ref 4.2–5.4)
RBC # BLD: 2.84 M/UL (ref 4.2–5.4)
SODIUM BLD-SCNC: 138 MMOL/L (ref 136–145)
SODIUM BLD-SCNC: 138 MMOL/L (ref 136–145)
TROPONIN: <0.01 NG/ML (ref 0–0.03)
WBC # BLD: 11.3 K/UL (ref 4.8–10.8)
WBC # BLD: 13.7 K/UL (ref 4.8–10.8)

## 2020-12-05 PROCEDURE — 83735 ASSAY OF MAGNESIUM: CPT

## 2020-12-05 PROCEDURE — 82947 ASSAY GLUCOSE BLOOD QUANT: CPT

## 2020-12-05 PROCEDURE — 85027 COMPLETE CBC AUTOMATED: CPT

## 2020-12-05 PROCEDURE — 85379 FIBRIN DEGRADATION QUANT: CPT

## 2020-12-05 PROCEDURE — 93010 ELECTROCARDIOGRAM REPORT: CPT | Performed by: INTERNAL MEDICINE

## 2020-12-05 PROCEDURE — 84484 ASSAY OF TROPONIN QUANT: CPT

## 2020-12-05 PROCEDURE — 71275 CT ANGIOGRAPHY CHEST: CPT

## 2020-12-05 PROCEDURE — 80048 BASIC METABOLIC PNL TOTAL CA: CPT

## 2020-12-05 PROCEDURE — 2580000003 HC RX 258: Performed by: ORTHOPAEDIC SURGERY

## 2020-12-05 PROCEDURE — 6370000000 HC RX 637 (ALT 250 FOR IP): Performed by: INTERNAL MEDICINE

## 2020-12-05 PROCEDURE — 6360000002 HC RX W HCPCS: Performed by: ORTHOPAEDIC SURGERY

## 2020-12-05 PROCEDURE — 2700000000 HC OXYGEN THERAPY PER DAY

## 2020-12-05 PROCEDURE — 36415 COLL VENOUS BLD VENIPUNCTURE: CPT

## 2020-12-05 PROCEDURE — 97162 PT EVAL MOD COMPLEX 30 MIN: CPT

## 2020-12-05 PROCEDURE — 6370000000 HC RX 637 (ALT 250 FOR IP): Performed by: ORTHOPAEDIC SURGERY

## 2020-12-05 PROCEDURE — 97110 THERAPEUTIC EXERCISES: CPT

## 2020-12-05 PROCEDURE — 6360000002 HC RX W HCPCS

## 2020-12-05 PROCEDURE — 85025 COMPLETE CBC W/AUTO DIFF WBC: CPT

## 2020-12-05 PROCEDURE — 1210000000 HC MED SURG R&B

## 2020-12-05 PROCEDURE — 6360000004 HC RX CONTRAST MEDICATION: Performed by: HOSPITALIST

## 2020-12-05 PROCEDURE — 97530 THERAPEUTIC ACTIVITIES: CPT

## 2020-12-05 RX ORDER — OXYCODONE HYDROCHLORIDE 5 MG/1
10 TABLET ORAL EVERY 4 HOURS PRN
Status: DISCONTINUED | OUTPATIENT
Start: 2020-12-05 | End: 2020-12-07 | Stop reason: HOSPADM

## 2020-12-05 RX ORDER — ONDANSETRON 4 MG/1
4 TABLET, ORALLY DISINTEGRATING ORAL EVERY 8 HOURS PRN
Status: DISCONTINUED | OUTPATIENT
Start: 2020-12-05 | End: 2020-12-07 | Stop reason: HOSPADM

## 2020-12-05 RX ORDER — OXYCODONE HYDROCHLORIDE 5 MG/1
5 TABLET ORAL EVERY 4 HOURS PRN
Status: DISCONTINUED | OUTPATIENT
Start: 2020-12-05 | End: 2020-12-07 | Stop reason: HOSPADM

## 2020-12-05 RX ORDER — SODIUM CHLORIDE 0.9 % (FLUSH) 0.9 %
10 SYRINGE (ML) INJECTION PRN
Status: DISCONTINUED | OUTPATIENT
Start: 2020-12-05 | End: 2020-12-07 | Stop reason: HOSPADM

## 2020-12-05 RX ORDER — ACETAMINOPHEN 325 MG/1
650 TABLET ORAL EVERY 4 HOURS PRN
Status: DISCONTINUED | OUTPATIENT
Start: 2020-12-05 | End: 2020-12-07 | Stop reason: HOSPADM

## 2020-12-05 RX ORDER — POLYETHYLENE GLYCOL 3350 17 G/17G
17 POWDER, FOR SOLUTION ORAL DAILY PRN
Status: DISCONTINUED | OUTPATIENT
Start: 2020-12-05 | End: 2020-12-07 | Stop reason: HOSPADM

## 2020-12-05 RX ORDER — MORPHINE SULFATE 4 MG/ML
4 INJECTION, SOLUTION INTRAMUSCULAR; INTRAVENOUS
Status: DISCONTINUED | OUTPATIENT
Start: 2020-12-05 | End: 2020-12-07 | Stop reason: HOSPADM

## 2020-12-05 RX ORDER — SENNA AND DOCUSATE SODIUM 50; 8.6 MG/1; MG/1
1 TABLET, FILM COATED ORAL 2 TIMES DAILY
Status: DISCONTINUED | OUTPATIENT
Start: 2020-12-05 | End: 2020-12-07 | Stop reason: HOSPADM

## 2020-12-05 RX ORDER — SODIUM CHLORIDE 0.9 % (FLUSH) 0.9 %
10 SYRINGE (ML) INJECTION EVERY 12 HOURS SCHEDULED
Status: DISCONTINUED | OUTPATIENT
Start: 2020-12-05 | End: 2020-12-07 | Stop reason: HOSPADM

## 2020-12-05 RX ORDER — ASPIRIN 81 MG/1
81 TABLET ORAL DAILY
Status: DISCONTINUED | OUTPATIENT
Start: 2020-12-05 | End: 2020-12-07 | Stop reason: HOSPADM

## 2020-12-05 RX ORDER — ATENOLOL 25 MG/1
12.5 TABLET ORAL DAILY
Status: DISCONTINUED | OUTPATIENT
Start: 2020-12-05 | End: 2020-12-07 | Stop reason: HOSPADM

## 2020-12-05 RX ORDER — MEPERIDINE HYDROCHLORIDE 50 MG/ML
INJECTION INTRAMUSCULAR; INTRAVENOUS; SUBCUTANEOUS
Status: COMPLETED
Start: 2020-12-05 | End: 2020-12-05

## 2020-12-05 RX ORDER — MORPHINE SULFATE 4 MG/ML
2 INJECTION, SOLUTION INTRAMUSCULAR; INTRAVENOUS
Status: DISCONTINUED | OUTPATIENT
Start: 2020-12-05 | End: 2020-12-07 | Stop reason: HOSPADM

## 2020-12-05 RX ORDER — MEPERIDINE HYDROCHLORIDE 25 MG/ML
12.5 INJECTION INTRAMUSCULAR; INTRAVENOUS; SUBCUTANEOUS ONCE
Status: CANCELLED | OUTPATIENT
Start: 2020-12-05

## 2020-12-05 RX ORDER — ONDANSETRON 2 MG/ML
4 INJECTION INTRAMUSCULAR; INTRAVENOUS EVERY 6 HOURS PRN
Status: DISCONTINUED | OUTPATIENT
Start: 2020-12-05 | End: 2020-12-07 | Stop reason: HOSPADM

## 2020-12-05 RX ADMIN — SODIUM CHLORIDE: 9 INJECTION, SOLUTION INTRAVENOUS at 06:09

## 2020-12-05 RX ADMIN — Medication 2 G: at 06:09

## 2020-12-05 RX ADMIN — DOCUSATE SODIUM AND SENNOSIDES 1 TABLET: 8.6; 5 TABLET, FILM COATED ORAL at 02:24

## 2020-12-05 RX ADMIN — ATORVASTATIN CALCIUM 10 MG: 10 TABLET, FILM COATED ORAL at 23:40

## 2020-12-05 RX ADMIN — APIXABAN 2.5 MG: 2.5 TABLET, FILM COATED ORAL at 08:11

## 2020-12-05 RX ADMIN — ASPIRIN 81 MG: 81 TABLET, COATED ORAL at 13:38

## 2020-12-05 RX ADMIN — INSULIN LISPRO 1 UNITS: 100 INJECTION, SOLUTION INTRAVENOUS; SUBCUTANEOUS at 23:46

## 2020-12-05 RX ADMIN — Medication 2 G: at 13:38

## 2020-12-05 RX ADMIN — MEMANTINE HYDROCHLORIDE 5 MG: 5 TABLET ORAL at 08:12

## 2020-12-05 RX ADMIN — ATENOLOL 12.5 MG: 25 TABLET ORAL at 09:14

## 2020-12-05 RX ADMIN — INSULIN LISPRO 1 UNITS: 100 INJECTION, SOLUTION INTRAVENOUS; SUBCUTANEOUS at 08:23

## 2020-12-05 RX ADMIN — SODIUM CHLORIDE, PRESERVATIVE FREE 10 ML: 5 INJECTION INTRAVENOUS at 23:58

## 2020-12-05 RX ADMIN — Medication 10 ML: at 23:42

## 2020-12-05 RX ADMIN — APIXABAN 2.5 MG: 2.5 TABLET, FILM COATED ORAL at 23:39

## 2020-12-05 RX ADMIN — ACETAMINOPHEN 650 MG: 325 TABLET ORAL at 23:40

## 2020-12-05 RX ADMIN — IOPAMIDOL 90 ML: 755 INJECTION, SOLUTION INTRAVENOUS at 03:32

## 2020-12-05 RX ADMIN — DONEPEZIL HYDROCHLORIDE 5 MG: 5 TABLET, FILM COATED ORAL at 08:11

## 2020-12-05 RX ADMIN — MORPHINE SULFATE 2 MG: 4 INJECTION, SOLUTION INTRAMUSCULAR; INTRAVENOUS at 00:36

## 2020-12-05 RX ADMIN — DOCUSATE SODIUM AND SENNOSIDES 1 TABLET: 8.6; 5 TABLET, FILM COATED ORAL at 23:41

## 2020-12-05 RX ADMIN — DOCUSATE SODIUM AND SENNOSIDES 1 TABLET: 8.6; 5 TABLET, FILM COATED ORAL at 08:10

## 2020-12-05 RX ADMIN — GABAPENTIN 300 MG: 300 CAPSULE ORAL at 23:39

## 2020-12-05 RX ADMIN — VENLAFAXINE HYDROCHLORIDE 37.5 MG: 37.5 CAPSULE, EXTENDED RELEASE ORAL at 08:12

## 2020-12-05 RX ADMIN — MEPERIDINE HYDROCHLORIDE 12.5 MG: 50 INJECTION, SOLUTION INTRAMUSCULAR; INTRAVENOUS; SUBCUTANEOUS at 00:04

## 2020-12-05 RX ADMIN — OXYCODONE HYDROCHLORIDE 5 MG: 5 TABLET ORAL at 23:40

## 2020-12-05 RX ADMIN — OXYCODONE HYDROCHLORIDE 5 MG: 5 TABLET ORAL at 13:37

## 2020-12-05 ASSESSMENT — PAIN SCALES - GENERAL
PAINLEVEL_OUTOF10: 3
PAINLEVEL_OUTOF10: 7
PAINLEVEL_OUTOF10: 8
PAINLEVEL_OUTOF10: 4
PAINLEVEL_OUTOF10: 4

## 2020-12-05 ASSESSMENT — PAIN DESCRIPTION - ORIENTATION: ORIENTATION: RIGHT;UPPER;OUTER

## 2020-12-05 ASSESSMENT — PAIN - FUNCTIONAL ASSESSMENT: PAIN_FUNCTIONAL_ASSESSMENT: PREVENTS OR INTERFERES SOME ACTIVE ACTIVITIES AND ADLS

## 2020-12-05 ASSESSMENT — PAIN DESCRIPTION - LOCATION: LOCATION: LEG

## 2020-12-05 ASSESSMENT — PAIN DESCRIPTION - DESCRIPTORS: DESCRIPTORS: SORE;ACHING

## 2020-12-05 NOTE — PROGRESS NOTES
Physical Therapy    Facility/Department: [facility] SURG SERVICES  Initial Assessment    NAME: Annie Vasques  : 1935  MRN: [id number]    Date of Service: 2020    Discharge Recommendations:  Continue to assess pending progress, Patient would benefit from continued therapy after discharge, 24 hour supervision or assist        Assessment   Body structures, Functions, Activity limitations: Decreased functional mobility ; Decreased ROM; Decreased strength;Decreased cognition;Decreased endurance;Decreased sensation;Decreased posture; Increased pain;Decreased balance;Decreased coordination  Assessment: Pt. will benefit from cont. PT to decrease impairments. Pt. a fall risk due to recent fall, now TTWB RLE and should not attempt mobility on her own at this time. Pt. should be transfered with ugo steady with v. cues to perform TTWB. Pt. most likely will need continued therapy prior to d/c home due to the inability to even come to standing at this time with RW and she lives alone. Treatment Diagnosis: impaired gait and mobility  Prognosis: Fair  Decision Making: Medium Complexity  PT Education: Goals;PT Role;Plan of Care;Gait Training;General Safety;Transfer Training;Functional Mobility Training;Weight-bearing Education  Patient Education: use of call light for A.  Barriers to Learning: decreased ability to follow commands, coordinate with therapy on transfers  REQUIRES PT FOLLOW UP: Yes  Activity Tolerance  Activity Tolerance: Patient limited by fatigue;Patient limited by pain       Patient Diagnosis(es): The primary encounter diagnosis was Closed displaced subtrochanteric fracture of right femur, initial encounter ([facility] [address] Diagnoses of Closed head injury, initial encounter and Fall, initial encounter were also pertinent to this visit.      has a past medical history of Abdominal pain, Anxiety, Bell's palsy, Colon polyp, Depression, Diverticulosis, Female bladder prolapse, Hernia, abdominal, History of adenomatous polyp of colon, Hypertriglyceridemia, Neuropathy, Osteopenia, Type II or unspecified type diabetes mellitus without mention of complication, not stated as uncontrolled, and Vitamin B 12 deficiency. has a past surgical history that includes Hysterectomy (1984); Cholecystectomy (age 22); bladder repair (1984); Colonoscopy (02/15/2012); Upper gastrointestinal endoscopy (02/16/2012); Nasal polyp surgery; Appendectomy; Elbow fracture surgery (1970s); Upper gastrointestinal endoscopy (2/1998); Colonoscopy (3/1998); Colonoscopy (5/1/07 ); Upper gastrointestinal endoscopy (1/2/07); Colonoscopy (9/16/04); Upper gastrointestinal endoscopy (N/A, 1/11/2016); Colonoscopy (N/A, 1/25/2016); hernia repair (1991); hernia repair (1984 ); Cataract removal (12/2014); Cataract removal (01/2015); eye surgery (Bilateral); Umbilical hernia repair (N/A, 2/15/2016); and Clavicle surgery (Left, 6/22/2020). Restrictions  Restrictions/Precautions  Restrictions/Precautions: Fall Risk, Weight Bearing  Required Braces or Orthoses?: No  Lower Extremity Weight Bearing Restrictions  Right Lower Extremity Weight Bearing: Toe Touch Weight Bearing  Position Activity Restriction  Other position/activity restrictions: L clavicle fx with ORIF 6/22/20  Vision/Hearing  Hearing: Within functional limits     Subjective  General  Chart Reviewed: Yes  Patient assessed for rehabilitation services?: Yes  Response To Previous Treatment: Not applicable  Family / Caregiver Present: No  Referring Practitioner: Dr. Jr Ellis  Referral Date : 12/04/20  Diagnosis: Right subtrochanteric femur fracture, Closed head injury, fall  Follows Commands: Impaired  Other (Comment): pt. able to follow commands with repetition and increased time  General Comment  Comments: 12/4 FEMUR IM NAIL TARIQ INSERTION, 12/5/20 rapid response for chest pain  Subjective  Subjective: Pt. states she needs a trapeze for her bed at home so she can move around.   Pain Screening  Patient Currently in Pain: Yes  Pain Assessment  Pain Assessment: 0-10  Pain Level: 4  Pain Location: Leg  Pain Orientation: Right;Upper; Outer  Pain Descriptors: Sore;Aching  Functional Pain Assessment: Prevents or interferes some active activities and ADLs  Non-Pharmaceutical Pain Intervention(s): Ambulation/Increased Activity  Response to Pain Intervention: Patient Satisfied  Vital Signs  Patient Currently in Pain: Yes  Oxygen Therapy  SpO2: 90 %(O2 by nc removed for therapy and sat was 89-90%, when O2 replaced, sat returned to 94%)  O2 Device: Nasal cannula  O2 Flow Rate (L/min): 3 L/min  Pre Treatment Pain Screening  Intervention List: Patient able to continue with treatment    Orientation  Orientation  Overall Orientation Status: Within Functional Limits  Social/Functional History  Social/Functional History  Lives With: Alone  Type of Home: Apartment  Home Layout: One level  Bathroom Toilet: Standard  Receives Help From: Neighbor(landlord lives in same building as pt (reports she can call him or knock on wall))  ADL Assistance: Independent  Ambulation Assistance: Independent  Transfer Assistance: Independent  Additional Comments: reports she travels a lot and had just returned from a trip to [facility] visiting family. A friend shops for her. Cognition   Cognition  Overall Cognitive Status: Exceptions  Following Commands: Follows one step commands with increased time; Follows one step commands with repetition  Attention Span: Attends with cues to redirect  Safety Judgement: Good awareness of safety precautions  Problem Solving: Assistance required to generate solutions;Assistance required to implement solutions  Insights: Fully aware of deficits  Initiation: Requires cues for some  Sequencing: Requires cues for some    Objective     Observation/Palpation  Posture: Fair  Observation: IV, O2    AROM RLE (degrees)  RLE AROM: Exceptions  RLE General AROM: very stiff in RLE: ankle to neutral DF, hip to 90 in sitting, knee to near 90 in sitting  AROM LLE (degrees)  LLE AROM : WFL  AROM RUE (degrees)  RUE AROM : WFL  AROM LUE (degrees)  LUE AROM : WFL  Strength RLE  Strength RLE: Exception  Comment: grossly 2-/5  Strength LLE  Strength LLE: WFL  Comment: grossly 4/5  Strength RUE  Strength RUE: WNL  Strength LUE  Strength LUE: Exception  Comment: decreased ability to push up from sitting due to recent clavicle fx with ORIF     Sensation  Overall Sensation Status: Impaired(neuropathy)  Bed mobility  Supine to Sit: Moderate assistance  Sit to Supine: Unable to assess  Comment: pt sat on EOB x 15 mins CGA  Transfers  Sit to Stand: Maximum Assistance  Stand to sit: Maximum Assistance  Comment: pt. unable to stand using RW with 2 attempts made. Pt. put forth very little effort and said, \"That's not going to work. \" Difficult for pt to understand directional cues and hand placement. Perez Done steady brought to room and pt transfered to chair using ugo steady. Ambulation  Ambulation?: No  WB Status: TTWB RLE     Balance  Posture: Fair  Sitting - Static: Fair;+  Sitting - Dynamic: Fair;-  Standing - Static: Poor;+  Standing - Dynamic: Poor;-  Exercises  Comments: AAROM RLE and AROM LLE: AP, LAQ, hip flexion x 10 at [address]  Times per week: 3-7  Plan weeks: 2  Current Treatment Recommendations: Strengthening, ROM, Balance Training, Functional Mobility Training, Transfer Training, Gait Training, Endurance Training, Safety Education & Training, Positioning, Equipment Evaluation, Education, & procurement, Patient/Caregiver Education & Training  Plan Comment: cont. PT per POC.   Safety Devices  Type of devices: (reclined with ice pack on RLE)    G-Code       OutComes Score                                                  AM-PAC Score             Goals  Short term goals  Time Frame for Short term goals: 2 wks  Short term goal 1: supine to sit indep  Short term goal 2: sit to stand indep  Short term goal 3: amb. 20' with RW SBA, TTWB RLE  Patient Goals

## 2020-12-05 NOTE — CARE COORDINATION
SW and nurse met with pt at bedside. Pt will not consider going to a SNF with COVID. Pt was interested in [facility], however pt lives alone and that may not be a safe option. SW informed pt that 8th floor rehab is a possibility, pt was interested.

## 2020-12-05 NOTE — ANESTHESIA POSTPROCEDURE EVALUATION
Department of Anesthesiology  Postprocedure Note    Patient: Nate Burns  MRN: [id number]  YOB: 1935  Date of evaluation: 12/4/2020  Time:  11:35 PM     Procedure Summary     Date:  12/04/20 Room / Location:  [facility] OR 09 / [facility]    Anesthesia Start:  2156 Anesthesia Stop:  2332    Procedure:  FEMUR IM NAIL TARIQ INSERTION (Right Hip) Diagnosis:  (Right subtrochanteric femur fracture)    Surgeon:  Christopher Britton MD Responsible Provider:  Eve Salinas MD    Anesthesia Type:  general ASA Status:  3          Anesthesia Type: general    Ronak Phase I: Ronak Score: 9    Ronak Phase II:      Last vitals: Reviewed and per EMR flowsheets.        Anesthesia Post Evaluation    Patient location during evaluation: PACU  Patient participation: complete - patient cannot participate  Level of consciousness: awake and alert  Pain score: 0  Airway patency: fixed obstruction  Nausea & Vomiting: no nausea and no vomiting  Complications: no  Cardiovascular status: blood pressure returned to baseline and hemodynamically stable  Respiratory status: acceptable  Hydration status: euvolemic

## 2020-12-05 NOTE — PROGRESS NOTES
[facility] Hospitalists    Patient:  Ortiz Ayala  YOB: 1935  Date of Service: 12/5/2020  MRN: [id number]   Acct: [de-identified]   Primary Care Physician: Yas Montelongo MD  Advance Directive: Full Code  Admit Date: 12/3/2020       Hospital Day: 2  Portions of this note have been copied forward, however, changed to reflect the most current clinical status of this patient. CHIEF COMPLAINT: Hip pain    SUBJECTIVE:  Patient has no new complaints. RR chest pain called last night. Chest pain now resolved. Patient denies dyspnea. States she has felt anxious. Cumulative Hospital Course: The patient is an 81 y.o. female with PMH Alzheimer's disease, HTN, DM II who presented to [facility] ED complaining of a fall that occurred when she tripped, fell forward hitting the right side of her head and right hip. She has a hematoma on her right frontotemporal area. CT head unremarkable. Work up concerning for a sisplaced comminuted fracture involving the proximal shaft of the right femur. She has been unable to bear weight on her right lower extremity since the fall. Denies history of heart disease, stroke or COPD. Patient admitted to Hospitalist service, made NPO after midnight with consult placed to Orthopedic surgery. Patient underwent cephalomedullary nailing of right closed displaced peritrochanteric hip fracture on 12/04/2020 by Dr. Mare Sharma. Post operatively a rapid response chest pain was called. EKG and cardiac enzymes were unremarkable. CTA negative for pulmonary emboli. Echocardiogram ordered. Review of Systems:   14 point review of systems is negative except as specifically addressed above.     Objective:   VITALS:  BP (!) 107/44   Pulse 76   Temp 99.8 °F (37.7 °C) (Temporal)   Resp 14   Ht 5' 3\" (1.6 m)   Wt 115 lb (52.2 kg)   SpO2 100%   BMI 20.37 kg/m²   24HR INTAKE/OUTPUT:      Intake/Output Summary (Last 24 hours) at 12/5/2020 1403  Last data filed at 12/5/2020 1034  Gross per 24 hour   Intake 1414.37 ml   Output 1400 ml   Net 14.37 ml     General appearance: alert, appears stated age, cooperative and no distress, resting comfortably in bed   Head: Normocephalic, without obvious abnormality, atraumatic  Eyes: conjunctivae/corneas clear. PERRL, EOM's intact. Ears: normal external ears and nose, throat without exudate  Neck: no adenopathy, no carotid bruit, no JVD, supple, symmetrical, trachea midline   Lungs: clear throughout, no wheezes, rales or rhonchi   Heart: RRR, S1, S2 normal, no murmur  Abdomen: soft, non-tender; non-distended, normal bowel sounds no masses, no organomegaly  Extremities: No lower extremity edema,  No erythema, RLE with limited ROM, and appropriate tenderness to palpation, incision C/D/I   Skin: Pale, warm and dry  Lymphatic: No palpable lymph node enlargment  Neurologic: Alert and oriented X 3, generalized weakness and normal tone.  No focal deficits  Psychiatric: Alert and oriented, thought content appropriate, normal insight, mood appropriate    Medications:      sodium chloride 75 mL/hr at 12/05/20 0609    dextrose        sodium chloride flush  10 mL Intravenous 2 times per day    sennosides-docusate sodium  1 tablet Oral BID    ceFAZolin (ANCEF) IVPB  2 g Intravenous Q8H    apixaban  2.5 mg Oral BID    atenolol  12.5 mg Oral Daily    aspirin  81 mg Oral Daily    sodium chloride flush  10 mL Intravenous 2 times per day    donepezil  5 mg Oral Daily    gabapentin  300 mg Oral Nightly    memantine  5 mg Oral Daily    atorvastatin  10 mg Oral Daily    venlafaxine  37.5 mg Oral Daily    insulin lispro  0-6 Units Subcutaneous TID     insulin lispro  0-3 Units Subcutaneous Nightly     sodium chloride flush, magnesium hydroxide, acetaminophen, oxyCODONE **OR** oxyCODONE, morphine **OR** morphine, polyethylene glycol, ondansetron **OR** ondansetron, sodium chloride flush, acetaminophen **OR** acetaminophen, polyethylene glycol, potassium chloride **OR** potassium alternative oral replacement **OR** potassium chloride, magnesium sulfate, oxyCODONE-acetaminophen **OR** [DISCONTINUED] oxyCODONE-acetaminophen, morphine **OR** [DISCONTINUED] morphine, nitroGLYCERIN, glucose, dextrose, glucagon (rDNA), dextrose  Dietary Nutrition Supplements: Standard High Calorie Oral Supplement  DIET CARB CONTROL;     Lab and other Data:     Recent Labs     12/04/20  0725 12/05/20  0035 12/05/20  0457   WBC 8.4 13.7* 11.3*   HGB 9.9* 8.5* 8.4*    262 249     Recent Labs     12/04/20  0725 12/05/20  0035 12/05/20  0457    138 138   K 4.4 4.1 4.7  4.7    104 103   CO2 25 21* 25   BUN 14 10 10   CREATININE 0.5 0.6 0.5   GLUCOSE 134* 196* 159*     Recent Labs     12/03/20  1850   AST 17   ALT 15   BILITOT <0.2   ALKPHOS 72     UA:  Recent Labs     12/03/20  1950   COLORU YELLOW   PHUR 8.5*   CLARITYU Clear   SPECGRAV 1.012   LEUKOCYTESUR Negative   UROBILINOGEN 0.2   BILIRUBINUR Negative   BLOODU Negative   GLUCOSEU Negative     RAD:   Xr Pelvis (1-2 Views)  1. Displaced fracture involving the proximal shaft of the right femur. There is also evidence of involvement of the lesser trochanter as well as the lower aspect of the greater trochanter of the right hip. The femoral head remains located within the acetabulum. There is osteopenia the bones. Signed by Dr Yas Zarate on 12/3/2020 8:46 PM    Ct Head Wo Contrast  1. Mild cerebral and cerebellar volume loss with chronic microvascular disease but no evidence of acute intracranial process. Signed by Dr Yas Zarate on 12/3/2020 8:52 PM    Xr Chest Portable  No acute disease. Signed by Dr Yas Zarate on 12/3/2020 8:49 PM    Xr Femur Right 1 Vw  1. Displaced comminuted fracture involving the proximal shaft of the right femur. There is also involvement of the lesser and greater trochanter.  Signed by Dr Yas Zarate on 12/3/2020 8:47 PM    Assessment/Plan   Principal Problem:    Femur fracture, right (HCC)-s/p CMN 12/04/2020, Eliquis started for DVT prophylaxis. Pain well controlled. Patient feels she will likely need placement/rehab.  SW consulted     Active Problems:    Bradycardia-resolved, continue to hold BB        Diabetes mellitus, type II (HCC)-SSI, accuchecks, hypoglycemia tx order, well controlled       Vitamin B 12 deficiency-on replacement as OP       Essential hypertension-stable       Late onset Alzheimer's disease without behavioral disturbance (HCC)-noted, resume home medications    DVT Prophylaxis: SCD, Eliquis Lorraine Closs, PA-C

## 2020-12-05 NOTE — BRIEF OP NOTE
Brief Postoperative Note      Patient: Isaac Corea  YOB: 1935  MRN: [id number]    Date of Procedure: 12/4/2020    Pre-Op Diagnosis: Right subtrochanteric femur fracture    Post-Op Diagnosis: Same       Procedure(s): FEMUR IM NAIL TARIQ INSERTION    Surgeon(s):  Alyse Aaron MD    Assistant:  * No surgical staff found *    Anesthesia: General    Estimated Blood Loss (mL): less than 873     Complications: None    Specimens:   * No specimens in log *    Implants:  Implant Name Type Inv.  Item Serial No.  Lot No. LRB No. Used Action   NAIL IM L400MM [id number] 130DEG LNG R PROX FEM GRN TI PATTI  NAIL IM L400MM [id number] 130DEG LNG R PROX FEM GRN TI PATTI  DEPU[employer]WD [id number] Right 1 Implanted   BLADE IM L85MM DIA10.35MM PROX FEM G TI PATTI FEN MEL FOR  BLADE IM L85MM DIA10.35MM PROX FEM G TI PATTI FEN MEL FOR  [employer]PU[employer]WD  Right 1 Implanted   SCREW BNE L38MM DIA5MM TIB LT GRN TI ST PATTI VICENTE FULL THRD  SCREW BNE L38MM DIA5MM TIB LT GRN TI ST PATTI VICENTE FULL THRD  DEPU[employer]WD  Right 1 Implanted   SCREW BNE L42MM DIA5MM TIB LT GRN TI ST PATTI VICENTE FULL THRD  SCREW BNE L42MM DIA5MM TIB LT GRN TI ST PATTI VICENTE FULL THRD  DEPU[employer] [place]-  Right 1 Implanted         Drains:   Urethral Catheter Non-latex (Active)   Catheter Indications Perioperative use in selected surgeries including but not limited to urologic, pelvic or need for intraoperative monitoring of urinary output due to prolonged surgery, large volume infusion or need for diuretic therapy in surgery 12/04/20 0800   Urine Color Yellow 12/04/20 1125   Urine Appearance Clear 12/04/20 1125   Output (mL) 350 mL 12/04/20 1912       Findings: Displaced subtrochanteric right femur fracture    Electronically signed by Alyse Aaron MD on 12/4/2020 at 11:27 PM

## 2020-12-05 NOTE — ANESTHESIA PRE PROCEDURE
Department of Anesthesiology  Preprocedure Note       Name:  Nate Burns   Age:  81 y.o.  :  1935                                          MRN:  [id number]         Date:  2020      Surgeon: Padma Ortega):  Christopher Britton MD    Procedure: Procedure(s):  OPEN REDUCTION INTERNAL FIXATION LEFT CLAVICLE    Medications prior to admission:   Prior to Admission medications    Medication Sig Start Date End Date Taking? Authorizing Provider   Diabetic Shoe MISC by Does not apply route DISPENSE ONE PAIR DIABETIC SHOES AND THREE PAIRS OF HEAT MOLDED INSERTS 10/13/20   Tracy Gaston MD   vitamin D (ERGOCALCIFEROL) 1.25 MG (91892 UT) CAPS capsule Take 1 capsule by mouth once a week 20   Tracy Gaston MD   metFORMIN (GLUCOPHAGE) 500 MG tablet TAKE 1 TABLET DAILY WITH BREAKFAST 20   Tracy Gaston MD   cyanocobalamin 1000 MCG/ML injection Inject 1 mL into the muscle once for 1 dose 8/3/20 12/3/20  Tracy Gaston MD   ondansetron [facility]) 4 MG tablet Take 1 tablet by mouth daily as needed for Nausea or Vomiting 20   Nicholas Marks MD   simvastatin (ZOCOR) 10 MG tablet Take 1 tablet by mouth every evening 20   Tracy Gaston MD   blood glucose test strips (ASCENSIA AUTODISC VI;ONE TOUCH ULTRA TEST VI) strip 1 each by In Vitro route daily As needed. 20   Tracy Gaston MD   blood glucose test strips (ASCENSIA AUTODISC VI;ONE TOUCH ULTRA TEST VI) strip 1 each by In Vitro route daily As needed. 20   Tracy Gaston MD   Lancets MISC 1 each by Does not apply route 3 times daily 20   Tracy Gaston MD   Diabetic Shoe MISC by Does not apply route DISPENSE ONE PAIR DIABETIC SHOES AND THREE PAIRS OF HEAT MOLDED INSERTS 20   Tracy Gaston MD   LORazepam (ATIVAN) 0.5 MG tablet Take 0.5 mg by mouth daily as needed for Anxiety. 3/24/20   Historical Provider, MD   blood glucose test strips (ASCENSIA AUTODISC VI;ONE TOUCH ULTRA TEST VI) strip 1 each by In Vitro route daily As needed.  3/24/20   Amparo Riley Lelia Leo MD   Prenatal Vit-Fe Fumarate-FA (PRENATAL VITAMIN) 27-1 MG TABS tablet Take 1 tablet by mouth once a week 3/24/20   Mariana Wang MD   memantine [facility]) 5 MG tablet Take 5 mg by mouth daily 1/23/18   Wesley Provider, MD   docusate sodium (COLACE) 100 MG capsule  3/6/19   Historical Provider, MD   Diabetic Shoe MISC by Does not apply route Diabetic shoes bilateral 3/27/19   Mariana Wang MD   donepezil (ARICEPT) 5 MG tablet Take 5 mg by mouth daily     Historical Provider, MD   Diabetic Shoe MISC by Does not apply route Diabetic shoes bilateral 5/2/18   Mariana Wang MD   atenolol (TENORMIN) 25 MG tablet Take 0.5 tablets by mouth daily 9/8/17   Mariana Wang MD   nitroGLYCERIN (NITROSTAT) 0.4 MG SL tablet Place 1 tablet under the tongue every 5 minutes as needed for Chest pain up to max of 3 total doses. If no relief after 1 dose, call 911. 9/8/17   Mariana Wang MD   UNABLE TO FIND Take 1 tablet by mouth daily 10/13/16   Guido Foots, APRN - CNP   Psyllium (METAMUCIL PO) Take by mouth daily    Historical Provider, MD   Fructose-Dextrose-Phosphor Acd (EMETROL PO) Take 10 mLs by mouth daily as needed (nausea)     Historical Provider, MD   Tetrahydrozoline HCl (EYE DROPS OP) Apply 2 drops to eye 2 times daily Indications: systane     Historical Provider, MD   venlafaxine (EFFEXOR-XR) 37.5 MG XR capsule Take 37.5 mg by mouth daily. Historical Provider, MD   gabapentin (NEURONTIN) 300 MG capsule Take 300 mg by mouth nightly     Historical Provider, MD       Current medications:    No current facility-administered medications for this visit. No current outpatient medications on file.      Facility-Administered Medications Ordered in Other Visits   Medication Dose Route Frequency Provider Last Rate Last Dose    ceFAZolin (ANCEF) 2 g in 0.9% sodium chloride 50 mL IVPB  2 g Intravenous On Call to Baldo Henry MD        sodium chloride flush 0.9 % injection 10 mL  10 mL Intravenous 2 times CHOLECYSTECTOMY  age 22    CLAVICLE SURGERY Left 6/22/2020    OPEN REDUCTION INTERNAL FIXATION LEFT CLAVICLE performed by Holden Hill MD at [address] [place] [address]  02/15/2012    Dr Raghu Betts AP, diverticulosis coli, small internal hemorrhoids, 5 yr recall    COLONOSCOPY  3/1998    negative    COLONOSCOPY  5/1/07     Dr Raheem Boateng, diverticulosis coli/small internal hemorrhoids    COLONOSCOPY  9/16/04    Dr Blood-diverticulosis coli, ileocecal valve lipoma    COLONOSCOPY N/A 1/25/2016    Dr Riki SALCIDO, 5 yr recall   Stephan Rajput Bilateral     cataract   [facility]    Dr Flavio Boateng partner   [facility]    total-Dr. Hunter Herrera URanulfo 97. N/A 2/15/2016    OPEN REPAIR OF RECURRENT INCISIONAL HERNIA WITH MESH  performed by Hernan Kerr MD at [address] [facility]  02/16/2012    Dr White-Barretts/gastritis    UPPER GASTROINTESTINAL ENDOSCOPY  2/1998    Dr Raheem Boateng, negative    UPPER GASTROINTESTINAL ENDOSCOPY  1/2/07    Dr Raheem Boateng, gastritis    UPPER GASTROINTESTINAL ENDOSCOPY N/A 1/11/2016    Dr Maia Connell junction w/minimal chronic inflammation       Social History:    Social History     Tobacco Use    Smoking status: Never Smoker    Smokeless tobacco: Never Used   Substance Use Topics    Alcohol use: No                                Counseling given: Not Answered      Vital Signs (Current): There were no vitals filed for this visit.                                            BP Readings from Last 3 Encounters:   12/04/20 (!) 105/43   10/13/20 132/70   10/13/20 132/70       NPO Status:                                                                                 BMI:   Wt Readings from Last 3 Encounters:   12/03/20 115 lb (52.2 kg)   10/13/20 103 lb (46.7 kg)   10/13/20 103 lb (46.7 kg)     There is no height or weight on file to calculate BMI.    CBC:   Lab Results   Component Value Date    WBC 8.4 12/04/2020    RBC 3.38 12/04/2020    HGB 9.9 12/04/2020    HCT 32.2 12/04/2020    MCV 95.3 12/04/2020    RDW 13.3 12/04/2020     12/04/2020       CMP:   Lab Results   Component Value Date     12/04/2020    K 4.4 12/04/2020     12/04/2020    CO2 25 12/04/2020    BUN 14 12/04/2020    CREATININE 0.5 12/04/2020    GFRAA >59 12/04/2020    LABGLOM >60 12/04/2020    GLUCOSE 134 12/04/2020    PROT 6.4 12/03/2020    CALCIUM 8.9 12/04/2020    BILITOT <0.2 12/03/2020    ALKPHOS 72 12/03/2020    AST 17 12/03/2020    ALT 15 12/03/2020       POC Tests:   Recent Labs     12/04/20  1659   POCGLU 111*       Coags: No results found for: PROTIME, INR, APTT    HCG (If Applicable): No results found for: PREGTESTUR, PREGSERUM, HCG, HCGQUANT     ABGs: No results found for: PHART, PO2ART, AWZ1IYP, MMF2WRU, BEART, Q5OTMVMX     Type & Screen (If Applicable):  No results found for: LABABO, LABRH    Drug/Infectious Status (If Applicable):  No results found for: HIV, HEPCAB    COVID-19 Screening (If Applicable):   Lab Results   Component Value Date    COVID19 Not Detected 12/03/2020         Anesthesia Evaluation  Patient summary reviewed no history of anesthetic complications:   Airway: Mallampati: I  TM distance: >3 FB   Neck ROM: full  Mouth opening: > = 3 FB Dental: normal exam         Pulmonary:normal exam  breath sounds clear to auscultation      (-) COPD, asthma, shortness of breath, sleep apnea and not a current smoker                           Cardiovascular:  Exercise tolerance: good (>4 METS),   (+) hypertension:,     (-) pacemaker, past MI, CAD, CABG/stent, dysrhythmias and  angina    ECG reviewed  Rhythm: regular  Rate: normal           Beta Blocker:  No for medical reason (HR 59 bpm at examination, will hold Beta blocker)         Neuro/Psych:   (+) psychiatric history (Depression, Alzheimer's):   (-) seizures and CVA GI/Hepatic/Renal:        (-) GERD, liver disease and no renal disease       Endo/Other:    (+) DiabetesType II DM, , no malignancy/cancer. (-) blood dyscrasia, no malignancy/cancer               Abdominal:           Vascular:     - DVT and PE. Anesthesia Plan      general     ASA 3       Induction: intravenous. MIPS: Postoperative opioids intended and Prophylactic antiemetics administered. Anesthetic plan and risks discussed with patient. Use of blood products discussed with patient whom consented to blood products. Plan discussed with CRNA.                 Paul Weaver MD   12/4/2020

## 2020-12-05 NOTE — PROGRESS NOTES
Patient taken to room 533, upon arrival she stated that \"her chest felt like it was going to explode. \" Rapid response was initiated. Handoff was given to Archie Collet, RN and doctor.

## 2020-12-06 LAB
ANION GAP SERPL CALCULATED.3IONS-SCNC: 8 MMOL/L (ref 7–19)
BLOOD BANK DISPENSE STATUS: NORMAL
BLOOD BANK PRODUCT CODE: NORMAL
BPU ID: NORMAL
BUN BLDV-MCNC: 15 MG/DL (ref 8–23)
CALCIUM SERPL-MCNC: 8.6 MG/DL (ref 8.8–10.2)
CHLORIDE BLD-SCNC: 105 MMOL/L (ref 98–111)
CO2: 25 MMOL/L (ref 22–29)
CREAT SERPL-MCNC: 0.5 MG/DL (ref 0.5–0.9)
DESCRIPTION BLOOD BANK: NORMAL
GFR AFRICAN AMERICAN: >59
GFR NON-AFRICAN AMERICAN: >60
GLUCOSE BLD-MCNC: 112 MG/DL (ref 74–109)
GLUCOSE BLD-MCNC: 114 MG/DL (ref 70–99)
GLUCOSE BLD-MCNC: 146 MG/DL (ref 70–99)
GLUCOSE BLD-MCNC: 159 MG/DL (ref 70–99)
GLUCOSE BLD-MCNC: 165 MG/DL (ref 70–99)
HCT VFR BLD CALC: 20.5 % (ref 37–47)
HCT VFR BLD CALC: 27.5 % (ref 37–47)
HEMOGLOBIN: 6.4 G/DL (ref 12–16)
HEMOGLOBIN: 8.8 G/DL (ref 12–16)
LV EF: 58 %
LVEF MODALITY: NORMAL
MCH RBC QN AUTO: 29.9 PG (ref 27–31)
MCHC RBC AUTO-ENTMCNC: 31.2 G/DL (ref 33–37)
MCV RBC AUTO: 95.8 FL (ref 81–99)
PDW BLD-RTO: 13.4 % (ref 11.5–14.5)
PERFORMED ON: ABNORMAL
PLATELET # BLD: 195 K/UL (ref 130–400)
PMV BLD AUTO: 10.9 FL (ref 9.4–12.3)
POTASSIUM REFLEX MAGNESIUM: 4.4 MMOL/L (ref 3.5–5)
RBC # BLD: 2.14 M/UL (ref 4.2–5.4)
SODIUM BLD-SCNC: 138 MMOL/L (ref 136–145)
WBC # BLD: 9.5 K/UL (ref 4.8–10.8)

## 2020-12-06 PROCEDURE — 82947 ASSAY GLUCOSE BLOOD QUANT: CPT

## 2020-12-06 PROCEDURE — 6370000000 HC RX 637 (ALT 250 FOR IP): Performed by: INTERNAL MEDICINE

## 2020-12-06 PROCEDURE — 2580000003 HC RX 258: Performed by: INTERNAL MEDICINE

## 2020-12-06 PROCEDURE — 85018 HEMOGLOBIN: CPT

## 2020-12-06 PROCEDURE — 93306 TTE W/DOPPLER COMPLETE: CPT

## 2020-12-06 PROCEDURE — 36430 TRANSFUSION BLD/BLD COMPNT: CPT

## 2020-12-06 PROCEDURE — P9016 RBC LEUKOCYTES REDUCED: HCPCS

## 2020-12-06 PROCEDURE — 2580000003 HC RX 258: Performed by: ORTHOPAEDIC SURGERY

## 2020-12-06 PROCEDURE — 6370000000 HC RX 637 (ALT 250 FOR IP): Performed by: ORTHOPAEDIC SURGERY

## 2020-12-06 PROCEDURE — 85014 HEMATOCRIT: CPT

## 2020-12-06 PROCEDURE — 1210000000 HC MED SURG R&B

## 2020-12-06 PROCEDURE — 36415 COLL VENOUS BLD VENIPUNCTURE: CPT

## 2020-12-06 PROCEDURE — 80048 BASIC METABOLIC PNL TOTAL CA: CPT

## 2020-12-06 PROCEDURE — 85027 COMPLETE CBC AUTOMATED: CPT

## 2020-12-06 RX ORDER — 0.9 % SODIUM CHLORIDE 0.9 %
20 INTRAVENOUS SOLUTION INTRAVENOUS ONCE
Status: COMPLETED | OUTPATIENT
Start: 2020-12-06 | End: 2020-12-06

## 2020-12-06 RX ADMIN — APIXABAN 2.5 MG: 2.5 TABLET, FILM COATED ORAL at 21:45

## 2020-12-06 RX ADMIN — INSULIN LISPRO 1 UNITS: 100 INJECTION, SOLUTION INTRAVENOUS; SUBCUTANEOUS at 21:48

## 2020-12-06 RX ADMIN — SODIUM CHLORIDE 20 ML: 9 INJECTION, SOLUTION INTRAVENOUS at 09:54

## 2020-12-06 RX ADMIN — DOCUSATE SODIUM AND SENNOSIDES 1 TABLET: 8.6; 5 TABLET, FILM COATED ORAL at 09:53

## 2020-12-06 RX ADMIN — SODIUM CHLORIDE, PRESERVATIVE FREE 10 ML: 5 INJECTION INTRAVENOUS at 09:53

## 2020-12-06 RX ADMIN — ATORVASTATIN CALCIUM 10 MG: 10 TABLET, FILM COATED ORAL at 09:53

## 2020-12-06 RX ADMIN — DOCUSATE SODIUM AND SENNOSIDES 1 TABLET: 8.6; 5 TABLET, FILM COATED ORAL at 21:45

## 2020-12-06 RX ADMIN — APIXABAN 2.5 MG: 2.5 TABLET, FILM COATED ORAL at 09:53

## 2020-12-06 RX ADMIN — DONEPEZIL HYDROCHLORIDE 5 MG: 5 TABLET, FILM COATED ORAL at 09:53

## 2020-12-06 RX ADMIN — INSULIN LISPRO 1 UNITS: 100 INJECTION, SOLUTION INTRAVENOUS; SUBCUTANEOUS at 16:10

## 2020-12-06 RX ADMIN — GABAPENTIN 300 MG: 300 CAPSULE ORAL at 21:45

## 2020-12-06 RX ADMIN — SODIUM CHLORIDE, PRESERVATIVE FREE 10 ML: 5 INJECTION INTRAVENOUS at 21:45

## 2020-12-06 RX ADMIN — MEMANTINE HYDROCHLORIDE 5 MG: 5 TABLET ORAL at 09:53

## 2020-12-06 RX ADMIN — ATENOLOL 12.5 MG: 25 TABLET ORAL at 09:53

## 2020-12-06 RX ADMIN — VENLAFAXINE HYDROCHLORIDE 37.5 MG: 37.5 CAPSULE, EXTENDED RELEASE ORAL at 09:53

## 2020-12-06 RX ADMIN — ASPIRIN 81 MG: 81 TABLET, COATED ORAL at 09:53

## 2020-12-06 NOTE — PROGRESS NOTES
[facility] Hospitalists    Patient:  Mercedes Colon  YOB: 1935  Date of Service: 12/6/2020  MRN: [id number]   Acct: [de-identified]   Primary Care Physician: Keli Guerrero MD  Advance Directive: Full Code  Admit Date: 12/3/2020       Hospital Day: 3  Portions of this note have been copied forward, however, changed to reflect the most current clinical status of this patient. CHIEF COMPLAINT: Hip pain    SUBJECTIVE:  Patient has no new complaints. She does not want to go to SNF due to [place]. Apprehensive regarding discharge to inpatient Rehab if accepted also due to [place]. Explained all patient's must be negative for COVID on rehab unit. Denies new or worsening pain. Chest pain remains resolved. Cumulative Hospital Course: The patient is an 81 y.o. female with PMH Alzheimer's disease, HTN, DM II who presented to [address] [place] [address] ED complaining of a fall that occurred when she tripped, fell forward hitting the right side of her head and right hip. She has a hematoma on her right frontotemporal area. CT head unremarkable. Work up concerning for a sisplaced comminuted fracture involving the proximal shaft of the right femur. She has been unable to bear weight on her right lower extremity since the fall. Denies history of heart disease, stroke or COPD. Patient admitted to Hospitalist service, made NPO after midnight with consult placed to Orthopedic surgery. Patient underwent cephalomedullary nailing of right closed displaced peritrochanteric hip fracture on 12/04/2020 by Dr. Los Benitez. Post operatively a rapid response chest pain was called. EKG and cardiac enzymes were unremarkable. CTA negative for pulmonary emboli. Echocardiogram ordered. Review of Systems:   14 point review of systems is negative except as specifically addressed above.     Objective:   VITALS:  BP (!) 115/56   Pulse 70   Temp 97.1 °F (36.2 °C) (Temporal)   Resp 16   Ht 5' 3\" (1.6 m)   Wt 115 lb (52.2 kg)   SpO2 98%   BMI 20.37 kg/m²   24HR INTAKE/OUTPUT:      Intake/Output Summary (Last 24 hours) at 12/6/2020 1016  Last data filed at 12/6/2020 0913  Gross per 24 hour   Intake 1070.6 ml   Output 650 ml   Net 420.6 ml     General appearance: alert, appears stated age, cooperative and no distress, sitting up comfortably in bedside chair   Head: Normocephalic, without obvious abnormality, atraumatic  Eyes: conjunctivae/corneas clear. PERRL, EOM's intact. Ears: normal external ears and nose, throat without exudate  Neck: no adenopathy, no carotid bruit, no JVD, supple, symmetrical, trachea midline   Lungs: clear throughout, no wheezes, rales or rhonchi  Heart: regular rate rhythm no murmurs rubs gallops  Abdomen: soft, non-tender; non-distended, normal bowel sounds no masses, no organomegaly  Extremities: No lower extremity edema,  No erythema, RLE with limited ROM, and appropriate tenderness to palpation, incision C/D/I   Skin: Pale, warm and dry  Lymphatic: No palpable lymph node enlargment  Neurologic: Alert and oriented X 3, generalized weakness and normal tone.  No focal deficits  Psychiatric: Alert and oriented, thought content appropriate, normal insight, mood appropriate    Medications:      sodium chloride 75 mL/hr at 12/05/20 0609    dextrose        sodium chloride  20 mL Intravenous Once    sodium chloride flush  10 mL Intravenous 2 times per day    sennosides-docusate sodium  1 tablet Oral BID    apixaban  2.5 mg Oral BID    atenolol  12.5 mg Oral Daily    aspirin  81 mg Oral Daily    sodium chloride flush  10 mL Intravenous 2 times per day    donepezil  5 mg Oral Daily    gabapentin  300 mg Oral Nightly    memantine  5 mg Oral Daily    atorvastatin  10 mg Oral Daily    venlafaxine  37.5 mg Oral Daily    insulin lispro  0-6 Units Subcutaneous TID     insulin lispro  0-3 Units Subcutaneous Nightly     sodium chloride flush, magnesium hydroxide, acetaminophen, oxyCODONE **OR** oxyCODONE, morphine **OR** morphine, polyethylene glycol, ondansetron **OR** ondansetron, sodium chloride flush, acetaminophen **OR** acetaminophen, polyethylene glycol, potassium chloride **OR** potassium alternative oral replacement **OR** potassium chloride, magnesium sulfate, oxyCODONE-acetaminophen **OR** [DISCONTINUED] oxyCODONE-acetaminophen, morphine **OR** [DISCONTINUED] morphine, nitroGLYCERIN, glucose, dextrose, glucagon (rDNA), dextrose  Dietary Nutrition Supplements: Standard High Calorie Oral Supplement  DIET CARB CONTROL;     Lab and other Data:     Recent Labs     12/05/20 0035 12/05/20 0457 12/06/20 0224   WBC 13.7* 11.3* 9.5   HGB 8.5* 8.4* 6.4*    249 195     Recent Labs     12/05/20 0035 12/05/20 0457 12/06/20 0224    138 138   K 4.1 4.7  4.7 4.4    103 105   CO2 21* 25 25   BUN 10 10 15   CREATININE 0.6 0.5 0.5   GLUCOSE 196* 159* 112*     Recent Labs     12/03/20  1850   AST 17   ALT 15   BILITOT <0.2   ALKPHOS 72     UA:  Recent Labs     12/03/20  1950   COLORU YELLOW   PHUR 8.5*   CLARITYU Clear   SPECGRAV 1.012   LEUKOCYTESUR Negative   UROBILINOGEN 0.2   BILIRUBINUR Negative   BLOODU Negative   GLUCOSEU Negative     RAD:   Xr Pelvis (1-2 Views)  1. Displaced fracture involving the proximal shaft of the right femur. There is also evidence of involvement of the lesser trochanter as well as the lower aspect of the greater trochanter of the right hip. The femoral head remains located within the acetabulum. There is osteopenia the bones. Signed by Dr Sondra Mina on 12/3/2020 8:46 PM    Ct Head Wo Contrast  1. Mild cerebral and cerebellar volume loss with chronic microvascular disease but no evidence of acute intracranial process. Signed by Dr Sondra Mina on 12/3/2020 8:52 PM    Xr Chest Portable  No acute disease. Signed by Dr Sondra Mina on 12/3/2020 8:49 PM    Xr Femur Right 1 Vw  1. Displaced comminuted fracture involving the proximal shaft of the right femur.  There is also involvement of the lesser and greater trochanter. Signed by Dr Steph Paula on 12/3/2020 8:47 PM    Assessment/Plan   Principal Problem:    Femur fracture, right (HCC)-s/p CMN 12/04/2020, Eliquis started for DVT prophylaxis. Pain well controlled. [facility] well controlled     Active Problems:    Bradycardia-resolved.  Will not resume BB at DC       Diabetes mellitus, type II (HCC)-SSI, accuchecks, hypoglycemia tx order, stable       Vitamin B 12 deficiency-on replacement as OP       Essential hypertension-stable       Late onset Alzheimer's disease without behavioral disturbance (HCC)-noted, resume home medications, stable    DVT Prophylaxis: SCD, Eliquis    Tonny Muse PA-C

## 2020-12-06 NOTE — PROGRESS NOTES
Physical Therapy  Isaac Corea  [id number]     12/06/20 1209   Subjective   Subjective Patient up to chair with nursing assist with all needs in reach and waiting for lunch. Will cont to follow.    Electronically signed by Madeline Elizondo PTA on 12/6/2020 at 12:09 PM

## 2020-12-07 ENCOUNTER — HOSPITAL ENCOUNTER (INPATIENT)
Age: 85
LOS: 15 days | Discharge: HOME HEALTH | DRG: 560 | End: 2020-12-22
Attending: PSYCHIATRY & NEUROLOGY | Admitting: PSYCHIATRY & NEUROLOGY
Payer: MEDICARE

## 2020-12-07 VITALS
BODY MASS INDEX: 20.38 KG/M2 | SYSTOLIC BLOOD PRESSURE: 108 MMHG | HEART RATE: 66 BPM | TEMPERATURE: 97.1 F | WEIGHT: 115 LBS | OXYGEN SATURATION: 96 % | DIASTOLIC BLOOD PRESSURE: 57 MMHG | HEIGHT: 63 IN | RESPIRATION RATE: 18 BRPM

## 2020-12-07 PROBLEM — S72.124D CLOSED NONDISPLACED FRACTURE OF LESSER TROCHANTER OF RIGHT FEMUR WITH ROUTINE HEALING: Status: ACTIVE | Noted: 2020-12-07

## 2020-12-07 LAB
ANION GAP SERPL CALCULATED.3IONS-SCNC: 9 MMOL/L (ref 7–19)
BASOPHILS ABSOLUTE: 0 K/UL (ref 0–0.2)
BASOPHILS RELATIVE PERCENT: 0.3 % (ref 0–1)
BUN BLDV-MCNC: 12 MG/DL (ref 8–23)
CALCIUM SERPL-MCNC: 8.6 MG/DL (ref 8.8–10.2)
CHLORIDE BLD-SCNC: 105 MMOL/L (ref 98–111)
CO2: 26 MMOL/L (ref 22–29)
CREAT SERPL-MCNC: 0.5 MG/DL (ref 0.5–0.9)
EOSINOPHILS ABSOLUTE: 0.1 K/UL (ref 0–0.6)
EOSINOPHILS RELATIVE PERCENT: 1 % (ref 0–5)
GFR AFRICAN AMERICAN: >59
GFR NON-AFRICAN AMERICAN: >60
GLUCOSE BLD-MCNC: 107 MG/DL (ref 70–99)
GLUCOSE BLD-MCNC: 113 MG/DL (ref 74–109)
GLUCOSE BLD-MCNC: 131 MG/DL (ref 70–99)
GLUCOSE BLD-MCNC: 159 MG/DL (ref 70–99)
GLUCOSE BLD-MCNC: 208 MG/DL (ref 70–99)
HCT VFR BLD CALC: 24.6 % (ref 37–47)
HCT VFR BLD CALC: 25.6 % (ref 37–47)
HEMOGLOBIN: 7.9 G/DL (ref 12–16)
HEMOGLOBIN: 8.3 G/DL (ref 12–16)
IMMATURE GRANULOCYTES #: 0.1 K/UL
LYMPHOCYTES ABSOLUTE: 2.7 K/UL (ref 1.1–4.5)
LYMPHOCYTES RELATIVE PERCENT: 31.5 % (ref 20–40)
MCH RBC QN AUTO: 30.5 PG (ref 27–31)
MCH RBC QN AUTO: 30.5 PG (ref 27–31)
MCHC RBC AUTO-ENTMCNC: 32.1 G/DL (ref 33–37)
MCHC RBC AUTO-ENTMCNC: 32.4 G/DL (ref 33–37)
MCV RBC AUTO: 94.1 FL (ref 81–99)
MCV RBC AUTO: 95 FL (ref 81–99)
MONOCYTES ABSOLUTE: 0.9 K/UL (ref 0–0.9)
MONOCYTES RELATIVE PERCENT: 10.3 % (ref 0–10)
NEUTROPHILS ABSOLUTE: 4.9 K/UL (ref 1.5–7.5)
NEUTROPHILS RELATIVE PERCENT: 56.3 % (ref 50–65)
PDW BLD-RTO: 13.6 % (ref 11.5–14.5)
PDW BLD-RTO: 13.7 % (ref 11.5–14.5)
PERFORMED ON: ABNORMAL
PLATELET # BLD: 216 K/UL (ref 130–400)
PLATELET # BLD: 240 K/UL (ref 130–400)
PMV BLD AUTO: 10.7 FL (ref 9.4–12.3)
PMV BLD AUTO: 11 FL (ref 9.4–12.3)
POTASSIUM REFLEX MAGNESIUM: 4.6 MMOL/L (ref 3.5–5)
PREALBUMIN: 10 MG/DL (ref 20–40)
RBC # BLD: 2.59 M/UL (ref 4.2–5.4)
RBC # BLD: 2.72 M/UL (ref 4.2–5.4)
SODIUM BLD-SCNC: 140 MMOL/L (ref 136–145)
WBC # BLD: 8.7 K/UL (ref 4.8–10.8)
WBC # BLD: 9.6 K/UL (ref 4.8–10.8)

## 2020-12-07 PROCEDURE — 36415 COLL VENOUS BLD VENIPUNCTURE: CPT

## 2020-12-07 PROCEDURE — 85027 COMPLETE CBC AUTOMATED: CPT

## 2020-12-07 PROCEDURE — 97165 OT EVAL LOW COMPLEX 30 MIN: CPT

## 2020-12-07 PROCEDURE — 97535 SELF CARE MNGMENT TRAINING: CPT

## 2020-12-07 PROCEDURE — 6370000000 HC RX 637 (ALT 250 FOR IP): Performed by: INTERNAL MEDICINE

## 2020-12-07 PROCEDURE — 80048 BASIC METABOLIC PNL TOTAL CA: CPT

## 2020-12-07 PROCEDURE — 1180000000 HC REHAB R&B

## 2020-12-07 PROCEDURE — 85025 COMPLETE CBC W/AUTO DIFF WBC: CPT

## 2020-12-07 PROCEDURE — 6370000000 HC RX 637 (ALT 250 FOR IP): Performed by: ORTHOPAEDIC SURGERY

## 2020-12-07 PROCEDURE — 97530 THERAPEUTIC ACTIVITIES: CPT

## 2020-12-07 PROCEDURE — 2580000003 HC RX 258: Performed by: ORTHOPAEDIC SURGERY

## 2020-12-07 PROCEDURE — 84134 ASSAY OF PREALBUMIN: CPT

## 2020-12-07 PROCEDURE — 2580000003 HC RX 258: Performed by: INTERNAL MEDICINE

## 2020-12-07 PROCEDURE — 82947 ASSAY GLUCOSE BLOOD QUANT: CPT

## 2020-12-07 PROCEDURE — 6370000000 HC RX 637 (ALT 250 FOR IP): Performed by: PSYCHIATRY & NEUROLOGY

## 2020-12-07 PROCEDURE — 1210000000 HC MED SURG R&B

## 2020-12-07 RX ORDER — OXYCODONE HYDROCHLORIDE 5 MG/1
10 TABLET ORAL EVERY 4 HOURS PRN
Status: CANCELLED | OUTPATIENT
Start: 2020-12-07

## 2020-12-07 RX ORDER — SENNA AND DOCUSATE SODIUM 50; 8.6 MG/1; MG/1
1 TABLET, FILM COATED ORAL 2 TIMES DAILY
Status: CANCELLED | OUTPATIENT
Start: 2020-12-07

## 2020-12-07 RX ORDER — ONDANSETRON 4 MG/1
4 TABLET, ORALLY DISINTEGRATING ORAL EVERY 8 HOURS PRN
Status: DISCONTINUED | OUTPATIENT
Start: 2020-12-07 | End: 2020-12-22 | Stop reason: HOSPADM

## 2020-12-07 RX ORDER — MORPHINE SULFATE 4 MG/ML
2 INJECTION, SOLUTION INTRAMUSCULAR; INTRAVENOUS EVERY 4 HOURS PRN
Status: CANCELLED | OUTPATIENT
Start: 2020-12-07

## 2020-12-07 RX ORDER — ONDANSETRON 2 MG/ML
4 INJECTION INTRAMUSCULAR; INTRAVENOUS EVERY 6 HOURS PRN
Status: CANCELLED | OUTPATIENT
Start: 2020-12-07

## 2020-12-07 RX ORDER — OXYCODONE HYDROCHLORIDE AND ACETAMINOPHEN 5; 325 MG/1; MG/1
1 TABLET ORAL EVERY 4 HOURS PRN
Status: CANCELLED | OUTPATIENT
Start: 2020-12-07

## 2020-12-07 RX ORDER — SODIUM CHLORIDE 0.9 % (FLUSH) 0.9 %
10 SYRINGE (ML) INJECTION PRN
Status: DISCONTINUED | OUTPATIENT
Start: 2020-12-07 | End: 2020-12-22 | Stop reason: HOSPADM

## 2020-12-07 RX ORDER — DEXTROSE MONOHYDRATE 50 MG/ML
100 INJECTION, SOLUTION INTRAVENOUS PRN
Status: DISCONTINUED | OUTPATIENT
Start: 2020-12-07 | End: 2020-12-22 | Stop reason: HOSPADM

## 2020-12-07 RX ORDER — SODIUM CHLORIDE 0.9 % (FLUSH) 0.9 %
10 SYRINGE (ML) INJECTION PRN
Status: CANCELLED | OUTPATIENT
Start: 2020-12-07

## 2020-12-07 RX ORDER — ASPIRIN 81 MG/1
81 TABLET ORAL DAILY
Status: DISCONTINUED | OUTPATIENT
Start: 2020-12-08 | End: 2020-12-22 | Stop reason: HOSPADM

## 2020-12-07 RX ORDER — SODIUM CHLORIDE 0.9 % (FLUSH) 0.9 %
10 SYRINGE (ML) INJECTION EVERY 12 HOURS SCHEDULED
Status: CANCELLED | OUTPATIENT
Start: 2020-12-07

## 2020-12-07 RX ORDER — MAGNESIUM SULFATE IN WATER 40 MG/ML
2 INJECTION, SOLUTION INTRAVENOUS PRN
Status: DISCONTINUED | OUTPATIENT
Start: 2020-12-07 | End: 2020-12-22 | Stop reason: HOSPADM

## 2020-12-07 RX ORDER — NITROGLYCERIN 0.4 MG/1
0.4 TABLET SUBLINGUAL EVERY 5 MIN PRN
Status: CANCELLED | OUTPATIENT
Start: 2020-12-07

## 2020-12-07 RX ORDER — ASPIRIN 81 MG/1
81 TABLET ORAL DAILY
Status: CANCELLED | OUTPATIENT
Start: 2020-12-08

## 2020-12-07 RX ORDER — DEXTROSE MONOHYDRATE 50 MG/ML
100 INJECTION, SOLUTION INTRAVENOUS PRN
Status: CANCELLED | OUTPATIENT
Start: 2020-12-07

## 2020-12-07 RX ORDER — SENNA AND DOCUSATE SODIUM 50; 8.6 MG/1; MG/1
1 TABLET, FILM COATED ORAL 2 TIMES DAILY
Status: DISCONTINUED | OUTPATIENT
Start: 2020-12-07 | End: 2020-12-15

## 2020-12-07 RX ORDER — POLYETHYLENE GLYCOL 3350 17 G/17G
17 POWDER, FOR SOLUTION ORAL DAILY PRN
Status: DISCONTINUED | OUTPATIENT
Start: 2020-12-07 | End: 2020-12-22 | Stop reason: HOSPADM

## 2020-12-07 RX ORDER — ACETAMINOPHEN 325 MG/1
650 TABLET ORAL EVERY 4 HOURS PRN
Status: DISCONTINUED | OUTPATIENT
Start: 2020-12-07 | End: 2020-12-12 | Stop reason: SDUPTHER

## 2020-12-07 RX ORDER — GABAPENTIN 300 MG/1
300 CAPSULE ORAL NIGHTLY
Status: CANCELLED | OUTPATIENT
Start: 2020-12-07

## 2020-12-07 RX ORDER — ACETAMINOPHEN 650 MG/1
650 SUPPOSITORY RECTAL EVERY 6 HOURS PRN
Status: DISCONTINUED | OUTPATIENT
Start: 2020-12-07 | End: 2020-12-22 | Stop reason: HOSPADM

## 2020-12-07 RX ORDER — ACETAMINOPHEN 325 MG/1
650 TABLET ORAL EVERY 4 HOURS PRN
Status: CANCELLED | OUTPATIENT
Start: 2020-12-07

## 2020-12-07 RX ORDER — ACETAMINOPHEN 325 MG/1
650 TABLET ORAL EVERY 4 HOURS PRN
Status: DISCONTINUED | OUTPATIENT
Start: 2020-12-07 | End: 2020-12-22 | Stop reason: HOSPADM

## 2020-12-07 RX ORDER — ACETAMINOPHEN 325 MG/1
650 TABLET ORAL EVERY 6 HOURS PRN
Status: CANCELLED | OUTPATIENT
Start: 2020-12-07

## 2020-12-07 RX ORDER — POLYETHYLENE GLYCOL 3350 17 G/17G
17 POWDER, FOR SOLUTION ORAL DAILY PRN
Status: DISCONTINUED | OUTPATIENT
Start: 2020-12-07 | End: 2020-12-07 | Stop reason: SDUPTHER

## 2020-12-07 RX ORDER — SODIUM CHLORIDE 0.9 % (FLUSH) 0.9 %
10 SYRINGE (ML) INJECTION PRN
Status: DISCONTINUED | OUTPATIENT
Start: 2020-12-07 | End: 2020-12-12 | Stop reason: SDUPTHER

## 2020-12-07 RX ORDER — NICOTINE POLACRILEX 4 MG
15 LOZENGE BUCCAL PRN
Status: CANCELLED | OUTPATIENT
Start: 2020-12-07

## 2020-12-07 RX ORDER — MORPHINE SULFATE 4 MG/ML
2 INJECTION, SOLUTION INTRAMUSCULAR; INTRAVENOUS EVERY 4 HOURS PRN
Status: DISCONTINUED | OUTPATIENT
Start: 2020-12-07 | End: 2020-12-22 | Stop reason: HOSPADM

## 2020-12-07 RX ORDER — OXYCODONE HYDROCHLORIDE 5 MG/1
10 TABLET ORAL EVERY 4 HOURS PRN
Status: DISCONTINUED | OUTPATIENT
Start: 2020-12-07 | End: 2020-12-22 | Stop reason: HOSPADM

## 2020-12-07 RX ORDER — DEXTROSE MONOHYDRATE 25 G/50ML
12.5 INJECTION, SOLUTION INTRAVENOUS PRN
Status: DISCONTINUED | OUTPATIENT
Start: 2020-12-07 | End: 2020-12-22 | Stop reason: HOSPADM

## 2020-12-07 RX ORDER — SODIUM CHLORIDE 0.9 % (FLUSH) 0.9 %
10 SYRINGE (ML) INJECTION EVERY 12 HOURS SCHEDULED
Status: DISCONTINUED | OUTPATIENT
Start: 2020-12-07 | End: 2020-12-08

## 2020-12-07 RX ORDER — BISACODYL 10 MG
10 SUPPOSITORY, RECTAL RECTAL DAILY PRN
Status: DISCONTINUED | OUTPATIENT
Start: 2020-12-07 | End: 2020-12-22 | Stop reason: HOSPADM

## 2020-12-07 RX ORDER — OXYCODONE HYDROCHLORIDE 5 MG/1
5 TABLET ORAL EVERY 4 HOURS PRN
Status: DISCONTINUED | OUTPATIENT
Start: 2020-12-07 | End: 2020-12-22 | Stop reason: HOSPADM

## 2020-12-07 RX ORDER — DEXTROSE MONOHYDRATE 25 G/50ML
12.5 INJECTION, SOLUTION INTRAVENOUS PRN
Status: CANCELLED | OUTPATIENT
Start: 2020-12-07

## 2020-12-07 RX ORDER — POTASSIUM CHLORIDE 20 MEQ/1
40 TABLET, EXTENDED RELEASE ORAL PRN
Status: CANCELLED | OUTPATIENT
Start: 2020-12-07

## 2020-12-07 RX ORDER — MORPHINE SULFATE 4 MG/ML
4 INJECTION, SOLUTION INTRAMUSCULAR; INTRAVENOUS
Status: CANCELLED | OUTPATIENT
Start: 2020-12-07

## 2020-12-07 RX ORDER — ATENOLOL 25 MG/1
12.5 TABLET ORAL DAILY
Status: DISCONTINUED | OUTPATIENT
Start: 2020-12-08 | End: 2020-12-22 | Stop reason: HOSPADM

## 2020-12-07 RX ORDER — MORPHINE SULFATE 4 MG/ML
2 INJECTION, SOLUTION INTRAMUSCULAR; INTRAVENOUS
Status: CANCELLED | OUTPATIENT
Start: 2020-12-07

## 2020-12-07 RX ORDER — DONEPEZIL HYDROCHLORIDE 5 MG/1
5 TABLET, FILM COATED ORAL DAILY
Status: DISCONTINUED | OUTPATIENT
Start: 2020-12-08 | End: 2020-12-22 | Stop reason: HOSPADM

## 2020-12-07 RX ORDER — MAGNESIUM SULFATE IN WATER 40 MG/ML
2 INJECTION, SOLUTION INTRAVENOUS PRN
Status: CANCELLED | OUTPATIENT
Start: 2020-12-07

## 2020-12-07 RX ORDER — SODIUM CHLORIDE 0.9 % (FLUSH) 0.9 %
10 SYRINGE (ML) INJECTION EVERY 12 HOURS SCHEDULED
Status: DISCONTINUED | OUTPATIENT
Start: 2020-12-07 | End: 2020-12-22 | Stop reason: HOSPADM

## 2020-12-07 RX ORDER — VENLAFAXINE HYDROCHLORIDE 37.5 MG/1
37.5 CAPSULE, EXTENDED RELEASE ORAL DAILY
Status: DISCONTINUED | OUTPATIENT
Start: 2020-12-08 | End: 2020-12-22 | Stop reason: HOSPADM

## 2020-12-07 RX ORDER — POTASSIUM CHLORIDE 7.45 MG/ML
10 INJECTION INTRAVENOUS PRN
Status: CANCELLED | OUTPATIENT
Start: 2020-12-07

## 2020-12-07 RX ORDER — MEMANTINE HYDROCHLORIDE 5 MG/1
5 TABLET ORAL DAILY
Status: DISCONTINUED | OUTPATIENT
Start: 2020-12-08 | End: 2020-12-22 | Stop reason: HOSPADM

## 2020-12-07 RX ORDER — ONDANSETRON 2 MG/ML
4 INJECTION INTRAMUSCULAR; INTRAVENOUS EVERY 6 HOURS PRN
Status: DISCONTINUED | OUTPATIENT
Start: 2020-12-07 | End: 2020-12-22 | Stop reason: HOSPADM

## 2020-12-07 RX ORDER — VENLAFAXINE HYDROCHLORIDE 37.5 MG/1
37.5 CAPSULE, EXTENDED RELEASE ORAL DAILY
Status: CANCELLED | OUTPATIENT
Start: 2020-12-08

## 2020-12-07 RX ORDER — POLYETHYLENE GLYCOL 3350 17 G/17G
17 POWDER, FOR SOLUTION ORAL DAILY PRN
Status: CANCELLED | OUTPATIENT
Start: 2020-12-07

## 2020-12-07 RX ORDER — POTASSIUM CHLORIDE 7.45 MG/ML
10 INJECTION INTRAVENOUS PRN
Status: DISCONTINUED | OUTPATIENT
Start: 2020-12-07 | End: 2020-12-22 | Stop reason: HOSPADM

## 2020-12-07 RX ORDER — ATORVASTATIN CALCIUM 10 MG/1
10 TABLET, FILM COATED ORAL DAILY
Status: DISCONTINUED | OUTPATIENT
Start: 2020-12-08 | End: 2020-12-22 | Stop reason: HOSPADM

## 2020-12-07 RX ORDER — OXYCODONE HYDROCHLORIDE AND ACETAMINOPHEN 5; 325 MG/1; MG/1
1 TABLET ORAL EVERY 4 HOURS PRN
Status: DISCONTINUED | OUTPATIENT
Start: 2020-12-07 | End: 2020-12-09

## 2020-12-07 RX ORDER — ACETAMINOPHEN 325 MG/1
650 TABLET ORAL EVERY 6 HOURS PRN
Status: DISCONTINUED | OUTPATIENT
Start: 2020-12-07 | End: 2020-12-22 | Stop reason: HOSPADM

## 2020-12-07 RX ORDER — ACETAMINOPHEN 650 MG/1
650 SUPPOSITORY RECTAL EVERY 6 HOURS PRN
Status: CANCELLED | OUTPATIENT
Start: 2020-12-07

## 2020-12-07 RX ORDER — NITROGLYCERIN 0.4 MG/1
0.4 TABLET SUBLINGUAL EVERY 5 MIN PRN
Status: DISCONTINUED | OUTPATIENT
Start: 2020-12-07 | End: 2020-12-22 | Stop reason: HOSPADM

## 2020-12-07 RX ORDER — ATORVASTATIN CALCIUM 10 MG/1
10 TABLET, FILM COATED ORAL DAILY
Status: CANCELLED | OUTPATIENT
Start: 2020-12-08

## 2020-12-07 RX ORDER — OXYCODONE HYDROCHLORIDE 5 MG/1
5 TABLET ORAL EVERY 4 HOURS PRN
Status: CANCELLED | OUTPATIENT
Start: 2020-12-07

## 2020-12-07 RX ORDER — NICOTINE POLACRILEX 4 MG
15 LOZENGE BUCCAL PRN
Status: DISCONTINUED | OUTPATIENT
Start: 2020-12-07 | End: 2020-12-22 | Stop reason: HOSPADM

## 2020-12-07 RX ORDER — MORPHINE SULFATE 4 MG/ML
2 INJECTION, SOLUTION INTRAMUSCULAR; INTRAVENOUS
Status: DISCONTINUED | OUTPATIENT
Start: 2020-12-07 | End: 2020-12-22 | Stop reason: HOSPADM

## 2020-12-07 RX ORDER — POTASSIUM CHLORIDE 20 MEQ/1
40 TABLET, EXTENDED RELEASE ORAL PRN
Status: DISCONTINUED | OUTPATIENT
Start: 2020-12-07 | End: 2020-12-22 | Stop reason: HOSPADM

## 2020-12-07 RX ORDER — GABAPENTIN 300 MG/1
300 CAPSULE ORAL NIGHTLY
Status: DISCONTINUED | OUTPATIENT
Start: 2020-12-07 | End: 2020-12-22 | Stop reason: HOSPADM

## 2020-12-07 RX ORDER — MEMANTINE HYDROCHLORIDE 5 MG/1
5 TABLET ORAL DAILY
Status: CANCELLED | OUTPATIENT
Start: 2020-12-08

## 2020-12-07 RX ORDER — POLYETHYLENE GLYCOL 3350 17 G/17G
17 POWDER, FOR SOLUTION ORAL DAILY
Status: DISCONTINUED | OUTPATIENT
Start: 2020-12-07 | End: 2020-12-15

## 2020-12-07 RX ORDER — BISACODYL 10 MG
10 SUPPOSITORY, RECTAL RECTAL DAILY PRN
Status: DISCONTINUED | OUTPATIENT
Start: 2020-12-07 | End: 2020-12-12 | Stop reason: SDUPTHER

## 2020-12-07 RX ORDER — DONEPEZIL HYDROCHLORIDE 5 MG/1
5 TABLET, FILM COATED ORAL DAILY
Status: CANCELLED | OUTPATIENT
Start: 2020-12-08

## 2020-12-07 RX ORDER — MORPHINE SULFATE 4 MG/ML
4 INJECTION, SOLUTION INTRAMUSCULAR; INTRAVENOUS
Status: DISCONTINUED | OUTPATIENT
Start: 2020-12-07 | End: 2020-12-22 | Stop reason: HOSPADM

## 2020-12-07 RX ORDER — ATENOLOL 25 MG/1
12.5 TABLET ORAL DAILY
Status: CANCELLED | OUTPATIENT
Start: 2020-12-08

## 2020-12-07 RX ORDER — ONDANSETRON 4 MG/1
4 TABLET, ORALLY DISINTEGRATING ORAL EVERY 8 HOURS PRN
Status: CANCELLED | OUTPATIENT
Start: 2020-12-07

## 2020-12-07 RX ADMIN — MEMANTINE HYDROCHLORIDE 5 MG: 5 TABLET ORAL at 07:18

## 2020-12-07 RX ADMIN — APIXABAN 2.5 MG: 2.5 TABLET, FILM COATED ORAL at 07:18

## 2020-12-07 RX ADMIN — SODIUM CHLORIDE, PRESERVATIVE FREE 10 ML: 5 INJECTION INTRAVENOUS at 08:20

## 2020-12-07 RX ADMIN — DOCUSATE SODIUM 50 MG AND SENNOSIDES 8.6 MG 1 TABLET: 8.6; 5 TABLET, FILM COATED ORAL at 20:04

## 2020-12-07 RX ADMIN — VENLAFAXINE HYDROCHLORIDE 37.5 MG: 37.5 CAPSULE, EXTENDED RELEASE ORAL at 07:18

## 2020-12-07 RX ADMIN — INSULIN LISPRO 1 UNITS: 100 INJECTION, SOLUTION INTRAVENOUS; SUBCUTANEOUS at 20:12

## 2020-12-07 RX ADMIN — ATORVASTATIN CALCIUM 10 MG: 10 TABLET, FILM COATED ORAL at 07:18

## 2020-12-07 RX ADMIN — DONEPEZIL HYDROCHLORIDE 5 MG: 5 TABLET, FILM COATED ORAL at 07:18

## 2020-12-07 RX ADMIN — SODIUM CHLORIDE, PRESERVATIVE FREE 10 ML: 5 INJECTION INTRAVENOUS at 20:15

## 2020-12-07 RX ADMIN — DOCUSATE SODIUM AND SENNOSIDES 1 TABLET: 8.6; 5 TABLET, FILM COATED ORAL at 07:18

## 2020-12-07 RX ADMIN — POLYETHYLENE GLYCOL 3350 17 G: 17 POWDER, FOR SOLUTION ORAL at 20:04

## 2020-12-07 RX ADMIN — ATENOLOL 12.5 MG: 25 TABLET ORAL at 07:18

## 2020-12-07 RX ADMIN — GABAPENTIN 300 MG: 300 CAPSULE ORAL at 20:04

## 2020-12-07 RX ADMIN — ASPIRIN 81 MG: 81 TABLET, COATED ORAL at 07:18

## 2020-12-07 RX ADMIN — APIXABAN 2.5 MG: 2.5 TABLET, FILM COATED ORAL at 20:04

## 2020-12-07 SDOH — HEALTH STABILITY: MENTAL HEALTH: HOW OFTEN DO YOU HAVE A DRINK CONTAINING ALCOHOL?: NEVER

## 2020-12-07 ASSESSMENT — PAIN DESCRIPTION - PAIN TYPE: TYPE: SURGICAL PAIN

## 2020-12-07 ASSESSMENT — PAIN DESCRIPTION - PROGRESSION: CLINICAL_PROGRESSION: NOT CHANGED

## 2020-12-07 ASSESSMENT — PAIN DESCRIPTION - ORIENTATION: ORIENTATION: RIGHT

## 2020-12-07 ASSESSMENT — PAIN DESCRIPTION - DESCRIPTORS: DESCRIPTORS: ACHING;SORE

## 2020-12-07 ASSESSMENT — PAIN DESCRIPTION - ONSET: ONSET: ON-GOING

## 2020-12-07 ASSESSMENT — PAIN SCALES - GENERAL: PAINLEVEL_OUTOF10: 2

## 2020-12-07 ASSESSMENT — PAIN DESCRIPTION - LOCATION: LOCATION: LEG

## 2020-12-07 ASSESSMENT — PAIN - FUNCTIONAL ASSESSMENT: PAIN_FUNCTIONAL_ASSESSMENT: PREVENTS OR INTERFERES SOME ACTIVE ACTIVITIES AND ADLS

## 2020-12-07 ASSESSMENT — PAIN DESCRIPTION - FREQUENCY: FREQUENCY: CONTINUOUS

## 2020-12-07 NOTE — PROGRESS NOTES
12/07/20 0951   Restrictions/Precautions   Restrictions/Precautions Fall Risk;Weight Bearing   Lower Extremity Weight Bearing Restrictions   Right Lower Extremity Weight Bearing Toe Touch Weight Bearing   Position Activity Restriction   Other position/activity restrictions L clavicle fx with ORIF 6/22/20   General   Chart Reviewed Yes   Subjective   Subjective Patient up in chair TR pivot by PCA   General Comment   Comments 12/4 FEMUR IM NAIL TARIQ INSERTION, 12/5/20 rapid response for chest pain   Pain Screening   Patient Currently in Pain No   Intervention List Patient able to continue with treatment   Vital Signs   Level of Consciousness Alert (0)   Oxygen Therapy   O2 Device None (Room air)   Orientation   Overall Orientation Status WNL   Transfers   Sit to Stand Maximum Assistance   Stand to sit Moderate Assistance   Comment Patient  demonstrated  scooting   on L LE for patient  to do pivot TR   Ambulation   Ambulation?  Yes   WB Status TTWB RLE   Ambulation 1   Surface level tile   Device Rolling Walker   Assistance Moderate assistance   Quality of Gait unsteady difficulty advancing L LE L UE weak from clavicle FX in  6-2020   Gait Deviations Slow [place];Decreased step length   Distance 2'   Comments Patient chair rolled up to patient to sit   Balance   Standing - Static Fair   Standing - Dynamic Fair   Activity Tolerance   Activity Tolerance Patient limited by endurance   Safety Devices   Type of devices Left in chair;Call light within reach   Physical Therapy    Electronically signed by Rosa Trevino PTA on 12/7/2020 at 10:07 AM

## 2020-12-07 NOTE — PROGRESS NOTES
Prior Authorization Retail Medication Request    Medication/Dose: Continuous Blood Gluc Sensor (FREESTYLE ANNA 14 DAY SENSOR) MISC  ICD code (if different than what is on RX):  unknown  Previously Tried and Failed:  unknown  Rationale:  unknown    Insurance Name:  [facility]  Insurance ID:  [id number]      Pharmacy Information (if different than what is on RX)  Name:  [facility]  Phone:  [phone]     assistance;Maximum assistance  Additional Comments: good potential to upgrade ADL status with AE and technique training           Transfers  Stand Pivot Transfers: Minimal assistance; Moderate assistance(able to scoot LLE using heel/toe method to pivot around using walker. Difficulty taking a step, in part due to LUE deficits associated with previous clavicle fx)  Transfer Comments: Patient requires assist of one for mobility, but had assist of another primarily to supervise for TTWB.      Cognition  Cognition Comment: Awake, alert, following directions, needs supervision and structure, does well with repitition                 LUE PROM (degrees)  LUE PROM: WNL  LUE AROM (degrees)  LUE AROM : WNL  RUE PROM (degrees)  RUE PROM: WNL  RUE AROM (degrees)  RUE AROM : WNL                   Tx initiated:  Sit to stand techniques, balance activities, ADL strategies (15 mins)     Plan   Plan  Times per week: 4-8    G-Code     OutComes Score                                                  AM-PAC Score             Goals  Short term goals  Time Frame for Short term goals: 1-2 weeks  Short term goal 1: Supervision for transfers  Short term goal 2: Supervision for dressing  Short term goal 3: Supervision for toileting  Short term goal 4: Consistently adhere to TTWB  Short term goal 5: Supervision for basic wheelchair management  Long term goals  Long term goal 1: Progress as tolerated       Therapy Time   Individual Concurrent Group Co-treatment   Time In           Time Out           Minutes                   Davida Puckett OT Electronically signed by Davida Puckett OT on 12/7/2020 at 10:15 AM

## 2020-12-07 NOTE — DISCHARGE SUMMARY
Mora Del Angel  :  1935  MRN:  [id number]    Admit date:  12/3/2020  Discharge date:      Discharging Physician:  Mic Dang MD    Advance Directive: Full Code    Consults: orthopedic surgery    Primary Care Physician:  Jocelin Ramos MD    Discharge Diagnoses:  Principal Problem:    Femur fracture, right [facility])  Active Problems:    Diabetes mellitus, type II ([facility] [address]    Vitamin B 12 deficiency    Essential hypertension    Late onset Alzheimer's disease without behavioral disturbance ([facility] [address]  Resolved Problems:    * No resolved hospital problems. *  Portions of this note have been copied forward, however, changed to reflect the most current clinical status of this patient. Hospital Course: The patient is an 81 y. o. female with PMH Alzheimer's disease, HTN, DM II who presented to [facility] ED complaining of a fall that occurred when she tripped, fell forward hitting the right side of her head and right hip. She has a hematoma on her right frontotemporal area. CT head unremarkable. Work up concerning for a sisplaced comminuted fracture involving the proximal shaft of the right femur. She has been unable to bear weight on her right lower extremity since the fall. Denies history of heart disease, stroke or COPD. Patient admitted to Hospitalist service, made NPO after midnight with consult placed to Orthopedic surgery. Patient underwent cephalomedullary nailing of right closed displaced peritrochanteric hip fracture on 2020 by Dr. Kieran Farooq. Post operatively a rapid response chest pain was called. EKG and cardiac enzymes were unremarkable. CTA negative for pulmonary emboli. Echocardiogram ordered and reveals EF of 55-60%. Pt is accepted to [facility] 8th floor IP rehab. She is stable and ready for discharge per attending hospitalist.       Significant Diagnostic Studies:   Xr Pelvis (1-2 Views)  Result Date: 12/3/2020  1. Displaced fracture involving the proximal shaft of the right femur.  There is also evidence of involvement of the lesser trochanter as well as the lower aspect of the greater trochanter of the right hip. The femoral head remains located within the acetabulum. There is osteopenia the bones. Signed by Dr Malinda Fair on 12/3/2020 8:46 PM    Ct Head Wo Contrast  Result Date: 12/3/2020  1. Mild cerebral and cerebellar volume loss with chronic microvascular disease but no evidence of acute intracranial process. Signed by Dr Malinda Fair on 12/3/2020 8:52 PM    Xr Chest Portable  Result Date: 12/3/2020  No acute disease. Signed by Dr Malinda Fair on 12/3/2020 8:49 PM    Fluoro For Surgical Procedures  Result Date: 12/5/2020  FLUORO FOR SURGICAL PROCEDURES 12/5/2020 5:10 PM History: Right femur hardware fixation. Number of images = 6. Fluoroscopy time = 1:32 minutes. Fluoroscopy dose in mGym2 = 0.1355. Cumulative Air Kerma in mGy = 7.4557. Spot images document hardware fixation of the right femur. Signed by Dr Tosin Cortes on 12/5/2020 5:10 PM    Xr Femur Right 1 Vw  Result Date: 12/3/2020  1. Displaced comminuted fracture involving the proximal shaft of the right femur. There is also involvement of the lesser and greater trochanter. Signed by Dr Malinda Fair on 12/3/2020 8:47 PM      Pertinent Labs:   CBC:   Recent Labs     12/05/20  0457 12/06/20  0224 12/06/20  1359 12/07/20  0300   WBC 11.3* 9.5  --  9.6   HGB 8.4* 6.4* 8.8* 8.3*    195  --  216     BMP:    Recent Labs     12/05/20  0457 12/06/20  0224 12/07/20  0300    138 140   K 4.7  4.7 4.4 4.6    105 105   CO2 25 25 26   BUN 10 15 12   CREATININE 0.5 0.5 0.5   GLUCOSE 159* 112* 113*       Physical Exam:  Vital Signs: BP (!) 108/57   Pulse 66   Temp 97.1 °F (36.2 °C) (Temporal)   Resp 18   Ht 5' 3\" (1.6 m)   Wt 115 lb (52.2 kg)   SpO2 96%   BMI 20.37 kg/m²   General appearance:. Alert and Cooperative   HEENT: Normocephalic. Chest: clear to auscultation bilaterally without wheezes or rhonchi.   Cardiac: Normal mg by mouth nightly              Lancets MISC  1 each by Does not apply route 3 times daily             LORazepam (ATIVAN) 0.5 MG tablet  Take 0.5 mg by mouth daily as needed for Anxiety. memantine (NAMENDA) 5 MG tablet  Take 5 mg by mouth daily             metFORMIN (GLUCOPHAGE) 500 MG tablet  TAKE 1 TABLET DAILY WITH BREAKFAST             nitroGLYCERIN (NITROSTAT) 0.4 MG SL tablet  Place 1 tablet under the tongue every 5 minutes as needed for Chest pain up to max of 3 total doses. If no relief after 1 dose, call 911. ondansetron (ZOFRAN) 4 MG tablet  Take 1 tablet by mouth every 8 hours as needed for Nausea or Vomiting             oxyCODONE-acetaminophen (PERCOCET) 5-325 MG per tablet  Take 1 tablet by mouth every 4 hours as needed for Pain for up to 3 days. Prenatal Vit-Fe Fumarate-FA (PRENATAL VITAMIN) 27-1 MG TABS tablet  Take 1 tablet by mouth once a week             Psyllium (METAMUCIL PO)  Take by mouth daily             simvastatin (ZOCOR) 10 MG tablet  Take 1 tablet by mouth every evening             Tetrahydrozoline HCl (EYE DROPS OP)  Apply 2 drops to eye 2 times daily Indications: systane              UNABLE TO FIND  Take 1 tablet by mouth daily             venlafaxine (EFFEXOR-XR) 37.5 MG XR capsule  Take 37.5 mg by mouth daily. vitamin D (ERGOCALCIFEROL) 1.25 MG (50277 UT) CAPS capsule  Take 1 capsule by mouth once a week                 Discharge Instructions: Follow up with accepting provider upon arrival.  Take medications as directed. Resume activity as tolerated. Diet: Dietary Nutrition Supplements: Standard High Calorie Oral Supplement  DIET CARB CONTROL;     Disposition: Patient is medically stable and will be discharged to [address] [place] [address] rehab. Time spent on discharge 45 minutes spent in assessing patient, reviewing medications, discussion with nursing, confirming safe discharge plan and preparation of discharge summary.     Signed:  Eduard Lion LULA Warner

## 2020-12-07 NOTE — CARE COORDINATION
FYI, the [employer] Predict Tool is available for view under the Media tab for this patient. The [employer] Predict Tool is offered as a suggestion for the best disposition for this patient at the time of discharge based on their care needs.

## 2020-12-07 NOTE — PROGRESS NOTES
4 Eyes Skin Assessment    Adolfo Shipman is being assessed upon: Admission    I agree that Elton Esquivel, along with Charlene Jose RN (either 2 RN's or 1 LPN and 1 RN) have performed a thorough Head to Toe Skin Assessment on the patient. ALL assessment sites listed below have been assessed. Areas assessed by both nurses:     [x]   Head, Face, and Ears   [x]   Shoulders, Back, and Chest  [x]   Arms, Elbows, and Hands   [x]   Coccyx, Sacrum, and Ischium  [x]   Legs, Feet, and Heels    Does the Patient have Skin Breakdown?  No    Rober Prevention initiated: NA  Wound Care Orders initiated: NA    [facility] nurse consulted for Pressure Injury (Stage 3,4, Unstageable, DTI, NWPT, and Complex wounds) and New or Established Ostomies: No        Primary Nurse eSignature: Opal Headley RN on 12/7/2020 at 3:38 PM      Co-Signer eSignature: Electronically signed by Kranthi Brennan RN on 12/7/2020 at 3:42 PM

## 2020-12-07 NOTE — PLAN OF CARE
[address] TREATMENT PLAN      Josie Cummings    : 1935  Acct #: [de-identified]  MRN: [id number]   PHYSICIAN:  Los Hatch MD  Primary Problem    Patient Active Problem List   Diagnosis    Diabetes mellitus, type II ([facility] [address]    Vitamin B 12 deficiency    Essential hypertension    Diabetic peripheral neuropathy associated with type 2 diabetes mellitus ([facility] [address]    History of Bell's palsy    Late onset Alzheimer's disease without behavioral disturbance ([facility] [address]    Peripheral vascular disease, unspecified ([facility] [address]    Vitamin D deficiency    Tailor's bunion of both feet    Femur fracture, right ([facility] [address]    Closed nondisplaced fracture of lesser trochanter of right femur with routine healing       Rehabilitation Diagnosis:     rt hip fx  Closed nondisplaced fracture of lesser trochanter of right femur with routine healing [S72.124D]  Closed nondisplaced fracture of lesser trochanter of right femur with routine healing [S72.124D]       ADMIT DATE:2020   CARE PLAN     NURSING:  Josie Cummings while on this unit will:     [] Be continent of bowel and bladder      [x] Have an adequate number of bowel movements   [x] Urinate with no urinary retention >300ml in bladder   [] Complete bladder protocol with davis removal   [] Maintain O2 SATs at ___%   [x] Have pain managed while on ARU        [] Be pain free by discharge    [x] Have no skin breakdown while on ARU   [] Have improved skin integrity via wound measurements   [x] Have no signs/symptoms of infection at the wound site  [x] Be free from injury during hospitalization   [] Complete education with patient/family with understanding demonstrated for:  [] Adjustment   [x] Other:   Nursing interventions may include bowel/bladder training, education for medical assistive devices, medication education, O2 saturation management, energy conservation, stress management techniques, fall prevention, alarms protocol, seating and positioning, skin/wound care, pressure relief instruction,dressing changes,  infection protection, DVT prophylaxis, and/or assistance with in room safety with transfers to bed, toilet, wheelchair, shower as well as bathroom activities and hygiene. Patient/caregiver education for:   [x] Disease/sustained injury/management      [x] Medication Use   [] Surgical intervention   [x] Safety   [] Body mechanics and or joint protection   [] Health maintenance       IRF-LUCY  Bladder and Bowel Continence  Bladder Continence: Always continent  Bowel Continence: Always continent       PHYSICAL THERAPY:  Goals:                  Short term goals  Time Frame for Short term goals: 2 WEEKS  Short term goal 1: BED MOBILITY CGA  Short term goal 2: TRANSFERS MIN A  Short term goal 3: PROPEL  FT SBA  Short term goal 4: AMBULATE 25 FT WITH RW CGA  Short term goal 5: UP/DOWN 6 IN STEP WITH RW MOD A            Long term goals  Time Frame for Long term goals : 4 WEEKS  Long term goal 1: INDEP BED MOB  Long term goal 2: INDEP TF SURFACE TO SURFACE  Long term goal 3: PROPEL  FT INDEP  Long term goal 4: AMBULATE 50 FT WITH RW INDEP  Long term goal 5: UP/DWON 6 IN STEP WITH RW CGA  These goals were reviewed with this patient at the time of assessment and Stefanie Collet is in agreement.      Plan of Care: Frequency:  [x] 5 days per week, 90 minutes per day                             []  5 days per week, 60 minutes per day               Current Treatment Recommendations: Strengthening, ROM, Balance Training, Functional Mobility Training, Transfer Training, Endurance Training, Wheelchair Mobility Training, Gait Training, Stair training, Pain Management, Home Exercise Program, Safety Education & Training, Patient/Caregiver Education & Training, Equipment Evaluation, Education, & procurement    IRF-LUCY  Roll Left and Right  Assistance Needed: Partial/moderate assistance  CARE Score: 3  Discharge Goal: Independent  Sit to Lying  Assistance Needed: Partial/moderate assistance  CARE Score: 3  Discharge Goal: Independent  Lying to Sitting on Side of Bed  Assistance Needed: Partial/moderate assistance  CARE Score: 3  Discharge Goal: Independent  Sit to Stand  Assistance Needed: Partial/moderate assistance  CARE Score: 3  Discharge Goal: Independent  Chair/Bed-to-Chair Transfer  Assistance Needed: Partial/moderate assistance  CARE Score: 3  Discharge Goal: Independent  Car Transfer  Assistance Needed: Partial/moderate assistance  CARE Score: 3  Discharge Goal: Independent  Walk 10 Feet  Assistance Needed: Partial/moderate assistance  CARE Score: 3  Discharge Goal: Independent  Walk 50 Feet with Two Turns  Reason if not Attempted: Not attempted due to medical condition or safety concerns  CARE Score: 88  Discharge Goal: Independent  Walk 150 Feet  Reason if not Attempted: Not attempted due to medical condition or safety concerns  CARE Score: 88  Discharge Goal: Not Attempted  Walking 10 Feet on Uneven Surfaces  Reason if not Attempted: Not attempted due to medical condition or safety concerns  CARE Score: 88  Discharge Goal: Not Attempted  1 Step (Curb)  Reason if not Attempted: Not attempted due to medical condition or safety concerns  CARE Score: 88  Discharge Goal: Supervision or touching assistance  4 Steps  Reason if not Attempted: Not attempted due to medical condition or safety concerns  CARE Score: 88  Discharge Goal: Not Attempted  12 Steps  Reason if not Attempted: Not attempted due to medical condition or safety concerns  CARE Score: 88  Discharge Goal: Not Attempted  Wheel 50 Feet with Two Turns  Assistance Needed: Supervision or touching assistance  CARE Score: 4  Discharge Goal: Independent  Wheel 150 Feet  Assistance Needed: Supervision or touching assistance  CARE Score: 4  Discharge Goal: Independent    OCCUPATIONAL THERAPY:  Goals:             Short term goals  Time Frame for Short term goals: 1 week  Short term goal 1: complete LB dressing with AE with min A  Short term goal 2: complete toilet transfers with min A  Short term goal 3: complete toilet hygiene/clothing mgmt with min A  Short term goal 4: complete simple home making task using recommended mobility means without cues for WB protocol  Short term goal 5: complete 1 handed static act for 2 mins with min A  Short term goal 6: complete overall bathing with min A :  Long term goals  Time Frame for Long term goals : 2 weeks  Long term goal 1: complet overall toileting with supervision  Long term goal 2: complete overall bathing with supervision  Long term goal 3: complete overall dressing with supervision  Long term goal 4: complete home making task using recommended mobility with supervision  Long term goal 5: complete HEP with independence  Long term goals 6: pt/family verbalize DME :    These goals were reviewed with this patient at the time of assessment and Mariposa Araujo is in agreement    Plan of Care: Frequency:   [x] 5 days per week, 90 minutes per day     [] 5 days per week, 60 minutes per day                             IRF-LUCY  Eating  Assistance Needed: Setup or clean-up assistance  CARE Score: 5  Discharge Goal: Independent  Oral Hygiene  Assistance Needed: Setup or clean-up assistance  CARE Score: 5  Discharge Goal: Independent  Toileting Hygiene  Assistance Needed: Substantial/maximal assistance  CARE Score: 2  Discharge Goal: Set-up or clean-up assistance  Shower/Bathe Self  Assistance Needed: Partial/moderate assistance  CARE Score: 3  Discharge Goal: Set-up or clean-up assistance  Upper Body Dressing  Assistance Needed: Supervision or touching assistance  CARE Score: 4  Discharge Goal: Independent  Lower Body Dressing  Assistance Needed: Substantial/maximal assistance  CARE Score: 2  Discharge Goal: Set-up or clean-up assistance  Putting On/Taking Off Footwear  Assistance Needed: Substantial/maximal assistance  CARE Score: 2  Discharge Goal: Set-up or clean-up assistance  Toilet Transfer  Assistance Needed: Partial/moderate assistance  CARE Score: 3  Discharge Goal: Set-up or clean-up assistance  Picking Up Object  Reason if not Attempted: Not attempted due to medical condition or safety concerns  CARE Score: 88  Discharge Goal: Set-up or clean-up assistance  Treatments may include IADL retraining, strengthening, safety education and training, patient/caregiver education and training, equipment evaluation/ training/procurement, neuromuscular reeducation, wheelchair mobility training. SPEECH THERAPY:   Plan of Care and Goals:   LTG    FIM Goals: Comprehension:    Expression:    Social:    Problem Solving:    Memory:                                                  IRF-LUCY  Hearing, Speech, and Vision  Expression of Ideas and Wants: Without difficulty  Understanding Verbal and Non-Verbal Content: Understands             Plan of Care:  Frequency:   [] 5 days per week, 60 minutes per day                            [x] Not appropriate for Speech Therapy  Treatments may include speech/language/communication therapy, cognitive training, group therapy, education, and/or dysphagia therapy based on the above goals. These goals were reviewed with this patient at the time of assessment and Oneida Dias is in agreement. CASE MANAGEMENT:  Goals:   Assist patient/family with discharge planning, patient/family counseling,  and coordination with insurance during ARU stay. Patient Goals: stay free of COVID, be able to follow the instructions and get back on my feet, take care of self               Activities Prior to Admit:                         Oneida Dias will be seen a minimum of 3 hours of therapy per day/a minimum of 5 out of 7 days per week. [] In this rare instance due to the nature of this patient's medical involvement, this patient will be seen 15 hours per week (900 minutes within a 7 day period).     Treatments may include therapeutic exercises, gait training, neuromuscular re-ed, transfer training, community reintegration, bed mobility, w/c mobility and training, self care, home mgmt, cognitive training, energy conservation,dysphagia tx, speech/language/communication therapy, group therapy, and patient/family education. In addition, dietician/nutritionist may monitor calorie count as well as intake and collaboratively work with SLP on dietary upgrades. Neuropsychology/Psychology may evaluate and provide necessary support. Medical issues being managed closely and that require 24 hour availability of a physician:   [] Swallowing Precautions  [] Bowel/Bladder Fx  [] Weight bearing precautions   [] Wound Care    [x] Pain Mgmt   [x] Infection Protection   [x] DVT Prophylaxis   [] Fall Precautions  [] Fluid/Electrolyte/Nutrition Balance   [] Voice Protection   [] Respiratory  [] Other:    Medical Prognosis: [x] Good  [] Fair    [] Guarded   Total expected IRF days:  13  Anticipated discharge destination:    [] Home Independently   [x] Home with supervision    []SNF     [] Other                                           Physician anticipated functional outcomes:  Ambulate household distances independently with assistive device. IPOC brief synthesis: Acute inpatient rehabilitation with occupational   physical therapy 180 minutes, 5 every 7   days will address basic and advancing mobility with self-care   instruction and adaptive equipment training. Caregiver education will   be offered. Expected length of stay prior to the supervised level of   functional discharge to home with home care is 13 days. Assessment and Plan:  1. Right hip fracture status post cephalomedullary nailing. Toe-touch weightbearing for 6 weeks-PT/OT  2. DVT prophylaxis for 6 weeks. Low-dose Eliquis  3. Diabetes-continue sliding scale and resume home oral hypoglycemic  4. History of dementia-continue Aricept and Namenda. Both doses are low.   5.  History of anxiety/depression-continue Effexor          Case

## 2020-12-08 ENCOUNTER — APPOINTMENT (OUTPATIENT)
Dept: GENERAL RADIOLOGY | Age: 85
DRG: 560 | End: 2020-12-08
Attending: PSYCHIATRY & NEUROLOGY
Payer: MEDICARE

## 2020-12-08 LAB
ANION GAP SERPL CALCULATED.3IONS-SCNC: 9 MMOL/L (ref 7–19)
BUN BLDV-MCNC: 17 MG/DL (ref 8–23)
CALCIUM SERPL-MCNC: 9 MG/DL (ref 8.8–10.2)
CHLORIDE BLD-SCNC: 102 MMOL/L (ref 98–111)
CO2: 28 MMOL/L (ref 22–29)
CREAT SERPL-MCNC: 0.5 MG/DL (ref 0.5–0.9)
EKG P AXIS: NORMAL DEGREES
EKG P-R INTERVAL: NORMAL MS
EKG Q-T INTERVAL: 476 MS
EKG QRS DURATION: 80 MS
EKG QTC CALCULATION (BAZETT): 461 MS
EKG T AXIS: 18 DEGREES
GFR AFRICAN AMERICAN: >59
GFR NON-AFRICAN AMERICAN: >60
GLUCOSE BLD-MCNC: 112 MG/DL (ref 70–99)
GLUCOSE BLD-MCNC: 132 MG/DL (ref 74–109)
GLUCOSE BLD-MCNC: 133 MG/DL (ref 70–99)
GLUCOSE BLD-MCNC: 153 MG/DL (ref 70–99)
GLUCOSE BLD-MCNC: 180 MG/DL (ref 70–99)
HCT VFR BLD CALC: 26.5 % (ref 37–47)
HEMOGLOBIN: 8.5 G/DL (ref 12–16)
MCH RBC QN AUTO: 30.8 PG (ref 27–31)
MCHC RBC AUTO-ENTMCNC: 32.1 G/DL (ref 33–37)
MCV RBC AUTO: 96 FL (ref 81–99)
PDW BLD-RTO: 13.8 % (ref 11.5–14.5)
PERFORMED ON: ABNORMAL
PLATELET # BLD: 278 K/UL (ref 130–400)
PMV BLD AUTO: 10.2 FL (ref 9.4–12.3)
POTASSIUM REFLEX MAGNESIUM: 4.5 MMOL/L (ref 3.5–5)
RBC # BLD: 2.76 M/UL (ref 4.2–5.4)
SODIUM BLD-SCNC: 139 MMOL/L (ref 136–145)
WBC # BLD: 9.5 K/UL (ref 4.8–10.8)

## 2020-12-08 PROCEDURE — 93010 ELECTROCARDIOGRAM REPORT: CPT | Performed by: INTERNAL MEDICINE

## 2020-12-08 PROCEDURE — 97535 SELF CARE MNGMENT TRAINING: CPT

## 2020-12-08 PROCEDURE — 97116 GAIT TRAINING THERAPY: CPT

## 2020-12-08 PROCEDURE — 6370000000 HC RX 637 (ALT 250 FOR IP): Performed by: INTERNAL MEDICINE

## 2020-12-08 PROCEDURE — 97530 THERAPEUTIC ACTIVITIES: CPT

## 2020-12-08 PROCEDURE — 73000 X-RAY EXAM OF COLLAR BONE: CPT

## 2020-12-08 PROCEDURE — 2580000003 HC RX 258: Performed by: INTERNAL MEDICINE

## 2020-12-08 PROCEDURE — 6370000000 HC RX 637 (ALT 250 FOR IP): Performed by: PSYCHIATRY & NEUROLOGY

## 2020-12-08 PROCEDURE — 1180000000 HC REHAB R&B

## 2020-12-08 PROCEDURE — 80048 BASIC METABOLIC PNL TOTAL CA: CPT

## 2020-12-08 PROCEDURE — 97165 OT EVAL LOW COMPLEX 30 MIN: CPT

## 2020-12-08 PROCEDURE — 99223 1ST HOSP IP/OBS HIGH 75: CPT | Performed by: PSYCHIATRY & NEUROLOGY

## 2020-12-08 PROCEDURE — 85027 COMPLETE CBC AUTOMATED: CPT

## 2020-12-08 PROCEDURE — 82947 ASSAY GLUCOSE BLOOD QUANT: CPT

## 2020-12-08 PROCEDURE — 97110 THERAPEUTIC EXERCISES: CPT

## 2020-12-08 PROCEDURE — 36415 COLL VENOUS BLD VENIPUNCTURE: CPT

## 2020-12-08 RX ADMIN — APIXABAN 2.5 MG: 2.5 TABLET, FILM COATED ORAL at 08:29

## 2020-12-08 RX ADMIN — POLYETHYLENE GLYCOL 3350 17 G: 17 POWDER, FOR SOLUTION ORAL at 16:40

## 2020-12-08 RX ADMIN — DOCUSATE SODIUM 50 MG AND SENNOSIDES 8.6 MG 1 TABLET: 8.6; 5 TABLET, FILM COATED ORAL at 21:10

## 2020-12-08 RX ADMIN — SODIUM CHLORIDE, PRESERVATIVE FREE 10 ML: 5 INJECTION INTRAVENOUS at 08:31

## 2020-12-08 RX ADMIN — APIXABAN 2.5 MG: 2.5 TABLET, FILM COATED ORAL at 21:10

## 2020-12-08 RX ADMIN — ATORVASTATIN CALCIUM 10 MG: 10 TABLET, FILM COATED ORAL at 08:29

## 2020-12-08 RX ADMIN — METFORMIN HYDROCHLORIDE 500 MG: 500 TABLET ORAL at 08:28

## 2020-12-08 RX ADMIN — OXYCODONE HYDROCHLORIDE 10 MG: 5 TABLET ORAL at 13:21

## 2020-12-08 RX ADMIN — DOCUSATE SODIUM 50 MG AND SENNOSIDES 8.6 MG 1 TABLET: 8.6; 5 TABLET, FILM COATED ORAL at 08:29

## 2020-12-08 RX ADMIN — VENLAFAXINE HYDROCHLORIDE 37.5 MG: 37.5 CAPSULE, EXTENDED RELEASE ORAL at 08:29

## 2020-12-08 RX ADMIN — ASPIRIN 81 MG: 81 TABLET, COATED ORAL at 08:29

## 2020-12-08 RX ADMIN — GABAPENTIN 300 MG: 300 CAPSULE ORAL at 21:10

## 2020-12-08 RX ADMIN — DONEPEZIL HYDROCHLORIDE 5 MG: 5 TABLET, FILM COATED ORAL at 08:29

## 2020-12-08 RX ADMIN — INSULIN LISPRO 1 UNITS: 100 INJECTION, SOLUTION INTRAVENOUS; SUBCUTANEOUS at 16:39

## 2020-12-08 RX ADMIN — ATENOLOL 12.5 MG: 25 TABLET ORAL at 08:29

## 2020-12-08 RX ADMIN — MEMANTINE HYDROCHLORIDE 5 MG: 5 TABLET ORAL at 08:29

## 2020-12-08 ASSESSMENT — PAIN SCALES - GENERAL
PAINLEVEL_OUTOF10: 10
PAINLEVEL_OUTOF10: 10
PAINLEVEL_OUTOF10: 0
PAINLEVEL_OUTOF10: 2
PAINLEVEL_OUTOF10: 4

## 2020-12-08 ASSESSMENT — PAIN DESCRIPTION - LOCATION
LOCATION: HIP
LOCATION: HIP;KNEE
LOCATION: HIP

## 2020-12-08 ASSESSMENT — PAIN DESCRIPTION - PAIN TYPE
TYPE: ACUTE PAIN
TYPE: ACUTE PAIN

## 2020-12-08 ASSESSMENT — PAIN DESCRIPTION - ORIENTATION: ORIENTATION: RIGHT

## 2020-12-08 ASSESSMENT — PAIN DESCRIPTION - DESCRIPTORS: DESCRIPTORS: ACHING;SORE

## 2020-12-08 NOTE — PATIENT CARE CONFERENCE
[facility] ACUTE INPATIENT REHABILITATION  TEAM CONFERENCE NOTE    Date: 2020  Patient Name: Carley Shaw        MRN: [id number]    : 1935  (81 y.o.)  Gender: female      Diagnosis: RIGHT HIP CEPHALOMEDULLARY NAILING      PHYSICAL THERAPY  STRENGTH  Strength RLE  Strength RLE: Exception  Comment: HIP 2/5; KNEE 3/5; ANKLE 4/5  Strength LLE  Strength LLE: WFL  ROM  AROM RLE (degrees)  RLE AROM: Exceptions  RLE General AROM: DECREASED RIGHT HIP/KNEE  AROM LLE (degrees)  LLE AROM : WFL  BED MOBILITY  Bed Mobility  Supine to Sit: Moderate assistance, Minimal assistance(TRIPLANAR TO LEFT, ASSIST WITH LEFT )  Sit to Supine: Moderate assistance, Minimal assistance(TRIPLANAR )  TRANSFERS  Transfers  Sit to Stand: Moderate Assistance, Minimal Assistance(PULL TO STAND PARALLLE BARS)  Stand to sit: Moderate Assistance, Minimal Assistance  Bed to Chair: Moderate assistance, Minimal assistance(PARTIAL STAND, MOVE HAND TO FAR SURFACE SQT PIVOT)  Car Transfer: Moderate Assistance, Minimal Assistance  WHEELCHAIR PROPULSION  Propulsion 1  Propulsion: Manual  Level: Level Tile  Method: TOBIAS MACHADO  Level of Assistance: Contact guard assistance  Distance: 150 FT  AMBULATION  Ambulation 1  Device: Parallel Bars  Assistance: Moderate assistance, Minimal assistance  Quality of Gait: HANDS, RIGHT TOES, LEFT SWING. NONCONTINUOUS SWING WITH PATIENT TOUCHING TOES DOWN BEFORE THEY WERE EVEN WITH HANDS. RQUIRED UPWARD FORCE BEFORE WOULD ATTEMPT SWING.    Distance: 12 FT  STAIRS     GOALS:  Short term goals  Time Frame for Short term goals: 2 WEEKS  Short term goal 1: BED MOBILITY CGA  Short term goal 2: TRANSFERS MIN A  Short term goal 3: PROPEL  FT SBA  Short term goal 4: AMBULATE 25 FT WITH RW CGA  Short term goal 5: UP/DOWN 6 IN STEP WITH RW MOD A    Long term goals  Time Frame for Long term goals : 4 WEEKS  Long term goal 1: INDEP BED MOB  Long term goal 2: INDEP TF SURFACE TO SURFACE  Long term goal 3: PROPEL  FT INDEP  Long term goal 4: AMBULATE 50 FT WITH RW INDEP  Long term goal 5: UP/DWON 6 IN STEP WITH RW CGA    ASSESSMENT:  Assessment: REQUIRES ASSIST WITH RIGHT LE FOR TRIPLANAR BED MOBILITY TOWARDS LEFT SIDE. MAINTAINES TTWB DURING TRANSFERS AND AMB. SPEECH THERAPY: N/A      OCCUPATIONAL THERAPY    CURRENT IRF-LUCY SCORES  Eating: CARE Score: 5  Oral Hygiene: CARE Score: 5  Toileting: CARE Score: 2  Shower/Bathe: CARE Score: 3  Upper Body Dressing: CARE Score: 4  Lower Body Dressing: CARE Score: 2  Footwear: CARE Score: 2  Toilet Transfers: CARE Score: 3  Picking Up Object:  CARE Score: 88          STGs:  Short term goals  Time Frame for Short term goals: 1 week  Short term goal 1: complete LB dressing with AE with min A  Short term goal 2: complete toilet transfers with min A  Short term goal 3: complete toilet hygiene/clothing mgmt with min A  Short term goal 4: complete simple home making task using recommended mobility means without cues for WB protocol  Short term goal 5: complete 1 handed static act for 2 mins with min A  Short term goal 6: complete overall bathing with min A    LTGs:  Long term goals  Time Frame for Long term goals : 2 weeks  Long term goal 1: complet overall toileting with supervision  Long term goal 2: complete overall bathing with supervision  Long term goal 3: complete overall dressing with supervision  Long term goal 4: complete home making task using recommended mobility with supervision  Long term goal 5: complete HEP with independence  Long term goals 6: pt/family verbalize DME    Assessment:   12/08/20 1305   Assessment   Performance deficits / Impairments Decreased functional mobility ; Decreased strength;Decreased high-level IADLs;Decreased endurance;Decreased safe awareness;Decreased balance;Decreased ADL status   Assessment Pt benefits from further skilled therapy to address ADLs, WB protocol, AE training, functional mobility, home management, and home safety for increased independence at home   Treatment Diagnosis Right Femur Fx s/p IM nail   Prognosis Good   Decision Making Low Complexity   History clavicle fx with ORIF 6/22/20, Rapid response 12/5   Discharge Recommendations Home with Home health OT                 NUTRITION  Current Wt: Weight: 115 lb 1.6 oz (52.2 kg) / Body mass index is 20.39 kg/m². Admission Wt:   115 lb 1.6 oz (52.2 kg)  Oral Diet Orders:   CARB CONTROL  Oral Nutrition Supplement (ONS) Orders:  Diabetic Oral Supplement BID  Pt presents adequately nourished with PO intake >50%. Reports good appetite. Please see nutrition note for details. NURSING    Wounds/Incisions/Ulcers: Incision healing well     Rober Scale Score: 19    Pain: No pain concerns to address    Consultations/Labs/X-rays:     Family Education: Need to make contact with family to initiate education    Fall Risk:  Dhillon Score: 95    Fall in the last week?no      Other Nursing Issues: Alert and oriented x4. S/p nailing of right hip on 12/4. Incision x3 to right hip, glued. Mepilex intact. Bilateral foot pumps in place. Continent of bowel and bladder. Last BM 12/4. Miralax and sennokot given. Up with assist x2. TTWB on right s1acses. Accu checks with SSI. Atenolol placed on hold due to BP being low. SOCIAL WORK/CASE MANAGEMENT  Assessment: Very pleasant and cooperative; Reports she hasa friend, Kathy Tejeda who is supportive and she could go to her home at discharge if necessary. Discharge Plan   Estimated Length of Stay: 12/22/20  Destination: home health, likely    Pass: No    Services at Discharge: To be determined  Equipment at Discharge: To be determined    Progress made in the prior week:  1. eval 12/8  2.  3.  4.  5.      Goals for following week:  1. indep bed mob  2. Complete toilet transfers with min A  3.   4.   5.     Factors facilitating achievement of predicted outcomes:  Motivated, Cooperative and Pleasant    Barriers to the achievement of predicted outcomes: Pain, Decreased endurance, Upper extremity weakness and Lower extremity weakness  4 steps to enter at home-toe touch weight bearing x 6 weeks    Team Members Present at Conference:  : Rosa Maria Andino [place]   Occupational Therapist: Guillaume Alejandre, OTR/L  Physical Therapist: Paulina Monique PT,DPT  Speech Therapist: N/A  Nurse: Bernard Heredia, RN,BSN   Nurse Manager:  Bernard Heredia RN, BSN  Dietitian:  Randolph Sage RD  Rehab Director:  Roseline Chappell approve the established interdisciplinary plan of care as documented within the medical record of Gina Salamanca.

## 2020-12-08 NOTE — PLAN OF CARE
Problem: Falls - Risk of:  Goal: Will remain free from falls  Description: Will remain free from falls  Outcome: Ongoing  Goal: Absence of physical injury  Description: Absence of physical injury  Outcome: Ongoing     Problem: Skin Integrity:  Goal: Will show no infection signs and symptoms  Description: Will show no infection signs and symptoms  Outcome: Ongoing  Goal: Absence of new skin breakdown  Description: Absence of new skin breakdown  Outcome: Ongoing  Goal: Demonstration of wound healing without infection will improve  Description: Demonstration of wound healing without infection will improve  Outcome: Ongoing  Goal: Risk for impaired skin integrity will decrease  Description: Risk for impaired skin integrity will decrease  Outcome: Ongoing     Problem: Pain:  Goal: Pain level will decrease  Description: Pain level will decrease  Outcome: Ongoing  Goal: Control of acute pain  Description: Control of acute pain  Outcome: Ongoing  Goal: Control of chronic pain  Description: Control of chronic pain  Outcome: Ongoing     Problem: SAFETY  Goal: Free from accidental physical injury  Outcome: Ongoing  Goal: Free from intentional harm  Outcome: Ongoing     Problem: DAILY CARE  Goal: Daily care needs are met  Outcome: Ongoing     Problem: PAIN  Goal: Patient's pain/discomfort is manageable  Outcome: Ongoing  Goal: STG - Patient will verbalize an acceptable level of pain  Outcome: Ongoing     Problem: SKIN INTEGRITY  Goal: Skin integrity is maintained or improved  Outcome: Ongoing  Goal: STG - patient will maintain good skin integrity  Outcome: Ongoing  Goal: STG - Patient exhibits signs of wound healing. Outcome: Ongoing     Problem: KNOWLEDGE DEFICIT  Goal: Patient/S.O. demonstrates understanding of disease process, treatment plan, medications, and discharge instructions.   Outcome: Ongoing     Problem: DISCHARGE BARRIERS  Goal: Patient's continuum of care needs are met  Outcome: Ongoing     Problem: Mobility - Impaired:  Goal: Mobility will improve  Description: Mobility will improve  Outcome: Ongoing     Problem:  Activity:  Goal: Ability to ambulate will improve  Description: Ability to ambulate will improve  Outcome: Ongoing  Goal: Ability to perform activities at highest level will improve  Description: Ability to perform activities at highest level will improve  Outcome: Ongoing     Problem: Nutritional:  Goal: Ability to attain and maintain optimal nutritional status will improve  Description: Ability to attain and maintain optimal nutritional status will improve  Outcome: Ongoing     Problem: Safety:  Goal: Ability to remain free from injury will improve  Description: Ability to remain free from injury will improve  Outcome: Ongoing     Problem: IP BLADDER/VOIDING  Goal: LTG - Patient will utilize adaptive techniques/equipment to complete bladder elimination  Outcome: Ongoing     Problem: IP BOWEL ELIMINATION  Goal: LTG - patient will have regular and routine bowel evacuation  Outcome: Ongoing     Problem: IP BREATHING  Goal: LTG - Patient/caregiver will demonstrate/perform proper techniques to maintain patent airway  Outcome: Ongoing     Problem: NUTRITION  Goal: Patient maintains adequate hydration  Outcome: Ongoing

## 2020-12-08 NOTE — PROGRESS NOTES
Nutrition Assessment     Type and Reason for Visit: Reassess    Nutrition Recommendations/Plan: Modify ONS to Glucerna BID    Nutrition Assessment:  Following for new admit to inpatient rehab. Pt presents at risk for nutritional compromise r/t recent wt loss and decreased intake. Pt does state her appetite has been improving slowly since surgery. Obtained preferences from pt and encouraged intake. Will continue to follow nutrition progression. Malnutrition Assessment:  Malnutrition Status: No malnutrition     Current Nutrition Therapies:    DIET CARB CONTROL;   Dietary Nutrition Supplements: Diabetic Oral Supplement    Anthropometric Measures:  · Height: 5' 3\" (160 cm)  · Current Body Wt: 115 lb 1.6 oz (52.2 kg)   · BMI: 20.4    Nutrition Diagnosis:   · Increased nutrient needs related to acute injury/trauma as evidenced by wounds, weight loss      Nutrition Interventions:   Food and/or Nutrient Delivery:  Continue Current Diet, Modify Oral Nutrition Supplement  Nutrition Education/Counseling:  No recommendation at this time   Coordination of Nutrition Care:  Continue to monitor while inpatient    Goals:  PO intake >50%, wt stable, incision healing       Nutrition Monitoring and Evaluation:   Behavioral-Environmental Outcomes:  None Identified   Food/Nutrient Intake Outcomes:  Food and Nutrient Intake, Supplement Intake  Physical Signs/Symptoms Outcomes:  Biochemical Data, Nutrition Focused Physical Findings, Skin, Weight     Discharge Planning:    Continue current diet     Electronically signed by Nirmal Brown RD, LD on 12/8/20 at 1:25 PM CST    Contact: [phone]

## 2020-12-08 NOTE — PLAN OF CARE
Problem: Falls - Risk of:  Goal: Will remain free from falls  Description: Will remain free from falls  12/8/2020 1123 by Juan José Woods LPN  Outcome: Ongoing  12/8/2020 1123 by Juan José Woods LPN  Outcome: Ongoing  12/7/2020 2205 by Antwon Simmons RN  Outcome: Ongoing  Goal: Absence of physical injury  Description: Absence of physical injury  12/8/2020 1123 by Juan José Woods LPN  Outcome: Ongoing  12/8/2020 1123 by Juan José Woods LPN  Outcome: Ongoing  12/7/2020 2205 by Antwon Simmons RN  Outcome: Ongoing     Problem: Skin Integrity:  Goal: Will show no infection signs and symptoms  Description: Will show no infection signs and symptoms  12/8/2020 1123 by Juan José Woods LPN  Outcome: Ongoing  12/8/2020 1123 by Juan José Woods LPN  Outcome: Ongoing  12/7/2020 2205 by Antwon Simmons RN  Outcome: Ongoing  Goal: Absence of new skin breakdown  Description: Absence of new skin breakdown  12/8/2020 1123 by Juan José Woosd LPN  Outcome: Ongoing  12/8/2020 1123 by Juan José Woods LPN  Outcome: Ongoing  12/7/2020 2205 by Antwon Simmons RN  Outcome: Ongoing  Goal: Demonstration of wound healing without infection will improve  Description: Demonstration of wound healing without infection will improve  12/8/2020 1123 by Juan José Woods LPN  Outcome: Ongoing  12/8/2020 1123 by Juan José Woods LPN  Outcome: Ongoing  12/7/2020 2205 by Antwon Simmons RN  Outcome: Ongoing  Goal: Risk for impaired skin integrity will decrease  Description: Risk for impaired skin integrity will decrease  12/8/2020 1123 by Juan José Woods LPN  Outcome: Ongoing  12/8/2020 1123 by Juan José Woods LPN  Outcome: Ongoing  12/7/2020 2205 by Antwon Simmons RN  Outcome: Ongoing     Problem: Pain:  Goal: Pain level will decrease  Description: Pain level will decrease  12/8/2020 1123 by Juan José Woods LPN  Outcome: Ongoing  12/8/2020 1123 by Juan José Woods LPN  Outcome: Ongoing  12/7/2020 2205 by Dwight Sanabria RN  Outcome: Ongoing  Goal: Control of acute pain  Description: Control of acute pain  12/8/2020 1123 by Lisa Carter LPN  Outcome: Ongoing  12/8/2020 1123 by Lisa Carter LPN  Outcome: Ongoing  12/7/2020 2205 by Dwight Sanabria RN  Outcome: Ongoing  Goal: Control of chronic pain  Description: Control of chronic pain  12/8/2020 1123 by Lisa Carter LPN  Outcome: Ongoing  12/8/2020 1123 by Lisa Carter LPN  Outcome: Ongoing  12/7/2020 2205 by Dwight Sanabria RN  Outcome: Ongoing     Problem: SAFETY  Goal: Free from accidental physical injury  12/8/2020 1123 by Lisa Carter LPN  Outcome: Ongoing  12/8/2020 1123 by Lisa Carter LPN  Outcome: Ongoing  12/7/2020 2205 by Dwight Sanabria RN  Outcome: Ongoing  Goal: Free from intentional harm  12/8/2020 1123 by Lisa Carter LPN  Outcome: Ongoing  12/8/2020 1123 by Lisa Carter LPN  Outcome: Ongoing  12/7/2020 2205 by Dwight Sanabria RN  Outcome: Ongoing     Problem: DAILY CARE  Goal: Daily care needs are met  12/8/2020 1123 by Lisa Carter LPN  Outcome: Ongoing  12/8/2020 1123 by Lisa Carter LPN  Outcome: Ongoing  12/7/2020 2205 by Dwight Sanabria, RN  Outcome: Ongoing     Problem: PAIN  Goal: Patient's pain/discomfort is manageable  12/8/2020 1123 by Lisa Carter LPN  Outcome: Ongoing  12/8/2020 1123 by Lisa Carter LPN  Outcome: Ongoing  12/7/2020 2205 by Dwight Sanabria, RN  Outcome: Ongoing  Goal: STG - Patient will verbalize an acceptable level of pain  12/8/2020 1123 by Lisa Carter LPN  Outcome: Ongoing  12/8/2020 1123 by Lisa Carter LPN  Outcome: Ongoing  12/7/2020 2205 by Dwight Sanabria RN  Outcome: Ongoing     Problem: SKIN INTEGRITY  Goal: Skin integrity is maintained or improved  12/8/2020 1123 by Lisa Raul, LPN  Outcome: Ongoing  12/8/2020 1123 by Lisa Carter LPN  Outcome: Ongoing  12/7/2020 2205 by Axel Suarez RN  Outcome: Ongoing  Goal: STG - patient will maintain good skin integrity  12/8/2020 1123 by Priscilla Route, LPN  Outcome: Ongoing  12/8/2020 1123 by Priscilla Route, LPN  Outcome: Ongoing  12/7/2020 2205 by Axel Suarez RN  Outcome: Ongoing  Goal: STG - Patient exhibits signs of wound healing. 12/8/2020 1123 by Priscilla Route, LPN  Outcome: Ongoing  12/8/2020 1123 by Priscilla Route, LPN  Outcome: Ongoing  12/7/2020 2205 by Axel Suarez RN  Outcome: Ongoing     Problem: KNOWLEDGE DEFICIT  Goal: Patient/S.O. demonstrates understanding of disease process, treatment plan, medications, and discharge instructions. 12/8/2020 1123 by Priscilla Route, LPN  Outcome: Ongoing  12/8/2020 1123 by Priscilla Route, LPN  Outcome: Ongoing  12/7/2020 2205 by Axel Suarez RN  Outcome: Ongoing     Problem: DISCHARGE BARRIERS  Goal: Patient's continuum of care needs are met  12/8/2020 1123 by Priscilla Route, LPN  Outcome: Ongoing  12/8/2020 1123 by Priscilla Route, LPN  Outcome: Ongoing  12/7/2020 2205 by Axel Suarez RN  Outcome: Ongoing     Problem: Mobility - Impaired:  Goal: Mobility will improve  Description: Mobility will improve  12/8/2020 1123 by Priscilla Route, LPN  Outcome: Ongoing  12/8/2020 1123 by Priscilla Route, LPN  Outcome: Ongoing  12/7/2020 2205 by Axel Suarez RN  Outcome: Ongoing     Problem:  Activity:  Goal: Ability to ambulate will improve  Description: Ability to ambulate will improve  12/8/2020 1123 by Priscilla Route, LPN  Outcome: Ongoing  12/8/2020 1123 by Priscilla Route, LPN  Outcome: Ongoing  12/7/2020 2205 by Axel Suarez RN  Outcome: Ongoing  Goal: Ability to perform activities at highest level will improve  Description: Ability to perform activities at highest level will improve  12/8/2020 1123 by Priscilla Route, LPN  Outcome: Ongoing  12/8/2020 1123 by Priscilla Route LPN  Outcome: Ongoing  12/7/2020 2205 by Clark Jha RN  Outcome: Ongoing     Problem: Nutritional:  Goal: Ability to attain and maintain optimal nutritional status will improve  Description: Ability to attain and maintain optimal nutritional status will improve  12/8/2020 1123 by Tammy Hatch LPN  Outcome: Ongoing  12/8/2020 1123 by Tammy Hatch LPN  Outcome: Ongoing  12/7/2020 2205 by Clark Jha RN  Outcome: Ongoing     Problem: Safety:  Goal: Ability to remain free from injury will improve  Description: Ability to remain free from injury will improve  12/8/2020 1123 by Tammy Hatch LPN  Outcome: Ongoing  12/8/2020 1123 by Tammy Hatch LPN  Outcome: Ongoing  12/7/2020 2205 by Clark Jha RN  Outcome: Ongoing     Problem: IP BLADDER/VOIDING  Goal: LTG - Patient will utilize adaptive techniques/equipment to complete bladder elimination  12/8/2020 1123 by Tammy Hatch LPN  Outcome: Ongoing  12/8/2020 1123 by Tammy Hatch LPN  Outcome: Ongoing  12/7/2020 2205 by Clark Jha RN  Outcome: Ongoing     Problem: IP BOWEL ELIMINATION  Goal: LTG - patient will have regular and routine bowel evacuation  12/8/2020 1123 by Tammy Hatch LPN  Outcome: Ongoing  12/8/2020 1123 by Tammy Hatch LPN  Outcome: Ongoing  12/7/2020 2205 by Clark Jha RN  Outcome: Ongoing     Problem: IP BREATHING  Goal: LTG - Patient/caregiver will demonstrate/perform proper techniques to maintain patent airway  12/8/2020 1123 by Tammy Hatch LPN  Outcome: Ongoing  12/8/2020 1123 by Tammy Hatch LPN  Outcome: Ongoing  12/7/2020 2205 by Clark Jha RN  Outcome: Ongoing     Problem: NUTRITION  Goal: Patient maintains adequate hydration  12/8/2020 1123 by Tammy Hatch LPN  Outcome: Ongoing  12/8/2020 1123 by Tammy Hatch LPN  Outcome: Ongoing  12/7/2020 2205 by Clark Jha RN  Outcome: Ongoing     Problem: Bleeding:  Goal: Will show no signs and symptoms of excessive bleeding  Description: Will show no signs and symptoms of excessive bleeding  12/8/2020 1123 by Tracy Escamilla LPN  Outcome: Ongoing  12/8/2020 1123 by Tracy Escamilla LPN  Outcome: Ongoing     Problem: Infection - Surgical Site:  Goal: Will show no infection signs and symptoms  Description: Will show no infection signs and symptoms  12/8/2020 1123 by Tracy Escamilla LPN  Outcome: Ongoing  12/8/2020 1123 by Tracy Escamilla LPN  Outcome: Ongoing  12/7/2020 2205 by Marcia Lawler RN  Outcome: Ongoing     Problem: Serum Glucose Level - Abnormal:  Goal: Ability to maintain appropriate glucose levels has stabilized  Description: Ability to maintain appropriate glucose levels has stabilized  12/8/2020 1123 by Tracy Escamilla LPN  Outcome: Ongoing  12/8/2020 1123 by Tracy Escamilla LPN  Outcome: Ongoing     Problem: Mood - Altered:  Goal: Mood stable  Description: Mood stable  12/8/2020 1123 by Tracy Escamilla LPN  Outcome: Ongoing  12/8/2020 1123 by Tracy Escamilla LPN  Outcome: Ongoing

## 2020-12-08 NOTE — H&P
[facility]   History and Physical        Patient:   Angélica Ceja  MR#:    [id number]  Account Number:                   [id number]      Room:    [facility]-   YOB: 1935  Date of Progress Note: 12/8/2020  Time of Note                           7:45 AM  Attending Physician:  Adama Malone MD        Admitting diagnosis: Right femur fracture with toe-touch weightbearing for 6 weeks    Secondary diagnoses:, Hypertension, anxiety/depression, Alzheimer's disease    CHIEF COMPLAINT: Right hip pain      HISTORY OF PRESENT ILLNESS:   This 81 y.o. 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 She presented to [facility] ER on 12/3/20 after having a fall at home where she tripped and fell forward hitting her right side of her head and her right hip. She has a hematoma to her right frontotemporal aread and had been unable to bear weight on her right lower extremity since her fall. CT of head was negative for acute changes. X-ray done of her right femur revealed an acute traumatic displaced peritrochanteric fracture. She was admitted to the hospitalist service with consult for orthopedic surgery. She was seen by Dr. Manuela Junior, who recommended Cephalomedullary Nailing of her fracture. She was in agreement and went for surgery on 12/4/20. She tolerated the procedure well. Post op she had chest pain, rapid response was called. EKG and cardiac enzymes were unremarkable. CTA negative for pulmonary emboli. Echocardiogram ordered and reveals EF of 55-60%. Patient has had no further chest pain. She will be toe-touch weightbearing on her right lower extremity for 6 weeks. She will need DVT prophylaxis for 6 weeks. She is participating in both PT/OT. She is felt to need a stay on Rehab to work towards her goal of going home with assist of her friend.  She is now felt ready to    REVIEW OF SYSTEMS:  Constitutional - No SURGERY Bilateral     cataract    FEMUR FRACTURE SURGERY Right 12/4/2020    FEMUR IM NAIL TARIQ INSERTION performed by Theo Klinefelter, MD at [address]    Dr Ozzy Rodriguez partner   [address]    total-Dr. Claudia Blunt NASAL POLYP SURGERY      UMBILICAL HERNIA REPAIR N/A 2/15/2016    OPEN REPAIR OF RECURRENT INCISIONAL HERNIA WITH MESH  performed by Annemarie Noland MD at [facility] ENDOSCOPY  02/16/2012    Dr White-Horacio/gastritis    UPPER GASTROINTESTINAL ENDOSCOPY  2/1998    Dr Lacy Rodriguez, negative    UPPER GASTROINTESTINAL ENDOSCOPY  1/2/07    Dr Lacy Rodriguez, gastritis    UPPER GASTROINTESTINAL ENDOSCOPY N/A 1/11/2016    Dr Clarke Begin junction w/minimal chronic inflammation       Medications in Hospital:      Current Facility-Administered Medications:     acetaminophen (TYLENOL) tablet 650 mg, 650 mg, Oral, Q4H PRN, Spike Rios MD    magnesium hydroxide (MILK OF MAGNESIA) 400 MG/5ML suspension 30 mL, 30 mL, Oral, Daily PRN, Spike Rios MD    morphine (PF) injection 2 mg, 2 mg, Intravenous, Q2H PRN **OR** morphine (PF) injection 4 mg, 4 mg, Intravenous, Q2H PRN, Spike Rios MD    ondansetron (ZOFRAN-ODT) disintegrating tablet 4 mg, 4 mg, Oral, Q8H PRN **OR** ondansetron (ZOFRAN) injection 4 mg, 4 mg, Intravenous, Q6H PRN, Spike Rios MD    oxyCODONE (ROXICODONE) immediate release tablet 5 mg, 5 mg, Oral, Q4H PRN **OR** oxyCODONE (ROXICODONE) immediate release tablet 10 mg, 10 mg, Oral, Q4H PRN, Spike Rios MD    polyethylene glycol [facility]) packet 17 g, 17 g, Oral, Daily PRN, Spike Rios MD    sennosides-docusate sodium (SENOKOT-S) 8.6-50 MG tablet 1 tablet, 1 tablet, Oral, BID, Spike Rios MD, 1 tablet at 12/07/20 2004    sodium chloride flush 0.9 % injection 10 mL, 10 mL, Intravenous, 2 times per day, Spike Rios MD    sodium chloride flush 0.9 % injection 10 mL, 10 mL, Intravenous, PRN, Allison Molina Rosina Urban MD    nitroGLYCERIN (NITROSTAT) SL tablet 0.4 mg, 0.4 mg, Sublingual, Q5 Min PRN, Kyaw Chapa MD    oxyCODONE-acetaminophen (PERCOCET) 5-325 MG per tablet 1 tablet, 1 tablet, Oral, Q4H PRN, Kyaw Chapa MD    venlafaxine [facility] XR) extended release capsule 37.5 mg, 37.5 mg, Oral, Daily, Kyaw Chapa MD    acetaminophen (TYLENOL) tablet 650 mg, 650 mg, Oral, Q4H PRN, Gino Allen MD    bisacodyl (DULCOLAX) suppository 10 mg, 10 mg, Rectal, Daily PRN, Gino Allen MD    polyethylene glycol [facility]) packet 17 g, 17 g, Oral, Daily, Gino Allen MD, 17 g at 12/07/20 2004    bisacodyl (DULCOLAX) suppository 10 mg, 10 mg, Rectal, Daily PRN, Gino Allen MD    Allergies:  Phenergan [promethazine]    Social History:   TOBACCO:   reports that she has never smoked. She has never used smokeless tobacco.  ETOH:   reports no history of alcohol use. Family History:       Problem Relation Age of Onset    Cancer Mother         Pancreatic    Other Mother     Cancer Father         melanoma    Colon Cancer Neg Hx     Colon Polyps Neg Hx     Esophageal Cancer Neg Hx     Liver Cancer Neg Hx     Liver Disease Neg Hx     Rectal Cancer Neg Hx     Stomach Cancer Neg Hx            Physical Exam:    Vitals: BP (!) 107/49   Pulse 68   Temp 98.3 °F (36.8 °C) (Temporal)   Resp 16   Ht 5' 3\" (1.6 m)   Wt 115 lb 1.6 oz (52.2 kg)   SpO2 90%   BMI 20.39 kg/m²     Constitutional - well developed, well nourished.     Eyes - conjunctiva normal.  Pupils react to light  Ear, nose, throat -hearing intact to finger rub No scars, masses, or lesions over external nose or ears, no atrophy of tongue  Neck-symmetric, no masses noted, no jugular vein distension  Respiration- chest wall appears symmetric, good expansion,   normal effort without use of accessory muscles  Cardiovascular- RRR without m,r,g  Musculoskeletal - no significant wasting of muscles noted, no bony deformities  Extremities-no clubbing, cyanosis or edema  Skin - warm, dry, and intact. No rash, erythema, or pallor. Psychiatric - mood, affect, and behavior appear normal.      Neurological exam  Awake, alert, fluent oriented x 3 appropriate affect  Attention and concentration appear appropriate  Recent and remote memory appears unremarkable  Speech normal without dysarthria  No clear issues with language of fund of knowledge    Cranial Nerve Exam   CN II- Visual fields grossly unremarkable  CN III, IV,VI-EOMI, No nystagmus, conjugate eye movements, no ptosis  CN VII-right facial contracture and facial spasm  CN VIII-Hearing intact   CN IX and X- Palate elevates in midline  CN XI-good shoulder shrug  CN XII-Tongue midline with no fasciculations or fibrillations    Motor Exam  V/V throughout upper and lower extremities bilaterally, no cogwheeling, normal tone. Antalgic weakness right hip        Reflexes   Absent throughout    Tremors- no tremors in hands or head noted    Gait  Not tested    Coordination  Finger to nose-unremarkable        CBC:   Recent Labs     12/07/20  0300 12/07/20  1807 12/08/20  0506   WBC 9.6 8.7 9.5   HGB 8.3* 7.9* 8.5*    240 278       BMP:    Recent Labs     12/06/20  0224 12/07/20  0300 12/08/20  0506    140 139   K 4.4 4.6 4.5    105 102   CO2 25 26 28   BUN 15 12 17   CREATININE 0.5 0.5 0.5   GLUCOSE 112* 113* 132*       Hepatic: No results for input(s): AST, ALT, ALB, BILITOT, ALKPHOS in the last 72 hours. Lipids: No results for input(s): CHOL, HDL in the last 72 hours. Invalid input(s): LDLCALCU    INR: No results for input(s): INR in the last 72 hours. Assessment and Plan     1. Right hip fracture status post cephalomedullary nailing. Toe-touch weightbearing for 6 weeks-PT/OT  2. DVT prophylaxis for 6 weeks. Low-dose Eliquis  3. Diabetes-continue sliding scale and resume home oral hypoglycemic  4. History of dementia-continue Aricept and Namenda. Both doses are low.   5.  History of anxiety/depression-continue Effexor    Patient's functional assessment  Prior to hospitalization the patient was continent of bowel and incontinent of bladder    Functional assessment  All notes from reehab data were reviewed regarding the patient's functional status. Current therapy  Requires PT, OT and/or speech therapy    Anticipated discharge approximately 13 days    Anticipated discharge setting  Home with home care    No clear barriers to discharge    The patient appears to be an appropriate candidate for inpatient rehabilitation    Sufficiently stable: Patient's condition is sufficiently stable at the time of admission to allow the patient to actively participate in an intensive rehabilitation program    Close medical supervision: A rehabilitation physician, or other licensed treating physician with specialized training and experience in inpatient rehabilitation, will conduct face-to-face visits with the patient a minimum of at least 3 days per week throughout the patient's stay    This patient requires close medical supervision for the active management of the ongoing conditions and potential complications stated in the admission note    Intensive rehabilitation nursing: The patient demonstrates the need for 24-hour rehabilitation nursing care for active management of the multiple medical issues documented in the admission note    Appropriate therapy needed: The patient requires the active and ongoing therapeutic intervention of at least 2 therapeutic disciplines, one of which must be physical or occupational therapy and/or speech therapy    Intensive therapy: The patient requires and is reasonably expected to actively participate in at least 3 hours of therapy per day at least 5 days per week, and expected to make measurable improvements that will be of practical value to improve the patient's functional capacity or adaptation to impairments.  In addition, therapy treatments will begin within 36 hours from midnight of the day of the patient's admission to the inpatient rehabilitation facility    Expected duration and frequency therapy: 180 minutes per day, 5 days per week    Interdisciplinary team: The patient demonstrates the need for an interdisciplinary team for active management of the following medical issues including ataxia, motor planning, balance, disease management, elimination, endurance, family training, education, independent ADLs, pain management, precautions, range of motion, safety, strength, and transfers    I have reviewed the preadmission screening documents and concur with the findings. I believe the patient meets criteria and is sufficiently stable to allow participation in the program. This requires an intensive level of therapy, close medical supervision, and an interdisciplinary team approach provided through an individualized plan of care. I approve admitting this patient for an intensive inpatient rehabilitation program.      Michelle Benavidez.  Tyra Boo MD

## 2020-12-08 NOTE — PROGRESS NOTES
Physical Therapy EVALUATION Note  DATE:  2020  NAME:  Christina Gold  :  1935  (85 y.o.,female)  MRN:  [id number]    HEIGHT:  Height: 5' 3\" (160 cm)  WEIGHT:  Weight: 115 lb 1.6 oz (52.2 kg)    PATIENT DIAGNOSIS(ES):    Diagnosis: RIGHT HIP CEPHALOMEDULLARY NAILING    Additional Pertinent Hx:  DM type II,neuropathy,HTN,Bell's Palsy,depression,anxiety,late onset Azheimer's disease   RESTRICTIONS/PRECAUTIONS:    Restrictions/Precautions  Restrictions/Precautions: Weight Bearing, Fall Risk, Surgical Protocols  Position Activity Restriction  Other position/activity restrictions: L clavicle fx with ORIF 20  OVERALL  ORIENTATION STATUS:     PAIN:  Pain Level: 4  Pain Type: Acute pain    Pain Location: Hip, Knee     Pain Orientation: Right      NEUROLOGICAL                       Sensation  Overall Sensation Status: Impaired  STRENGTH  Strength RLE  Strength RLE: Exception  Comment: HIP 2/5; KNEE 3/5; ANKLE 4/5  Strength LLE  Strength LLE: WFL  ROM  AROM RLE (degrees)  RLE AROM: Exceptions  RLE General AROM: DECREASED RIGHT HIP/KNEE  AROM LLE (degrees)  LLE AROM : WFL  POSTURE/BALANCE  Balance  Posture: Good  Sitting - Static: Good  Sitting - Dynamic: Good  Standing - Static: Fair  Standing - Dynamic: Fair       ACTIVITY TOLERANCE  Activity Tolerance  Activity Tolerance: Patient limited by pain, Patient limited by fatigue, Patient limited by endurance      BED MOBILITY  Bed Mobility  Supine to Sit: Moderate assistance, Minimal assistance(TRIPLANAR TO LEFT, ASSIST WITH LEFT )  Sit to Supine: Moderate assistance, Minimal assistance(TRIPLANAR )  TRANSFERS  Sit to Stand: Moderate Assistance, Minimal Assistance(PULL TO STAND PARALLLE BARS)      Bed to Chair: Moderate assistance, Minimal assistance(PARTIAL STAND, MOVE HAND TO FAR SURFACE SQT PIVOT)  Car Transfer:  Moderate Assistance, Minimal Assistance     WHEELCHAIR PROPULSION 1  Propulsion 1  Propulsion: Manual  Level: Level Tile  Method: TOBIAS MACHADO  Level of Attempted: Not attempted due to medical condition or safety concerns  CARE Score: 88  Discharge Goal: Not Attempted    12 Steps  Reason if not Attempted: Not attempted due to medical condition or safety concerns  CARE Score: 88  Discharge Goal: Not Attempted    Wheel 50 Feet with Two Turns  Assistance Needed: Supervision or touching assistance  CARE Score: 4  Discharge Goal: Independent    Wheel 150 Feet  Assistance Needed: Supervision or touching assistance  CARE Score: 4  Discharge Goal: Independent      LAST TREATMENT TIME  PT Individual Minutes  Time In: 1000  Time Out: 1100  Minutes: 61

## 2020-12-08 NOTE — PROGRESS NOTES
Occupational Therapy  Facility/Department: [facility] 8 REHAB UNIT  Daily Treatment Note  NAME: Mercedes Colon  : 1935  MRN: [id number]    Date of Service: 2020    Discharge Recommendations:  Home with Home health OT       Assessment   Performance deficits / Impairments: Decreased functional mobility ; Decreased strength;Decreased high-level IADLs;Decreased endurance;Decreased safe awareness;Decreased balance;Decreased ADL status  Assessment: Pt benefits from further skilled therapy to address ADLs, WB protocol, AE training, functional mobility, home management, and home safety for increased independence at home  Treatment Diagnosis: Right Femur Fx s/p IM nail  Prognosis: Good  Decision Making: Low Complexity  History: clavicle fx with ORIF 20, Rapid response   OT Education: OT Role;Precautions;Plan of Care  Patient Education: AE training  Activity Tolerance  Activity Tolerance: Patient limited by pain  Safety Devices  Safety Devices in place: Yes  Type of devices: Call light within reach; Bed alarm in place; Left in bed         Patient Diagnosis(es): There were no encounter diagnoses. has a past medical history of Abdominal pain, Anxiety, Bell's palsy, Colon polyp, Depression, Diverticulosis, Female bladder prolapse, Hernia, abdominal, History of adenomatous polyp of colon, Hypertriglyceridemia, Neuropathy, Osteopenia, Type II or unspecified type diabetes mellitus without mention of complication, not stated as uncontrolled, and Vitamin B 12 deficiency. has a past surgical history that includes Hysterectomy (); Cholecystectomy (age 22); bladder repair (); Colonoscopy (02/15/2012); Upper gastrointestinal endoscopy (2012); Nasal polyp surgery; Appendectomy; Elbow fracture surgery (); Upper gastrointestinal endoscopy (1998); Colonoscopy (3/1998); Colonoscopy (07 ); Upper gastrointestinal endoscopy (07); Colonoscopy (04);  Upper gastrointestinal endoscopy (N/A, 2016); Colonoscopy (N/A, 1/25/2016); hernia repair (1991); hernia repair (1984 ); Cataract removal (12/2014); Cataract removal (01/2015); eye surgery (Bilateral); Umbilical hernia repair (N/A, 2/15/2016); Clavicle surgery (Left, 6/22/2020); and Femur fracture surgery (Right, 12/4/2020). Restrictions  Restrictions/Precautions  Restrictions/Precautions: Weight Bearing, Fall Risk, Surgical Protocols  Lower Extremity Weight Bearing Restrictions  Right Lower Extremity Weight Bearing: Toe Touch Weight Bearing(TTWBx6 weeks)  Left Lower Extremity Weight Bearing: Weight Bearing As Tolerated  Position Activity Restriction  Other position/activity restrictions: L clavicle fx with ORIF 6/22/20  Subjective          Orientation  Orientation  Overall Orientation Status: Within Normal Limits  Objective             Balance  Sitting Balance: Stand by assistance  Standing Balance: Moderate assistance  Bed mobility  Supine to Sit: Moderate assistance  Sit to Supine: Maximum assistance  Transfers  Stand Pivot Transfers: Moderate assistance  Sit to stand:  Moderate assistance  Stand to sit: Moderate assistance         12/08/20 1305   Cognitive Patterns   Cognitive Pattern Assessment Used BIMS   Repetition of Three Words (First Attempt) 3   Temporal Orientation: Year Correct   Temporal Orientation: Month Accurate within 5 days   Temporal Orientation: Day Correct   Able to recall \"sock Yes, no cue required   Able to recall \"blue\" Yes, no cue required   Able to recall \"bed\" No, could not recall   BIMS Summary Score 13     Plan   Plan  Specific instructions for Next Treatment: AE training  Current Treatment Recommendations: Positioning, Safety Education & Training, Functional Mobility Training, Home Management Training, Balance Training, Self-Care / ADL, Equipment Evaluation, Education, & procurement, Strengthening, Endurance Training, Patient/Caregiver Education & Training    Goals  Short term goals  Time Frame for Short term goals: 1 week  Short term goal 1: complete LB dressing with AE with min A  Short term goal 2: complete toilet transfers with min A  Short term goal 3: complete toilet hygiene/clothing mgmt with min A  Short term goal 4: complete simple home making task using recommended mobility means without cues for WB protocol  Short term goal 5: complete 1 handed static act for 2 mins with min A  Short term goal 6: complete overall bathing with min A  Long term goals  Time Frame for Long term goals : 2 weeks  Long term goal 1: complet overall toileting with supervision  Long term goal 2: complete overall bathing with supervision  Long term goal 3: complete overall dressing with supervision  Long term goal 4: complete home making task using recommended mobility with supervision  Long term goal 5: complete HEP with independence  Long term goals 6: pt/family verbalize DME  Patient Goals   Patient goals : return home       Therapy Time   Individual Concurrent Group Co-treatment   Time In 0187         Time Out 1345         Minutes 40         Timed Code Treatment Minutes: 40 Minutes       Electronically signed by Karen Burger OT on 12/8/2020 at 2:59 PM

## 2020-12-09 LAB
ANION GAP SERPL CALCULATED.3IONS-SCNC: 9 MMOL/L (ref 7–19)
BUN BLDV-MCNC: 18 MG/DL (ref 8–23)
CALCIUM SERPL-MCNC: 9.2 MG/DL (ref 8.8–10.2)
CHLORIDE BLD-SCNC: 101 MMOL/L (ref 98–111)
CO2: 28 MMOL/L (ref 22–29)
CREAT SERPL-MCNC: 0.5 MG/DL (ref 0.5–0.9)
GFR AFRICAN AMERICAN: >59
GFR NON-AFRICAN AMERICAN: >60
GLUCOSE BLD-MCNC: 122 MG/DL (ref 70–99)
GLUCOSE BLD-MCNC: 124 MG/DL (ref 74–109)
GLUCOSE BLD-MCNC: 130 MG/DL (ref 70–99)
GLUCOSE BLD-MCNC: 137 MG/DL (ref 70–99)
GLUCOSE BLD-MCNC: 93 MG/DL (ref 70–99)
HCT VFR BLD CALC: 26.8 % (ref 37–47)
HEMOGLOBIN: 8.3 G/DL (ref 12–16)
MCH RBC QN AUTO: 30.1 PG (ref 27–31)
MCHC RBC AUTO-ENTMCNC: 31 G/DL (ref 33–37)
MCV RBC AUTO: 97.1 FL (ref 81–99)
PDW BLD-RTO: 14.1 % (ref 11.5–14.5)
PERFORMED ON: ABNORMAL
PERFORMED ON: NORMAL
PLATELET # BLD: 322 K/UL (ref 130–400)
PMV BLD AUTO: 10.5 FL (ref 9.4–12.3)
POTASSIUM REFLEX MAGNESIUM: 5.4 MMOL/L (ref 3.5–5)
RBC # BLD: 2.76 M/UL (ref 4.2–5.4)
SODIUM BLD-SCNC: 138 MMOL/L (ref 136–145)
WBC # BLD: 9.2 K/UL (ref 4.8–10.8)

## 2020-12-09 PROCEDURE — 97535 SELF CARE MNGMENT TRAINING: CPT

## 2020-12-09 PROCEDURE — 6370000000 HC RX 637 (ALT 250 FOR IP): Performed by: INTERNAL MEDICINE

## 2020-12-09 PROCEDURE — 97530 THERAPEUTIC ACTIVITIES: CPT

## 2020-12-09 PROCEDURE — 99233 SBSQ HOSP IP/OBS HIGH 50: CPT | Performed by: PSYCHIATRY & NEUROLOGY

## 2020-12-09 PROCEDURE — 6370000000 HC RX 637 (ALT 250 FOR IP): Performed by: PSYCHIATRY & NEUROLOGY

## 2020-12-09 PROCEDURE — 82947 ASSAY GLUCOSE BLOOD QUANT: CPT

## 2020-12-09 PROCEDURE — 36415 COLL VENOUS BLD VENIPUNCTURE: CPT

## 2020-12-09 PROCEDURE — 97116 GAIT TRAINING THERAPY: CPT

## 2020-12-09 PROCEDURE — 85027 COMPLETE CBC AUTOMATED: CPT

## 2020-12-09 PROCEDURE — 1180000000 HC REHAB R&B

## 2020-12-09 PROCEDURE — 80048 BASIC METABOLIC PNL TOTAL CA: CPT

## 2020-12-09 PROCEDURE — 2580000003 HC RX 258: Performed by: INTERNAL MEDICINE

## 2020-12-09 PROCEDURE — 97110 THERAPEUTIC EXERCISES: CPT

## 2020-12-09 RX ORDER — OXYCODONE HYDROCHLORIDE AND ACETAMINOPHEN 5; 325 MG/1; MG/1
1 TABLET ORAL 3 TIMES DAILY
Status: DISCONTINUED | OUTPATIENT
Start: 2020-12-09 | End: 2020-12-22 | Stop reason: HOSPADM

## 2020-12-09 RX ORDER — IRON POLYSACCHARIDE COMPLEX 150 MG
150 CAPSULE ORAL DAILY
Status: DISCONTINUED | OUTPATIENT
Start: 2020-12-09 | End: 2020-12-22 | Stop reason: HOSPADM

## 2020-12-09 RX ADMIN — GABAPENTIN 300 MG: 300 CAPSULE ORAL at 20:49

## 2020-12-09 RX ADMIN — APIXABAN 2.5 MG: 2.5 TABLET, FILM COATED ORAL at 20:49

## 2020-12-09 RX ADMIN — OXYCODONE HYDROCHLORIDE AND ACETAMINOPHEN 1 TABLET: 5; 325 TABLET ORAL at 20:49

## 2020-12-09 RX ADMIN — METFORMIN HYDROCHLORIDE 500 MG: 500 TABLET ORAL at 07:53

## 2020-12-09 RX ADMIN — DOCUSATE SODIUM 50 MG AND SENNOSIDES 8.6 MG 1 TABLET: 8.6; 5 TABLET, FILM COATED ORAL at 20:49

## 2020-12-09 RX ADMIN — ATORVASTATIN CALCIUM 10 MG: 10 TABLET, FILM COATED ORAL at 07:53

## 2020-12-09 RX ADMIN — Medication 150 MG: at 13:13

## 2020-12-09 RX ADMIN — VENLAFAXINE HYDROCHLORIDE 37.5 MG: 37.5 CAPSULE, EXTENDED RELEASE ORAL at 07:53

## 2020-12-09 RX ADMIN — DOCUSATE SODIUM 50 MG AND SENNOSIDES 8.6 MG 1 TABLET: 8.6; 5 TABLET, FILM COATED ORAL at 07:53

## 2020-12-09 RX ADMIN — DONEPEZIL HYDROCHLORIDE 5 MG: 5 TABLET, FILM COATED ORAL at 07:53

## 2020-12-09 RX ADMIN — ASPIRIN 81 MG: 81 TABLET, COATED ORAL at 07:53

## 2020-12-09 RX ADMIN — SODIUM CHLORIDE, PRESERVATIVE FREE 10 ML: 5 INJECTION INTRAVENOUS at 10:53

## 2020-12-09 RX ADMIN — POLYETHYLENE GLYCOL 3350 17 G: 17 POWDER, FOR SOLUTION ORAL at 20:49

## 2020-12-09 RX ADMIN — MEMANTINE HYDROCHLORIDE 5 MG: 5 TABLET ORAL at 07:53

## 2020-12-09 RX ADMIN — APIXABAN 2.5 MG: 2.5 TABLET, FILM COATED ORAL at 07:53

## 2020-12-09 RX ADMIN — OXYCODONE HYDROCHLORIDE 5 MG: 5 TABLET ORAL at 07:58

## 2020-12-09 RX ADMIN — OXYCODONE HYDROCHLORIDE AND ACETAMINOPHEN 1 TABLET: 5; 325 TABLET ORAL at 13:12

## 2020-12-09 ASSESSMENT — PAIN DESCRIPTION - ORIENTATION: ORIENTATION: RIGHT

## 2020-12-09 ASSESSMENT — PAIN SCALES - GENERAL
PAINLEVEL_OUTOF10: 5
PAINLEVEL_OUTOF10: 4

## 2020-12-09 ASSESSMENT — PAIN DESCRIPTION - PAIN TYPE: TYPE: ACUTE PAIN

## 2020-12-09 ASSESSMENT — PAIN DESCRIPTION - LOCATION: LOCATION: HIP

## 2020-12-09 NOTE — PROGRESS NOTES
Patient:   Josie Cummings  MR#:    [id number]   Room:    [facility]823-   YOB: 1935  Date of Progress Note: 12/9/2020  Time of Note                           7:51 AM  Consulting Physician:   Los Hatch M.D. Attending Physician:  Los Hatch MD        CHIEF COMPLAINT: Right hip pain        Subjective: This 81 y.o. 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 She presented to [facility] ER on 12/3/20 after having a fall at home where she tripped and fell forward hitting her right side of her head and her right hip. She has a hematoma to her right frontotemporal aread and had been unable to bear weight on her right lower extremity since her fall. CT of head was negative for acute changes. X-ray done of her right femur revealed an acute traumatic displaced peritrochanteric fracture. She was admitted to the hospitalist service with consult for orthopedic surgery. She was seen by Dr. Da Ware, who recommended Cephalomedullary Nailing of her fracture. She was in agreement and went for surgery on 12/4/20. She tolerated the procedure well. Post op she had chest pain, rapid response was called. EKG and cardiac enzymes were unremarkable. CTA negative for pulmonary emboli. Echocardiogram ordered and reveals EF of 55-60%. Patient has had no further chest pain. She will be toe-touch weightbearing on her right lower extremity for 6 weeks. She will need DVT prophylaxis for 6 weeks. No complaints today except for breakthrough pain in her hip.   Blood pressure a little low so atenolol has been held  REVIEW OF SYSTEMS:  Constitutional: No fevers No chills  Neck:No stiffness  Respiratory: No shortness of breath  Cardiovascular: No chest pain No palpitations  Gastrointestinal: No abdominal pain    Genitourinary: No Dysuria  Neurological: No headache, no confusion      PHYSICAL EXAM:  BP (!) 107/51   Pulse 74   Temp 96.5 °F (35.8 °C) (Temporal)   Resp 16   Ht 5' 3\" (1.6 m)   Wt 115 lb 1.6 oz (52.2 kg)   SpO2 97%   BMI 20.39 kg/m²     Constitutional: she appears well-developed and well-nourished. Eyes - conjunctiva normal.  Pupils react to light  Ear, nose, throat -hearing intact to voice. No scars, masses, or lesions over external nose or ears, no atrophy of tongue  Neck-symmetric, no masses noted, no jugular vein distension  Respiration- chest wall appears symmetric, good expansion,   normal effort without use of accessory muscles  Cardiovascular- RRR  Musculoskeletal - no significant wasting of muscles noted, no bony deformities, gait no gross ataxia  Extremities-no clubbing, cyanosis or edema  Skin - warm, dry, and intact. No rash, erythema, or pallor. Psychiatric - mood, affect, and behavior appear normal.      Neurology  NEUROLOGICAL EXAM:      Mental status   Awake, alert, fluent oriented x 3 appropriate affect  Attention and concentration appear appropriate  Recent and remote memory appears unremarkable  Speech normal without dysarthria  No clear issues with language       Cranial Nerves   CN II- Visual fields grossly unremarkable  CN III, IV,VI-EOMI, No nystagmus, conjugate eye movements, no ptosis  CN VII-right hemifacial spasm  CN VIII-Hearing intact      Motor function  Antigravity x 4     Sensory function Intact to light touch     Cerebellar F-N intact     Tremor None present     Gait                  Not tested           Nursing/pcp notes, imaging,labs and vitals reviewed.      PT,OT and/or speech notes reviewed    Lab Results   Component Value Date    WBC 9.2 12/09/2020    HGB 8.3 (L) 12/09/2020    HCT 26.8 (L) 12/09/2020    MCV 97.1 12/09/2020     12/09/2020     Lab Results   Component Value Date     12/09/2020    K 5.4 (H) 12/09/2020     12/09/2020    CO2 28 12/09/2020    BUN 18 12/09/2020    CREATININE 0.5 12/09/2020    GLUCOSE 124 (H) 12/09/2020    CALCIUM 9.2 12/09/2020 PROT 6.4 (L) 12/03/2020    LABALBU 4.0 12/03/2020    BILITOT <0.2 12/03/2020    ALKPHOS 72 12/03/2020    AST 17 12/03/2020    ALT 15 12/03/2020    LABGLOM >60 12/09/2020    GFRAA >59 12/09/2020    GLOB 2.4 10/11/2016   No results found for: INRNo results found for: PHENYTOIN, ESR, CRP    PHYSICAL THERAPY  STRENGTH  Strength RLE  Strength RLE: Exception  Comment: HIP 2/5; KNEE 3/5; ANKLE 4/5  Strength LLE  Strength LLE: WFL  ROM  AROM RLE (degrees)  RLE AROM: Exceptions  RLE General AROM: DECREASED RIGHT HIP/KNEE  AROM LLE (degrees)  LLE AROM : WFL  BED MOBILITY  Bed Mobility  Supine to Sit: Moderate assistance, Minimal assistance(TRIPLANAR TO LEFT, ASSIST WITH LEFT )  Sit to Supine: Moderate assistance, Minimal assistance(TRIPLANAR )  TRANSFERS  Transfers  Sit to Stand: Moderate Assistance, Minimal Assistance(PULL TO STAND PARALLLE BARS)  Stand to sit: Moderate Assistance, Minimal Assistance  Bed to Chair: Moderate assistance, Minimal assistance(PARTIAL STAND, MOVE HAND TO FAR SURFACE SQT PIVOT)  Car Transfer: Moderate Assistance, Minimal Assistance  WHEELCHAIR PROPULSION  Propulsion 1  Propulsion: Manual  Level: Level Tile  Method: TOBIAS MACHADO  Level of Assistance: Contact guard assistance  Distance: 150 FT  AMBULATION  Ambulation 1  Device: Parallel Bars  Assistance: Moderate assistance, Minimal assistance  Quality of Gait: HANDS, RIGHT TOES, LEFT SWING. NONCONTINUOUS SWING WITH PATIENT TOUCHING TOES DOWN BEFORE THEY WERE EVEN WITH HANDS. RQUIRED UPWARD FORCE BEFORE WOULD ATTEMPT SWING.    Distance: 12 FT  STAIRS  GOALS:  Short term goals  Time Frame for Short term goals: 2 WEEKS  Short term goal 1: BED MOBILITY CGA  Short term goal 2: TRANSFERS MIN A  Short term goal 3: PROPEL  FT SBA  Short term goal 4: AMBULATE 25 FT WITH RW CGA  Short term goal 5: UP/DOWN 6 IN STEP WITH RW MOD A     Long term goals  Time Frame for Long term goals : 4 WEEKS  Long term goal 1: INDEP BED MOB  Long term goal 2: INDEP TF SURFACE TO SURFACE  Long term goal 3: PROPEL  FT INDEP  Long term goal 4: AMBULATE 50 FT WITH RW INDEP  Long term goal 5: UP/DWON 6 IN STEP WITH RW CGA     ASSESSMENT:  Assessment: REQUIRES ASSIST WITH RIGHT LE FOR TRIPLANAR BED MOBILITY TOWARDS LEFT SIDE. MAINTAINES TTWB DURING TRANSFERS AND AMB.       SPEECH THERAPY        OCCUPATIONAL THERAPY     CURRENT IRF-LUCY SCORES  Eating: CARE Score: 5  Oral Hygiene: CARE Score: 5  Toileting: CARE Score: 2  Shower/Bathe: CARE Score: 3  Upper Body Dressing: CARE Score: 4  Lower Body Dressing: CARE Score: 2  Footwear: CARE Score: 2  Toilet Transfers: CARE Score: 3  Picking Up Object:  CARE Score: 88              STGs:  Short term goals  Time Frame for Short term goals: 1 week  Short term goal 1: complete LB dressing with AE with min A  Short term goal 2: complete toilet transfers with min A  Short term goal 3: complete toilet hygiene/clothing mgmt with min A  Short term goal 4: complete simple home making task using recommended mobility means without cues for WB protocol  Short term goal 5: complete 1 handed static act for 2 mins with min A  Short term goal 6: complete overall bathing with min A     LTGs:  Long term goals  Time Frame for Long term goals : 2 weeks  Long term goal 1: complet overall toileting with supervision  Long term goal 2: complete overall bathing with supervision  Long term goal 3: complete overall dressing with supervision  Long term goal 4: complete home making task using recommended mobility with supervision  Long term goal 5: complete HEP with independence  Long term goals 6: pt/family verbalize DME     Assessment:    12/08/20 1305   Assessment   Performance deficits / Impairments Decreased functional mobility ; Decreased strength;Decreased high-level IADLs;Decreased endurance;Decreased safe awareness;Decreased balance;Decreased ADL status   Assessment Pt benefits from further skilled therapy to address ADLs, WB protocol, AE training, functional mobility, home management, and home safety for increased independence at home   Treatment Diagnosis Right Femur Fx s/p IM nail   Prognosis Good   Decision Making Low Complexity   History clavicle fx with ORIF 6/22/20, Rapid response 12/5   Discharge Recommendations Home with Home health OT                        NUTRITION  Current Wt: Weight: 115 lb 1.6 oz (52.2 kg) / Body mass index is 20.39 kg/m². Admission Wt:   115 lb 1.6 oz (52.2 kg)  Oral Diet Orders:   CARB CONTROL  Oral Nutrition Supplement (ONS) Orders:  Diabetic Oral Supplement BID  Pt presents adequately nourished with PO intake >50%. Reports good appetite. Please see nutrition note for details. RECORD REVIEW: Previous medical records, medications were reviewed at today's visit    IMPRESSION:   1. Right hip fracture status post cephalomedullary nailing. Toe-touch weightbearing for 6 weeks-PT/OT  2. DVT prophylaxis for 6 weeks. Low-dose Eliquis  3. Diabetes-continue sliding scale and resume home oral hypoglycemic  4. History of dementia-continue Aricept and Namenda. Both doses are low. 5.  History of anxiety/depression-continue Effexor  6. Acute blood loss anemia -improving. Niferex added  Percocet scheduled.   Atenolol held   Staffing this date , mutlidisciplinary with entire team with complex decision making and planning for discharge  EMMYOS:  kamran

## 2020-12-09 NOTE — PROGRESS NOTES
Occupational Therapy     12/09/20 1445   General   Diagnosis R hip fx with nail,   Pain Assessment   Patient Currently in Pain Denies   Balance   Sitting Balance Stand by assistance   Standing Balance Minimal assistance   Transfers   Stand Pivot Transfers Minimal assistance   Sit to stand Minimal assistance   Stand to sit Minimal assistance   Toilet Transfers   Toilet - Technique Stand pivot   Equipment Used Grab bars   Toilet Transfer Minimal assistance   Type of ROM/Therapeutic Exercise   Type of ROM/Therapeutic Exercise Eugenio   Comment 8# RUE, 5# LUE, 3 sets x 15 reps. Assessment   Performance deficits / Impairments Decreased functional mobility ; Decreased strength;Decreased high-level IADLs;Decreased endurance;Decreased safe awareness;Decreased balance;Decreased ADL status   Treatment Diagnosis Right Femur Fx s/p IM nail   Prognosis Good   History clavicle fx with ORIF 6/22/20, Rapid response 12/5   Activity Tolerance   Activity Tolerance Patient Tolerated treatment well   Safety Devices   Safety Devices in place Yes   Type of devices Call light within reach; Bed alarm in place   Plan   Current Treatment Recommendations Positioning; Safety Education & Training;Functional Mobility Training;Home Management Training;Balance Training;Self-Care / ADL;Equipment Evaluation, Education, & procurement;Strengthening; Endurance Training;Patient/Caregiver Education & Training      12/09/20 8801 [address] Needed Partial/moderate assistance   CARE Score 3   Lower Body Dressing   Assistance Needed Partial/moderate assistance   Comment Using AE. CARE Score 3   Putting On/Taking Off Footwear   Assistance Needed Partial/moderate assistance   Comment Using AE, will need to assess shoes.    CARE Score 3      12/09/20 1445   Toilet Transfer   Assistance Needed Partial/moderate assistance   CARE Score 3

## 2020-12-09 NOTE — PLAN OF CARE
Problem: Falls - Risk of:  Goal: Will remain free from falls  Description: Will remain free from falls  12/9/2020 1104 by Ambika Montero RN  Outcome: Ongoing  12/8/2020 2345 by Ab Cadena RN  Outcome: Ongoing  Goal: Absence of physical injury  Description: Absence of physical injury  12/9/2020 1104 by Ambika Montero RN  Outcome: Ongoing  12/8/2020 2345 by Ab Cadena RN  Outcome: Ongoing

## 2020-12-09 NOTE — PLAN OF CARE
Problem: Falls - Risk of:  Goal: Will remain free from falls  Description: Will remain free from falls  12/8/2020 2345 by Nilson Valencia RN  Outcome: Ongoing  12/8/2020 1123 by Lane Pugh LPN  Outcome: Ongoing  12/8/2020 1123 by Lane Pugh LPN  Outcome: Ongoing  Goal: Absence of physical injury  Description: Absence of physical injury  12/8/2020 2345 by Nilson Valencia RN  Outcome: Ongoing  12/8/2020 1123 by Lane Pugh LPN  Outcome: Ongoing  12/8/2020 1123 by Lane Pugh LPN  Outcome: Ongoing     Problem: Skin Integrity:  Goal: Will show no infection signs and symptoms  Description: Will show no infection signs and symptoms  12/8/2020 2345 by Nilson Valencia RN  Outcome: Ongoing  12/8/2020 1123 by Lane Pugh LPN  Outcome: Ongoing  12/8/2020 1123 by Lane Pugh LPN  Outcome: Ongoing  Goal: Absence of new skin breakdown  Description: Absence of new skin breakdown  12/8/2020 2345 by Nilson Valencia RN  Outcome: Ongoing  12/8/2020 1123 by Lane Pugh LPN  Outcome: Ongoing  12/8/2020 1123 by Lane Pugh LPN  Outcome: Ongoing  Goal: Demonstration of wound healing without infection will improve  Description: Demonstration of wound healing without infection will improve  12/8/2020 2345 by Nilson Valencia RN  Outcome: Ongoing  12/8/2020 1123 by Lane Pugh LPN  Outcome: Ongoing  12/8/2020 1123 by Lane Pugh LPN  Outcome: Ongoing  Goal: Risk for impaired skin integrity will decrease  Description: Risk for impaired skin integrity will decrease  12/8/2020 2345 by Nilson Valencia RN  Outcome: Ongoing  12/8/2020 1123 by Lane Pugh LPN  Outcome: Ongoing  12/8/2020 1123 by Lane Pugh LPN  Outcome: Ongoing     Problem: Pain:  Goal: Pain level will decrease  Description: Pain level will decrease  12/8/2020 2345 by Nilson Valencia RN  Outcome: Ongoing  12/8/2020 1123 by aLne Pugh LPN  Outcome: Ongoing  12/8/2020 1123 by Rocio Joyner LPN  Outcome: Ongoing  Goal: Control of acute pain  Description: Control of acute pain  12/8/2020 2345 by Lor De Jesus RN  Outcome: Ongoing  12/8/2020 1123 by Rocio Joyner LPN  Outcome: Ongoing  12/8/2020 1123 by Rocio Joyner LPN  Outcome: Ongoing  Goal: Control of chronic pain  Description: Control of chronic pain  12/8/2020 2345 by Lor De Jesus RN  Outcome: Ongoing  12/8/2020 1123 by Rocio Joyner LPN  Outcome: Ongoing  12/8/2020 1123 by Rocio Joyner LPN  Outcome: Ongoing     Problem: SAFETY  Goal: Free from accidental physical injury  12/8/2020 2345 by Lor De Jesus RN  Outcome: Ongoing  12/8/2020 1123 by Rocio Joyner LPN  Outcome: Ongoing  12/8/2020 1123 by Rocio Joyner LPN  Outcome: Ongoing  Goal: Free from intentional harm  12/8/2020 2345 by Lor De Jesus RN  Outcome: Ongoing  12/8/2020 1123 by Rocio Joyner LPN  Outcome: Ongoing  12/8/2020 1123 by Rocio Joyner LPN  Outcome: Ongoing     Problem: DAILY CARE  Goal: Daily care needs are met  12/8/2020 2345 by Lor De Jesus RN  Outcome: Ongoing  12/8/2020 1123 by Rocio Joyner LPN  Outcome: Ongoing  12/8/2020 1123 by Rocio Joyner LPN  Outcome: Ongoing     Problem: PAIN  Goal: Patient's pain/discomfort is manageable  12/8/2020 2345 by Lor De Jesus RN  Outcome: Ongoing  12/8/2020 1123 by Rocio Joyner LPN  Outcome: Ongoing  12/8/2020 1123 by Rocio Joyner LPN  Outcome: Ongoing  Goal: STG - Patient will verbalize an acceptable level of pain  12/8/2020 2345 by Lor De Jesus RN  Outcome: Ongoing  12/8/2020 1123 by Rocio Joyner LPN  Outcome: Ongoing  12/8/2020 1123 by Rocio Joyner LPN  Outcome: Ongoing     Problem: SKIN INTEGRITY  Goal: Skin integrity is maintained or improved  12/8/2020 2345 by Lor De Jesus RN  Outcome: Ongoing  12/8/2020 1123 by Rocio Joyner LPN  Outcome: Ongoing  12/8/2020 1123 by Jayson Gonzáles LPN  Outcome: Ongoing  Goal: STG - patient will maintain good skin integrity  12/8/2020 2345 by Kevon Whitney RN  Outcome: Ongoing  12/8/2020 1123 by Jayson Gonzáles LPN  Outcome: Ongoing  12/8/2020 1123 by Jayson Gonzáles LPN  Outcome: Ongoing  Goal: STG - Patient exhibits signs of wound healing. 12/8/2020 2345 by Kevon Whitney RN  Outcome: Ongoing  12/8/2020 1123 by Jayson Gonzáles LPN  Outcome: Ongoing  12/8/2020 1123 by Jayson Gonzáles LPN  Outcome: Ongoing     Problem: KNOWLEDGE DEFICIT  Goal: Patient/S.O. demonstrates understanding of disease process, treatment plan, medications, and discharge instructions. 12/8/2020 2345 by Kevon Whitney RN  Outcome: Ongoing  12/8/2020 1123 by Jayson Gonzáles LPN  Outcome: Ongoing  12/8/2020 1123 by Jayson Gonzáles LPN  Outcome: Ongoing     Problem: DISCHARGE BARRIERS  Goal: Patient's continuum of care needs are met  12/8/2020 2345 by Kevon Whitney RN  Outcome: Ongoing  12/8/2020 1123 by Jayson Gonzáles LPN  Outcome: Ongoing  12/8/2020 1123 by Jayson Gonzáles LPN  Outcome: Ongoing     Problem: Mobility - Impaired:  Goal: Mobility will improve  Description: Mobility will improve  12/8/2020 2345 by Kevon Whitney RN  Outcome: Ongoing  12/8/2020 1123 by Jayson Gonzáles LPN  Outcome: Ongoing  12/8/2020 1123 by Jayson Gonzáles LPN  Outcome: Ongoing     Problem:  Activity:  Goal: Ability to ambulate will improve  Description: Ability to ambulate will improve  12/8/2020 2345 by Kevon Whitney RN  Outcome: Ongoing  12/8/2020 1123 by Jayson Gonzáles LPN  Outcome: Ongoing  12/8/2020 1123 by Jayson Gonzáles LPN  Outcome: Ongoing  Goal: Ability to perform activities at highest level will improve  Description: Ability to perform activities at highest level will improve  12/8/2020 2345 by Kevon Whitney RN  Outcome: Ongoing  12/8/2020 1123 by Jayson Gonzáles, show no signs and symptoms of excessive bleeding  Description: Will show no signs and symptoms of excessive bleeding  12/8/2020 2345 by Cuba Graff RN  Outcome: Ongoing  12/8/2020 1123 by Elier Ho LPN  Outcome: Ongoing  12/8/2020 1123 by Elier Ho LPN  Outcome: Ongoing     Problem: Infection - Surgical Site:  Goal: Will show no infection signs and symptoms  Description: Will show no infection signs and symptoms  12/8/2020 2345 by Cuba Graff RN  Outcome: Ongoing  12/8/2020 1123 by Elier Ho LPN  Outcome: Ongoing  12/8/2020 1123 by Elier Ho LPN  Outcome: Ongoing     Problem: Serum Glucose Level - Abnormal:  Goal: Ability to maintain appropriate glucose levels has stabilized  Description: Ability to maintain appropriate glucose levels has stabilized  12/8/2020 2345 by uCba Graff RN  Outcome: Ongoing  12/8/2020 1123 by Elier Ho LPN  Outcome: Ongoing  12/8/2020 1123 by Elier Ho LPN  Outcome: Ongoing     Problem: Mood - Altered:  Goal: Mood stable  Description: Mood stable  12/8/2020 2345 by Cuba Graff RN  Outcome: Ongoing  12/8/2020 1123 by Elier Ho LPN  Outcome: Ongoing  12/8/2020 1123 by Elier Ho LPN  Outcome: Ongoing

## 2020-12-09 NOTE — PROGRESS NOTES
Physical Therapy  Name: Annie Vasques  MRN:  [id number]  Date of service:  12/9/2020 12/09/20 1023 12/09/20 1028 12/09/20 1033   Restrictions/Precautions   Restrictions/Precautions  --   --  Weight Bearing; Fall Risk;Surgical Protocols   Lower Extremity Weight Bearing Restrictions   Right Lower Extremity Weight Bearing  --   --  Toe Touch Weight Bearing   Left Lower Extremity Weight Bearing  --   --  Weight Bearing As Tolerated   Position Activity Restriction   Other position/activity restrictions  --   --  L clavicle fx with ORIF 6/22/20   Subjective   Subjective  --   --   --    Bed Mobility   Supine to Sit  --   --   --    Sit to Supine  --   --   --    Transfers   Sit to Stand Minimal Assistance  --   --    Stand to sit Minimal Assistance  --   --    Bed to Chair Minimal assistance; Moderate assistance  --   --    Exercises   Comments  --  AAROM RLE and AROM LLE: AP, LAQ, hip flexion, hip abd/add x 15 ,   sit to stand maintaining TTWB RLE  x 6 with min assist  --    Other exercises   Other exercises?  --  Yes  --    Short term goals   Time Frame for Short term goals  --   --   --    Short term goal 1  --   --   --    Short term goal 2  --   --   --    Short term goal 3  --   --   --    Short term goal 4  --   --   --    Short term goal 5  --   --   --    Long term goals   Time Frame for Long term goals   --   --   --    Long term goal 1  --   --   --    Long term goal 2  --   --   --    Long term goal 3  --   --   --    Long term goal 4  --   --   --    Long term goal 5  --   --   --    Plan   Times per week  --   --   --    Times per day  --   --   --    Plan weeks  --   --   --    Current Treatment Recommendations  --   --   --       12/09/20 1047 12/09/20 1048 12/09/20 1056   Restrictions/Precautions   Restrictions/Precautions  --   --   --    Lower Extremity Weight Bearing Restrictions   Right Lower Extremity Weight Bearing  --   --   --    Left Lower Extremity Weight Bearing  --   --   --    Position Activity

## 2020-12-09 NOTE — PROGRESS NOTES
Occupational Therapy  Facility/Department: [facility] 8 REHAB UNIT  Daily Treatment Note  NAME: Tata Costello  : 1935  MRN: [id number]    Date of Service: 2020    Discharge Recommendations:  Home with Home health OT       Assessment   Performance deficits / Impairments: Decreased functional mobility ; Decreased strength;Decreased high-level IADLs;Decreased endurance;Decreased safe awareness;Decreased balance;Decreased ADL status  Assessment: Meds taken prior to session, so pain much tolerable than yesterday afternoon  OT Education: ADL Adaptive Strategies  Activity Tolerance  Activity Tolerance: Patient Tolerated treatment well  Safety Devices  Safety Devices in place: Yes  Type of devices: Bed alarm in place; Left in bed;Call light within reach         Patient Diagnosis(es): There were no encounter diagnoses. has a past medical history of Abdominal pain, Anxiety, Bell's palsy, Colon polyp, Depression, Diverticulosis, Female bladder prolapse, Hernia, abdominal, History of adenomatous polyp of colon, Hypertriglyceridemia, Neuropathy, Osteopenia, Type II or unspecified type diabetes mellitus without mention of complication, not stated as uncontrolled, and Vitamin B 12 deficiency. has a past surgical history that includes Hysterectomy (); Cholecystectomy (age 22); bladder repair (); Colonoscopy (02/15/2012); Upper gastrointestinal endoscopy (2012); Nasal polyp surgery; Appendectomy; Elbow fracture surgery (); Upper gastrointestinal endoscopy (1998); Colonoscopy (3/1998); Colonoscopy (07 ); Upper gastrointestinal endoscopy (07); Colonoscopy (04); Upper gastrointestinal endoscopy (N/A, 2016); Colonoscopy (N/A, 2016); hernia repair (); hernia repair ( ); Cataract removal (2014); Cataract removal (2015); eye surgery (Bilateral); Umbilical hernia repair (N/A, 2/15/2016);  Clavicle surgery (Left, 2020); and Femur fracture surgery (Right, 12/4/2020). Restrictions  Restrictions/Precautions  Restrictions/Precautions: Weight Bearing, Fall Risk, Surgical Protocols  Lower Extremity Weight Bearing Restrictions  Right Lower Extremity Weight Bearing: Toe Touch Weight Bearing(TTWBx6 weeks)  Left Lower Extremity Weight Bearing: Weight Bearing As Tolerated  Position Activity Restriction  Other position/activity restrictions: L clavicle fx with ORIF 6/22/20  Subjective   General  Chart Reviewed: Yes, Orders  Patient assessed for rehabilitation services?: Yes  Family / Caregiver Present: No  Diagnosis: R hip fx with nail,  Pain Assessment  Pain Assessment: 0-10  Pain Level: 4  Pain Type: Acute pain  Pain Location: Hip  Pain Orientation: Right  Pre Treatment Pain Screening  Intervention List: Patient able to continue with treatment  Comments / Details: meds prior to tx  Vital Signs  Patient Currently in Pain: Yes   Objective    Bed mobility  Supine to Sit: Minimal assistance(with bed rail and HOB elevated)  Sit to Supine:  Moderate assistance  Transfers  Stand Pivot Transfers: Minimal assistance  Sit to stand: Minimal assistance     LUE PROM (degrees)  LUE PROM: WFL  LUE AROM (degrees)  LUE AROM : WFL  LUE General AROM: left clavicle fx earlier this year  RUE PROM (degrees)  RUE PROM: WNL  RUE AROM (degrees)  RUE AROM : WNL      12/09/20 0815   Eating   Assistance Needed Setup or clean-up assistance   CARE Score 5   Oral Hygiene   Assistance Needed Setup or clean-up assistance   CARE Score [address] assistance   Comment assistance with pants up   CARE Score 3   Shower/Bathe Self   Assistance Needed Partial/moderate assistance   CARE Score 3   Upper Body Dressing   Assistance Needed Setup or clean-up assistance   CARE Score 5   Lower Body Dressing   Assistance Needed Substantial/maximal assistance   CARE Score 2      12/09/20 0815   Toilet Transfer   Assistance Needed Partial/moderate assistance   Comment min A CARE Score 3     Plan   Plan  Specific instructions for Next Treatment: AE training  Current Treatment Recommendations: Positioning, Safety Education & Training, Functional Mobility Training, Home Management Training, Balance Training, Self-Care / ADL, Equipment Evaluation, Education, & procurement, Strengthening, Endurance Training, Patient/Caregiver Education & Training    Goals  Short term goals  Time Frame for Short term goals: 1 week  Short term goal 1: complete LB dressing with AE with min A  Short term goal 2: complete toilet transfers with min A  Short term goal 3: complete toilet hygiene/clothing mgmt with min A  Short term goal 4: complete simple home making task using recommended mobility means without cues for WB protocol  Short term goal 5: complete 1 handed static act for 2 mins with min A  Short term goal 6: complete overall bathing with min A  Long term goals  Time Frame for Long term goals : 2 weeks  Long term goal 1: complet overall toileting with supervision  Long term goal 2: complete overall bathing with supervision  Long term goal 3: complete overall dressing with supervision  Long term goal 4: complete home making task using recommended mobility with supervision  Long term goal 5: complete HEP with independence  Long term goals 6: pt/family verbalize DME  Patient Goals   Patient goals : return home       Therapy Time   Individual Concurrent Group Co-treatment   Time In 0815         Time Out 0900         Minutes 45         Timed Code Treatment Minutes: 45 Minutes     Electronically signed by Benoit Dodd OT on 12/9/2020 at 10:36 AM

## 2020-12-10 LAB
ANION GAP SERPL CALCULATED.3IONS-SCNC: 8 MMOL/L (ref 7–19)
BASOPHILS ABSOLUTE: 0 K/UL (ref 0–0.2)
BASOPHILS RELATIVE PERCENT: 0.4 % (ref 0–1)
BUN BLDV-MCNC: 16 MG/DL (ref 8–23)
CALCIUM SERPL-MCNC: 9.2 MG/DL (ref 8.8–10.2)
CHLORIDE BLD-SCNC: 104 MMOL/L (ref 98–111)
CO2: 30 MMOL/L (ref 22–29)
CREAT SERPL-MCNC: 0.4 MG/DL (ref 0.5–0.9)
EOSINOPHILS ABSOLUTE: 0.1 K/UL (ref 0–0.6)
EOSINOPHILS RELATIVE PERCENT: 1 % (ref 0–5)
GFR AFRICAN AMERICAN: >59
GFR NON-AFRICAN AMERICAN: >60
GLUCOSE BLD-MCNC: 109 MG/DL (ref 70–99)
GLUCOSE BLD-MCNC: 116 MG/DL (ref 74–109)
GLUCOSE BLD-MCNC: 124 MG/DL (ref 70–99)
GLUCOSE BLD-MCNC: 128 MG/DL (ref 70–99)
GLUCOSE BLD-MCNC: 178 MG/DL (ref 70–99)
HCT VFR BLD CALC: 26.6 % (ref 37–47)
HEMOGLOBIN: 8.3 G/DL (ref 12–16)
IMMATURE GRANULOCYTES #: 0.1 K/UL
LYMPHOCYTES ABSOLUTE: 3.1 K/UL (ref 1.1–4.5)
LYMPHOCYTES RELATIVE PERCENT: 39.2 % (ref 20–40)
MCH RBC QN AUTO: 30.3 PG (ref 27–31)
MCHC RBC AUTO-ENTMCNC: 31.2 G/DL (ref 33–37)
MCV RBC AUTO: 97.1 FL (ref 81–99)
MONOCYTES ABSOLUTE: 0.8 K/UL (ref 0–0.9)
MONOCYTES RELATIVE PERCENT: 10.2 % (ref 0–10)
NEUTROPHILS ABSOLUTE: 3.8 K/UL (ref 1.5–7.5)
NEUTROPHILS RELATIVE PERCENT: 48.6 % (ref 50–65)
PDW BLD-RTO: 13.9 % (ref 11.5–14.5)
PERFORMED ON: ABNORMAL
PLATELET # BLD: 345 K/UL (ref 130–400)
PMV BLD AUTO: 9.9 FL (ref 9.4–12.3)
POTASSIUM REFLEX MAGNESIUM: 5.1 MMOL/L (ref 3.5–5)
RBC # BLD: 2.74 M/UL (ref 4.2–5.4)
SODIUM BLD-SCNC: 142 MMOL/L (ref 136–145)
WBC # BLD: 7.9 K/UL (ref 4.8–10.8)

## 2020-12-10 PROCEDURE — 97530 THERAPEUTIC ACTIVITIES: CPT

## 2020-12-10 PROCEDURE — 2580000003 HC RX 258: Performed by: INTERNAL MEDICINE

## 2020-12-10 PROCEDURE — 6370000000 HC RX 637 (ALT 250 FOR IP): Performed by: INTERNAL MEDICINE

## 2020-12-10 PROCEDURE — 97116 GAIT TRAINING THERAPY: CPT

## 2020-12-10 PROCEDURE — 36415 COLL VENOUS BLD VENIPUNCTURE: CPT

## 2020-12-10 PROCEDURE — 99232 SBSQ HOSP IP/OBS MODERATE 35: CPT | Performed by: PSYCHIATRY & NEUROLOGY

## 2020-12-10 PROCEDURE — 97535 SELF CARE MNGMENT TRAINING: CPT

## 2020-12-10 PROCEDURE — 97110 THERAPEUTIC EXERCISES: CPT

## 2020-12-10 PROCEDURE — 1180000000 HC REHAB R&B

## 2020-12-10 PROCEDURE — 82947 ASSAY GLUCOSE BLOOD QUANT: CPT

## 2020-12-10 PROCEDURE — 85025 COMPLETE CBC W/AUTO DIFF WBC: CPT

## 2020-12-10 PROCEDURE — 80048 BASIC METABOLIC PNL TOTAL CA: CPT

## 2020-12-10 PROCEDURE — 6370000000 HC RX 637 (ALT 250 FOR IP): Performed by: PSYCHIATRY & NEUROLOGY

## 2020-12-10 RX ORDER — PANTOPRAZOLE SODIUM 40 MG/1
40 TABLET, DELAYED RELEASE ORAL
Status: DISCONTINUED | OUTPATIENT
Start: 2020-12-10 | End: 2020-12-22 | Stop reason: HOSPADM

## 2020-12-10 RX ADMIN — POLYETHYLENE GLYCOL 3350 17 G: 17 POWDER, FOR SOLUTION ORAL at 20:48

## 2020-12-10 RX ADMIN — APIXABAN 2.5 MG: 2.5 TABLET, FILM COATED ORAL at 08:18

## 2020-12-10 RX ADMIN — ATORVASTATIN CALCIUM 10 MG: 10 TABLET, FILM COATED ORAL at 08:18

## 2020-12-10 RX ADMIN — OXYCODONE HYDROCHLORIDE AND ACETAMINOPHEN 1 TABLET: 5; 325 TABLET ORAL at 13:56

## 2020-12-10 RX ADMIN — OXYCODONE HYDROCHLORIDE AND ACETAMINOPHEN 1 TABLET: 5; 325 TABLET ORAL at 08:19

## 2020-12-10 RX ADMIN — Medication 150 MG: at 08:18

## 2020-12-10 RX ADMIN — APIXABAN 2.5 MG: 2.5 TABLET, FILM COATED ORAL at 20:48

## 2020-12-10 RX ADMIN — OXYCODONE HYDROCHLORIDE 5 MG: 5 TABLET ORAL at 10:00

## 2020-12-10 RX ADMIN — METFORMIN HYDROCHLORIDE 500 MG: 500 TABLET ORAL at 08:18

## 2020-12-10 RX ADMIN — DOCUSATE SODIUM 50 MG AND SENNOSIDES 8.6 MG 1 TABLET: 8.6; 5 TABLET, FILM COATED ORAL at 20:48

## 2020-12-10 RX ADMIN — SODIUM CHLORIDE, PRESERVATIVE FREE 10 ML: 5 INJECTION INTRAVENOUS at 08:20

## 2020-12-10 RX ADMIN — OXYCODONE HYDROCHLORIDE 5 MG: 5 TABLET ORAL at 13:09

## 2020-12-10 RX ADMIN — OXYCODONE HYDROCHLORIDE AND ACETAMINOPHEN 1 TABLET: 5; 325 TABLET ORAL at 20:48

## 2020-12-10 RX ADMIN — MEMANTINE HYDROCHLORIDE 5 MG: 5 TABLET ORAL at 08:18

## 2020-12-10 RX ADMIN — VENLAFAXINE HYDROCHLORIDE 37.5 MG: 37.5 CAPSULE, EXTENDED RELEASE ORAL at 08:18

## 2020-12-10 RX ADMIN — DONEPEZIL HYDROCHLORIDE 5 MG: 5 TABLET, FILM COATED ORAL at 08:18

## 2020-12-10 RX ADMIN — ASPIRIN 81 MG: 81 TABLET, COATED ORAL at 08:18

## 2020-12-10 RX ADMIN — GABAPENTIN 300 MG: 300 CAPSULE ORAL at 20:48

## 2020-12-10 RX ADMIN — DOCUSATE SODIUM 50 MG AND SENNOSIDES 8.6 MG 1 TABLET: 8.6; 5 TABLET, FILM COATED ORAL at 08:18

## 2020-12-10 ASSESSMENT — PAIN SCALES - GENERAL
PAINLEVEL_OUTOF10: 1
PAINLEVEL_OUTOF10: 5
PAINLEVEL_OUTOF10: 0
PAINLEVEL_OUTOF10: 5
PAINLEVEL_OUTOF10: 1
PAINLEVEL_OUTOF10: 5

## 2020-12-10 ASSESSMENT — PAIN DESCRIPTION - PROGRESSION
CLINICAL_PROGRESSION: NOT CHANGED
CLINICAL_PROGRESSION: GRADUALLY IMPROVING
CLINICAL_PROGRESSION: NOT CHANGED

## 2020-12-10 ASSESSMENT — PAIN DESCRIPTION - ORIENTATION
ORIENTATION: RIGHT;LEFT
ORIENTATION: RIGHT;LEFT

## 2020-12-10 ASSESSMENT — PAIN DESCRIPTION - DESCRIPTORS
DESCRIPTORS: ACHING;SORE
DESCRIPTORS: ACHING;SORE

## 2020-12-10 ASSESSMENT — PAIN DESCRIPTION - PAIN TYPE
TYPE: ACUTE PAIN
TYPE: ACUTE PAIN
TYPE: SURGICAL PAIN
TYPE: ACUTE PAIN

## 2020-12-10 ASSESSMENT — PAIN DESCRIPTION - LOCATION
LOCATION: HIP;SHOULDER
LOCATION: SHOULDER;HIP;LEG

## 2020-12-10 ASSESSMENT — PAIN DESCRIPTION - ONSET
ONSET: GRADUAL
ONSET: ON-GOING
ONSET: ON-GOING

## 2020-12-10 ASSESSMENT — PAIN DESCRIPTION - FREQUENCY
FREQUENCY: CONTINUOUS
FREQUENCY: CONTINUOUS
FREQUENCY: INTERMITTENT

## 2020-12-10 NOTE — PLAN OF CARE
Problem: Falls - Risk of:  Goal: Will remain free from falls  Description: Will remain free from falls  12/10/2020 0948 by Estephania Manzano RN  Outcome: Ongoing  12/10/2020 0137 by Cuba Graff RN  Outcome: Ongoing  Goal: Absence of physical injury  Description: Absence of physical injury  12/10/2020 0948 by Estephania Manzano RN  Outcome: Ongoing  12/10/2020 0137 by Cuba Graff RN  Outcome: Ongoing

## 2020-12-10 NOTE — PROGRESS NOTES
Occupational Therapy     12/10/20 1300   General   Diagnosis R hip fx with nail,   Pain Assessment   Patient Currently in Pain Yes   Pain Assessment 0-10   Pain Level 5   Pain Type Acute pain   Pain Location Shoulder;Hip;Leg   Pain Orientation Right;Left   Pain Descriptors Aching; Sore   Pain Frequency Continuous   Clinical Progression Not changed   Response to Pain Intervention Patient Satisfied   Balance   Sitting Balance Stand by assistance   Standing Balance Minimal assistance   Transfers   Stand Pivot Transfers Minimal assistance   Sit to stand Minimal assistance   Stand to sit Minimal assistance   Toilet Transfers   Toilet - Technique Stand pivot   Equipment Used Grab bars   Toilet Transfer Minimal assistance   Type of ROM/Therapeutic Exercise   Type of ROM/Therapeutic Exercise Free weights   Comment Free weight: 1# RUE proximal and distal, 1# LUE distal only, 2 sets x 15 reps. Assessment   Performance deficits / Impairments Decreased functional mobility ; Decreased strength;Decreased high-level IADLs;Decreased endurance;Decreased safe awareness;Decreased balance;Decreased ADL status   Treatment Diagnosis Right Femur Fx s/p IM nail   History clavicle fx with ORIF 6/22/20, Rapid response 12/5   Timed Code Treatment Minutes 45 Minutes   Activity Tolerance   Activity Tolerance Patient Tolerated treatment well   Safety Devices   Safety Devices in place Yes   Type of devices Gait belt  (Given to PT.)   Plan   Current Treatment Recommendations Positioning; Safety Education & Training;Functional Mobility Training;Home Management Training;Balance Training;Self-Care / ADL;Equipment Evaluation, Education, & procurement;Strengthening; Endurance Training;Patient/Caregiver Education & Training      12/10/20 [address] Needed Partial/moderate assistance   CARE Score 3

## 2020-12-10 NOTE — PROGRESS NOTES
Clarissa Cummings  [id number]     12/10/20 1351 12/10/20 1356 12/10/20 1401   Restrictions/Precautions   Restrictions/Precautions Weight Bearing; Fall Risk;Surgical Protocols  --   --    Lower Extremity Weight Bearing Restrictions   Right Lower Extremity Weight Bearing Toe Touch Weight Bearing  --   --    Left Lower Extremity Weight Bearing Weight Bearing As Tolerated  --   --    Position Activity Restriction   Other position/activity restrictions L clavicle fx with ORIF 6/22/20  --   --    Subjective   Subjective Pt agreeable to work with therapy. --   --    Pain Assessment   Pain Level  --  1  --    Ambulation   Ambulation?  --   --  No   Stairs/Curb   Stairs?  --   --  No   Wheelchair Activities   Propulsion  --   --  Yes   Propulsion 1   Propulsion  --   --  Manual   Level  --   --  Level Tile   Method  --   --  RUE;TOBIAS   Level of Assistance  --   --  Contact guard assistance   Description/ Details  --   --  Some veering to the R. requires VC to correct   Distance  --   --  200'   Propulsion 2   Propulsion  --   --  Manual   Level  --   --  Level Tile   Method  --   --  LUE;CARTER   Level of Assistance  --   --  Contact guard assistance   Description/ Details  --   --  Same as propulsion 1   Distance  --   --  200'   Exercises   Comments  --   --   --    Conditions Requiring Skilled Therapeutic Intervention   Body structures, Functions, Activity limitations  --   --   --    Assessment  --   --   --    Activity Tolerance   Activity Tolerance  --   --   --    Safety Devices   Type of devices  --   --   --       12/10/20 1424 12/10/20 1426   Restrictions/Precautions   Restrictions/Precautions  --   --    Lower Extremity Weight Bearing Restrictions   Right Lower Extremity Weight Bearing  --   --    Left Lower Extremity Weight Bearing  --   --    Position Activity Restriction   Other position/activity restrictions  --   --    Subjective   Subjective  --   --    Pain Assessment   Pain Level  --   --    Ambulation   Ambulation? --   --    Stairs/Curb   Stairs?  --   --    Wheelchair Activities   Propulsion  --   --    Propulsion 1   Propulsion  --   --    Level  --   --    Method  --   --    Level of Assistance  --   --    Description/ Details  --   --    Distance  --   --    Propulsion 2   Propulsion  --   --    Level  --   --    Method  --   --    Level of Assistance  --   --    Description/ Details  --   --    Distance  --   --    Exercises   Comments seated BLE ther ex x15  --    Conditions Requiring Skilled Therapeutic Intervention   Body structures, Functions, Activity limitations Decreased functional mobility ; Decreased ADL status; Decreased ROM; Decreased strength;Decreased cognition;Decreased endurance;Decreased sensation;Decreased balance;Decreased posture;Decreased coordination  --    Assessment Pt toleraetd treatmenr well. Pt propelled self in [place] 200' x2 CGA w/ VC to correct steering; pt veers to the right occasionally. Pt performed seated BLE ex x15. Pt left in room in [facility].  --    Activity Tolerance   Activity Tolerance Patient Tolerated treatment well  --    Safety Devices   Type of devices  --  Left in chair;Call light within reach; Chair alarm in place   Electronically signed by Wojciech Brady on 12/10/2020 at 2:44 PM

## 2020-12-10 NOTE — PROGRESS NOTES
Name: Annie Vasques  MRN:  [id number]  Date of service:  12/10/2020     12/10/20 0901   Restrictions/Precautions   Restrictions/Precautions Weight Bearing; Fall Risk;Surgical Protocols   Lower Extremity Weight Bearing Restrictions   Right Lower Extremity Weight Bearing Toe Touch Weight Bearing   Left Lower Extremity Weight Bearing Weight Bearing As Tolerated   Position Activity Restriction   Other position/activity restrictions L clavicle fx with ORIF 6/22/20   General   Chart Reviewed Yes   Subjective   Subjective Pt agreeable to work with therapy, but says that her L shoulder is causing her more pain this morning and she may not be able to do as much. Pain Screening   Patient Currently in Pain Yes   Pain Assessment   Pain Assessment 0-10   Pain Level 5  (5/10 in RLE, 10/10 in L shoulder)   Pain Type Acute pain   Pain Location Hip; Shoulder   Bed Mobility   Sit to Supine Minimal assistance  (Assist with LLE)   Transfers   Sit to Stand Minimal Assistance   Stand to sit Minimal Assistance   Stand Pivot Transfers Moderate Assistance   Comment Pt stand/pivot wc<>toilet, wc to bed. Able to perform toileting and clean up IND with min assist to stand from toilet and CGA for standing balance to pull her pants back up. Ambulation   Ambulation? Yes   WB Status TTWB RLE   Ambulation 1   Surface level tile   Device Parallel Bars   Assistance Contact guard assistance   Quality of Gait Insufficient push force with UE's for unloading weight on LLE, unable to achieve lift off with LLE. Emphasis on pushing up with BUE and \"hopping\" with LLE but pt unable to clear LLE. Distance 1'   Comments Stayed in place to focus on pushing force with BUE and clearing LLE   Exercises   Comments Practiced stand/pivot TF and STS TF in room, seated BLE ther ex x15 reps each in all planes AROM   Conditions Requiring Skilled Therapeutic Intervention   Body structures, Functions, Activity limitations Decreased functional mobility ; Decreased ADL status; Decreased ROM; Decreased strength;Decreased cognition;Decreased endurance;Decreased sensation;Decreased balance;Decreased posture;Decreased coordination   Assessment Pt cont to require min-mod assist for all bed mobility and TF at this time. Continued to work on gait in II bars with emphasis on push up through BUE to clear LLE, but pt unable to clear LLE even without advancing forward. Pt having increased pain so remainder of tx completed in sitting, then TF BTB to rest. Pt would cont to benefit from skilled therapy services to address remaining strength and mobility impairments. Activity Tolerance   Activity Tolerance Patient limited by pain; Patient limited by fatigue;Patient limited by endurance   Safety Devices   Type of devices Bed alarm in place;Call light within reach;Gait belt;Left in bed         Electronically signed by Essie Hatfiedl PTA on 12/10/2020 at 10:00 AM

## 2020-12-10 NOTE — PROGRESS NOTES
Patient:   Mercedes Colon  MR#:    [id number]   Room:    0823/823-01   YOB: 1935  Date of Progress Note: 12/10/2020  Time of Note                           9:08 AM  Consulting Physician:   Rachel Conway M.D. Attending Physician:  Rachel Conway MD        CHIEF COMPLAINT: Right hip pain        Subjective: This 81 y.o. 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 She presented to [facility] ER on 12/3/20 after having a fall at home where she tripped and fell forward hitting her right side of her head and her right hip. She has a hematoma to her right frontotemporal aread and had been unable to bear weight on her right lower extremity since her fall. CT of head was negative for acute changes. X-ray done of her right femur revealed an acute traumatic displaced peritrochanteric fracture. She was admitted to the hospitalist service with consult for orthopedic surgery. She was seen by Dr. Los Benitez, who recommended Cephalomedullary Nailing of her fracture. She was in agreement and went for surgery on 12/4/20. She tolerated the procedure well. Post op she had chest pain, rapid response was called. EKG and cardiac enzymes were unremarkable. CTA negative for pulmonary emboli. Echocardiogram ordered and reveals EF of 55-60%. Patient has had no further chest pain. She will be toe-touch weightbearing on her right lower extremity for 6 weeks. She will need DVT prophylaxis for 6 weeks. No significant complaints this morning. Blood pressure adequate since atenolol has been held.   REVIEW OF SYSTEMS:  Constitutional: No fevers No chills  Neck:No stiffness  Respiratory: No shortness of breath  Cardiovascular: No chest pain No palpitations  Gastrointestinal: No abdominal pain    Genitourinary: No Dysuria  Neurological: No headache, no confusion      PHYSICAL EXAM:  /65   Pulse 71   Temp 97 °F (36.1 °C) (Temporal)   Resp 16   Ht 5' 3\" (1.6 m)   Wt 115 lb 1.6 oz (52.2 kg)   SpO2 92%   BMI 20.39 kg/m²     Constitutional: she appears well-developed and well-nourished. Eyes - conjunctiva normal.  Pupils react to light  Ear, nose, throat -hearing intact to voice. No scars, masses, or lesions over external nose or ears, no atrophy of tongue  Neck-symmetric, no masses noted, no jugular vein distension  Respiration- chest wall appears symmetric, good expansion,   normal effort without use of accessory muscles  Cardiovascular- RRR  Musculoskeletal - no significant wasting of muscles noted, no bony deformities, gait no gross ataxia  Extremities-no clubbing, cyanosis or edema  Skin - warm, dry, and intact. No rash, erythema, or pallor. Psychiatric - mood, affect, and behavior appear normal.      Neurology  NEUROLOGICAL EXAM:      Mental status   Awake, alert, fluent oriented x 3 appropriate affect  Attention and concentration appear appropriate  Recent and remote memory appears unremarkable  Speech normal without dysarthria  No clear issues with language       Cranial Nerves   CN II- Visual fields grossly unremarkable  CN III, IV,VI-EOMI, No nystagmus, conjugate eye movements, no ptosis  CN VII-right hemifacial spasm  CN VIII-Hearing intact      Motor function  Antigravity x 4     Sensory function Intact to light touch     Cerebellar F-N intact     Tremor None present     Gait                  Not tested           Nursing/pcp notes, imaging,labs and vitals reviewed.      PT,OT and/or speech notes reviewed    Lab Results   Component Value Date    WBC 7.9 12/10/2020    HGB 8.3 (L) 12/10/2020    HCT 26.6 (L) 12/10/2020    MCV 97.1 12/10/2020     12/10/2020     Lab Results   Component Value Date     12/10/2020    K 5.1 (H) 12/10/2020     12/10/2020    CO2 30 (H) 12/10/2020    BUN 16 12/10/2020    CREATININE 0.4 (L) 12/10/2020    GLUCOSE 116 (H) 12/10/2020    CALCIUM 9.2 12/10/2020    PROT 6.4 (L) 12/03/2020    LABALBU 4.0 12/03/2020    BILITOT <0.2 12/03/2020    ALKPHOS 72 12/03/2020    AST 17 12/03/2020    ALT 15 12/03/2020    LABGLOM >60 12/10/2020    GFRAA >59 12/10/2020    GLOB 2.4 10/11/2016   No results found for: INRNo results found for: PHENYTOIN, ESR, CRP    PHYSICAL THERAPY  STRENGTH  Strength RLE  Strength RLE: Exception  Comment: HIP 2/5; KNEE 3/5; ANKLE 4/5  Strength LLE  Strength LLE: WFL  ROM  AROM RLE (degrees)  RLE AROM: Exceptions  RLE General AROM: DECREASED RIGHT HIP/KNEE  AROM LLE (degrees)  LLE AROM : WFL  BED MOBILITY  Bed Mobility  Supine to Sit: Moderate assistance, Minimal assistance(TRIPLANAR TO LEFT, ASSIST WITH LEFT )  Sit to Supine: Moderate assistance, Minimal assistance(TRIPLANAR )  TRANSFERS  Transfers  Sit to Stand: Moderate Assistance, Minimal Assistance(PULL TO STAND PARALLLE BARS)  Stand to sit: Moderate Assistance, Minimal Assistance  Bed to Chair: Moderate assistance, Minimal assistance(PARTIAL STAND, MOVE HAND TO FAR SURFACE SQT PIVOT)  Car Transfer: Moderate Assistance, Minimal Assistance  WHEELCHAIR PROPULSION  Propulsion 1  Propulsion: Manual  Level: Level Tile  Method: TOBIAS MACHADO  Level of Assistance: Contact guard assistance  Distance: 150 FT  AMBULATION  Ambulation 1  Device: Parallel Bars  Assistance: Moderate assistance, Minimal assistance  Quality of Gait: HANDS, RIGHT TOES, LEFT SWING. NONCONTINUOUS SWING WITH PATIENT TOUCHING TOES DOWN BEFORE THEY WERE EVEN WITH HANDS. RQUIRED UPWARD FORCE BEFORE WOULD ATTEMPT SWING.    Distance: 12 FT  STAIRS  GOALS:  Short term goals  Time Frame for Short term goals: 2 WEEKS  Short term goal 1: BED MOBILITY CGA  Short term goal 2: TRANSFERS MIN A  Short term goal 3: PROPEL  FT SBA  Short term goal 4: AMBULATE 25 FT WITH RW CGA  Short term goal 5: UP/DOWN 6 IN STEP WITH RW MOD A     Long term goals  Time Frame for Long term goals : 4 WEEKS  Long term goal 1: INDEP BED MOB  Long term goal 2: INDEP TF SURFACE TO SURFACE  Long term goal 3: PROPEL  FT INDEP  Long term goal 4: AMBULATE 50 FT WITH RW INDEP  Long term goal 5: UP/DWON 6 IN STEP WITH RW CGA     ASSESSMENT:  Assessment: REQUIRES ASSIST WITH RIGHT LE FOR TRIPLANAR BED MOBILITY TOWARDS LEFT SIDE. MAINTAINES TTWB DURING TRANSFERS AND AMB.       SPEECH THERAPY        OCCUPATIONAL THERAPY     CURRENT IRF-LUCY SCORES  Eating: CARE Score: 5  Oral Hygiene: CARE Score: 5  Toileting: CARE Score: 2  Shower/Bathe: CARE Score: 3  Upper Body Dressing: CARE Score: 4  Lower Body Dressing: CARE Score: 2  Footwear: CARE Score: 2  Toilet Transfers: CARE Score: 3  Picking Up Object:  CARE Score: 88              STGs:  Short term goals  Time Frame for Short term goals: 1 week  Short term goal 1: complete LB dressing with AE with min A  Short term goal 2: complete toilet transfers with min A  Short term goal 3: complete toilet hygiene/clothing mgmt with min A  Short term goal 4: complete simple home making task using recommended mobility means without cues for WB protocol  Short term goal 5: complete 1 handed static act for 2 mins with min A  Short term goal 6: complete overall bathing with min A     LTGs:  Long term goals  Time Frame for Long term goals : 2 weeks  Long term goal 1: complet overall toileting with supervision  Long term goal 2: complete overall bathing with supervision  Long term goal 3: complete overall dressing with supervision  Long term goal 4: complete home making task using recommended mobility with supervision  Long term goal 5: complete HEP with independence  Long term goals 6: pt/family verbalize DME     Assessment:    12/08/20 1305   Assessment   Performance deficits / Impairments Decreased functional mobility ; Decreased strength;Decreased high-level IADLs;Decreased endurance;Decreased safe awareness;Decreased balance;Decreased ADL status   Assessment Pt benefits from further skilled therapy to address ADLs, WB protocol, AE training, functional mobility, home management, and home safety for increased independence at home   Treatment Diagnosis Right Femur Fx s/p IM nail   Prognosis Good   Decision Making Low Complexity   History clavicle fx with ORIF 6/22/20, Rapid response 12/5   Discharge Recommendations Home with Home health OT                        NUTRITION  Current Wt: Weight: 115 lb 1.6 oz (52.2 kg) / Body mass index is 20.39 kg/m². Admission Wt:   115 lb 1.6 oz (52.2 kg)  Oral Diet Orders:   CARB CONTROL  Oral Nutrition Supplement (ONS) Orders:  Diabetic Oral Supplement BID  Pt presents adequately nourished with PO intake >50%. Reports good appetite. Please see nutrition note for details. RECORD REVIEW: Previous medical records, medications were reviewed at today's visit    IMPRESSION:   1. Right hip fracture status post cephalomedullary nailing. Toe-touch weightbearing for 6 weeks-PT/OT  2. DVT prophylaxis for 6 weeks. Low-dose Eliquis  3. Diabetes-continue sliding scale and resume home oral hypoglycemic  4. History of dementia-continue Aricept and Namenda. Both doses are low. 5.  History of anxiety/depression-continue Effexor  6. Acute blood loss anemia -improving. Niferex added  Percocet scheduled.   Atenolol held    ELOS:  restaff next week

## 2020-12-10 NOTE — PROGRESS NOTES
Occupational Therapy     12/10/20 0815   General   Diagnosis R hip fx with nail,   Subjective   Subjective c/o increased L shldr pain this morning. Pain Assessment   Patient Currently in Pain Yes   Pain Assessment 0-10   Pain Level 5   Pain Type Surgical pain   Pain Location Hip; Shoulder   Pain Orientation Right;Left  (R hip.)   Pain Descriptors Aching; Sore   Pain Frequency Continuous   Clinical Progression Not changed   Response to Pain Intervention Patient Satisfied   Balance   Sitting Balance Stand by assistance   Standing Balance Minimal assistance   Transfers   Stand Pivot Transfers Moderate assistance  (Bed>w/c, w/c<>mat table.)   Sit to stand Minimal assistance   Stand to sit Minimal assistance   Transfer Comments C/o increased pain in L shdlr this tx session. Toilet Transfers   Toilet - Technique Stand pivot   Equipment Used Grab bars   Toilet Transfer Minimal assistance   Assessment   Performance deficits / Impairments Decreased functional mobility ; Decreased strength;Decreased high-level IADLs;Decreased endurance;Decreased safe awareness;Decreased balance;Decreased ADL status   Treatment Diagnosis Right Femur Fx s/p IM nail   Prognosis Good   History clavicle fx with ORIF 6/22/20, Rapid response 12/5   Timed Code Treatment Minutes 45 Minutes   Activity Tolerance   Activity Tolerance Patient Tolerated treatment well   Safety Devices   Safety Devices in place Yes   Type of devices Gait belt  (Given to PT.)   Plan   Current Treatment Recommendations Positioning; Safety Education & Training;Functional Mobility Training;Home Management Training;Balance Training;Self-Care / ADL;Equipment Evaluation, Education, & procurement;Strengthening; Endurance Training;Patient/Caregiver Education & Training      12/10/20 3027   Toileting Hygiene   Assistance Needed Partial/moderate assistance   CARE Score 3

## 2020-12-10 NOTE — PLAN OF CARE
Impaired:  Goal: Mobility will improve  Description: Mobility will improve  Outcome: Ongoing     Problem:  Activity:  Goal: Ability to ambulate will improve  Description: Ability to ambulate will improve  Outcome: Ongoing  Goal: Ability to perform activities at highest level will improve  Description: Ability to perform activities at highest level will improve  Outcome: Ongoing     Problem: Nutritional:  Goal: Ability to attain and maintain optimal nutritional status will improve  Description: Ability to attain and maintain optimal nutritional status will improve  Outcome: Ongoing     Problem: Safety:  Goal: Ability to remain free from injury will improve  Description: Ability to remain free from injury will improve  Outcome: Ongoing     Problem: IP BLADDER/VOIDING  Goal: LTG - Patient will utilize adaptive techniques/equipment to complete bladder elimination  Outcome: Ongoing     Problem: IP BOWEL ELIMINATION  Goal: LTG - patient will have regular and routine bowel evacuation  Outcome: Ongoing     Problem: IP BREATHING  Goal: LTG - Patient/caregiver will demonstrate/perform proper techniques to maintain patent airway  Outcome: Ongoing     Problem: NUTRITION  Goal: Patient maintains adequate hydration  Outcome: Ongoing     Problem: Bleeding:  Goal: Will show no signs and symptoms of excessive bleeding  Description: Will show no signs and symptoms of excessive bleeding  Outcome: Ongoing     Problem: Infection - Surgical Site:  Goal: Will show no infection signs and symptoms  Description: Will show no infection signs and symptoms  Outcome: Ongoing     Problem: Serum Glucose Level - Abnormal:  Goal: Ability to maintain appropriate glucose levels has stabilized  Description: Ability to maintain appropriate glucose levels has stabilized  Outcome: Ongoing     Problem: Mood - Altered:  Goal: Mood stable  Description: Mood stable  Outcome: Ongoing

## 2020-12-11 LAB
GLUCOSE BLD-MCNC: 111 MG/DL (ref 70–99)
GLUCOSE BLD-MCNC: 117 MG/DL (ref 70–99)
GLUCOSE BLD-MCNC: 123 MG/DL (ref 70–99)
GLUCOSE BLD-MCNC: 143 MG/DL (ref 70–99)
PERFORMED ON: ABNORMAL

## 2020-12-11 PROCEDURE — 99232 SBSQ HOSP IP/OBS MODERATE 35: CPT | Performed by: PSYCHIATRY & NEUROLOGY

## 2020-12-11 PROCEDURE — 82947 ASSAY GLUCOSE BLOOD QUANT: CPT

## 2020-12-11 PROCEDURE — 97116 GAIT TRAINING THERAPY: CPT

## 2020-12-11 PROCEDURE — 97530 THERAPEUTIC ACTIVITIES: CPT

## 2020-12-11 PROCEDURE — 6370000000 HC RX 637 (ALT 250 FOR IP): Performed by: INTERNAL MEDICINE

## 2020-12-11 PROCEDURE — 6370000000 HC RX 637 (ALT 250 FOR IP): Performed by: PSYCHIATRY & NEUROLOGY

## 2020-12-11 PROCEDURE — 2580000003 HC RX 258: Performed by: INTERNAL MEDICINE

## 2020-12-11 PROCEDURE — 1180000000 HC REHAB R&B

## 2020-12-11 PROCEDURE — 97110 THERAPEUTIC EXERCISES: CPT

## 2020-12-11 PROCEDURE — 97535 SELF CARE MNGMENT TRAINING: CPT

## 2020-12-11 RX ADMIN — ATORVASTATIN CALCIUM 10 MG: 10 TABLET, FILM COATED ORAL at 08:05

## 2020-12-11 RX ADMIN — SODIUM CHLORIDE, PRESERVATIVE FREE 10 ML: 5 INJECTION INTRAVENOUS at 08:09

## 2020-12-11 RX ADMIN — OXYCODONE HYDROCHLORIDE AND ACETAMINOPHEN 1 TABLET: 5; 325 TABLET ORAL at 08:06

## 2020-12-11 RX ADMIN — APIXABAN 2.5 MG: 2.5 TABLET, FILM COATED ORAL at 21:12

## 2020-12-11 RX ADMIN — POLYETHYLENE GLYCOL 3350 17 G: 17 POWDER, FOR SOLUTION ORAL at 21:12

## 2020-12-11 RX ADMIN — MEMANTINE HYDROCHLORIDE 5 MG: 5 TABLET ORAL at 08:06

## 2020-12-11 RX ADMIN — DONEPEZIL HYDROCHLORIDE 5 MG: 5 TABLET, FILM COATED ORAL at 08:06

## 2020-12-11 RX ADMIN — VENLAFAXINE HYDROCHLORIDE 37.5 MG: 37.5 CAPSULE, EXTENDED RELEASE ORAL at 08:05

## 2020-12-11 RX ADMIN — METFORMIN HYDROCHLORIDE 500 MG: 500 TABLET ORAL at 08:05

## 2020-12-11 RX ADMIN — PANTOPRAZOLE SODIUM 40 MG: 40 TABLET, DELAYED RELEASE ORAL at 05:49

## 2020-12-11 RX ADMIN — ASPIRIN 81 MG: 81 TABLET, COATED ORAL at 08:05

## 2020-12-11 RX ADMIN — SODIUM CHLORIDE, PRESERVATIVE FREE 10 ML: 5 INJECTION INTRAVENOUS at 21:15

## 2020-12-11 RX ADMIN — DOCUSATE SODIUM 50 MG AND SENNOSIDES 8.6 MG 1 TABLET: 8.6; 5 TABLET, FILM COATED ORAL at 21:12

## 2020-12-11 RX ADMIN — Medication 150 MG: at 08:05

## 2020-12-11 RX ADMIN — GABAPENTIN 300 MG: 300 CAPSULE ORAL at 21:12

## 2020-12-11 RX ADMIN — OXYCODONE HYDROCHLORIDE AND ACETAMINOPHEN 1 TABLET: 5; 325 TABLET ORAL at 14:03

## 2020-12-11 RX ADMIN — OXYCODONE HYDROCHLORIDE AND ACETAMINOPHEN 1 TABLET: 5; 325 TABLET ORAL at 21:12

## 2020-12-11 RX ADMIN — APIXABAN 2.5 MG: 2.5 TABLET, FILM COATED ORAL at 08:06

## 2020-12-11 ASSESSMENT — PAIN DESCRIPTION - LOCATION
LOCATION: HIP
LOCATION: HIP

## 2020-12-11 ASSESSMENT — PAIN DESCRIPTION - FREQUENCY
FREQUENCY: INTERMITTENT
FREQUENCY: INTERMITTENT

## 2020-12-11 ASSESSMENT — PAIN DESCRIPTION - ORIENTATION
ORIENTATION: RIGHT
ORIENTATION: RIGHT

## 2020-12-11 ASSESSMENT — PAIN SCALES - GENERAL
PAINLEVEL_OUTOF10: 2
PAINLEVEL_OUTOF10: 4
PAINLEVEL_OUTOF10: 0
PAINLEVEL_OUTOF10: 0
PAINLEVEL_OUTOF10: 5
PAINLEVEL_OUTOF10: 2

## 2020-12-11 ASSESSMENT — PAIN DESCRIPTION - PROGRESSION: CLINICAL_PROGRESSION: NOT CHANGED

## 2020-12-11 ASSESSMENT — PAIN DESCRIPTION - ONSET: ONSET: ON-GOING

## 2020-12-11 ASSESSMENT — PAIN DESCRIPTION - PAIN TYPE
TYPE: SURGICAL PAIN
TYPE: SURGICAL PAIN

## 2020-12-11 ASSESSMENT — PAIN DESCRIPTION - DESCRIPTORS
DESCRIPTORS: DISCOMFORT
DESCRIPTORS: DISCOMFORT

## 2020-12-11 NOTE — PROGRESS NOTES
ex and improving mechanics of STS TF. Pt left with OT following tx.    Activity Tolerance   Activity Tolerance Patient Tolerated treatment well   Safety Devices   Type of devices Left in chair  (Left with OT)         Electronically signed by Lisette Frazier PTA on 12/11/2020 at 9:57 AM

## 2020-12-11 NOTE — PLAN OF CARE
Problem: Falls - Risk of:  Goal: Will remain free from falls  Description: Will remain free from falls  Outcome: Ongoing  Goal: Absence of physical injury  Description: Absence of physical injury  Outcome: Ongoing     Problem: Skin Integrity:  Goal: Will show no infection signs and symptoms  Description: Will show no infection signs and symptoms  Outcome: Ongoing  Goal: Absence of new skin breakdown  Description: Absence of new skin breakdown  Outcome: Ongoing  Goal: Demonstration of wound healing without infection will improve  Description: Demonstration of wound healing without infection will improve  Outcome: Ongoing  Goal: Risk for impaired skin integrity will decrease  Description: Risk for impaired skin integrity will decrease  Outcome: Ongoing     Problem: Pain:  Goal: Pain level will decrease  Description: Pain level will decrease  Outcome: Ongoing  Goal: Control of acute pain  Description: Control of acute pain  Outcome: Ongoing  Goal: Control of chronic pain  Description: Control of chronic pain  Outcome: Ongoing     Problem: SAFETY  Goal: Free from accidental physical injury  Outcome: Ongoing  Goal: Free from intentional harm  Outcome: Ongoing     Problem: DAILY CARE  Goal: Daily care needs are met  Outcome: Ongoing     Problem: PAIN  Goal: Patient's pain/discomfort is manageable  Outcome: Ongoing  Goal: STG - Patient will verbalize an acceptable level of pain  Outcome: Ongoing     Problem: SKIN INTEGRITY  Goal: Skin integrity is maintained or improved  Outcome: Ongoing  Goal: STG - patient will maintain good skin integrity  Outcome: Ongoing  Goal: STG - Patient exhibits signs of wound healing. Outcome: Ongoing     Problem: KNOWLEDGE DEFICIT  Goal: Patient/S.O. demonstrates understanding of disease process, treatment plan, medications, and discharge instructions.   Outcome: Ongoing     Problem: DISCHARGE BARRIERS  Goal: Patient's continuum of care needs are met  Outcome: Ongoing     Problem: Mobility - Impaired:  Goal: Mobility will improve  Description: Mobility will improve  Outcome: Ongoing     Problem:  Activity:  Goal: Ability to ambulate will improve  Description: Ability to ambulate will improve  Outcome: Ongoing  Goal: Ability to perform activities at highest level will improve  Description: Ability to perform activities at highest level will improve  Outcome: Ongoing     Problem: Nutritional:  Goal: Ability to attain and maintain optimal nutritional status will improve  Description: Ability to attain and maintain optimal nutritional status will improve  Outcome: Ongoing     Problem: Safety:  Goal: Ability to remain free from injury will improve  Description: Ability to remain free from injury will improve  Outcome: Ongoing     Problem: IP BLADDER/VOIDING  Goal: LTG - Patient will utilize adaptive techniques/equipment to complete bladder elimination  Outcome: Ongoing     Problem: IP BOWEL ELIMINATION  Goal: LTG - patient will have regular and routine bowel evacuation  Outcome: Ongoing     Problem: IP BREATHING  Goal: LTG - Patient/caregiver will demonstrate/perform proper techniques to maintain patent airway  Outcome: Ongoing     Problem: Bleeding:  Goal: Will show no signs and symptoms of excessive bleeding  Description: Will show no signs and symptoms of excessive bleeding  Outcome: Ongoing     Problem: Infection - Surgical Site:  Goal: Will show no infection signs and symptoms  Description: Will show no infection signs and symptoms  Outcome: Ongoing     Problem: Serum Glucose Level - Abnormal:  Goal: Ability to maintain appropriate glucose levels has stabilized  Description: Ability to maintain appropriate glucose levels has stabilized  Outcome: Ongoing     Problem: Mood - Altered:  Goal: Mood stable  Description: Mood stable  Outcome: Ongoing

## 2020-12-11 NOTE — PLAN OF CARE
Problem: Falls - Risk of:  Goal: Will remain free from falls  Description: Will remain free from falls  12/10/2020 2234 by Kareen Quijano RN  Outcome: Ongoing  12/10/2020 0948 by Diego Shelley RN  Outcome: Ongoing  Goal: Absence of physical injury  Description: Absence of physical injury  12/10/2020 2234 by Kareen Quijano RN  Outcome: Ongoing  12/10/2020 0948 by Diego Shelley RN  Outcome: Ongoing     Problem: Skin Integrity:  Goal: Will show no infection signs and symptoms  Description: Will show no infection signs and symptoms  12/10/2020 2234 by Kareen Quijano RN  Outcome: Ongoing  12/10/2020 0948 by Diego Shelley RN  Outcome: Ongoing  Goal: Absence of new skin breakdown  Description: Absence of new skin breakdown  12/10/2020 2234 by Kareen Quijano RN  Outcome: Ongoing  12/10/2020 0948 by Diego Shelley RN  Outcome: Ongoing  Goal: Demonstration of wound healing without infection will improve  Description: Demonstration of wound healing without infection will improve  12/10/2020 2234 by Kareen Quijano RN  Outcome: Ongoing  12/10/2020 0948 by Diego Shelley RN  Outcome: Ongoing  Goal: Risk for impaired skin integrity will decrease  Description: Risk for impaired skin integrity will decrease  12/10/2020 2234 by Kareen Quijano RN  Outcome: Ongoing  12/10/2020 0948 by Diego Shelley RN  Outcome: Ongoing     Problem: Pain:  Goal: Pain level will decrease  Description: Pain level will decrease  12/10/2020 2234 by Kareen Quijano RN  Outcome: Ongoing  12/10/2020 0948 by Diego Shelley RN  Outcome: Ongoing  Goal: Control of acute pain  Description: Control of acute pain  12/10/2020 2234 by Kareen Quijano RN  Outcome: Ongoing  12/10/2020 0948 by Diego Shelley RN  Outcome: Ongoing  Goal: Control of chronic pain  Description: Control of chronic pain  12/10/2020 2234 by Kareen Quijano RN  Outcome: Ongoing  12/10/2020 0948 by Diego Shelley RN  Outcome: Ongoing     Problem: SAFETY  Goal: Free from accidental physical injury  12/10/2020 2234 by Nilson Valencia RN  Outcome: Ongoing  12/10/2020 0948 by Nancy Dan RN  Outcome: Ongoing  Goal: Free from intentional harm  12/10/2020 2234 by Nilson Valencia RN  Outcome: Ongoing  12/10/2020 0948 by Nancy Dan RN  Outcome: Ongoing     Problem: DAILY CARE  Goal: Daily care needs are met  12/10/2020 2234 by Nilson Valencia RN  Outcome: Ongoing  12/10/2020 0948 by Nancy Dan RN  Outcome: Ongoing     Problem: PAIN  Goal: Patient's pain/discomfort is manageable  12/10/2020 2234 by Nilson Valencia RN  Outcome: Ongoing  12/10/2020 0948 by Nancy Dan RN  Outcome: Ongoing  Goal: STG - Patient will verbalize an acceptable level of pain  12/10/2020 2234 by Nilson Valencia RN  Outcome: Ongoing  12/10/2020 0948 by Nancy Dan RN  Outcome: Ongoing     Problem: SKIN INTEGRITY  Goal: Skin integrity is maintained or improved  12/10/2020 2234 by Nilson Valencia RN  Outcome: Ongoing  12/10/2020 0948 by Nancy Dan RN  Outcome: Ongoing  Goal: STG - patient will maintain good skin integrity  12/10/2020 2234 by Nilson Valencia RN  Outcome: Ongoing  12/10/2020 0948 by Nancy Dan RN  Outcome: Ongoing  Goal: STG - Patient exhibits signs of wound healing. 12/10/2020 2234 by Nilson Valencia RN  Outcome: Ongoing  12/10/2020 0948 by Nancy Dan RN  Outcome: Ongoing     Problem: KNOWLEDGE DEFICIT  Goal: Patient/S.O. demonstrates understanding of disease process, treatment plan, medications, and discharge instructions.   12/10/2020 2234 by Nilson Valencia RN  Outcome: Ongoing  12/10/2020 0948 by Nancy Dan RN  Outcome: Ongoing     Problem: DISCHARGE BARRIERS  Goal: Patient's continuum of care needs are met  12/10/2020 2234 by Nilson Valencia RN  Outcome: Ongoing  12/10/2020 0948 by Nancy Dan RN  Outcome: Ongoing     Problem: Mobility - Impaired:  Goal: Mobility will improve  Description: Mobility will improve  12/10/2020 2234 by Marcia Lawler RN  Outcome: Ongoing  12/10/2020 0948 by Grayson Crowley RN  Outcome: Ongoing     Problem:  Activity:  Goal: Ability to ambulate will improve  Description: Ability to ambulate will improve  12/10/2020 2234 by Marcia Lawler RN  Outcome: Ongoing  12/10/2020 0948 by Grayson Crowley RN  Outcome: Ongoing  Goal: Ability to perform activities at highest level will improve  Description: Ability to perform activities at highest level will improve  12/10/2020 2234 by Marcia Lawler RN  Outcome: Ongoing  12/10/2020 0948 by Grayson Crowley RN  Outcome: Ongoing     Problem: Nutritional:  Goal: Ability to attain and maintain optimal nutritional status will improve  Description: Ability to attain and maintain optimal nutritional status will improve  12/10/2020 2234 by Marcia Lawler RN  Outcome: Ongoing  12/10/2020 0948 by Grayson Crowley RN  Outcome: Ongoing     Problem: Safety:  Goal: Ability to remain free from injury will improve  Description: Ability to remain free from injury will improve  12/10/2020 2234 by Marcia Lawler RN  Outcome: Ongoing  12/10/2020 0948 by Grayson Crowley RN  Outcome: Ongoing     Problem: IP BLADDER/VOIDING  Goal: LTG - Patient will utilize adaptive techniques/equipment to complete bladder elimination  12/10/2020 2234 by Marcia Lawler RN  Outcome: Ongoing  12/10/2020 0948 by Grayson Crowley RN  Outcome: Ongoing     Problem: IP BOWEL ELIMINATION  Goal: LTG - patient will have regular and routine bowel evacuation  12/10/2020 2234 by Marcia Lawler RN  Outcome: Ongoing  12/10/2020 0948 by Grayson Crowley RN  Outcome: Ongoing     Problem: IP BREATHING  Goal: LTG - Patient/caregiver will demonstrate/perform proper techniques to maintain patent airway  12/10/2020 2234 by Marcia Lawler RN  Outcome: Ongoing  12/10/2020 0948 by Grayson Crowley RN  Outcome: Ongoing Problem: NUTRITION  Goal: Patient maintains adequate hydration  12/10/2020 2234 by Nilson Valencia RN  Outcome: Ongoing  12/10/2020 0948 by Nancy Dan RN  Outcome: Ongoing     Problem: Bleeding:  Goal: Will show no signs and symptoms of excessive bleeding  Description: Will show no signs and symptoms of excessive bleeding  12/10/2020 2234 by Nilson Valencia RN  Outcome: Ongoing  12/10/2020 0948 by Nancy Dan RN  Outcome: Ongoing     Problem: Infection - Surgical Site:  Goal: Will show no infection signs and symptoms  Description: Will show no infection signs and symptoms  12/10/2020 2234 by Nilson Valencia RN  Outcome: Ongoing  12/10/2020 0948 by Nancy Dan RN  Outcome: Ongoing     Problem: Serum Glucose Level - Abnormal:  Goal: Ability to maintain appropriate glucose levels has stabilized  Description: Ability to maintain appropriate glucose levels has stabilized  12/10/2020 2234 by Nilson Valencia RN  Outcome: Ongoing  12/10/2020 0948 by Nancy Dan RN  Outcome: Ongoing     Problem: Mood - Altered:  Goal: Mood stable  Description: Mood stable  12/10/2020 2234 by Nilson Valencia RN  Outcome: Ongoing  12/10/2020 0948 by Nancy Dan RN  Outcome: Ongoing

## 2020-12-11 NOTE — PROGRESS NOTES
Occupational Therapy     12/11/20 1000   General   Diagnosis R hip fx with nail,   Subjective   Subjective Pt. stated that her L shoulder pain has improved since yesterday. Pain Assessment   Patient Currently in Pain Yes   Pain Assessment 0-10   Pain Level 2   Pain Type Surgical pain   Pain Location Hip   Pain Orientation Right   Pain Descriptors Discomfort   Pain Frequency Intermittent   Clinical Progression Not changed   Response to Pain Intervention Patient Satisfied   ADL   Toileting Contact guard assistance   Balance   Sitting Balance Stand by assistance   Standing Balance Contact guard assistance  (At GB.)   Functional Mobility   Functional - Mobility Device Wheelchair   Activity To/from bathroom   Assist Level Contact guard assistance   Transfers   Stand Pivot Transfers Minimal assistance  (w/c<>mat table.)   Sit to stand Contact guard assistance  (Using GB.)   Stand to sit Minimal assistance   Toilet Transfers   Toilet - Technique Stand pivot   Equipment Used Grab bars   Toilet Transfer Contact guard assistance   Shower Transfers   Shower - Transfer From Wheelchair   Shower - Transfer Type To and From   [employer] - Transfer To Transfer tub bench   Shower - Technique Stand pivot   Shower Transfers Minimal assistance   Type of ROM/Therapeutic Exercise   Type of ROM/Therapeutic Exercise Eugenio   Comment Eugenio: 8# RUE 3 sets x 20 reps, 5# LUE 3 sets x 10 reps, no pain/discomfort reported. Short term goals   Short term goal 2 MET   Short term goal 3 MET   Assessment   Performance deficits / Impairments Decreased functional mobility ; Decreased strength;Decreased high-level IADLs;Decreased endurance;Decreased safe awareness;Decreased balance;Decreased ADL status   Treatment Diagnosis Right Femur Fx s/p IM nail   Prognosis Good   Timed Code Treatment Minutes 60 Minutes   Activity Tolerance   Activity Tolerance Patient Tolerated treatment well   Safety Devices   Safety Devices in place Yes   Type of devices Call light within reach; Bed alarm in place   Plan   Current Treatment Recommendations Positioning; Safety Education & Training;Functional Mobility Training;Home Management Training;Balance Training;Self-Care / ADL;Equipment Evaluation, Education, & procurement;Strengthening; Endurance Training;Patient/Caregiver Education & Training

## 2020-12-11 NOTE — PROGRESS NOTES
Name: Vinayak Zhong  MRN:  [id number]  Date of service:  12/11/2020 12/11/20 1314   Restrictions/Precautions   Restrictions/Precautions Weight Bearing; Fall Risk;Surgical Protocols   Lower Extremity Weight Bearing Restrictions   Right Lower Extremity Weight Bearing Toe Touch Weight Bearing   Left Lower Extremity Weight Bearing Weight Bearing As Tolerated   Position Activity Restriction   Other position/activity restrictions L clavicle fx with ORIF 6/22/20   General   Chart Reviewed Yes   Subjective   Subjective Pt agreeable to therapy this afternoon. Pain Screening   Patient Currently in Pain Yes   Intervention List Patient able to continue with treatment   Pain Assessment   Pain Assessment 0-10   Pain Level 2   Pain Type Surgical pain   Pain Location Hip   Pain Orientation Right   Pain Descriptors Discomfort   Pain Frequency Intermittent   Bed Mobility   Supine to Sit Minimal assistance  (assist with moving RLE out of bed)   Sit to Supine Minimal assistance   Transfers   Sit to Stand Minimal Assistance   Stand to sit Contact guard assistance;Minimal Assistance   Stand Pivot Transfers Minimal Assistance   Comment Pt stand/pivot from bed-[facility] [place], [place]- toward her left   Ambulation   Ambulation?  Yes   WB Status TTWB RLE   Ambulation 1   Surface level tile   Device Parallel Bars   Assistance Contact guard assistance;Minimal assistance   Quality of Gait Insufficient push force with UE's for unloading weight on LLE, but pt able to take 2-3 small hops barely clearing her L foot from the floor this date   Distance 1'   Comments Stayed in place to focus on pushing force with BUE and clearing LLE   Propulsion 1   Propulsion Manual   Level Level Tile   Method RUE;LUE   Level of Assistance Stand by assistance   Description/ Details Some veering to the R. requires VC to correct   Distance 200'   Exercises   Comments Seated BLE ther ex in all planes x15 reps each AROM   Conditions Requiring Skilled Therapeutic Intervention   Body structures, Functions, Activity limitations Decreased functional mobility ; Decreased ADL status; Decreased ROM; Decreased strength;Decreased cognition;Decreased endurance;Decreased sensation;Decreased balance;Decreased posture;Decreased coordination   Assessment Pt tolerated tx well this date, able to take 2-3 small hops on LLE in II bars maintaining NWB on RLE and barely clearing LLE from floor. Cont working on propelling wc in hallway with turns. Pt would cont to benefit from skilled therapy services to address remaining strength and mobility deficits.    Activity Tolerance   Activity Tolerance Patient Tolerated treatment well   Safety Devices   Type of devices Bed alarm in place;Call light within reach;Gait belt;Left in bed         Electronically signed by Chrystal Tapia PTA on 12/11/2020 at 1:45 PM

## 2020-12-11 NOTE — PROGRESS NOTES
Occupational Therapy  Facility/Department: [facility] 8 REHAB UNIT  Daily Treatment Note  NAME: Annie Vasques  : 1935  MRN: [id number]    Date of Service: 2020    Discharge Recommendations:  Home with Home health OT       Assessment      Safety Devices  Safety Devices in place: Yes  Type of devices: Left in bed;Bed alarm in place;Call light within reach         Patient Diagnosis(es): There were no encounter diagnoses. has a past medical history of Abdominal pain, Anxiety, Bell's palsy, Colon polyp, Depression, Diverticulosis, Female bladder prolapse, Hernia, abdominal, History of adenomatous polyp of colon, Hypertriglyceridemia, Neuropathy, Osteopenia, Type II or unspecified type diabetes mellitus without mention of complication, not stated as uncontrolled, and Vitamin B 12 deficiency. has a past surgical history that includes Hysterectomy (); Cholecystectomy (age 22); bladder repair (); Colonoscopy (02/15/2012); Upper gastrointestinal endoscopy (2012); Nasal polyp surgery; Appendectomy; Elbow fracture surgery (); Upper gastrointestinal endoscopy (1998); Colonoscopy (3/1998); Colonoscopy (07 ); Upper gastrointestinal endoscopy (07); Colonoscopy (04); Upper gastrointestinal endoscopy (N/A, 2016); Colonoscopy (N/A, 2016); hernia repair (); hernia repair ( ); Cataract removal (2014); Cataract removal (2015); eye surgery (Bilateral); Umbilical hernia repair (N/A, 2/15/2016); Clavicle surgery (Left, 2020); and Femur fracture surgery (Right, 2020).     Restrictions  Restrictions/Precautions  Restrictions/Precautions: Weight Bearing, Fall Risk, Surgical Protocols  Lower Extremity Weight Bearing Restrictions  Right Lower Extremity Weight Bearing: Toe Touch Weight Bearing  Left Lower Extremity Weight Bearing: Weight Bearing As Tolerated  Position Activity Restriction  Other position/activity restrictions: L clavicle fx with ORIF 20  Objective Type of ROM/Therapeutic Exercise  Type of ROM/Therapeutic Exercise: Free weights(1# R UE 2 setsxall planes, LUE distally only within comfort)        Plan   Plan  Specific instructions for Next Treatment: AE training  Current Treatment Recommendations: Positioning, Safety Education & Training, Functional Mobility Training, Home Management Training, Balance Training, Self-Care / ADL, Equipment Evaluation, Education, & procurement, Strengthening, Endurance Training, Patient/Caregiver Education & Training    Goals  Short term goals  Time Frame for Short term goals: 1 week  Short term goal 1: complete LB dressing with AE with min A  Short term goal 2: MET  Short term goal 3: MET  Short term goal 4: complete simple home making task using recommended mobility means without cues for WB protocol  Short term goal 5: complete 1 handed static act for 2 mins with min A  Short term goal 6: complete overall bathing with min A  Long term goals  Time Frame for Long term goals : 2 weeks  Long term goal 1: complet overall toileting with supervision  Long term goal 2: complete overall bathing with supervision  Long term goal 3: complete overall dressing with supervision  Long term goal 4: complete home making task using recommended mobility with supervision  Long term goal 5: complete HEP with independence  Long term goals 6: pt/family verbalize DME  Patient Goals   Patient goals : return home       Therapy Time   Individual Concurrent Group Co-treatment   Time In 1430         Time Out 1500         Minutes 30         Timed Code Treatment Minutes: 30 Minutes     Electronically signed by Bala Roy OT on 12/11/2020 at 4:41 PM

## 2020-12-12 LAB
GLUCOSE BLD-MCNC: 109 MG/DL (ref 70–99)
GLUCOSE BLD-MCNC: 126 MG/DL (ref 70–99)
GLUCOSE BLD-MCNC: 139 MG/DL (ref 70–99)
GLUCOSE BLD-MCNC: 139 MG/DL (ref 70–99)
PERFORMED ON: ABNORMAL

## 2020-12-12 PROCEDURE — 97110 THERAPEUTIC EXERCISES: CPT

## 2020-12-12 PROCEDURE — 6370000000 HC RX 637 (ALT 250 FOR IP): Performed by: PSYCHIATRY & NEUROLOGY

## 2020-12-12 PROCEDURE — 82947 ASSAY GLUCOSE BLOOD QUANT: CPT

## 2020-12-12 PROCEDURE — 97530 THERAPEUTIC ACTIVITIES: CPT

## 2020-12-12 PROCEDURE — 97535 SELF CARE MNGMENT TRAINING: CPT

## 2020-12-12 PROCEDURE — 1180000000 HC REHAB R&B

## 2020-12-12 PROCEDURE — 2580000003 HC RX 258: Performed by: INTERNAL MEDICINE

## 2020-12-12 PROCEDURE — 6370000000 HC RX 637 (ALT 250 FOR IP): Performed by: INTERNAL MEDICINE

## 2020-12-12 PROCEDURE — 99232 SBSQ HOSP IP/OBS MODERATE 35: CPT | Performed by: PSYCHIATRY & NEUROLOGY

## 2020-12-12 RX ADMIN — GABAPENTIN 300 MG: 300 CAPSULE ORAL at 21:08

## 2020-12-12 RX ADMIN — VENLAFAXINE HYDROCHLORIDE 37.5 MG: 37.5 CAPSULE, EXTENDED RELEASE ORAL at 08:14

## 2020-12-12 RX ADMIN — METFORMIN HYDROCHLORIDE 500 MG: 500 TABLET ORAL at 08:13

## 2020-12-12 RX ADMIN — PANTOPRAZOLE SODIUM 40 MG: 40 TABLET, DELAYED RELEASE ORAL at 05:56

## 2020-12-12 RX ADMIN — OXYCODONE HYDROCHLORIDE AND ACETAMINOPHEN 1 TABLET: 5; 325 TABLET ORAL at 21:07

## 2020-12-12 RX ADMIN — APIXABAN 2.5 MG: 2.5 TABLET, FILM COATED ORAL at 08:12

## 2020-12-12 RX ADMIN — DONEPEZIL HYDROCHLORIDE 5 MG: 5 TABLET, FILM COATED ORAL at 08:14

## 2020-12-12 RX ADMIN — ASPIRIN 81 MG: 81 TABLET, COATED ORAL at 08:13

## 2020-12-12 RX ADMIN — ATORVASTATIN CALCIUM 10 MG: 10 TABLET, FILM COATED ORAL at 08:13

## 2020-12-12 RX ADMIN — APIXABAN 2.5 MG: 2.5 TABLET, FILM COATED ORAL at 21:07

## 2020-12-12 RX ADMIN — MEMANTINE HYDROCHLORIDE 5 MG: 5 TABLET ORAL at 08:14

## 2020-12-12 RX ADMIN — SODIUM CHLORIDE, PRESERVATIVE FREE 10 ML: 5 INJECTION INTRAVENOUS at 08:16

## 2020-12-12 RX ADMIN — OXYCODONE HYDROCHLORIDE AND ACETAMINOPHEN 1 TABLET: 5; 325 TABLET ORAL at 14:24

## 2020-12-12 RX ADMIN — Medication 150 MG: at 08:12

## 2020-12-12 RX ADMIN — SODIUM CHLORIDE, PRESERVATIVE FREE 10 ML: 5 INJECTION INTRAVENOUS at 21:10

## 2020-12-12 RX ADMIN — OXYCODONE HYDROCHLORIDE AND ACETAMINOPHEN 1 TABLET: 5; 325 TABLET ORAL at 08:13

## 2020-12-12 ASSESSMENT — PAIN DESCRIPTION - ORIENTATION
ORIENTATION: RIGHT

## 2020-12-12 ASSESSMENT — PAIN DESCRIPTION - DESCRIPTORS
DESCRIPTORS: ACHING;DISCOMFORT
DESCRIPTORS: ACHING;DISCOMFORT

## 2020-12-12 ASSESSMENT — PAIN DESCRIPTION - PAIN TYPE
TYPE: SURGICAL PAIN
TYPE: SURGICAL PAIN

## 2020-12-12 ASSESSMENT — PAIN SCALES - GENERAL
PAINLEVEL_OUTOF10: 3
PAINLEVEL_OUTOF10: 0
PAINLEVEL_OUTOF10: 5
PAINLEVEL_OUTOF10: 5
PAINLEVEL_OUTOF10: 3
PAINLEVEL_OUTOF10: 0

## 2020-12-12 ASSESSMENT — PAIN DESCRIPTION - LOCATION
LOCATION: HIP

## 2020-12-12 NOTE — PROGRESS NOTES
Patient:   Jere Molina  MR#:    [id number]   Room:    6065/885-83   YOB: 1935  Date of Progress Note: 12/12/2020  Time of Note                           9:11 AM  Consulting Physician:   Deni Castaneda M.D. Attending Physician:  Deni Castaneda MD        CHIEF COMPLAINT: Right hip pain        Subjective: This 81 y.o. 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 She presented to [address] [place] [address] ER on 12/3/20 after having a fall at home where she tripped and fell forward hitting her right side of her head and her right hip. She has a hematoma to her right frontotemporal aread and had been unable to bear weight on her right lower extremity since her fall. CT of head was negative for acute changes. X-ray done of her right femur revealed an acute traumatic displaced peritrochanteric fracture. She was admitted to the hospitalist service with consult for orthopedic surgery. She was seen by Dr. Babatunde Peres, who recommended Cephalomedullary Nailing of her fracture. She was in agreement and went for surgery on 12/4/20. She tolerated the procedure well. Post op she had chest pain, rapid response was called. EKG and cardiac enzymes were unremarkable. CTA negative for pulmonary emboli. Echocardiogram ordered and reveals EF of 55-60%. Patient has had no further chest pain. She will be toe-touch weightbearing on her right lower extremity for 6 weeks. She will need DVT prophylaxis for 6 weeks.   No complaints this morning  REVIEW OF SYSTEMS:  Constitutional: No fevers No chills  Neck:No stiffness  Respiratory: No shortness of breath  Cardiovascular: No chest pain No palpitations  Gastrointestinal: No abdominal pain    Genitourinary: No Dysuria  Neurological: No headache, no confusion      PHYSICAL EXAM: /61   Pulse 71   Temp 96.6 °F (35.9 °C) (Temporal)   Resp 18   Ht 5' 3\" (1.6 m)   Wt 115 lb 1.6 oz (52.2 kg)   SpO2 93%   BMI 20.39 kg/m²     Constitutional: she appears well-developed and well-nourished. Eyes  conjunctiva normal.  Pupils react to light  Ear, nose, throat -hearing intact to voice. No scars, masses, or lesions over external nose or ears, no atrophy of tongue  Neck-symmetric, no masses noted, no jugular vein distension  Respiration- chest wall appears symmetric, good expansion,   normal effort without use of accessory muscles  Cardiovascular- RRR  Musculoskeletal  no significant wasting of muscles noted, no bony deformities, gait no gross ataxia  Extremities-no clubbing, cyanosis or edema  Skin  warm, dry, and intact. No rash, erythema, or pallor. Psychiatric  mood, affect, and behavior appear normal.      Neurology  NEUROLOGICAL EXAM:      Mental status   Awake, alert, fluent oriented x 3 appropriate affect  Attention and concentration appear appropriate  Recent and remote memory appears unremarkable  Speech normal without dysarthria  No clear issues with language       Cranial Nerves   CN II- Visual fields grossly unremarkable  CN III, IV,VI-EOMI, No nystagmus, conjugate eye movements, no ptosis  CN VII-right hemifacial spasm  CN VIII-Hearing intact      Motor function  Antigravity x 4     Sensory function Intact to light touch     Cerebellar F-N intact     Tremor None present     Gait                  Not tested           Nursing/pcp notes, imaging,labs and vitals reviewed.      PT,OT and/or speech notes reviewed    Lab Results   Component Value Date    WBC 7.9 12/10/2020    HGB 8.3 (L) 12/10/2020    HCT 26.6 (L) 12/10/2020    MCV 97.1 12/10/2020     12/10/2020     Lab Results   Component Value Date     12/10/2020    K 5.1 (H) 12/10/2020     12/10/2020    CO2 30 (H) 12/10/2020    BUN 16 12/10/2020    CREATININE 0.4 (L) 12/10/2020 GLUCOSE 116 (H) 12/10/2020    CALCIUM 9.2 12/10/2020    PROT 6.4 (L) 12/03/2020    LABALBU 4.0 12/03/2020    BILITOT <0.2 12/03/2020    ALKPHOS 72 12/03/2020    AST 17 12/03/2020    ALT 15 12/03/2020    LABGLOM >60 12/10/2020    GFRAA >59 12/10/2020    GLOB 2.4 10/11/2016   No results found for: INRNo results found for: PHENYTOIN, ESR, CRP    12/12/20 0829 12/12/20 0830 12/12/20 0841   Pain Screening   Patient Currently in Pain Yes  --   --    Pain Assessment   Pain Assessment 0-10  --   --    Pain Level 5  --   --    Pain Location Hip  --   --    Pain Orientation Right  --   --    Bed Mobility   Supine to Sit Minimal assistance  --   --    Sit to Supine Minimal assistance  --   --    Transfers   Sit to Stand  --  Minimal Assistance;Contact guard assistance  --    Stand to sit  --  Contact guard assistance  --    Bed to Chair  --  Minimal assistance  (Stand pivot)  --    Lateral Transfers  --  Minimal Assistance;Contact guard assistance  (Toilet transfer. Ind with hygient and clothing mgmt. CGA for balance.)  --    Ambulation   Ambulation?  --  No  --    Exercises   Comments  --   --   --    Conditions Requiring Skilled Therapeutic Intervention   Assessment  --   --  Able to maintain TTWB RLE for stand pivot transfers in/out of bed. Worked on standing with RW and sitting LE exercises.    Activity Tolerance   Activity Tolerance  --   --  Patient Tolerated treatment well   Safety Devices   Type of devices  --   --   --         12/12/20 0843 12/12/20 0900   Pain Screening   Patient Currently in Pain  --   --    Pain Assessment   Pain Assessment  --   --    Pain Level  --   --    Pain Location  --   --    Pain Orientation  --   --    Bed Mobility   Supine to Sit  --   --    Sit to Supine  --   --    Transfers   Sit to Stand  --   --    Stand to sit  --   --    Bed to Chair  --   --    Lateral Transfers  --   --    Ambulation   Ambulation?  --   --    Exercises Comments Sitting BLE ex x15 reps. A-AAROM with RLE.  --    Conditions Requiring Skilled Therapeutic Intervention   Assessment  --   --    Activity Tolerance   Activity Tolerance  --   --    Safety Devices   Type of devices  --  Call light within reach; Bed alarm in place       12/11/20 1000   General   Diagnosis R hip fx with nail,   Subjective   Subjective Pt. stated that her L shoulder pain has improved since yesterday. Pain Assessment   Patient Currently in Pain Yes   Pain Assessment 0-10   Pain Level 2   Pain Type Surgical pain   Pain Location Hip   Pain Orientation Right   Pain Descriptors Discomfort   Pain Frequency Intermittent   Clinical Progression Not changed   Response to Pain Intervention Patient Satisfied   ADL   Toileting Contact guard assistance   Balance   Sitting Balance Stand by assistance   Standing Balance Contact guard assistance  (At GB.)   Functional Mobility   Functional - Mobility Device Wheelchair   Activity To/from bathroom   Assist Level Contact guard assistance   Transfers   Stand Pivot Transfers Minimal assistance  (w/c<>mat table.)   Sit to stand Contact guard assistance  (Using GB.)   Stand to sit Minimal assistance   Toilet Transfers   Toilet - Technique Stand pivot   Equipment Used Grab bars   Toilet Transfer Contact guard assistance   Shower Transfers   Shower - Transfer From Wheelchair   Shower - Transfer Type To and From   [employer] - Transfer To Transfer tub bench   Shower - Technique Stand pivot   Shower Transfers Minimal assistance   Type of ROM/Therapeutic Exercise   Type of ROM/Therapeutic Exercise Eugenio   Comment Eugenio: 8# RUE 3 sets x 20 reps, 5# LUE 3 sets x 10 reps, no pain/discomfort reported.    Short term goals   Short term goal 2 MET   Short term goal 3 MET   Assessment Performance deficits / Impairments Decreased functional mobility ; Decreased strength;Decreased high-level IADLs;Decreased endurance;Decreased safe awareness;Decreased balance;Decreased ADL status   Treatment Diagnosis Right Femur Fx s/p IM nail   Prognosis Good   Timed Code Treatment Minutes 60 Minutes   Activity Tolerance   Activity Tolerance Patient Tolerated treatment well   Safety Devices   Safety Devices in place Yes   Type of devices Call light within reach; Bed alarm in place   Plan   Current Treatment Recommendations Positioning; Safety Education & Training;Functional Mobility Training;Home Management Training;Balance Training;Self-Care / ADL;Equipment Evaluation, Education, & procurement;Strengthening; Endurance Training;Patient/Caregiver Education & Training              RECORD REVIEW: Previous medical records, medications were reviewed at today's visit    IMPRESSION:   1. Right hip fracture status post cephalomedullary nailing. Toe-touch weightbearing for 6 weeksPT/OT  2. DVT prophylaxis for 6 weeks. Low-dose Eliquis  3. Diabetescontinue sliding scale and resume home oral hypoglycemic  4. History of dementiacontinue Aricept and Namenda. Both doses are low. Discussed with patient. 5.  History of anxiety/depressioncontinue Effexor  6. Acute blood loss anemia improving. Niferex added  Percocet scheduled. Atenolol held  Continue current treatment.   Patient improving  ELOS:  Staff next week

## 2020-12-12 NOTE — PROGRESS NOTES
12/12/20 0829 12/12/20 0830 12/12/20 0841   Pain Screening   Patient Currently in Pain Yes  --   --    Pain Assessment   Pain Assessment 0-10  --   --    Pain Level 5  --   --    Pain Location Hip  --   --    Pain Orientation Right  --   --    Bed Mobility   Supine to Sit Minimal assistance  --   --    Sit to Supine Minimal assistance  --   --    Transfers   Sit to Stand  --  Minimal Assistance;Contact guard assistance  --    Stand to sit  --  Contact guard assistance  --    Bed to Chair  --  Minimal assistance  (Stand pivot)  --    Lateral Transfers  --  Minimal Assistance;Contact guard assistance  (Toilet transfer. Ind with hygient and clothing mgmt. CGA for balance.)  --    Ambulation   Ambulation?  --  No  --    Exercises   Comments  --   --   --    Conditions Requiring Skilled Therapeutic Intervention   Assessment  --   --  Able to maintain TTWB RLE for stand pivot transfers in/out of bed. Worked on standing with RW and sitting LE exercises. Activity Tolerance   Activity Tolerance  --   --  Patient Tolerated treatment well   Safety Devices   Type of devices  --   --   --       12/12/20 0843 12/12/20 0900   Pain Screening   Patient Currently in Pain  --   --    Pain Assessment   Pain Assessment  --   --    Pain Level  --   --    Pain Location  --   --    Pain Orientation  --   --    Bed Mobility   Supine to Sit  --   --    Sit to Supine  --   --    Transfers   Sit to Stand  --   --    Stand to sit  --   --    Bed to Chair  --   --    Lateral Transfers  --   --    Ambulation   Ambulation?  --   --    Exercises   Comments Sitting BLE ex x15 reps. A-AAROM with RLE.  --    Conditions Requiring Skilled Therapeutic Intervention   Assessment  --   --    Activity Tolerance   Activity Tolerance  --   --    Safety Devices   Type of devices  --  Call light within reach; Bed alarm in place   Electronically signed by Jose Guerrero PTA on 12/12/2020 at 9:07 AM

## 2020-12-12 NOTE — PLAN OF CARE
Problem: Falls - Risk of:  Goal: Will remain free from falls  Description: Will remain free from falls  12/11/2020 2342 by Darwin Abraham LPN  Outcome: Ongoing  12/11/2020 1628 by Ruthann Acuña RN  Outcome: Ongoing  Goal: Absence of physical injury  Description: Absence of physical injury  12/11/2020 2342 by Darwin Abraham LPN  Outcome: Ongoing  12/11/2020 1628 by Ruthann Acuña RN  Outcome: Ongoing     Problem: Skin Integrity:  Goal: Will show no infection signs and symptoms  Description: Will show no infection signs and symptoms  12/11/2020 2342 by Darwin Abraham LPN  Outcome: Ongoing  12/11/2020 1628 by Ruthann Acuña RN  Outcome: Ongoing  Goal: Absence of new skin breakdown  Description: Absence of new skin breakdown  12/11/2020 2342 by Darwin Abraham LPN  Outcome: Ongoing  12/11/2020 1628 by Ruthann Acuña RN  Outcome: Ongoing  Goal: Demonstration of wound healing without infection will improve  Description: Demonstration of wound healing without infection will improve  12/11/2020 2342 by Darwin Abraham LPN  Outcome: Ongoing  12/11/2020 1628 by Ruthann Acuña RN  Outcome: Ongoing  Goal: Risk for impaired skin integrity will decrease  Description: Risk for impaired skin integrity will decrease  12/11/2020 2342 by Darwin Abraham LPN  Outcome: Ongoing  12/11/2020 1628 by Ruthann Acuña RN  Outcome: Ongoing     Problem: Pain:  Goal: Pain level will decrease  Description: Pain level will decrease  12/11/2020 2342 by Darwin Abraham LPN  Outcome: Ongoing  12/11/2020 1628 by Ruthann Acuña RN  Outcome: Ongoing  Goal: Control of acute pain  Description: Control of acute pain  12/11/2020 2342 by Darwin Abraham LPN  Outcome: Ongoing  12/11/2020 1628 by Ruthann Acuña RN  Outcome: Ongoing  Goal: Control of chronic pain  Description: Control of chronic pain  12/11/2020 2342 by Darwin Abraham LPN  Outcome: Ongoing  12/11/2020 1628 by Ruthann Acuña RN  Outcome: Ongoing     Problem: SAFETY  Goal: Free from accidental physical injury  12/11/2020 2342 by Delphine Sánchez LPN  Outcome: Ongoing  12/11/2020 1628 by Marv Morfin RN  Outcome: Ongoing  Goal: Free from intentional harm  12/11/2020 2342 by Delphine Sánchez LPN  Outcome: Ongoing  12/11/2020 1628 by Marv Morfin RN  Outcome: Ongoing     Problem: DAILY CARE  Goal: Daily care needs are met  12/11/2020 2342 by Delphine Sánchez LPN  Outcome: Ongoing  12/11/2020 1628 by Marv Morfin RN  Outcome: Ongoing     Problem: PAIN  Goal: Patient's pain/discomfort is manageable  12/11/2020 2342 by Delphine Sánchez LPN  Outcome: Ongoing  12/11/2020 1628 by Marv Morfin RN  Outcome: Ongoing  Goal: STG - Patient will verbalize an acceptable level of pain  12/11/2020 2342 by Delphine Sánchez LPN  Outcome: Ongoing  12/11/2020 1628 by Marv Morfin RN  Outcome: Ongoing     Problem: SKIN INTEGRITY  Goal: Skin integrity is maintained or improved  12/11/2020 2342 by Dlephine Sánchez LPN  Outcome: Ongoing  12/11/2020 1628 by Marv Morfin RN  Outcome: Ongoing  Goal: STG - patient will maintain good skin integrity  12/11/2020 2342 by Delphine Sánchez LPN  Outcome: Ongoing  12/11/2020 1628 by Marv Morfin RN  Outcome: Ongoing  Goal: STG - Patient exhibits signs of wound healing. 12/11/2020 2342 by Delphine Sánchez LPN  Outcome: Ongoing  12/11/2020 1628 by Marv Morfin RN  Outcome: Ongoing     Problem: KNOWLEDGE DEFICIT  Goal: Patient/S.O. demonstrates understanding of disease process, treatment plan, medications, and discharge instructions.   12/11/2020 2342 by Delphine Sánchez LPN  Outcome: Ongoing  12/11/2020 1628 by Marv Morfin RN  Outcome: Ongoing     Problem: DISCHARGE BARRIERS  Goal: Patient's continuum of care needs are met  12/11/2020 2342 by Delphine Sánchez LPN  Outcome: Ongoing  12/11/2020 1628 by Marv Morfin RN  Outcome: Ongoing     Problem: Mobility - Impaired:  Goal: Mobility will improve  Description: Mobility will improve  12/11/2020 2342 by Marcelo Mchugh LPN  Outcome: Ongoing  12/11/2020 1628 by Aditi Rodrigez RN  Outcome: Ongoing     Problem:  Activity:  Goal: Ability to ambulate will improve  Description: Ability to ambulate will improve  12/11/2020 2342 by Marcelo Mchugh LPN  Outcome: Ongoing  12/11/2020 1628 by Aditi Rodrigez RN  Outcome: Ongoing  Goal: Ability to perform activities at highest level will improve  Description: Ability to perform activities at highest level will improve  12/11/2020 2342 by Marcelo Mchugh LPN  Outcome: Ongoing  12/11/2020 1628 by Aditi Rodrigez RN  Outcome: Ongoing     Problem: Nutritional:  Goal: Ability to attain and maintain optimal nutritional status will improve  Description: Ability to attain and maintain optimal nutritional status will improve  12/11/2020 2342 by Marcelo Mchugh LPN  Outcome: Ongoing  12/11/2020 1628 by Aditi Rodrigez RN  Outcome: Ongoing     Problem: Safety:  Goal: Ability to remain free from injury will improve  Description: Ability to remain free from injury will improve  12/11/2020 2342 by Marcelo Mchugh LPN  Outcome: Ongoing  12/11/2020 1628 by Aditi Rodrigez RN  Outcome: Ongoing     Problem: IP BLADDER/VOIDING  Goal: LTG - Patient will utilize adaptive techniques/equipment to complete bladder elimination  12/11/2020 2342 by Marcelo Mchugh LPN  Outcome: Ongoing  12/11/2020 1628 by Aditi Rodrigez RN  Outcome: Ongoing     Problem: IP BOWEL ELIMINATION  Goal: LTG - patient will have regular and routine bowel evacuation  12/11/2020 2342 by Marcelo Mchugh LPN  Outcome: Ongoing  12/11/2020 1628 by Aditi Rodrigez RN  Outcome: Ongoing     Problem: IP BREATHING  Goal: LTG - Patient/caregiver will demonstrate/perform proper techniques to maintain patent airway  12/11/2020 2342 by Marcelo Mchugh LPN  Outcome: Ongoing  12/11/2020 1628 by Roc Madera RN  Outcome: Ongoing     Problem: NUTRITION  Goal: Patient maintains adequate hydration  12/11/2020 2342 by Sarath Martel LPN  Outcome: Ongoing  12/11/2020 1628 by Roc Madera RN  Outcome: Ongoing     Problem: Bleeding:  Goal: Will show no signs and symptoms of excessive bleeding  Description: Will show no signs and symptoms of excessive bleeding  12/11/2020 2342 by Sarath Martel LPN  Outcome: Ongoing  12/11/2020 1628 by Roc Madera RN  Outcome: Ongoing     Problem: Infection - Surgical Site:  Goal: Will show no infection signs and symptoms  Description: Will show no infection signs and symptoms  12/11/2020 2342 by Sarath Martel LPN  Outcome: Ongoing  12/11/2020 1628 by Roc Madera RN  Outcome: Ongoing     Problem: Serum Glucose Level - Abnormal:  Goal: Ability to maintain appropriate glucose levels has stabilized  Description: Ability to maintain appropriate glucose levels has stabilized  12/11/2020 2342 by Sarath Martel LPN  Outcome: Ongoing  12/11/2020 1628 by Roc Madera RN  Outcome: Ongoing     Problem: Mood - Altered:  Goal: Mood stable  Description: Mood stable  12/11/2020 2342 by Sarath Martel LPN  Outcome: Ongoing  12/11/2020 1628 by Roc Madera RN  Outcome: Ongoing

## 2020-12-12 NOTE — PROGRESS NOTES
Occupational Therapy     12/12/20 1315   Restrictions/Precautions   Restrictions/Precautions Weight Bearing; Fall Risk   Lower Extremity Weight Bearing Restrictions   Right Lower Extremity Weight Bearing Toe Touch Weight Bearing   General   Additional Pertinent Hx L clavicle fx with ORIF 6/22/20   Diagnosis R hip fx with nailing   ADL   Toileting Contact guard assistance   Balance   Standing Balance Contact guard assistance   Standing Balance   Time 4 mins   Activity 1 hand tabletop activity   Toilet Transfers   Toilet - Technique Stand pivot   Equipment Used Standard bedside commode   Toilet Transfer Contact guard assistance   Type of ROM/Therapeutic Exercise   Type of ROM/Therapeutic Exercise Eugenio   Comment 5#    Short term goals   Short term goal 5 MET   Assessment   Performance deficits / Impairments Decreased functional mobility ; Decreased strength;Decreased high-level IADLs;Decreased endurance;Decreased safe awareness;Decreased balance;Decreased ADL status   Assessment after discharge pt plans to stay with friend that has level entry until weight bearing restrictions lifted   Timed Code Treatment Minutes 30 Minutes   Activity Tolerance   Activity Tolerance Patient Tolerated treatment well   Safety Devices   Safety Devices in place Yes   Type of devices   (with PT)

## 2020-12-12 NOTE — PROGRESS NOTES
12/12/20 1353 12/12/20 1354 12/12/20 1404   Pain Screening   Patient Currently in Pain No  --   --    Bed Mobility   Sit to Supine Minimal assistance  --   --    Exercises   Comments  --  Supine BLE ex  x15 reps/ 2 sets  AROM LLE. AAROM RLE.  --    Conditions Requiring Skilled Therapeutic Intervention   Assessment  --   --  Performed supine BLE exercises at bedside this session.    Activity Tolerance   Activity Tolerance  --   --  Patient Tolerated treatment well;Patient limited by pain   Safety Devices   Type of devices  --   --   --       12/12/20 1420   Pain Screening   Patient Currently in Pain  --    Bed Mobility   Sit to Supine  --    Exercises   Comments  --    Conditions Requiring Skilled Therapeutic Intervention   Assessment  --    Activity Tolerance   Activity Tolerance  --    Safety Devices   Type of devices Bed alarm in place;Call light within reach   Electronically signed by Jessica Snyder PTA on 12/12/2020 at 2:22 PM

## 2020-12-12 NOTE — PLAN OF CARE
Problem: Falls - Risk of:  Goal: Will remain free from falls  Description: Will remain free from falls  12/12/2020 1337 by Michael Wall LPN  Outcome: Ongoing  12/11/2020 2342 by Marky Corbett LPN  Outcome: Ongoing  Goal: Absence of physical injury  Description: Absence of physical injury  12/12/2020 1337 by Michael Wall LPN  Outcome: Ongoing  12/11/2020 2342 by Maryk Corbett LPN  Outcome: Ongoing     Problem: Skin Integrity:  Goal: Will show no infection signs and symptoms  Description: Will show no infection signs and symptoms  12/12/2020 1337 by Michael Wall LPN  Outcome: Ongoing  12/11/2020 2342 by Marky Corbett LPN  Outcome: Ongoing  Goal: Absence of new skin breakdown  Description: Absence of new skin breakdown  12/12/2020 1337 by Michael Wall LPN  Outcome: Ongoing  12/11/2020 2342 by Marky Corbett LPN  Outcome: Ongoing  Goal: Demonstration of wound healing without infection will improve  Description: Demonstration of wound healing without infection will improve  12/12/2020 1337 by Michael Wall LPN  Outcome: Ongoing  12/11/2020 2342 by Marky Corbett LPN  Outcome: Ongoing  Goal: Risk for impaired skin integrity will decrease  Description: Risk for impaired skin integrity will decrease  12/12/2020 1337 by Michael Wall LPN  Outcome: Ongoing  12/11/2020 2342 by Marky Corbett LPN  Outcome: Ongoing     Problem: Pain:  Goal: Pain level will decrease  Description: Pain level will decrease  12/12/2020 1337 by Michael Wall LPN  Outcome: Ongoing  12/11/2020 2342 by Marky Corbett LPN  Outcome: Ongoing  Goal: Control of acute pain  Description: Control of acute pain  12/12/2020 1337 by Michael Wall LPN  Outcome: Ongoing  12/11/2020 2342 by Marky Corbett LPN  Outcome: Ongoing  Goal: Control of chronic pain  Description: Control of chronic pain  12/12/2020 1337 by Michael Wall LPN  Outcome: Ongoing  12/11/2020 2342 by Marky Corbett LPN Outcome: Ongoing     Problem: SAFETY  Goal: Free from accidental physical injury  12/12/2020 1337 by Ju Walker LPN  Outcome: Ongoing  12/11/2020 2342 by Gail Gomes LPN  Outcome: Ongoing  Goal: Free from intentional harm  12/12/2020 1337 by Ju Walker LPN  Outcome: Ongoing  12/11/2020 2342 by Gail Gomes LPN  Outcome: Ongoing     Problem: DAILY CARE  Goal: Daily care needs are met  12/12/2020 1337 by Ju Walker LPN  Outcome: Ongoing  12/11/2020 2342 by Gail Gomes LPN  Outcome: Ongoing     Problem: PAIN  Goal: Patient's pain/discomfort is manageable  12/12/2020 1337 by Ju Walker LPN  Outcome: Ongoing  12/11/2020 2342 by Gail Gomes LPN  Outcome: Ongoing  Goal: STG - Patient will verbalize an acceptable level of pain  12/12/2020 1337 by Ju Walker LPN  Outcome: Ongoing  12/11/2020 2342 by Gail Gomes LPN  Outcome: Ongoing     Problem: SKIN INTEGRITY  Goal: Skin integrity is maintained or improved  12/12/2020 1337 by Ju Walker LPN  Outcome: Ongoing  12/11/2020 2342 by Gail Gomes LPN  Outcome: Ongoing  Goal: STG - patient will maintain good skin integrity  12/12/2020 1337 by Ju Walker LPN  Outcome: Ongoing  12/11/2020 2342 by Gail Gomes LPN  Outcome: Ongoing  Goal: STG - Patient exhibits signs of wound healing. 12/12/2020 1337 by Ju Walker LPN  Outcome: Ongoing  12/11/2020 2342 by Gail Gomes LPN  Outcome: Ongoing     Problem: KNOWLEDGE DEFICIT  Goal: Patient/S.O. demonstrates understanding of disease process, treatment plan, medications, and discharge instructions.   12/12/2020 1337 by Ju Walker LPN  Outcome: Ongoing  12/11/2020 2342 by Gail Gomes LPN  Outcome: Ongoing     Problem: DISCHARGE BARRIERS  Goal: Patient's continuum of care needs are met  12/12/2020 1337 by Ju Walker LPN  Outcome: Ongoing  12/11/2020 2342 by Gail Gomes LPN  Outcome: Ongoing Problem: Mobility - Impaired:  Goal: Mobility will improve  Description: Mobility will improve  12/12/2020 1337 by Timothy Queen LPN  Outcome: Ongoing  12/11/2020 2342 by Syl Kirk LPN  Outcome: Ongoing     Problem:  Activity:  Goal: Ability to ambulate will improve  Description: Ability to ambulate will improve  12/12/2020 1337 by Timothy Queen LPN  Outcome: Ongoing  12/11/2020 2342 by Syl Kirk LPN  Outcome: Ongoing  Goal: Ability to perform activities at highest level will improve  Description: Ability to perform activities at highest level will improve  12/12/2020 1337 by Timothy Queen LPN  Outcome: Ongoing  12/11/2020 2342 by Syl Kirk LPN  Outcome: Ongoing     Problem: Nutritional:  Goal: Ability to attain and maintain optimal nutritional status will improve  Description: Ability to attain and maintain optimal nutritional status will improve  12/12/2020 1337 by Timothy Queen LPN  Outcome: Ongoing  12/11/2020 2342 by Syl Kirk LPN  Outcome: Ongoing     Problem: Safety:  Goal: Ability to remain free from injury will improve  Description: Ability to remain free from injury will improve  12/12/2020 1337 by Timothy Queen LPN  Outcome: Ongoing  12/11/2020 2342 by Syl Kirk LPN  Outcome: Ongoing     Problem: IP BLADDER/VOIDING  Goal: LTG - Patient will utilize adaptive techniques/equipment to complete bladder elimination  12/12/2020 1337 by Timothy Queen LPN  Outcome: Ongoing  12/11/2020 2342 by Syl Kirk LPN  Outcome: Ongoing     Problem: IP BOWEL ELIMINATION  Goal: LTG - patient will have regular and routine bowel evacuation  12/12/2020 1337 by Timothy Queen LPN  Outcome: Ongoing  12/11/2020 2342 by Syl Kirk LPN  Outcome: Ongoing     Problem: IP BREATHING  Goal: LTG - Patient/caregiver will demonstrate/perform proper techniques to maintain patent airway  12/12/2020 1337 by Timothy Queen LPN  Outcome: Ongoing 12/11/2020 2342 by Josie Arreola LPN  Outcome: Ongoing     Problem: NUTRITION  Goal: Patient maintains adequate hydration  12/12/2020 1337 by Melissa Olivas LPN  Outcome: Ongoing  12/11/2020 2342 by Josie Arreola LPN  Outcome: Ongoing     Problem: Bleeding:  Goal: Will show no signs and symptoms of excessive bleeding  Description: Will show no signs and symptoms of excessive bleeding  12/12/2020 1337 by Melissa Olivas LPN  Outcome: Ongoing  12/11/2020 2342 by Josie Arreola LPN  Outcome: Ongoing     Problem: Infection - Surgical Site:  Goal: Will show no infection signs and symptoms  Description: Will show no infection signs and symptoms  12/12/2020 1337 by Melissa Olivas LPN  Outcome: Ongoing  12/11/2020 2342 by Josie Arreola LPN  Outcome: Ongoing     Problem: Serum Glucose Level - Abnormal:  Goal: Ability to maintain appropriate glucose levels has stabilized  Description: Ability to maintain appropriate glucose levels has stabilized  12/12/2020 1337 by Melissa Olivas LPN  Outcome: Ongoing  12/11/2020 2342 by Josie Arreola LPN  Outcome: Ongoing     Problem: Mood - Altered:  Goal: Mood stable  Description: Mood stable  12/12/2020 1337 by Melissa Olivas LPN  Outcome: Ongoing  12/11/2020 2342 by Josie Arreola LPN  Outcome: Ongoing

## 2020-12-12 NOTE — PROGRESS NOTES
Occupational Therapy     12/12/20 1000   Restrictions/Precautions   Restrictions/Precautions Weight Bearing; Fall Risk   Lower Extremity Weight Bearing Restrictions   Right Lower Extremity Weight Bearing Toe Touch Weight Bearing   General   Additional Pertinent Hx L clavicle fx with ORIF 6/22/20   Diagnosis R hip fx with nail,   Subjective   Subjective issued HEP, pt relies on visual memory aids for new learning    ADL   Toileting Contact guard assistance   Bed mobility   Supine to Sit Minimal assistance   Sit to Supine Stand by assistance   Comment Assist with R LE OOB, able to return to bed with crossover technique   Transfers   Stand Pivot Transfers Contact guard assistance   Transfer Comments verbal and tactile cues for hand placement during stand pivot t/fs   Toilet Transfers   Toilet - Technique Stand pivot   Equipment Used Grab bars  (c/o L UE pain d/t GB being higher on L)   Toilet Transfer Contact guard assistance   Type of ROM/Therapeutic Exercise   Type of ROM/Therapeutic Exercise Free weights   Comment 1# proximal (some modifications for L UE), distally 2#   Assessment   Performance deficits / Impairments Decreased functional mobility ; Decreased strength;Decreased high-level IADLs;Decreased endurance;Decreased safe awareness;Decreased balance;Decreased ADL status   Timed Code Treatment Minutes 60 Minutes   Activity Tolerance   Activity Tolerance Patient Tolerated treatment well   Safety Devices   Safety Devices in place Yes   Type of devices Bed alarm in place;Call light within reach   Other Comments   Comments ice pack at tend of tx for pain

## 2020-12-13 LAB
GLUCOSE BLD-MCNC: 115 MG/DL (ref 70–99)
GLUCOSE BLD-MCNC: 117 MG/DL (ref 70–99)
GLUCOSE BLD-MCNC: 132 MG/DL (ref 70–99)
GLUCOSE BLD-MCNC: 132 MG/DL (ref 70–99)
PERFORMED ON: ABNORMAL

## 2020-12-13 PROCEDURE — 2580000003 HC RX 258: Performed by: INTERNAL MEDICINE

## 2020-12-13 PROCEDURE — 6370000000 HC RX 637 (ALT 250 FOR IP): Performed by: PSYCHIATRY & NEUROLOGY

## 2020-12-13 PROCEDURE — 6370000000 HC RX 637 (ALT 250 FOR IP): Performed by: INTERNAL MEDICINE

## 2020-12-13 PROCEDURE — 82947 ASSAY GLUCOSE BLOOD QUANT: CPT

## 2020-12-13 PROCEDURE — 1180000000 HC REHAB R&B

## 2020-12-13 RX ADMIN — ATORVASTATIN CALCIUM 10 MG: 10 TABLET, FILM COATED ORAL at 08:13

## 2020-12-13 RX ADMIN — GABAPENTIN 300 MG: 300 CAPSULE ORAL at 21:33

## 2020-12-13 RX ADMIN — VENLAFAXINE HYDROCHLORIDE 37.5 MG: 37.5 CAPSULE, EXTENDED RELEASE ORAL at 08:14

## 2020-12-13 RX ADMIN — PANTOPRAZOLE SODIUM 40 MG: 40 TABLET, DELAYED RELEASE ORAL at 05:52

## 2020-12-13 RX ADMIN — MEMANTINE HYDROCHLORIDE 5 MG: 5 TABLET ORAL at 08:13

## 2020-12-13 RX ADMIN — SODIUM CHLORIDE, PRESERVATIVE FREE 10 ML: 5 INJECTION INTRAVENOUS at 08:14

## 2020-12-13 RX ADMIN — Medication 150 MG: at 08:13

## 2020-12-13 RX ADMIN — APIXABAN 2.5 MG: 2.5 TABLET, FILM COATED ORAL at 21:33

## 2020-12-13 RX ADMIN — DOCUSATE SODIUM 50 MG AND SENNOSIDES 8.6 MG 1 TABLET: 8.6; 5 TABLET, FILM COATED ORAL at 21:32

## 2020-12-13 RX ADMIN — METFORMIN HYDROCHLORIDE 500 MG: 500 TABLET ORAL at 08:13

## 2020-12-13 RX ADMIN — OXYCODONE HYDROCHLORIDE AND ACETAMINOPHEN 1 TABLET: 5; 325 TABLET ORAL at 08:13

## 2020-12-13 RX ADMIN — APIXABAN 2.5 MG: 2.5 TABLET, FILM COATED ORAL at 08:14

## 2020-12-13 RX ADMIN — OXYCODONE HYDROCHLORIDE AND ACETAMINOPHEN 1 TABLET: 5; 325 TABLET ORAL at 21:33

## 2020-12-13 RX ADMIN — OXYCODONE HYDROCHLORIDE AND ACETAMINOPHEN 1 TABLET: 5; 325 TABLET ORAL at 13:24

## 2020-12-13 RX ADMIN — DONEPEZIL HYDROCHLORIDE 5 MG: 5 TABLET, FILM COATED ORAL at 08:14

## 2020-12-13 RX ADMIN — ASPIRIN 81 MG: 81 TABLET, COATED ORAL at 08:14

## 2020-12-13 ASSESSMENT — PAIN SCALES - GENERAL
PAINLEVEL_OUTOF10: 0
PAINLEVEL_OUTOF10: 0
PAINLEVEL_OUTOF10: 2
PAINLEVEL_OUTOF10: 0
PAINLEVEL_OUTOF10: 2
PAINLEVEL_OUTOF10: 3
PAINLEVEL_OUTOF10: 0

## 2020-12-13 ASSESSMENT — PAIN DESCRIPTION - LOCATION
LOCATION: HIP
LOCATION: HIP

## 2020-12-13 ASSESSMENT — PAIN DESCRIPTION - PAIN TYPE
TYPE: SURGICAL PAIN
TYPE: SURGICAL PAIN

## 2020-12-13 ASSESSMENT — PAIN DESCRIPTION - ORIENTATION
ORIENTATION: RIGHT
ORIENTATION: RIGHT

## 2020-12-13 ASSESSMENT — PAIN DESCRIPTION - DESCRIPTORS
DESCRIPTORS: ACHING;DISCOMFORT
DESCRIPTORS: ACHING;DISCOMFORT

## 2020-12-13 NOTE — PLAN OF CARE
Problem: Falls - Risk of:  Goal: Will remain free from falls  Description: Will remain free from falls  12/13/2020 1049 by Willie Aguilar LPN  Outcome: Ongoing  12/13/2020 0044 by Chip Burgos LPN  Outcome: Ongoing  Goal: Absence of physical injury  Description: Absence of physical injury  12/13/2020 1049 by Willie Aguilar LPN  Outcome: Ongoing  12/13/2020 0044 by Chip Burgos LPN  Outcome: Ongoing     Problem: Skin Integrity:  Goal: Will show no infection signs and symptoms  Description: Will show no infection signs and symptoms  12/13/2020 1049 by Willie Aguilar LPN  Outcome: Ongoing  12/13/2020 0044 by Chip Burgos LPN  Outcome: Ongoing  Goal: Absence of new skin breakdown  Description: Absence of new skin breakdown  12/13/2020 1049 by Willie Aguilar LPN  Outcome: Ongoing  12/13/2020 0044 by Chip Burgos LPN  Outcome: Ongoing  Goal: Demonstration of wound healing without infection will improve  Description: Demonstration of wound healing without infection will improve  12/13/2020 1049 by Willie Aguilar LPN  Outcome: Ongoing  12/13/2020 0044 by Chip Burgos LPN  Outcome: Ongoing  Goal: Risk for impaired skin integrity will decrease  Description: Risk for impaired skin integrity will decrease  12/13/2020 1049 by Willie Aguilar LPN  Outcome: Ongoing  12/13/2020 0044 by Chip Burgos LPN  Outcome: Ongoing     Problem: Pain:  Goal: Pain level will decrease  Description: Pain level will decrease  12/13/2020 1049 by Willie Aguilar LPN  Outcome: Ongoing  12/13/2020 0044 by Chip Burgos LPN  Outcome: Ongoing  Goal: Control of acute pain  Description: Control of acute pain  12/13/2020 1049 by Willie Aguilar LPN  Outcome: Ongoing  12/13/2020 0044 by Chip Burgos LPN  Outcome: Ongoing  Goal: Control of chronic pain  Description: Control of chronic pain  12/13/2020 1049 by Willie Aguilar LPN  Outcome: Ongoing  12/13/2020 0044 by Chip Burgos LPN Outcome: Ongoing     Problem: SAFETY  Goal: Free from accidental physical injury  12/13/2020 1049 by Mikhail Mcconnell LPN  Outcome: Ongoing  12/13/2020 0044 by Theodore Wen LPN  Outcome: Ongoing  Goal: Free from intentional harm  12/13/2020 1049 by Mikhail Mcconnell LPN  Outcome: Ongoing  12/13/2020 0044 by Theodore Wen LPN  Outcome: Ongoing     Problem: DAILY CARE  Goal: Daily care needs are met  12/13/2020 1049 by Mikhail Mcconnell LPN  Outcome: Ongoing  12/13/2020 0044 by Theodore Wen LPN  Outcome: Ongoing     Problem: PAIN  Goal: Patient's pain/discomfort is manageable  12/13/2020 1049 by Mikhail Mcconnell LPN  Outcome: Ongoing  12/13/2020 0044 by Theodore Wen LPN  Outcome: Ongoing  Goal: STG - Patient will verbalize an acceptable level of pain  12/13/2020 1049 by Mikhail Mcconnell LPN  Outcome: Ongoing  12/13/2020 0044 by Theodore Wen LPN  Outcome: Ongoing     Problem: SKIN INTEGRITY  Goal: Skin integrity is maintained or improved  12/13/2020 1049 by Mikhail Mcconnell LPN  Outcome: Ongoing  12/13/2020 0044 by Theodore Wen LPN  Outcome: Ongoing  Goal: STG - patient will maintain good skin integrity  12/13/2020 1049 by Mikhail Mcconnell LPN  Outcome: Ongoing  12/13/2020 0044 by Theodore Wen LPN  Outcome: Ongoing  Goal: STG - Patient exhibits signs of wound healing. 12/13/2020 1049 by Mikhail Mcconnell LPN  Outcome: Ongoing  12/13/2020 0044 by Theodore Wen LPN  Outcome: Ongoing     Problem: KNOWLEDGE DEFICIT  Goal: Patient/S.O. demonstrates understanding of disease process, treatment plan, medications, and discharge instructions.   12/13/2020 1049 by Mikhail Mcconnell LPN  Outcome: Ongoing  12/13/2020 0044 by Theodore Wen LPN  Outcome: Ongoing     Problem: DISCHARGE BARRIERS  Goal: Patient's continuum of care needs are met  12/13/2020 1049 by Mikhail Mcconnell LPN  Outcome: Ongoing  12/13/2020 0044 by Theodore Wen LPN  Outcome: Ongoing Problem: Mobility - Impaired:  Goal: Mobility will improve  Description: Mobility will improve  12/13/2020 1049 by Lane Pugh LPN  Outcome: Ongoing  12/13/2020 0044 by Juan J Andino LPN  Outcome: Ongoing     Problem:  Activity:  Goal: Ability to ambulate will improve  Description: Ability to ambulate will improve  12/13/2020 1049 by Lane Pugh LPN  Outcome: Ongoing  12/13/2020 0044 by Juan J Andino LPN  Outcome: Ongoing  Goal: Ability to perform activities at highest level will improve  Description: Ability to perform activities at highest level will improve  12/13/2020 1049 by Lane Pugh LPN  Outcome: Ongoing  12/13/2020 0044 by Juan J Andino LPN  Outcome: Ongoing     Problem: Nutritional:  Goal: Ability to attain and maintain optimal nutritional status will improve  Description: Ability to attain and maintain optimal nutritional status will improve  12/13/2020 1049 by Lane Pugh LPN  Outcome: Ongoing  12/13/2020 0044 by Juan J Andino LPN  Outcome: Ongoing     Problem: Safety:  Goal: Ability to remain free from injury will improve  Description: Ability to remain free from injury will improve  12/13/2020 1049 by Lane Pugh LPN  Outcome: Ongoing  12/13/2020 0044 by Juan J Andino LPN  Outcome: Ongoing     Problem: IP BLADDER/VOIDING  Goal: LTG - Patient will utilize adaptive techniques/equipment to complete bladder elimination  12/13/2020 1049 by Lane Pugh LPN  Outcome: Ongoing  12/13/2020 0044 by Juan J Andino LPN  Outcome: Ongoing     Problem: IP BOWEL ELIMINATION  Goal: LTG - patient will have regular and routine bowel evacuation  12/13/2020 1049 by Lane Pugh LPN  Outcome: Ongoing  12/13/2020 0044 by Juan J Andino LPN  Outcome: Ongoing     Problem: IP BREATHING  Goal: LTG - Patient/caregiver will demonstrate/perform proper techniques to maintain patent airway  12/13/2020 1049 by Lane Pugh LPN  Outcome: Ongoing 12/13/2020 0044 by Thermon Mark LPN  Outcome: Ongoing     Problem: NUTRITION  Goal: Patient maintains adequate hydration  12/13/2020 1049 by Kandace Sanchez LPN  Outcome: Ongoing  12/13/2020 0044 by Thermon POLLY FloresN  Outcome: Ongoing     Problem: Bleeding:  Goal: Will show no signs and symptoms of excessive bleeding  Description: Will show no signs and symptoms of excessive bleeding  12/13/2020 1049 by Kandace Sanchez LPN  Outcome: Ongoing  12/13/2020 0044 by Thermon Mark LPN  Outcome: Ongoing     Problem: Infection - Surgical Site:  Goal: Will show no infection signs and symptoms  Description: Will show no infection signs and symptoms  12/13/2020 1049 by Kandace Sanchez LPN  Outcome: Ongoing  12/13/2020 0044 by Thermoramon Flores LPN  Outcome: Ongoing     Problem: Serum Glucose Level - Abnormal:  Goal: Ability to maintain appropriate glucose levels has stabilized  Description: Ability to maintain appropriate glucose levels has stabilized  12/13/2020 1049 by Kandace Sanchez LPN  Outcome: Ongoing  12/13/2020 0044 by Thermon POLLY FloresN  Outcome: Ongoing     Problem: Mood - Altered:  Goal: Mood stable  Description: Mood stable  12/13/2020 1049 by Kandace Sanchez LPN  Outcome: Ongoing  12/13/2020 0044 by Thermon POLLY FloresN  Outcome: Ongoing

## 2020-12-13 NOTE — PROGRESS NOTES
Orthopedic Surgery Progress Note    Tata Costello  12/13/2020      Subjective:     Systemic or Specific Complaints: We were asked to see pt in regards to increase left shoulder pain. Pt is s/p orif left clavicle fx 6/20. Pt with recent xrays revealing a bent plate but no fx of the plate. In talking with pt, pain is intermittent, mild, sharp/catching type pain. It does not keep her from gettting around. She has some occasional discomfort with using lue for ambulation with the walker, but overall nothing significant. we've discussed removing the plate today and at this point we've decided to hold off on removal and anesthesia at this time. Objective:     Patient Vitals for the past 24 hrs:   BP Temp Temp src Pulse Resp SpO2 Weight   12/13/20 0650 (!) 130/56 96.1 °F (35.6 °C) Temporal 65 16 96 %    12/13/20 0514       113 lb 9.6 oz (51.5 kg)   12/12/20 1826 (!) 106/55 97 °F (36.1 °C) Temporal 74 18 91 %    12/12/20 1421 (!) 118/42   67 16         left upper  General: alert, appears stated age and cooperative   Wound:              Dressing:    Extremity: Distal NVI, no erosion/ulceration. From LUE w/o pain           DVT Exam: No evidence of DVT seen on physical exam.                   Data Review:  No results for input(s): HGB in the last 72 hours. No results for input(s): NA, K, CREATININE in the last 72 hours. Assessment:     Hardware failure s/p ORIF left clavicle  S/p recent TFN (dr Swathi Maxwell)    Plan:      1:  DVT prophylaxis, ICE, elevate  2:  Pain control  3:  Physical therapy/Occupational therapy  4:  Anticipate discharge when medcially stable  5: Weight bearing as tolerated LUE  6: call if needed. Thanks.         Electronically signed by Cheyenne Naidu PA-C on 12/13/2020 at 9:23 AM

## 2020-12-13 NOTE — PLAN OF CARE
Problem: Falls - Risk of:  Goal: Will remain free from falls  Description: Will remain free from falls  12/13/2020 0044 by Kwesi Hanks LPN  Outcome: Ongoing  12/12/2020 1337 by Harley Dukes LPN  Outcome: Ongoing  Goal: Absence of physical injury  Description: Absence of physical injury  12/13/2020 0044 by Kwesi Hanks LPN  Outcome: Ongoing  12/12/2020 1337 by Harley Dukes LPN  Outcome: Ongoing     Problem: Skin Integrity:  Goal: Will show no infection signs and symptoms  Description: Will show no infection signs and symptoms  12/13/2020 0044 by Kwesi Hanks LPN  Outcome: Ongoing  12/12/2020 1337 by Harley Dukes LPN  Outcome: Ongoing  Goal: Absence of new skin breakdown  Description: Absence of new skin breakdown  12/13/2020 0044 by Kwesi Hanks LPN  Outcome: Ongoing  12/12/2020 1337 by Harley Dukes LPN  Outcome: Ongoing  Goal: Demonstration of wound healing without infection will improve  Description: Demonstration of wound healing without infection will improve  12/13/2020 0044 by Kwesi Hanks LPN  Outcome: Ongoing  12/12/2020 1337 by Harley Dukes LPN  Outcome: Ongoing  Goal: Risk for impaired skin integrity will decrease  Description: Risk for impaired skin integrity will decrease  12/13/2020 0044 by Kwesi Hanks LPN  Outcome: Ongoing  12/12/2020 1337 by Harley Dukes LPN  Outcome: Ongoing     Problem: Pain:  Goal: Pain level will decrease  Description: Pain level will decrease  12/13/2020 0044 by Kwesi Hanks LPN  Outcome: Ongoing  12/12/2020 1337 by Harley Dukes LPN  Outcome: Ongoing  Goal: Control of acute pain  Description: Control of acute pain  12/13/2020 0044 by Kwesi Hanks LPN  Outcome: Ongoing  12/12/2020 1337 by Harley Dukes LPN  Outcome: Ongoing  Goal: Control of chronic pain  Description: Control of chronic pain  12/13/2020 0044 by Kwesi Hanks LPN  Outcome: Ongoing  12/12/2020 1337 by Harley Dukes LPN Problem: Mobility - Impaired:  Goal: Mobility will improve  Description: Mobility will improve  12/13/2020 0044 by Shane Knapp LPN  Outcome: Ongoing  12/12/2020 1337 by Lisa Carter LPN  Outcome: Ongoing     Problem:  Activity:  Goal: Ability to ambulate will improve  Description: Ability to ambulate will improve  12/13/2020 0044 by Shane Knapp LPN  Outcome: Ongoing  12/12/2020 1337 by Lisa Carter LPN  Outcome: Ongoing  Goal: Ability to perform activities at highest level will improve  Description: Ability to perform activities at highest level will improve  12/13/2020 0044 by Shane Knapp LPN  Outcome: Ongoing  12/12/2020 1337 by Lisa Carter LPN  Outcome: Ongoing     Problem: Nutritional:  Goal: Ability to attain and maintain optimal nutritional status will improve  Description: Ability to attain and maintain optimal nutritional status will improve  12/13/2020 0044 by Shane Knapp LPN  Outcome: Ongoing  12/12/2020 1337 by Lisa Carter LPN  Outcome: Ongoing     Problem: Safety:  Goal: Ability to remain free from injury will improve  Description: Ability to remain free from injury will improve  12/13/2020 0044 by Shane Knapp LPN  Outcome: Ongoing  12/12/2020 1337 by Lisa Carter LPN  Outcome: Ongoing     Problem: IP BLADDER/VOIDING  Goal: LTG - Patient will utilize adaptive techniques/equipment to complete bladder elimination  12/13/2020 0044 by Shane Knapp LPN  Outcome: Ongoing  12/12/2020 1337 by Lisa Carter LPN  Outcome: Ongoing     Problem: IP BOWEL ELIMINATION  Goal: LTG - patient will have regular and routine bowel evacuation  12/13/2020 0044 by Shane Knapp LPN  Outcome: Ongoing  12/12/2020 1337 by Lisa Carter LPN  Outcome: Ongoing     Problem: IP BREATHING  Goal: LTG - Patient/caregiver will demonstrate/perform proper techniques to maintain patent airway  12/13/2020 0044 by Shane Knapp LPN  Outcome: Ongoing

## 2020-12-14 LAB
ANION GAP SERPL CALCULATED.3IONS-SCNC: 7 MMOL/L (ref 7–19)
BASOPHILS ABSOLUTE: 0.1 K/UL (ref 0–0.2)
BASOPHILS RELATIVE PERCENT: 0.6 % (ref 0–1)
BUN BLDV-MCNC: 15 MG/DL (ref 8–23)
CALCIUM SERPL-MCNC: 9.5 MG/DL (ref 8.8–10.2)
CHLORIDE BLD-SCNC: 103 MMOL/L (ref 98–111)
CO2: 30 MMOL/L (ref 22–29)
CREAT SERPL-MCNC: 0.5 MG/DL (ref 0.5–0.9)
EOSINOPHILS ABSOLUTE: 0.1 K/UL (ref 0–0.6)
EOSINOPHILS RELATIVE PERCENT: 0.9 % (ref 0–5)
GFR AFRICAN AMERICAN: >59
GFR NON-AFRICAN AMERICAN: >60
GLUCOSE BLD-MCNC: 117 MG/DL (ref 74–109)
GLUCOSE BLD-MCNC: 122 MG/DL (ref 70–99)
GLUCOSE BLD-MCNC: 123 MG/DL (ref 70–99)
GLUCOSE BLD-MCNC: 123 MG/DL (ref 70–99)
GLUCOSE BLD-MCNC: 138 MG/DL (ref 70–99)
HCT VFR BLD CALC: 30.2 % (ref 37–47)
HEMOGLOBIN: 9.3 G/DL (ref 12–16)
IMMATURE GRANULOCYTES #: 0.1 K/UL
LYMPHOCYTES ABSOLUTE: 3.3 K/UL (ref 1.1–4.5)
LYMPHOCYTES RELATIVE PERCENT: 34.1 % (ref 20–40)
MCH RBC QN AUTO: 30.6 PG (ref 27–31)
MCHC RBC AUTO-ENTMCNC: 30.8 G/DL (ref 33–37)
MCV RBC AUTO: 99.3 FL (ref 81–99)
MONOCYTES ABSOLUTE: 1 K/UL (ref 0–0.9)
MONOCYTES RELATIVE PERCENT: 10.3 % (ref 0–10)
NEUTROPHILS ABSOLUTE: 5.1 K/UL (ref 1.5–7.5)
NEUTROPHILS RELATIVE PERCENT: 53.1 % (ref 50–65)
PDW BLD-RTO: 14.9 % (ref 11.5–14.5)
PERFORMED ON: ABNORMAL
PLATELET # BLD: 502 K/UL (ref 130–400)
PMV BLD AUTO: 9.4 FL (ref 9.4–12.3)
POTASSIUM REFLEX MAGNESIUM: 5 MMOL/L (ref 3.5–5)
RBC # BLD: 3.04 M/UL (ref 4.2–5.4)
SODIUM BLD-SCNC: 140 MMOL/L (ref 136–145)
WBC # BLD: 9.7 K/UL (ref 4.8–10.8)

## 2020-12-14 PROCEDURE — 2580000003 HC RX 258: Performed by: INTERNAL MEDICINE

## 2020-12-14 PROCEDURE — 97535 SELF CARE MNGMENT TRAINING: CPT

## 2020-12-14 PROCEDURE — 36415 COLL VENOUS BLD VENIPUNCTURE: CPT

## 2020-12-14 PROCEDURE — 80048 BASIC METABOLIC PNL TOTAL CA: CPT

## 2020-12-14 PROCEDURE — 97530 THERAPEUTIC ACTIVITIES: CPT

## 2020-12-14 PROCEDURE — 99232 SBSQ HOSP IP/OBS MODERATE 35: CPT | Performed by: PSYCHIATRY & NEUROLOGY

## 2020-12-14 PROCEDURE — 1180000000 HC REHAB R&B

## 2020-12-14 PROCEDURE — 97110 THERAPEUTIC EXERCISES: CPT

## 2020-12-14 PROCEDURE — 6370000000 HC RX 637 (ALT 250 FOR IP): Performed by: PSYCHIATRY & NEUROLOGY

## 2020-12-14 PROCEDURE — 85025 COMPLETE CBC W/AUTO DIFF WBC: CPT

## 2020-12-14 PROCEDURE — 82947 ASSAY GLUCOSE BLOOD QUANT: CPT

## 2020-12-14 PROCEDURE — 6370000000 HC RX 637 (ALT 250 FOR IP): Performed by: INTERNAL MEDICINE

## 2020-12-14 RX ADMIN — ASPIRIN 81 MG: 81 TABLET, COATED ORAL at 08:42

## 2020-12-14 RX ADMIN — SODIUM CHLORIDE, PRESERVATIVE FREE 10 ML: 5 INJECTION INTRAVENOUS at 08:47

## 2020-12-14 RX ADMIN — OXYCODONE HYDROCHLORIDE AND ACETAMINOPHEN 1 TABLET: 5; 325 TABLET ORAL at 20:26

## 2020-12-14 RX ADMIN — OXYCODONE HYDROCHLORIDE AND ACETAMINOPHEN 1 TABLET: 5; 325 TABLET ORAL at 08:41

## 2020-12-14 RX ADMIN — APIXABAN 2.5 MG: 2.5 TABLET, FILM COATED ORAL at 08:42

## 2020-12-14 RX ADMIN — SODIUM CHLORIDE, PRESERVATIVE FREE 10 ML: 5 INJECTION INTRAVENOUS at 20:27

## 2020-12-14 RX ADMIN — Medication 150 MG: at 08:42

## 2020-12-14 RX ADMIN — SODIUM CHLORIDE, PRESERVATIVE FREE 10 ML: 5 INJECTION INTRAVENOUS at 00:38

## 2020-12-14 RX ADMIN — DONEPEZIL HYDROCHLORIDE 5 MG: 5 TABLET, FILM COATED ORAL at 08:41

## 2020-12-14 RX ADMIN — PANTOPRAZOLE SODIUM 40 MG: 40 TABLET, DELAYED RELEASE ORAL at 05:52

## 2020-12-14 RX ADMIN — APIXABAN 2.5 MG: 2.5 TABLET, FILM COATED ORAL at 20:26

## 2020-12-14 RX ADMIN — ATORVASTATIN CALCIUM 10 MG: 10 TABLET, FILM COATED ORAL at 08:41

## 2020-12-14 RX ADMIN — MEMANTINE HYDROCHLORIDE 5 MG: 5 TABLET ORAL at 08:42

## 2020-12-14 RX ADMIN — DOCUSATE SODIUM 50 MG AND SENNOSIDES 8.6 MG 1 TABLET: 8.6; 5 TABLET, FILM COATED ORAL at 20:26

## 2020-12-14 RX ADMIN — METFORMIN HYDROCHLORIDE 500 MG: 500 TABLET ORAL at 08:42

## 2020-12-14 RX ADMIN — GABAPENTIN 300 MG: 300 CAPSULE ORAL at 20:26

## 2020-12-14 RX ADMIN — VENLAFAXINE HYDROCHLORIDE 37.5 MG: 37.5 CAPSULE, EXTENDED RELEASE ORAL at 08:42

## 2020-12-14 RX ADMIN — OXYCODONE HYDROCHLORIDE AND ACETAMINOPHEN 1 TABLET: 5; 325 TABLET ORAL at 13:55

## 2020-12-14 ASSESSMENT — PAIN DESCRIPTION - FREQUENCY
FREQUENCY: INTERMITTENT
FREQUENCY: INTERMITTENT
FREQUENCY: CONTINUOUS

## 2020-12-14 ASSESSMENT — PAIN DESCRIPTION - ONSET
ONSET: ON-GOING

## 2020-12-14 ASSESSMENT — PAIN DESCRIPTION - ORIENTATION
ORIENTATION: RIGHT;LEFT

## 2020-12-14 ASSESSMENT — PAIN DESCRIPTION - DESCRIPTORS
DESCRIPTORS: ACHING
DESCRIPTORS: ACHING
DESCRIPTORS: ACHING;DISCOMFORT
DESCRIPTORS: ACHING;DISCOMFORT

## 2020-12-14 ASSESSMENT — PAIN SCALES - GENERAL
PAINLEVEL_OUTOF10: 2
PAINLEVEL_OUTOF10: 1
PAINLEVEL_OUTOF10: 2
PAINLEVEL_OUTOF10: 2
PAINLEVEL_OUTOF10: 1
PAINLEVEL_OUTOF10: 0

## 2020-12-14 ASSESSMENT — PAIN DESCRIPTION - PAIN TYPE
TYPE: SURGICAL PAIN;CHRONIC PAIN

## 2020-12-14 ASSESSMENT — PAIN DESCRIPTION - PROGRESSION
CLINICAL_PROGRESSION: NOT CHANGED
CLINICAL_PROGRESSION: NOT CHANGED

## 2020-12-14 ASSESSMENT — PAIN DESCRIPTION - LOCATION
LOCATION: SHOULDER;HIP
LOCATION: SHOULDER;HIP
LOCATION: HIP;SHOULDER
LOCATION: HIP;SHOULDER

## 2020-12-14 NOTE — PROGRESS NOTES
Occupational Therapy     12/14/20 1345   General   Diagnosis R hip fx with nailing   Pain Assessment   Patient Currently in Pain Yes   Pain Assessment 0-10   Pain Level 2   Pain Type Surgical pain;Chronic pain   Pain Location Shoulder;Hip   Pain Orientation Right;Left   Pain Descriptors Aching   Pain Frequency Continuous   Clinical Progression Not changed   Response to Pain Intervention Patient Satisfied   ADL   Toileting Contact guard assistance   Balance   Sitting Balance Supervision   Standing Balance Contact guard assistance   Functional Mobility   Functional - Mobility Device Wheelchair   Activity To/from bathroom; Other   Assist Level Stand by assistance   Functional Mobility Comments Cues for tech when turning in tight space. Transfers   Stand Pivot Transfers Contact guard assistance   Sit to stand Contact guard assistance   Stand to sit Contact guard assistance   Toilet Transfers   Toilet - Technique Stand pivot   Equipment Used Standard bedside commode   Toilet Transfer Contact guard assistance   Toilet Transfers Comments Cues for proper w/c placement prior to transfer. Type of ROM/Therapeutic Exercise   Type of ROM/Therapeutic Exercise Free weights   Comment 1#, LUE distal only, RUE all planes, 1 set x 20 reps. Assessment   Performance deficits / Impairments Decreased functional mobility ; Decreased strength;Decreased high-level IADLs;Decreased endurance;Decreased safe awareness;Decreased balance;Decreased ADL status   Treatment Diagnosis Right Femur Fx s/p IM nail   Prognosis Good   History clavicle fx with ORIF 6/22/20, Rapid response 12/5   Timed Code Treatment Minutes 45 Minutes   Activity Tolerance   Activity Tolerance Patient Tolerated treatment well   Safety Devices   Safety Devices in place Yes   Type of devices Call light within reach; Bed alarm in place   Plan Current Treatment Recommendations Positioning; Safety Education & Training;Functional Mobility Training;Home Management Training;Balance Training;Self-Care / ADL;Equipment Evaluation, Education, & procurement;Strengthening; Endurance Training;Patient/Caregiver Education & Training

## 2020-12-14 NOTE — PROGRESS NOTES
Patient:   Rebecca Flores  MR#:    [id number]   Room:    7944/664-71   YOB: 1935  Date of Progress Note: 12/14/2020  Time of Note                           9:10 AM  Consulting Physician:   Dina Frey M.D. Attending Physician:  Dina Frey MD        CHIEF COMPLAINT: Right hip pain        Subjective: This 81 y.o. 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 She presented to [facility] ER on 12/3/20 after having a fall at home where she tripped and fell forward hitting her right side of her head and her right hip. She has a hematoma to her right frontotemporal aread and had been unable to bear weight on her right lower extremity since her fall. CT of head was negative for acute changes. X-ray done of her right femur revealed an acute traumatic displaced peritrochanteric fracture. She was admitted to the hospitalist service with consult for orthopedic surgery. She was seen by Dr. Karyn Parra, who recommended Cephalomedullary Nailing of her fracture. She was in agreement and went for surgery on 12/4/20. She tolerated the procedure well. Post op she had chest pain, rapid response was called. EKG and cardiac enzymes were unremarkable. CTA negative for pulmonary emboli. Echocardiogram ordered and reveals EF of 55-60%. Patient has had no further chest pain. She will be toe-touch weightbearing on her right lower extremity for 6 weeks. She will need DVT prophylaxis for 6 weeks. No complaints today. Had a good weekend. Many questions answered this morning.   REVIEW OF SYSTEMS:  Constitutional: No fevers No chills  Neck:No stiffness  Respiratory: No shortness of breath  Cardiovascular: No chest pain No palpitations  Gastrointestinal: No abdominal pain    Genitourinary: No Dysuria  Neurological: No headache, no confusion      PHYSICAL EXAM: /65   Pulse 70   Temp 97.2 °F (36.2 °C) (Temporal)   Resp 16   Ht 5' 3\" (1.6 m)   Wt 113 lb 9.6 oz (51.5 kg)   SpO2 92%   BMI 20.12 kg/m²     Constitutional: she appears well-developed and well-nourished. Eyes  conjunctiva normal.  Pupils react to light  Ear, nose, throat -hearing intact to voice. No scars, masses, or lesions over external nose or ears, no atrophy of tongue  Neck-symmetric, no masses noted, no jugular vein distension  Respiration- chest wall appears symmetric, good expansion,   normal effort without use of accessory muscles  Cardiovascular- RRR  Musculoskeletal  no significant wasting of muscles noted, no bony deformities, gait no gross ataxia  Extremities-no clubbing, cyanosis or edema  Skin  warm, dry, and intact. No rash, erythema, or pallor. Psychiatric  mood, affect, and behavior appear normal.      Neurology  NEUROLOGICAL EXAM:      Mental status   Awake, alert, fluent oriented x 3 appropriate affect  Attention and concentration appear appropriate  Recent and remote memory appears unremarkable  Speech normal without dysarthria  No clear issues with language       Cranial Nerves   CN II- Visual fields grossly unremarkable  CN III, IV,VI-EOMI, No nystagmus, conjugate eye movements, no ptosis  CN VII-right hemifacial spasm  CN VIII-Hearing intact      Motor function  Antigravity x 4     Sensory function Intact to light touch     Cerebellar F-N intact     Tremor None present     Gait                  Not tested           Nursing/pcp notes, imaging,labs and vitals reviewed.      PT,OT and/or speech notes reviewed    Lab Results   Component Value Date    WBC 9.7 12/14/2020    HGB 9.3 (L) 12/14/2020    HCT 30.2 (L) 12/14/2020    MCV 99.3 (H) 12/14/2020     (H) 12/14/2020     Lab Results   Component Value Date     12/14/2020    K 5.0 12/14/2020     12/14/2020    CO2 30 (H) 12/14/2020    BUN 15 12/14/2020    CREATININE 0.5 12/14/2020 GLUCOSE 117 (H) 12/14/2020    CALCIUM 9.5 12/14/2020    PROT 6.4 (L) 12/03/2020    LABALBU 4.0 12/03/2020    BILITOT <0.2 12/03/2020    ALKPHOS 72 12/03/2020    AST 17 12/03/2020    ALT 15 12/03/2020    LABGLOM >60 12/14/2020    GFRAA >59 12/14/2020    GLOB 2.4 10/11/2016   No results found for: INRNo results found for: PHENYTOIN, ESR, CRP    12/12/20 1315   Restrictions/Precautions   Restrictions/Precautions Weight Bearing; Fall Risk   Lower Extremity Weight Bearing Restrictions   Right Lower Extremity Weight Bearing Toe Touch Weight Bearing   General   Additional Pertinent Hx L clavicle fx with ORIF 6/22/20   Diagnosis R hip fx with nailing   ADL   Toileting Contact guard assistance   Balance   Standing Balance Contact guard assistance   Standing Balance   Time 4 mins   Activity 1 hand tabletop activity   Toilet Transfers   Toilet - Technique Stand pivot   Equipment Used Standard bedside commode   Toilet Transfer Contact guard assistance   Type of ROM/Therapeutic Exercise   Type of ROM/Therapeutic Exercise Eugenio   Comment 5#    Short term goals   Short term goal 5 MET   Assessment   Performance deficits / Impairments Decreased functional mobility ; Decreased strength;Decreased high-level IADLs;Decreased endurance;Decreased safe awareness;Decreased balance;Decreased ADL status   Assessment after discharge pt plans to stay with friend that has level entry until weight bearing restrictions lifted   Timed Code Treatment Minutes 30 Minutes   Activity Tolerance   Activity Tolerance Patient Tolerated treatment well   Safety Devices   Safety Devices in place Yes   Type of devices    (with PT)        12/12/20 1353 12/12/20 1354 12/12/20 1404   Pain Screening   Patient Currently in Pain No  --   --    Bed Mobility   Sit to Supine Minimal assistance  --   --    Exercises   Comments  --  Supine BLE ex  x15 reps/ 2 sets  AROM LLE.   AAROM RLE.  --    Conditions Requiring Skilled Therapeutic Intervention Assessment  --   --  Performed supine BLE exercises at bedside this session. Activity Tolerance   Activity Tolerance  --   --  Patient Tolerated treatment well;Patient limited by pain   Safety Devices   Type of devices  --   --   --         12/12/20 1420   Pain Screening   Patient Currently in Pain  --    Bed Mobility   Sit to Supine  --    Exercises   Comments  --    Conditions Requiring Skilled Therapeutic Intervention   Assessment  --    Activity Tolerance   Activity Tolerance  --    Safety Devices   Type of devices Bed alarm in place;Call light within reach           RECORD REVIEW: Previous medical records, medications were reviewed at today's visit    IMPRESSION:   1. Right hip fracture status post cephalomedullary nailing. Toe-touch weightbearing for 6 weeksPT/OT  2. DVT prophylaxis for 6 weeks. Low-dose Eliquis  3. Diabetescontinue sliding scale and resume home oral hypoglycemic  4. History of dementiacontinue Aricept and Namenda. Both doses are low. Discussed with patient. 5.  History of anxiety/depressioncontinue Effexor  6. Acute blood loss anemia improving. Niferex added  Percocet scheduled. Atenolol held  Continue current treatment.   Patient improving  ELOS:  Staff this week

## 2020-12-14 NOTE — PROGRESS NOTES
Durable Medical Equipment   Physician Order     Patient Name [address] [address] Phone   [phone] [facility]   [phone] [facility] E-mail Address   [email or web address]     Name Relation Home Work   [address] [place] [address] Casey Gaston [facility] [phone]       Patient Height Height: 5' 3\" (160 cm)  Patient Weight 113 lb 9.6 oz (51.5 kg)   1935     1. [facility] PART A AND B  F/O Payor/Plan Precert #   MEDICARE/MEDICARE PART A AND B    Subscriber Subscriber #   Bennett Bees [id number]   Address Phone   [address] [facility] [address] [phone]     2. [facility]  F/O Payor/Plan Precert #   [facility]    Subscriber Subscriber #   Bennett Bees [id number]   Address Phone   [address] [place] [address]   [place] [address] [phone]       DME NEEDS:  **16\" MANUAL WHEELCHAIR WITH ANTI-TIPPERS, FULL-LENGTH ARMRESTS, SWING-AWAY FOOT RESTS, AND STANDARD WHEELCHAIR CUSHION. [facility] BED. **BEDSIDE COMMODE.  **TUB TRANSFER BENCH.       DIAGNOSIS:  Closed nondisplaced fracture of lesser trochanter of right femur with routine healing S72.124D     [facility]   History and Physical           Patient:                                    Lucy Wilson  MR#:                                        [id number]  Account Number:                   [id number]              Room:                                      [facility]823-      YOB: 1935  Date of Progress Note:           2020  Time of Note                           7:45 AM  Attending Physician:               Kristy Brown MD           Admitting diagnosis: Right femur fracture with toe-touch weightbearing for 6 weeks     Secondary diagnoses:, Hypertension, anxiety/depression, Alzheimer's disease     CHIEF COMPLAINT: Right hip pain        HISTORY OF PRESENT ILLNESS: This 81 y.o. 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 She presented to [facility] ER on 12/3/20 after having a fall at home where she tripped and fell forward hitting her right side of her head and her right hip. She has a hematoma to her right frontotemporal aread and had been unable to bear weight on her right lower extremity since her fall. CT of head was negative for acute changes. X-ray done of her right femur revealed an acute traumatic displaced peritrochanteric fracture. She was admitted to the hospitalist service with consult for orthopedic surgery. She was seen by Dr. Eleazar Suárez, who recommended Cephalomedullary Nailing of her fracture. She was in agreement and went for surgery on 12/4/20. She tolerated the procedure well. Post op she had chest pain, rapid response was called. EKG and cardiac enzymes were unremarkable. CTA negative for pulmonary emboli. Echocardiogram ordered and reveals EF of 55-60%. Patient has had no further chest pain. She will be toe-touch weightbearing on her right lower extremity for 6 weeks. She will need DVT prophylaxis for 6 weeks. She is participating in both PT/OT. She is felt to need a stay on Rehab to work towards her goal of going home with assist of her friend.  She is now felt ready to    ____________________ _____________________ ________________   Physician Signature      Physician Name (print)   Physician NPI          Date

## 2020-12-14 NOTE — PATIENT CARE CONFERENCE
[facility] ACUTE INPATIENT REHABILITATION  TEAM CONFERENCE NOTE    Date: 2020  Patient Name: Luis Enrique Crow        MRN: [id number]    : 1935  (81 y.o.)  Gender: female   Referring Practitioner: Dr. Mane Orr  Diagnosis: RIGHT HIP CEPHALOMEDULLARY NAILING      PHYSICAL THERAPY  STRENGTH  Strength RLE  Strength RLE: Exception  Comment: HIP 2/5; KNEE 3/5; ANKLE 4/5  Strength LLE  Strength LLE: WFL  ROM  AROM RLE (degrees)  RLE AROM: Exceptions  RLE General AROM: DECREASED RIGHT HIP/KNEE  AROM LLE (degrees)  LLE AROM : WFL  BED MOBILITY  Bed Mobility  Supine to Sit: Minimal assistance  Sit to Supine: Minimal assistance  TRANSFERS  Transfers  Sit to Stand: Contact guard assistance  Stand to sit: Contact guard assistance, Stand by assistance  Bed to Chair: Contact guard assistance  Stand Pivot Transfers: Minimal Assistance  Lateral Transfers: Contact guard assistance(Toilet transfer. Ind with hygiene and clothing mgmt. CGA for standing balance.)  Car Transfer:  Moderate Assistance, Minimal Assistance  Comment: Pt stand/pivot from bed-BS-wc toward her left  WHEELCHAIR PROPULSION  Propulsion 1  Propulsion: Manual  Level: Level Tile  Method: TOBIAS MACHADO  Level of Assistance: Stand by assistance  Description/ Details: Some veering to the R. requires VC to correct  Distance: 200'  AMBULATION  Ambulation 1  Surface: level tile  Device: Parallel Bars  Other Apparatus: Wheelchair follow  Assistance: Contact guard assistance, Minimal assistance  Quality of Gait: Insufficient push force with UE's for unloading weight on LLE, but pt able to take 2-3 small hops barely clearing her L foot from the floor this date  Distance: 1'  Comments: Stayed in place to focus on pushing force with BUE and clearing LLE  STAIRS     GOALS:  Short term goals  Time Frame for Short term goals: 2 WEEKS  Short term goal 1: BED MOBILITY CGA  Short term goal 2: TRANSFERS MIN A  Short term goal 3: PROPEL  FT SBA Decreased functional mobility ; Decreased strength; Decreased high-level IADLs; Decreased endurance; Decreased safe awareness; Decreased balance; Decreased ADL status              NUTRITION  Current Wt: 115 lb 4 oz  Admission Wt:  115 lb 1.6 oz  Oral Diet Orders:   CARB CONTROL  Oral Nutrition Supplement (ONS) Orders:  Diabetic Oral Supplement BID. Pt remains stable with PO intake >50% most meals and stable wt status. Accuchecks ranging  this week. Pt with many questions r/t blood sugar control. DM diet education initiated. Please see nutrition note for details. NURSING    Wounds/Incisions/Ulcers: Incision healing well     Rober Scale Score: 20    Pain: Patient's pain is currently controlled with Roxicodone IR 5-10 mg q 4 hrs prn    Consultations/Labs/X-rays:     Family Education: family therapy instruction scheduled Monday 12/21 with friend    Fall Risk:  Dhillon Score: 80    Fall in the last week? Other Nursing Issues:         SOCIAL WORK/CASE MANAGEMENT  Assessment: Has difficulty applying precautions to her life situation. Is friendly and cooperative. Has a friend who has \"adopted\" her and is offering care for her in the home- discussing what that involves (including entrance modification for short term)    Discharge Plan   Estimated Length of Stay: 12/22/20  Destination: home health    Pass: No    Services at Discharge: [address] and Nursing per evauations    Equipment at Discharge: to be determined during caregiver training session; advised regarding rental portable ramp    Progress made in the prior week:  1. Improved tolerance to activity  2. CGA for toileting  3.  4.  5.      Goals for following week:  1. indep bed mobility  2. Supervision for ADL routine  3.   4.   5.     Factors facilitating achievement of predicted outcomes:  Motivated, Cooperative and Pleasant Barriers to the achievement of predicted outcomes: Pain, Decreased endurance and Lower extremity weakness    Team Members Present at Conference:  : Fortino Duverney, [employer]   Occupational Therapist: Peggy Tompkins, OTR/L  Physical Therapist: Lisandra Leslie PT,DPT  Speech Therapist: N/A  Nurse: Poonam Brasher, RN,BSN   Nurse Manager:  Poonam Brasher RN, BSN  Dietitian:  Tamara Moses, MS RD, LD  Rehab Director:  Chantell Curry approve the established interdisciplinary plan of care as documented within the medical record of Kolton Alvarez.

## 2020-12-14 NOTE — PROGRESS NOTES
12/14/20 0841 12/14/20 0918 12/14/20 0919   Pain Assessment   Pain Level 2  --   --    Transfers   Sit to Stand  --  Contact guard assistance  --    Stand to sit  --  Contact guard assistance  --    Lateral Transfers  --  Contact guard assistance  (BSC to [facility].)  --    Exercises   Comments  --   --  Stood with RW while maintaining TTWB RLE, working on RLE ROM. Conditions Requiring Skilled Therapeutic Intervention   Assessment  --   --   --    Activity Tolerance   Activity Tolerance  --   --   --    Safety Devices   Type of devices  --   --   --       12/14/20 0920 12/14/20 0921   Pain Assessment   Pain Level  --   --    Transfers   Sit to Stand  --   --    Stand to sit  --   --    Lateral Transfers  --   --    Exercises   Comments  --   --    Conditions Requiring Skilled Therapeutic Intervention   Assessment Pt. on [facility] for part of session. Ind with hygiene and clothing mgmt. CGA for standing balance. Stood with walker in therapy gym working on RLE ROM ex. --    Activity Tolerance   Activity Tolerance Patient Tolerated treatment well  --    Safety Devices   Type of devices  --  Call light within reach; Chair alarm in place   Electronically signed by Angela Zamudio PTA on 12/14/2020 at 9:27 AM

## 2020-12-14 NOTE — PLAN OF CARE
Problem: Falls - Risk of:  Goal: Will remain free from falls  Description: Will remain free from falls  12/13/2020 2247 by Blayne Heath LPN  Outcome: Ongoing  12/13/2020 1049 by Jose E Griffin LPN  Outcome: Ongoing  Goal: Absence of physical injury  Description: Absence of physical injury  12/13/2020 2247 by Blayne Heath LPN  Outcome: Ongoing  12/13/2020 1049 by Jose E Griffin LPN  Outcome: Ongoing     Problem: Skin Integrity:  Goal: Will show no infection signs and symptoms  Description: Will show no infection signs and symptoms  12/13/2020 2247 by Blayne Heath LPN  Outcome: Ongoing  12/13/2020 1049 by Jose E Griffin LPN  Outcome: Ongoing  Goal: Absence of new skin breakdown  Description: Absence of new skin breakdown  12/13/2020 2247 by Blayne Heath LPN  Outcome: Ongoing  12/13/2020 1049 by Jose E Griffin LPN  Outcome: Ongoing  Goal: Demonstration of wound healing without infection will improve  Description: Demonstration of wound healing without infection will improve  12/13/2020 2247 by Blayne Heath LPN  Outcome: Ongoing  12/13/2020 1049 by Jose E Griffin LPN  Outcome: Ongoing  Goal: Risk for impaired skin integrity will decrease  Description: Risk for impaired skin integrity will decrease  12/13/2020 2247 by Blayne Heath LPN  Outcome: Ongoing  12/13/2020 1049 by Jose E Griffin LPN  Outcome: Ongoing     Problem: Pain:  Goal: Pain level will decrease  Description: Pain level will decrease  12/13/2020 2247 by Blayne Heath LPN  Outcome: Ongoing  12/13/2020 1049 by Jose E Griffin LPN  Outcome: Ongoing  Goal: Control of acute pain  Description: Control of acute pain  12/13/2020 2247 by Blayne Heath LPN  Outcome: Ongoing  12/13/2020 1049 by Jose E Griffin LPN  Outcome: Ongoing  Goal: Control of chronic pain  Description: Control of chronic pain  12/13/2020 2247 by Blayne Heath LPN  Outcome: Ongoing  12/13/2020 1049 by Jose E Griffin LPN  Outcome: Ongoing Problem: SAFETY  Goal: Free from accidental physical injury  12/13/2020 2247 by Jeb Morin LPN  Outcome: Ongoing  12/13/2020 1049 by Tracy Escamilla LPN  Outcome: Ongoing  Goal: Free from intentional harm  12/13/2020 2247 by Jeb Morin LPN  Outcome: Ongoing  12/13/2020 1049 by Tracy Escamilla LPN  Outcome: Ongoing     Problem: DAILY CARE  Goal: Daily care needs are met  12/13/2020 2247 by Jeb Morin LPN  Outcome: Ongoing  12/13/2020 1049 by Tracy Escamilla LPN  Outcome: Ongoing     Problem: PAIN  Goal: Patient's pain/discomfort is manageable  12/13/2020 2247 by Jeb Morin LPN  Outcome: Ongoing  12/13/2020 1049 by Tracy Escamilla LPN  Outcome: Ongoing  Goal: STG - Patient will verbalize an acceptable level of pain  12/13/2020 2247 by Jeb Morin LPN  Outcome: Ongoing  12/13/2020 1049 by Tracy Escamilla LPN  Outcome: Ongoing     Problem: SKIN INTEGRITY  Goal: Skin integrity is maintained or improved  12/13/2020 2247 by Jeb Morin LPN  Outcome: Ongoing  12/13/2020 1049 by Tracy Escamilla LPN  Outcome: Ongoing  Goal: STG - patient will maintain good skin integrity  12/13/2020 2247 by Jeb Morin LPN  Outcome: Ongoing  12/13/2020 1049 by Tracy Escamilla LPN  Outcome: Ongoing  Goal: STG - Patient exhibits signs of wound healing. 12/13/2020 2247 by Jeb Morin LPN  Outcome: Ongoing  12/13/2020 1049 by Tracy Escamilla LPN  Outcome: Ongoing     Problem: KNOWLEDGE DEFICIT  Goal: Patient/S.O. demonstrates understanding of disease process, treatment plan, medications, and discharge instructions.   12/13/2020 2247 by Jeb Morin LPN  Outcome: Ongoing  12/13/2020 1049 by Tracy Escamilla LPN  Outcome: Ongoing     Problem: DISCHARGE BARRIERS  Goal: Patient's continuum of care needs are met  12/13/2020 2247 by Jeb Morin LPN  Outcome: Ongoing  12/13/2020 1049 by Tracy Escamilla LPN  Outcome: Ongoing     Problem: Mobility - Impaired:  Goal: Mobility will improve Description: Mobility will improve  12/13/2020 2247 by Sri Serna LPN  Outcome: Ongoing  12/13/2020 1049 by Kandace Sanchez LPN  Outcome: Ongoing     Problem:  Activity:  Goal: Ability to ambulate will improve  Description: Ability to ambulate will improve  12/13/2020 2247 by Sri Serna LPN  Outcome: Ongoing  12/13/2020 1049 by Kandace Sanchez LPN  Outcome: Ongoing  Goal: Ability to perform activities at highest level will improve  Description: Ability to perform activities at highest level will improve  12/13/2020 2247 by Sri Serna LPN  Outcome: Ongoing  12/13/2020 1049 by Kandace Sanchez LPN  Outcome: Ongoing     Problem: Nutritional:  Goal: Ability to attain and maintain optimal nutritional status will improve  Description: Ability to attain and maintain optimal nutritional status will improve  12/13/2020 2247 by Sri Serna LPN  Outcome: Ongoing  12/13/2020 1049 by Kandace Sanchez LPN  Outcome: Ongoing     Problem: Safety:  Goal: Ability to remain free from injury will improve  Description: Ability to remain free from injury will improve  12/13/2020 2247 by Sri Serna LPN  Outcome: Ongoing  12/13/2020 1049 by Kandace Sanchez LPN  Outcome: Ongoing     Problem: IP BLADDER/VOIDING  Goal: LTG - Patient will utilize adaptive techniques/equipment to complete bladder elimination  12/13/2020 2247 by Sri Serna LPN  Outcome: Ongoing  12/13/2020 1049 by Kandace Sanchez LPN  Outcome: Ongoing     Problem: IP BOWEL ELIMINATION  Goal: LTG - patient will have regular and routine bowel evacuation  12/13/2020 2247 by Sri Serna LPN  Outcome: Ongoing  12/13/2020 1049 by Kandace Sanchez LPN  Outcome: Ongoing     Problem: IP BREATHING  Goal: LTG - Patient/caregiver will demonstrate/perform proper techniques to maintain patent airway  12/13/2020 2247 by Sri Serna LPN  Outcome: Ongoing  12/13/2020 1049 by Kandace Sanchez LPN  Outcome: Ongoing     Problem: NUTRITION Goal: Patient maintains adequate hydration  12/13/2020 2247 by Thompson Carreno LPN  Outcome: Ongoing  12/13/2020 1049 by Elier Ho LPN  Outcome: Ongoing     Problem: Bleeding:  Goal: Will show no signs and symptoms of excessive bleeding  Description: Will show no signs and symptoms of excessive bleeding  12/13/2020 2247 by Thompson Carreno LPN  Outcome: Ongoing  12/13/2020 1049 by Elier Ho LPN  Outcome: Ongoing     Problem: Infection - Surgical Site:  Goal: Will show no infection signs and symptoms  Description: Will show no infection signs and symptoms  12/13/2020 2247 by Thompson Carreno LPN  Outcome: Ongoing  12/13/2020 1049 by Elier Ho LPN  Outcome: Ongoing     Problem: Serum Glucose Level - Abnormal:  Goal: Ability to maintain appropriate glucose levels has stabilized  Description: Ability to maintain appropriate glucose levels has stabilized  12/13/2020 2247 by Thompson Carreno LPN  Outcome: Ongoing  12/13/2020 1049 by Elier Ho LPN  Outcome: Ongoing     Problem: Mood - Altered:  Goal: Mood stable  Description: Mood stable  12/13/2020 2247 by Thompson Carreno LPN  Outcome: Ongoing  12/13/2020 1049 by Elier Ho LPN  Outcome: Ongoing

## 2020-12-14 NOTE — PLAN OF CARE
Problem: Falls - Risk of:  Goal: Will remain free from falls  Description: Will remain free from falls  12/14/2020 0945 by Joceline Keller RN  Outcome: Ongoing  12/13/2020 2247 by Destini Servin LPN  Outcome: Ongoing   Up with assistance

## 2020-12-14 NOTE — PROGRESS NOTES
12/14/20 1316 12/14/20 1333 12/14/20 1335   Pain Screening   Patient Currently in Pain No  --   --    Transfers   Sit to Stand Contact guard assistance  --   --    Stand to sit Contact guard assistance;Stand by assistance  --   --    Bed to Chair Contact guard assistance  --   --    Lateral Transfers Contact guard assistance  (Toilet transfer. Ind with hygiene and clothing mgmt. CGA for standing balance.)  --   --    Exercises   Comments  --  Sitting BLE ex  x20 reps. A-AAROM with RLE.  --    Conditions Requiring Skilled Therapeutic Intervention   Assessment  --   --  Assisted pt to bathroom at beginning of session. Performed standing with walker and sitting BLE exercises.    Activity Tolerance   Activity Tolerance  --   --  Patient Tolerated treatment well;Patient limited by pain   Electronically signed by Jose Guerrero PTA on 12/14/2020 at 1:38 PM

## 2020-12-15 LAB
GLUCOSE BLD-MCNC: 121 MG/DL (ref 70–99)
GLUCOSE BLD-MCNC: 122 MG/DL (ref 70–99)
GLUCOSE BLD-MCNC: 124 MG/DL (ref 70–99)
GLUCOSE BLD-MCNC: 132 MG/DL (ref 70–99)
PERFORMED ON: ABNORMAL

## 2020-12-15 PROCEDURE — 2580000003 HC RX 258: Performed by: INTERNAL MEDICINE

## 2020-12-15 PROCEDURE — 82947 ASSAY GLUCOSE BLOOD QUANT: CPT

## 2020-12-15 PROCEDURE — 1180000000 HC REHAB R&B

## 2020-12-15 PROCEDURE — 97530 THERAPEUTIC ACTIVITIES: CPT

## 2020-12-15 PROCEDURE — 97110 THERAPEUTIC EXERCISES: CPT

## 2020-12-15 PROCEDURE — 6370000000 HC RX 637 (ALT 250 FOR IP): Performed by: INTERNAL MEDICINE

## 2020-12-15 PROCEDURE — 99232 SBSQ HOSP IP/OBS MODERATE 35: CPT | Performed by: PSYCHIATRY & NEUROLOGY

## 2020-12-15 PROCEDURE — 6370000000 HC RX 637 (ALT 250 FOR IP): Performed by: PSYCHIATRY & NEUROLOGY

## 2020-12-15 RX ADMIN — PANTOPRAZOLE SODIUM 40 MG: 40 TABLET, DELAYED RELEASE ORAL at 05:41

## 2020-12-15 RX ADMIN — METFORMIN HYDROCHLORIDE 500 MG: 500 TABLET ORAL at 07:54

## 2020-12-15 RX ADMIN — Medication 150 MG: at 07:54

## 2020-12-15 RX ADMIN — ASPIRIN 81 MG: 81 TABLET, COATED ORAL at 07:54

## 2020-12-15 RX ADMIN — PSYLLIUM HUSK 1 PACKET: 3.4 POWDER ORAL at 12:28

## 2020-12-15 RX ADMIN — OXYCODONE HYDROCHLORIDE AND ACETAMINOPHEN 1 TABLET: 5; 325 TABLET ORAL at 13:45

## 2020-12-15 RX ADMIN — APIXABAN 2.5 MG: 2.5 TABLET, FILM COATED ORAL at 21:05

## 2020-12-15 RX ADMIN — OXYCODONE HYDROCHLORIDE AND ACETAMINOPHEN 1 TABLET: 5; 325 TABLET ORAL at 07:53

## 2020-12-15 RX ADMIN — VENLAFAXINE HYDROCHLORIDE 37.5 MG: 37.5 CAPSULE, EXTENDED RELEASE ORAL at 07:53

## 2020-12-15 RX ADMIN — APIXABAN 2.5 MG: 2.5 TABLET, FILM COATED ORAL at 07:54

## 2020-12-15 RX ADMIN — OXYCODONE HYDROCHLORIDE AND ACETAMINOPHEN 1 TABLET: 5; 325 TABLET ORAL at 21:06

## 2020-12-15 RX ADMIN — DONEPEZIL HYDROCHLORIDE 5 MG: 5 TABLET, FILM COATED ORAL at 07:54

## 2020-12-15 RX ADMIN — ATORVASTATIN CALCIUM 10 MG: 10 TABLET, FILM COATED ORAL at 07:53

## 2020-12-15 RX ADMIN — SODIUM CHLORIDE, PRESERVATIVE FREE 10 ML: 5 INJECTION INTRAVENOUS at 08:03

## 2020-12-15 RX ADMIN — MEMANTINE HYDROCHLORIDE 5 MG: 5 TABLET ORAL at 07:54

## 2020-12-15 RX ADMIN — GABAPENTIN 300 MG: 300 CAPSULE ORAL at 21:06

## 2020-12-15 RX ADMIN — SODIUM CHLORIDE, PRESERVATIVE FREE 10 ML: 5 INJECTION INTRAVENOUS at 21:05

## 2020-12-15 ASSESSMENT — PAIN DESCRIPTION - DESCRIPTORS
DESCRIPTORS: ACHING
DESCRIPTORS: ACHING

## 2020-12-15 ASSESSMENT — PAIN DESCRIPTION - ORIENTATION
ORIENTATION: RIGHT
ORIENTATION: RIGHT

## 2020-12-15 ASSESSMENT — PAIN SCALES - GENERAL
PAINLEVEL_OUTOF10: 0
PAINLEVEL_OUTOF10: 0
PAINLEVEL_OUTOF10: 3
PAINLEVEL_OUTOF10: 2
PAINLEVEL_OUTOF10: 3
PAINLEVEL_OUTOF10: 0

## 2020-12-15 ASSESSMENT — PAIN DESCRIPTION - PAIN TYPE
TYPE: SURGICAL PAIN
TYPE: SURGICAL PAIN

## 2020-12-15 ASSESSMENT — PAIN DESCRIPTION - LOCATION
LOCATION: HIP
LOCATION: HIP

## 2020-12-15 NOTE — PROGRESS NOTES
GLUCOSE 117 (H) 12/14/2020    CALCIUM 9.5 12/14/2020    PROT 6.4 (L) 12/03/2020    LABALBU 4.0 12/03/2020    BILITOT <0.2 12/03/2020    ALKPHOS 72 12/03/2020    AST 17 12/03/2020    ALT 15 12/03/2020    LABGLOM >60 12/14/2020    GFRAA >59 12/14/2020    GLOB 2.4 10/11/2016   No results found for: INRNo results found for: PHENYTOIN, ESR, CRP      12/14/20 1345   General   Diagnosis R hip fx with nailing   Pain Assessment   Patient Currently in Pain Yes   Pain Assessment 0-10   Pain Level 2   Pain Type Surgical pain;Chronic pain   Pain Location Shoulder;Hip   Pain Orientation Right;Left   Pain Descriptors Aching   Pain Frequency Continuous   Clinical Progression Not changed   Response to Pain Intervention Patient Satisfied   ADL   Toileting Contact guard assistance   Balance   Sitting Balance Supervision   Standing Balance Contact guard assistance   Functional Mobility   Functional - Mobility Device Wheelchair   Activity To/from bathroom; Other   Assist Level Stand by assistance   Functional Mobility Comments Cues for tech when turning in tight space. Transfers   Stand Pivot Transfers Contact guard assistance   Sit to stand Contact guard assistance   Stand to sit Contact guard assistance   Toilet Transfers   Toilet - Technique Stand pivot   Equipment Used Standard bedside commode   Toilet Transfer Contact guard assistance   Toilet Transfers Comments Cues for proper w/c placement prior to transfer. Type of ROM/Therapeutic Exercise   Type of ROM/Therapeutic Exercise Free weights   Comment 1#, LUE distal only, RUE all planes, 1 set x 20 reps. Assessment   Performance deficits / Impairments Decreased functional mobility ; Decreased strength;Decreased high-level IADLs;Decreased endurance;Decreased safe awareness;Decreased balance;Decreased ADL status   Treatment Diagnosis Right Femur Fx s/p IM nail   Prognosis Good   History clavicle fx with ORIF 6/22/20, Rapid response 12/5   Timed Code Treatment Minutes 45 Minutes Activity Tolerance   Activity Tolerance Patient Tolerated treatment well   Safety Devices   Safety Devices in place Yes   Type of devices Call light within reach; Bed alarm in place   Plan   Current Treatment Recommendations Positioning; Safety Education & Training;Functional Mobility Training;Home Management Training;Balance Training;Self-Care / ADL;Equipment Evaluation, Education, & procurement;Strengthening; Endurance Training;Patient/Caregiver Education & Training                    12/14/20 1316 12/14/20 1333 12/14/20 1335   Pain Screening   Patient Currently in Pain No  --   --    Transfers   Sit to Stand Contact guard assistance  --   --    Stand to sit Contact guard assistance;Stand by assistance  --   --    Bed to Chair Contact guard assistance  --   --    Lateral Transfers Contact guard assistance  (Toilet transfer. Ind with hygiene and clothing mgmt. CGA for standing balance.)  --   --    Exercises   Comments  --  Sitting BLE ex  x20 reps. A-AAROM with RLE.  --    Conditions Requiring Skilled Therapeutic Intervention   Assessment  --   --  Assisted pt to bathroom at beginning of session. Performed standing with walker and sitting BLE exercises. Activity Tolerance   Activity Tolerance  --   --  Patient Tolerated treatment well;Patient limited by pain         RECORD REVIEW: Previous medical records, medications were reviewed at today's visit    IMPRESSION:   1. Right hip fracture status post cephalomedullary nailing. Toe-touch weightbearing for 6 weeksPT/OT  2. DVT prophylaxis for 6 weeks. Low-dose Eliquis  3. Diabetescontinue sliding scale and resume home oral hypoglycemic  4. History of dementiacontinue Aricept and Namenda. Both doses are low. Discussed with patient. 5.  History of anxiety/depressioncontinue Effexor  6. Acute blood loss anemia improving. Niferex added  Percocet scheduled.   Atenolol held  Metamucil added  ELOS:  Staff tomorrow

## 2020-12-15 NOTE — PROGRESS NOTES
Occupational Therapy     12/15/20 1000   General   Additional Pertinent Hx L clavicle fx with ORIF 6/22/20   Diagnosis R hip fx with nailing   Pain Assessment   Patient Currently in Pain No   Pain Assessment 0-10   Pain Level 0   Balance   Sitting Balance Independent   Standing Balance Stand by assistance   Functional Mobility   Functional - Mobility Device Wheelchair   Activity To/From therapy gym;Retrieve items   Assist Level Supervision   Functional Mobility Comments Light item retrieval in OT kitchen. Transfers   Stand Pivot Transfers Stand by assistance  (Cues for positioning w/c and locking brakes.)   Sit to stand Stand by assistance   Stand to sit Stand by assistance   Assessment   Performance deficits / Impairments Decreased functional mobility ; Decreased strength;Decreased high-level IADLs;Decreased endurance;Decreased safe awareness;Decreased balance;Decreased ADL status   Treatment Diagnosis Right Femur Fx s/p IM nail   Prognosis Good   History clavicle fx with ORIF 6/22/20, Rapid response 12/5   Timed Code Treatment Minutes 60 Minutes   Activity Tolerance   Activity Tolerance Patient Tolerated treatment well   Safety Devices   Safety Devices in place Yes   Type of devices Call light within reach; Chair alarm in place   Plan   Current Treatment Recommendations Positioning; Safety Education & Training;Functional Mobility Training;Home Management Training;Balance Training;Self-Care / ADL;Equipment Evaluation, Education, & procurement;Strengthening; Endurance Training;Patient/Caregiver Education & Training

## 2020-12-15 NOTE — PROGRESS NOTES
12/15/20 0834 12/15/20 0837 12/15/20 0843   Exercises   Comments  --  Standing with RW working on RLE ROM. --    Conditions Requiring Skilled Therapeutic Intervention   Assessment Attempted stairs after pt said she will have 2 steps into house upon discharge. Pt. not able to maintain TTWB RLE for taking steps with RW and couldn't maintain to go up/down stairs. Discussed need for ramp into home upon discharge.   --   --    Activity Tolerance   Activity Tolerance  --   --  Patient Tolerated treatment well   Electronically signed by Javier Gutiérrez PTA on 12/15/2020 at 8:49 AM

## 2020-12-15 NOTE — PLAN OF CARE
Problem: Falls - Risk of:  Goal: Will remain free from falls  Description: Will remain free from falls  12/14/2020 2258 by Giuliana Bryan LPN  Outcome: Ongoing  12/14/2020 0945 by Marleni Alan RN  Outcome: Ongoing  Goal: Absence of physical injury  Description: Absence of physical injury  12/14/2020 2258 by Giuliana Bryan LPN  Outcome: Ongoing  12/14/2020 0945 by Marleni Alan RN  Outcome: Ongoing     Problem: Skin Integrity:  Goal: Will show no infection signs and symptoms  Description: Will show no infection signs and symptoms  12/14/2020 2258 by Giuliana Bryan LPN  Outcome: Ongoing  12/14/2020 0945 by Marleni Alan RN  Outcome: Ongoing  Goal: Absence of new skin breakdown  Description: Absence of new skin breakdown  12/14/2020 2258 by Giuliana Bryan LPN  Outcome: Ongoing  12/14/2020 0945 by Marleni Alan RN  Outcome: Ongoing  Goal: Demonstration of wound healing without infection will improve  Description: Demonstration of wound healing without infection will improve  12/14/2020 2258 by Giuliana Bryan LPN  Outcome: Ongoing  12/14/2020 0945 by Marleni Alan RN  Outcome: Ongoing  Goal: Risk for impaired skin integrity will decrease  Description: Risk for impaired skin integrity will decrease  12/14/2020 2258 by Giuliana Bryan LPN  Outcome: Ongoing  12/14/2020 0945 by Marleni Alan RN  Outcome: Ongoing     Problem: Pain:  Goal: Pain level will decrease  Description: Pain level will decrease  12/14/2020 2258 by Giuliana Bryan LPN  Outcome: Ongoing  12/14/2020 0945 by Marleni Alan RN  Outcome: Ongoing  Goal: Control of acute pain  Description: Control of acute pain  12/14/2020 2258 by Giuliana Bryan LPN  Outcome: Ongoing  12/14/2020 0945 by Marleni Alan RN  Outcome: Ongoing  Goal: Control of chronic pain  Description: Control of chronic pain  12/14/2020 2258 by Giuliana Bryan LPN  Outcome: Ongoing  12/14/2020 0945 by Marleni Alan RN  Outcome: Ongoing     Problem: SAFETY Goal: Free from accidental physical injury  12/14/2020 2258 by Hector Kaiser LPN  Outcome: Ongoing  12/14/2020 0945 by Zane Cardoso RN  Outcome: Ongoing  Goal: Free from intentional harm  12/14/2020 2258 by Hector Kaiser LPN  Outcome: Ongoing  12/14/2020 0945 by Zane Cardoso RN  Outcome: Ongoing     Problem: DAILY CARE  Goal: Daily care needs are met  12/14/2020 2258 by Hector Kaiser LPN  Outcome: Ongoing  12/14/2020 0945 by Zane Cardoso RN  Outcome: Ongoing     Problem: PAIN  Goal: Patient's pain/discomfort is manageable  12/14/2020 2258 by Hector Kaiser LPN  Outcome: Ongoing  12/14/2020 0945 by Zane Cardoso RN  Outcome: Ongoing  Goal: STG - Patient will verbalize an acceptable level of pain  12/14/2020 2258 by Hector Kaiser LPN  Outcome: Ongoing  12/14/2020 0945 by Zane Cardoso RN  Outcome: Ongoing     Problem: SKIN INTEGRITY  Goal: Skin integrity is maintained or improved  12/14/2020 2258 by Hector Kaiser LPN  Outcome: Ongoing  12/14/2020 0945 by Zane Cardoso RN  Outcome: Ongoing  Goal: STG - patient will maintain good skin integrity  12/14/2020 2258 by Hector Kaiser LPN  Outcome: Ongoing  12/14/2020 0945 by Zane Cardoso RN  Outcome: Ongoing  Goal: STG - Patient exhibits signs of wound healing. 12/14/2020 2258 by Hector Kaiser LPN  Outcome: Ongoing  12/14/2020 0945 by Zane Cardoso RN  Outcome: Ongoing     Problem: KNOWLEDGE DEFICIT  Goal: Patient/S.O. demonstrates understanding of disease process, treatment plan, medications, and discharge instructions.   12/14/2020 2258 by Hector Kaiser LPN  Outcome: Ongoing  12/14/2020 0945 by Zane Cardoso RN  Outcome: Ongoing     Problem: DISCHARGE BARRIERS  Goal: Patient's continuum of care needs are met  12/14/2020 2258 by Hector Kaiser LPN  Outcome: Ongoing  12/14/2020 0945 by Zane Cardoso RN  Outcome: Ongoing     Problem: Mobility - Impaired:  Goal: Mobility will improve  Description: Mobility will improve 12/14/2020 2258 by Hali Cruz LPN  Outcome: Ongoing  12/14/2020 0945 by Abril Aguilar RN  Outcome: Ongoing     Problem:  Activity:  Goal: Ability to ambulate will improve  Description: Ability to ambulate will improve  12/14/2020 2258 by Hali Cruz LPN  Outcome: Ongoing  12/14/2020 0945 by Abril Aguilar RN  Outcome: Ongoing  Goal: Ability to perform activities at highest level will improve  Description: Ability to perform activities at highest level will improve  12/14/2020 2258 by Hali Cruz LPN  Outcome: Ongoing  12/14/2020 0945 by Abril Aguilar RN  Outcome: Ongoing     Problem: Nutritional:  Goal: Ability to attain and maintain optimal nutritional status will improve  Description: Ability to attain and maintain optimal nutritional status will improve  12/14/2020 2258 by Hali Cruz LPN  Outcome: Ongoing  12/14/2020 0945 by Abril Aguilar RN  Outcome: Ongoing     Problem: Safety:  Goal: Ability to remain free from injury will improve  Description: Ability to remain free from injury will improve  12/14/2020 2258 by Hali Cruz LPN  Outcome: Ongoing  12/14/2020 0945 by Abril Aguilar RN  Outcome: Ongoing     Problem: IP BLADDER/VOIDING  Goal: LTG - Patient will utilize adaptive techniques/equipment to complete bladder elimination  12/14/2020 2258 by Hali Cruz LPN  Outcome: Ongoing  12/14/2020 0945 by Abril Aguilar RN  Outcome: Ongoing     Problem: IP BOWEL ELIMINATION  Goal: LTG - patient will have regular and routine bowel evacuation  12/14/2020 2258 by Hali Cruz LPN  Outcome: Ongoing  12/14/2020 0945 by Abril Aguilar RN  Outcome: Ongoing     Problem: IP BREATHING  Goal: LTG - Patient/caregiver will demonstrate/perform proper techniques to maintain patent airway  12/14/2020 2258 by Hali Cruz LPN  Outcome: Ongoing  12/14/2020 0945 by Abril Aguilar RN  Outcome: Ongoing     Problem: NUTRITION  Goal: Patient maintains adequate hydration 12/14/2020 2258 by Sri Serna LPN  Outcome: Ongoing  12/14/2020 0945 by Kranthi Brennan RN  Outcome: Ongoing     Problem: Bleeding:  Goal: Will show no signs and symptoms of excessive bleeding  Description: Will show no signs and symptoms of excessive bleeding  12/14/2020 2258 by Sri Serna LPN  Outcome: Ongoing  12/14/2020 0945 by Kranthi Brennan RN  Outcome: Ongoing     Problem: Infection - Surgical Site:  Goal: Will show no infection signs and symptoms  Description: Will show no infection signs and symptoms  12/14/2020 2258 by Sri Serna LPN  Outcome: Ongoing  12/14/2020 0945 by Kranthi Brennan RN  Outcome: Ongoing     Problem: Serum Glucose Level - Abnormal:  Goal: Ability to maintain appropriate glucose levels has stabilized  Description: Ability to maintain appropriate glucose levels has stabilized  12/14/2020 2258 by Sri Serna LPN  Outcome: Ongoing  12/14/2020 0945 by Kranthi Brennan RN  Outcome: Ongoing     Problem: Mood - Altered:  Goal: Mood stable  Description: Mood stable  12/14/2020 2258 by Sri Serna LPN  Outcome: Ongoing  12/14/2020 0945 by Kranthi Brennan RN  Outcome: Ongoing

## 2020-12-15 NOTE — PROGRESS NOTES
Nutrition Assessment     Type and Reason for Visit: Reassess    Nutrition Assessment:  Pt remains adequately nourished with PO intake >50% most meals. Wt remains stable. Accuchecks ranging  over the past week. Pt asked many questions about diabetes and her blood sugar control. Will provide DM diet ed prior to discharge. Malnutrition Assessment:  Malnutrition Status: No malnutrition    Current Nutrition Therapies:    DIET CARB CONTROL;   Dietary Nutrition Supplements: Diabetic Oral Supplement    Anthropometric Measures:  · Height: 5' 3\" (160 cm)  · Current Body Wt: 115 lb 4 oz (52.3 kg)   · BMI: 20.4    Nutrition Diagnosis:   · Increased nutrient needs related to acute injury/trauma as evidenced by wounds, weight loss      Nutrition Interventions:   Food and/or Nutrient Delivery:  Continue Current Diet, Continue Oral Nutrition Supplement  Nutrition Education/Counseling:  Education initiated   Coordination of Nutrition Care:  Continue to monitor while inpatient    Goals:  PO intake >50%, wt stable, incision healing       Nutrition Monitoring and Evaluation:   Behavioral-Environmental Outcomes:  None Identified   Food/Nutrient Intake Outcomes:  Food and Nutrient Intake, Supplement Intake  Physical Signs/Symptoms Outcomes:  Biochemical Data, Nutrition Focused Physical Findings, Skin, Weight     Discharge Planning:    Continue current diet     Electronically signed by Yesenia Bee RD, LD on 12/15/20 at 2:06 PM CST    Contact: [phone]

## 2020-12-15 NOTE — PLAN OF CARE
Problem: Falls - Risk of:  Goal: Will remain free from falls  Description: Will remain free from falls  12/15/2020 1031 by Mariah Macdonald LPN  Outcome: Ongoing  12/14/2020 2258 by Vinayak Fernandez LPN  Outcome: Ongoing  Goal: Absence of physical injury  Description: Absence of physical injury  12/15/2020 1031 by Mariah Macdonald LPN  Outcome: Ongoing  12/14/2020 2258 by Vinayak Fernandez LPN  Outcome: Ongoing     Problem: Skin Integrity:  Goal: Will show no infection signs and symptoms  Description: Will show no infection signs and symptoms  12/15/2020 1031 by Mraiah Macdonald LPN  Outcome: Ongoing  12/14/2020 2258 by Vinayak Fernandez LPN  Outcome: Ongoing  Goal: Absence of new skin breakdown  Description: Absence of new skin breakdown  12/15/2020 1031 by Mariah Macdonald LPN  Outcome: Ongoing  12/14/2020 2258 by Vinayak Fernandez LPN  Outcome: Ongoing  Goal: Demonstration of wound healing without infection will improve  Description: Demonstration of wound healing without infection will improve  12/15/2020 1031 by Mariah Macdonald LPN  Outcome: Ongoing  12/14/2020 2258 by Vinayak Fernandez LPN  Outcome: Ongoing  Goal: Risk for impaired skin integrity will decrease  Description: Risk for impaired skin integrity will decrease  12/15/2020 1031 by Mariah Macdonald LPN  Outcome: Ongoing  12/14/2020 2258 by Vinayak Fernandez LPN  Outcome: Ongoing     Problem: Pain:  Goal: Pain level will decrease  Description: Pain level will decrease  12/15/2020 1031 by Mariah Macdonald LPN  Outcome: Ongoing  12/14/2020 2258 by Vinayak Fernandez LPN  Outcome: Ongoing  Goal: Control of acute pain  Description: Control of acute pain  12/15/2020 1031 by Mariah Macdonald LPN  Outcome: Ongoing  12/14/2020 2258 by Vinayak Fernandez LPN  Outcome: Ongoing  Goal: Control of chronic pain  Description: Control of chronic pain  12/15/2020 1031 by Mariah Macdonald LPN  Outcome: Ongoing  12/14/2020 2258 by Vinayak Fernandez LPN  Outcome: Ongoing Problem: SAFETY  Goal: Free from accidental physical injury  12/15/2020 1031 by Ke Swann LPN  Outcome: Ongoing  12/14/2020 2258 by Peg Ayon LPN  Outcome: Ongoing  Goal: Free from intentional harm  12/15/2020 1031 by Ke Swann LPN  Outcome: Ongoing  12/14/2020 2258 by Peg Ayon LPN  Outcome: Ongoing     Problem: DAILY CARE  Goal: Daily care needs are met  12/15/2020 1031 by Ke Swann LPN  Outcome: Ongoing  12/14/2020 2258 by Peg Ayon LPN  Outcome: Ongoing     Problem: PAIN  Goal: Patient's pain/discomfort is manageable  12/15/2020 1031 by Ke Swann LPN  Outcome: Ongoing  12/14/2020 2258 by Peg Ayon LPN  Outcome: Ongoing  Goal: STG - Patient will verbalize an acceptable level of pain  12/15/2020 1031 by Ke Swann LPN  Outcome: Ongoing  12/14/2020 2258 by Peg Ayon LPN  Outcome: Ongoing     Problem: SKIN INTEGRITY  Goal: Skin integrity is maintained or improved  12/15/2020 1031 by Ke Swann LPN  Outcome: Ongoing  12/14/2020 2258 by Peg Ayon LPN  Outcome: Ongoing  Goal: STG - patient will maintain good skin integrity  12/15/2020 1031 by Ke Swann LPN  Outcome: Ongoing  12/14/2020 2258 by Peg Ayon LPN  Outcome: Ongoing  Goal: STG - Patient exhibits signs of wound healing. 12/15/2020 1031 by Ke Swann LPN  Outcome: Ongoing  12/14/2020 2258 by Peg Ayon LPN  Outcome: Ongoing     Problem: KNOWLEDGE DEFICIT  Goal: Patient/S.O. demonstrates understanding of disease process, treatment plan, medications, and discharge instructions.   12/15/2020 1031 by Ke Swann LPN  Outcome: Ongoing  12/14/2020 2258 by Peg Ayon LPN  Outcome: Ongoing     Problem: DISCHARGE BARRIERS  Goal: Patient's continuum of care needs are met  12/15/2020 1031 by Ke Swann LPN  Outcome: Ongoing  12/14/2020 2258 by Peg Ayon LPN  Outcome: Ongoing     Problem: Mobility - Impaired:  Goal: Mobility will improve Description: Mobility will improve  12/15/2020 1031 by Benjamín Banks LPN  Outcome: Ongoing  12/14/2020 2258 by Bakari Villegas LPN  Outcome: Ongoing     Problem:  Activity:  Goal: Ability to ambulate will improve  Description: Ability to ambulate will improve  12/15/2020 1031 by Benjamín Banks LPN  Outcome: Ongoing  12/14/2020 2258 by Bakari Villegas LPN  Outcome: Ongoing  Goal: Ability to perform activities at highest level will improve  Description: Ability to perform activities at highest level will improve  12/15/2020 1031 by Benjamín Banks LPN  Outcome: Ongoing  12/14/2020 2258 by Bakari Villegas LPN  Outcome: Ongoing     Problem: Nutritional:  Goal: Ability to attain and maintain optimal nutritional status will improve  Description: Ability to attain and maintain optimal nutritional status will improve  12/15/2020 1031 by Benjamín Banks LPN  Outcome: Ongoing  12/14/2020 2258 by Bakari Villegas LPN  Outcome: Ongoing     Problem: Safety:  Goal: Ability to remain free from injury will improve  Description: Ability to remain free from injury will improve  12/15/2020 1031 by Benjamín Banks LPN  Outcome: Ongoing  12/14/2020 2258 by Bakari Villegas LPN  Outcome: Ongoing     Problem: IP BLADDER/VOIDING  Goal: LTG - Patient will utilize adaptive techniques/equipment to complete bladder elimination  12/15/2020 1031 by Benjamín Banks LPN  Outcome: Ongoing  12/14/2020 2258 by Bakari Villegas LPN  Outcome: Ongoing     Problem: IP BOWEL ELIMINATION  Goal: LTG - patient will have regular and routine bowel evacuation  12/15/2020 1031 by Benjamín Banks LPN  Outcome: Ongoing  12/14/2020 2258 by Bakari Villegas LPN  Outcome: Ongoing     Problem: IP BREATHING  Goal: LTG - Patient/caregiver will demonstrate/perform proper techniques to maintain patent airway  12/15/2020 1031 by Benjamín Banks LPN  Outcome: Ongoing  12/14/2020 2258 by Bakari Villegas LPN  Outcome: Ongoing     Problem: NUTRITION Goal: Patient maintains adequate hydration  12/15/2020 1031 by Lisa Carter LPN  Outcome: Ongoing  12/14/2020 2258 by Lois Steven LPN  Outcome: Ongoing     Problem: Bleeding:  Goal: Will show no signs and symptoms of excessive bleeding  Description: Will show no signs and symptoms of excessive bleeding  12/15/2020 1031 by Lisa Carter LPN  Outcome: Ongoing  12/14/2020 2258 by Lois Steven LPN  Outcome: Ongoing     Problem: Infection - Surgical Site:  Goal: Will show no infection signs and symptoms  Description: Will show no infection signs and symptoms  12/15/2020 1031 by Lisa Carter LPN  Outcome: Ongoing  12/14/2020 2258 by Lois Steven LPN  Outcome: Ongoing     Problem: Serum Glucose Level - Abnormal:  Goal: Ability to maintain appropriate glucose levels has stabilized  Description: Ability to maintain appropriate glucose levels has stabilized  12/15/2020 1031 by Lisa Carter LPN  Outcome: Ongoing  12/14/2020 2258 by Lois Steven LPN  Outcome: Ongoing     Problem: Mood - Altered:  Goal: Mood stable  Description: Mood stable  12/15/2020 1031 by Lisa Carter LPN  Outcome: Ongoing  12/14/2020 2258 by Lois Steven LPN  Outcome: Ongoing

## 2020-12-15 NOTE — PROGRESS NOTES
12/15/20 1535 12/15/20 1536 12/15/20 1543   Bed Mobility   Supine to Sit Modified independent  --   --    Sit to Supine Minimal assistance  --   --    Scooting Modified independent  --   --    Transfers   Sit to Stand  --  Contact guard assistance  --    Stand to sit  --  Contact guard assistance;Stand by assistance  --    Bed to Chair  --  Contact guard assistance  --    Car Transfer  --  Contact guard assistance  --    Comment  --  Also verbal cues for technique during car transfer  --    Ambulation   Ambulation?  --   --  No   WB Status  --   --  TTWB RLE   Ambulation 1   Comments  --   --  Pt. unable to maintain TTWB RLE to amb. Exercises   Comments  --  Standing with RW working on RLE ROM.   --    Conditions Requiring Skilled Therapeutic Intervention   Assessment  --   --   --    Activity Tolerance   Activity Tolerance  --   --   --    Safety Devices   Type of devices  --   --   --       12/15/20 1544 12/15/20 1545   Bed Mobility   Supine to Sit  --   --    Sit to Supine  --   --    Scooting  --   --    Transfers   Sit to Stand  --   --    Stand to sit  --   --    Bed to Chair  --   --    Car Transfer  --   --    Comment  --   --    Ambulation   Ambulation?  --   --    WB Status  --   --    Ambulation 1   Comments  --   --    Exercises   Comments  --   --    Conditions Requiring Skilled Therapeutic Intervention   Assessment Completed car transfer with CGA and verbal cues  --    Activity Tolerance   Activity Tolerance Patient Tolerated treatment well  --    Safety Devices   Type of devices  --  Bed alarm in place;Call light within reach   Electronically signed by Lolita Oleary PTA on 12/15/2020 at 4:03 PM

## 2020-12-15 NOTE — PROGRESS NOTES
Occupational Therapy     12/15/20 8185   Pain Assessment   Pain Assessment 0-10   Pain Level 3   Pain Type Surgical pain   Pain Location Hip   Pain Orientation Right   Pain Descriptors Aching   Balance   Sitting Balance Independent   Standing Balance Stand by assistance   Functional Mobility   Functional - Mobility Device Wheelchair   Activity Retrieve items;Transport items   Assist Level Supervision   Functional Mobility Comments Light homemaking tasks in ADL apartment, cues to lock w/c brakes when reaching out for objects. Transfers   Stand Pivot Transfers Stand by assistance  (Cues for positioning w/c and locking brakes.)   Sit to stand Stand by assistance   Stand to sit Stand by assistance   Type of ROM/Therapeutic Exercise   Type of ROM/Therapeutic Exercise [place]   Comment RUE 10#, LUE 7.5# no pain reported, 3 sets x 15 reps. Assessment   Performance deficits / Impairments Decreased functional mobility ; Decreased strength;Decreased high-level IADLs;Decreased endurance;Decreased safe awareness;Decreased balance;Decreased ADL status   Treatment Diagnosis Right Femur Fx s/p IM nail   Prognosis Good   History clavicle fx with ORIF 6/22/20, Rapid response 12/5   Timed Code Treatment Minutes 45 Minutes   Activity Tolerance   Activity Tolerance Patient Tolerated treatment well   Safety Devices   Safety Devices in place Yes   Type of devices Call light within reach; Bed alarm in place   Plan   Current Treatment Recommendations Positioning; Safety Education & Training;Functional Mobility Training;Home Management Training;Balance Training;Self-Care / ADL;Equipment Evaluation, Education, & procurement;Strengthening; Endurance Training;Patient/Caregiver Education & Training

## 2020-12-16 LAB
GLUCOSE BLD-MCNC: 105 MG/DL (ref 70–99)
GLUCOSE BLD-MCNC: 115 MG/DL (ref 70–99)
GLUCOSE BLD-MCNC: 127 MG/DL (ref 70–99)
PERFORMED ON: ABNORMAL

## 2020-12-16 PROCEDURE — 1180000000 HC REHAB R&B

## 2020-12-16 PROCEDURE — 97110 THERAPEUTIC EXERCISES: CPT

## 2020-12-16 PROCEDURE — 97535 SELF CARE MNGMENT TRAINING: CPT

## 2020-12-16 PROCEDURE — 99233 SBSQ HOSP IP/OBS HIGH 50: CPT | Performed by: PSYCHIATRY & NEUROLOGY

## 2020-12-16 PROCEDURE — 6370000000 HC RX 637 (ALT 250 FOR IP): Performed by: PSYCHIATRY & NEUROLOGY

## 2020-12-16 PROCEDURE — 82947 ASSAY GLUCOSE BLOOD QUANT: CPT

## 2020-12-16 PROCEDURE — 97530 THERAPEUTIC ACTIVITIES: CPT

## 2020-12-16 PROCEDURE — 6370000000 HC RX 637 (ALT 250 FOR IP): Performed by: INTERNAL MEDICINE

## 2020-12-16 PROCEDURE — 2580000003 HC RX 258: Performed by: INTERNAL MEDICINE

## 2020-12-16 RX ADMIN — GABAPENTIN 300 MG: 300 CAPSULE ORAL at 21:20

## 2020-12-16 RX ADMIN — PSYLLIUM HUSK 1 PACKET: 3.4 POWDER ORAL at 08:58

## 2020-12-16 RX ADMIN — APIXABAN 2.5 MG: 2.5 TABLET, FILM COATED ORAL at 21:20

## 2020-12-16 RX ADMIN — Medication 150 MG: at 08:58

## 2020-12-16 RX ADMIN — OXYCODONE HYDROCHLORIDE AND ACETAMINOPHEN 1 TABLET: 5; 325 TABLET ORAL at 21:20

## 2020-12-16 RX ADMIN — MEMANTINE HYDROCHLORIDE 5 MG: 5 TABLET ORAL at 08:58

## 2020-12-16 RX ADMIN — SODIUM CHLORIDE, PRESERVATIVE FREE 10 ML: 5 INJECTION INTRAVENOUS at 08:59

## 2020-12-16 RX ADMIN — DONEPEZIL HYDROCHLORIDE 5 MG: 5 TABLET, FILM COATED ORAL at 08:58

## 2020-12-16 RX ADMIN — OXYCODONE HYDROCHLORIDE AND ACETAMINOPHEN 1 TABLET: 5; 325 TABLET ORAL at 08:58

## 2020-12-16 RX ADMIN — VENLAFAXINE HYDROCHLORIDE 37.5 MG: 37.5 CAPSULE, EXTENDED RELEASE ORAL at 08:58

## 2020-12-16 RX ADMIN — ASPIRIN 81 MG: 81 TABLET, COATED ORAL at 08:58

## 2020-12-16 RX ADMIN — OXYCODONE HYDROCHLORIDE AND ACETAMINOPHEN 1 TABLET: 5; 325 TABLET ORAL at 14:45

## 2020-12-16 RX ADMIN — APIXABAN 2.5 MG: 2.5 TABLET, FILM COATED ORAL at 08:58

## 2020-12-16 RX ADMIN — PANTOPRAZOLE SODIUM 40 MG: 40 TABLET, DELAYED RELEASE ORAL at 05:53

## 2020-12-16 RX ADMIN — METFORMIN HYDROCHLORIDE 500 MG: 500 TABLET ORAL at 08:58

## 2020-12-16 RX ADMIN — ATORVASTATIN CALCIUM 10 MG: 10 TABLET, FILM COATED ORAL at 08:58

## 2020-12-16 ASSESSMENT — PAIN SCALES - GENERAL
PAINLEVEL_OUTOF10: 0
PAINLEVEL_OUTOF10: 5
PAINLEVEL_OUTOF10: 0
PAINLEVEL_OUTOF10: 0
PAINLEVEL_OUTOF10: 2
PAINLEVEL_OUTOF10: 2
PAINLEVEL_OUTOF10: 0

## 2020-12-16 ASSESSMENT — PAIN DESCRIPTION - LOCATION: LOCATION: SHOULDER;ARM

## 2020-12-16 ASSESSMENT — PAIN DESCRIPTION - ORIENTATION: ORIENTATION: RIGHT;LEFT

## 2020-12-16 NOTE — PROGRESS NOTES
Patient:   Rebecca Flores  MR#:    [id number]   Room:    0823/823-01   YOB: 1935  Date of Progress Note: 12/16/2020  Time of Note                           8:39 AM  Consulting Physician:   Dina Frey M.D. Attending Physician:  Dina Frey MD        CHIEF COMPLAINT: Right hip pain        Subjective: This 81 y.o. 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 She presented to [address] [place] [address] ER on 12/3/20 after having a fall at home where she tripped and fell forward hitting her right side of her head and her right hip. She has a hematoma to her right frontotemporal aread and had been unable to bear weight on her right lower extremity since her fall. CT of head was negative for acute changes. X-ray done of her right femur revealed an acute traumatic displaced peritrochanteric fracture. She was admitted to the hospitalist service with consult for orthopedic surgery. She was seen by Dr. Karyn Parra, who recommended Cephalomedullary Nailing of her fracture. She was in agreement and went for surgery on 12/4/20. She tolerated the procedure well. Post op she had chest pain, rapid response was called. EKG and cardiac enzymes were unremarkable. CTA negative for pulmonary emboli. Echocardiogram ordered and reveals EF of 55-60%. Patient has had no further chest pain. She will be toe-touch weightbearing on her right lower extremity for 6 weeks. She will need DVT prophylaxis for 6 weeks. No complaints today.   REVIEW OF SYSTEMS:  Constitutional: No fevers No chills  Neck:No stiffness  Respiratory: No shortness of breath  Cardiovascular: No chest pain No palpitations  Gastrointestinal: No abdominal pain    Genitourinary: No Dysuria  Neurological: No headache, no confusion      PHYSICAL EXAM: BP (!) 114/58   Pulse 69   Temp 97.5 °F (36.4 °C) (Temporal)   Resp 16   Ht 5' 3\" (1.6 m)   Wt 115 lb 4 oz (52.3 kg)   SpO2 94%   BMI 20.42 kg/m²     Constitutional: she appears well-developed and well-nourished. Eyes  conjunctiva normal.  Pupils react to light  Ear, nose, throat -hearing intact to voice. No scars, masses, or lesions over external nose or ears, no atrophy of tongue  Neck-symmetric, no masses noted, no jugular vein distension  Respiration- chest wall appears symmetric, good expansion,   normal effort without use of accessory muscles  Cardiovascular- RRR  Musculoskeletal  no significant wasting of muscles noted, no bony deformities, gait no gross ataxia  Extremities-no clubbing, cyanosis or edema  Skin  warm, dry, and intact. No rash, erythema, or pallor. Psychiatric  mood, affect, and behavior appear normal.      Neurology  NEUROLOGICAL EXAM:      Mental status   Awake, alert, fluent oriented x 3 appropriate affect  Attention and concentration appear appropriate  Recent and remote memory appears unremarkable  Speech normal without dysarthria  No clear issues with language       Cranial Nerves   CN II- Visual fields grossly unremarkable  CN III, IV,VI-EOMI, No nystagmus, conjugate eye movements, no ptosis  CN VII-right hemifacial spasm  CN VIII-Hearing intact      Motor function  Antigravity x 4     Sensory function Intact to light touch     Cerebellar F-N intact     Tremor None present     Gait                  Not tested           Nursing/pcp notes, imaging,labs and vitals reviewed.      PT,OT and/or speech notes reviewed    Lab Results   Component Value Date    WBC 9.7 12/14/2020    HGB 9.3 (L) 12/14/2020    HCT 30.2 (L) 12/14/2020    MCV 99.3 (H) 12/14/2020     (H) 12/14/2020     Lab Results   Component Value Date     12/14/2020    K 5.0 12/14/2020     12/14/2020    CO2 30 (H) 12/14/2020    BUN 15 12/14/2020    CREATININE 0.5 12/14/2020 GLUCOSE 117 (H) 12/14/2020    CALCIUM 9.5 12/14/2020    PROT 6.4 (L) 12/03/2020    LABALBU 4.0 12/03/2020    BILITOT <0.2 12/03/2020    ALKPHOS 72 12/03/2020    AST 17 12/03/2020    ALT 15 12/03/2020    LABGLOM >60 12/14/2020    GFRAA >59 12/14/2020    GLOB 2.4 10/11/2016   No results found for: INRNo results found for: PHENYTOIN, ESR, CRP    PHYSICAL THERAPY  STRENGTH  Strength RLE  Strength RLE: Exception  Comment: HIP 2/5; KNEE 3/5; ANKLE 4/5  Strength LLE  Strength LLE: WFL  ROM  AROM RLE (degrees)  RLE AROM: Exceptions  RLE General AROM: DECREASED RIGHT HIP/KNEE  AROM LLE (degrees)  LLE AROM : WFL  BED MOBILITY  Bed Mobility  Supine to Sit: Minimal assistance  Sit to Supine: Minimal assistance  TRANSFERS  Transfers  Sit to Stand: Contact guard assistance  Stand to sit: Contact guard assistance, Stand by assistance  Bed to Chair: Contact guard assistance  Stand Pivot Transfers: Minimal Assistance  Lateral Transfers: Contact guard assistance(Toilet transfer. Ind with hygiene and clothing mgmt. CGA for standing balance.)  Car Transfer:  Moderate Assistance, Minimal Assistance  Comment: Pt stand/pivot [place] [place] toward her left  WHEELCHAIR PROPULSION  Propulsion 1  Propulsion: Manual  Level: Level Tile  Method: TOBIAS MACHADO  Level of Assistance: Stand by assistance  Description/ Details: Some veering to the R. requires VC to correct  Distance: 200'  AMBULATION  Ambulation 1  Surface: level tile  Device: Parallel Bars  Other Apparatus: Wheelchair follow  Assistance: Contact guard assistance, Minimal assistance  Quality of Gait: Insufficient push force with UE's for unloading weight on LLE, but pt able to take 2-3 small hops barely clearing her L foot from the floor this date  Distance: 1'  Comments: Stayed in place to focus on pushing force with BUE and clearing LLE  STAIRS  GOALS:  Short term goals  Time Frame for Short term goals: 2 WEEKS  Short term goal 1: BED MOBILITY CGA  Short term goal 2: TRANSFERS MIN A Short term goal 3: PROPEL  FT SBA  Short term goal 4: AMBULATE 25 FT WITH RW CGA  Short term goal 5: UP/DOWN 6 IN STEP WITH RW MOD A     Long term goals  Time Frame for Long term goals : 4 WEEKS  Long term goal 1: INDEP BED MOB  Long term goal 2: INDEP TF SURFACE TO SURFACE  Long term goal 3: PROPEL  FT INDEP  Long term goal 4: AMBULATE 50 FT WITH RW INDEP  Long term goal 5: UP/DWON 6 IN STEP WITH RW CGA     ASSESSMENT:  Assessment: Assisted pt to bathroom at beginning of session.   Performed standing with walker and sitting BLE exercises.        SPEECH THERAPY        OCCUPATIONAL THERAPY     CURRENT IRF-LUCY SCORES  Eating: CARE Score: 5  Oral Hygiene: CARE Score: 5  Toileting: CARE Score: 4  Shower/Bathe: CARE Score: 3  Upper Body Dressing: CARE Score: 5  Lower Body Dressing: CARE Score: 4  Footwear: CARE Score: 4  Toilet Transfers: CARE Score: 4  Picking Up Object:  CARE Score: 88        UE Functioning:  WFLs (some pain L UE with certain movements- mostly abduction)     Pain Assessment:  Pain Level: 2  Pain Location: Shoulder, Hip     STGs:  Short term goals  Time Frame for Short term goals: 1 week  Short term goal 1: complete LB dressing with AE with min A  Short term goal 2: MET  Short term goal 3: MET  Short term goal 4: complete simple home making task using recommended mobility means without cues for WB protocol  Short term goal 5: MET  Short term goal 6: complete overall bathing with min A     LTGs:  Long term goals  Time Frame for Long term goals : 2 weeks  Long term goal 1: complet overall toileting with supervision  Long term goal 2: complete overall bathing with supervision  Long term goal 3: complete overall dressing with supervision  Long term goal 4: complete home making task using recommended mobility with supervision  Long term goal 5: complete HEP with independence  Long term goals 6: pt/family verbalize DME     Assessment: Decreased functional mobility ; Decreased strength; Decreased high-level IADLs; Decreased endurance; Decreased safe awareness; Decreased balance; Decreased ADL status                    NUTRITION  Current Wt: 115 lb 4 oz  Admission Wt:  115 lb 1.6 oz  Oral Diet Orders:   CARB CONTROL  Oral Nutrition Supplement (ONS) Orders:  Diabetic Oral Supplement BID. Pt remains stable with PO intake >50% most meals and stable wt status. Accuchecks ranging  this week. Pt with many questions r/t blood sugar control. DM diet education initiated. Please see nutrition note for details.       RECORD REVIEW: Previous medical records, medications were reviewed at today's visit    IMPRESSION:   1. Right hip fracture status post cephalomedullary nailing. Toe-touch weightbearing for 6 weeksPT/OT  2. DVT prophylaxis for 6 weeks. Low-dose Eliquis  3. Diabetescontinue sliding scale and resume home oral hypoglycemic  4. History of dementiacontinue Aricept and Namenda. Both doses are low. Discussed with patient. 5.  History of anxiety/depressioncontinue Effexor  6. Acute blood loss anemia improving. Niferex added  Percocet scheduled.   Atenolol held  Metamucil added  Staffing this date , mutlidisciplinary with entire team with complex decision making and planning for discharge  ELOS:12/22

## 2020-12-16 NOTE — PROGRESS NOTES
12/16/20 1521 12/16/20 1528 12/16/20 1530   Bed Mobility   Supine to Sit Independent  --   --    Sit to Supine  --   --   --    Transfers   Sit to Stand Contact guard assistance  --   --    Stand to sit Contact guard assistance;Stand by assistance  --   --    Bed to Chair Contact guard assistance  --   --    Exercises   Comments  --  Standing  with RW working on RLE ROM. --    Other exercises   Other exercises?  --  Yes  --    Other exercises 1  --  Sitting BLE ex x15 reps. --    Conditions Requiring Skilled Therapeutic Intervention   Assessment  --   --  Randy session well.    Activity Tolerance   Activity Tolerance  --   --  Patient Tolerated treatment well   Safety Devices   Type of devices  --   --   --       12/16/20 1547 12/16/20 1548   Bed Mobility   Supine to Sit  --   --    Sit to Supine Minimal assistance  --    Transfers   Sit to Stand  --   --    Stand to sit  --   --    Bed to Chair  --   --    Exercises   Comments  --   --    Other exercises   Other exercises?  --   --    Other exercises 1  --   --    Conditions Requiring Skilled Therapeutic Intervention   Assessment  --   --    Activity Tolerance   Activity Tolerance  --   --    Safety Devices   Type of devices  --  Bed alarm in place;Call light within reach   Electronically signed by Mic Parra PTA on 12/16/2020 at 3:50 PM

## 2020-12-16 NOTE — PROGRESS NOTES
Occupational Therapy     12/16/20 1000   General   Additional Pertinent Hx L clavicle fx with ORIF 6/22/20   Diagnosis R hip fx with nailing   Pain Assessment   Patient Currently in Pain No   Pain Assessment 0-10   Pain Level 0   Balance   Sitting Balance Independent   Standing Balance Supervision   Functional Mobility   Functional - Mobility Device Wheelchair   Activity Transport items; Retrieve items; To/From therapy gym   Assist Level Supervision   Bed mobility   Supine to Sit Independent   Sit to Supine Minimal assistance  (Independent on mat table, Min A on high soft bed.)   Transfers   Stand Pivot Transfers Stand by assistance   Sit to stand Stand by assistance   Stand to sit Stand by assistance   Transfer Comments To/from various chairs/surfaces, requried Min cues for locking brakes and w/c positioning prior to tranfers. Assessment   Performance deficits / Impairments Decreased functional mobility ; Decreased strength;Decreased high-level IADLs;Decreased endurance;Decreased safe awareness;Decreased balance;Decreased ADL status   Treatment Diagnosis Right Femur Fx s/p IM nail   Prognosis Good   History clavicle fx with ORIF 6/22/20, Rapid response 12/5   Timed Code Treatment Minutes 60 Minutes   Activity Tolerance   Activity Tolerance Patient Tolerated treatment well   Safety Devices   Safety Devices in place Yes   Type of devices Call light within reach; Chair alarm in place   Plan   Current Treatment Recommendations Positioning; Safety Education & Training;Functional Mobility Training;Home Management Training;Balance Training;Self-Care / ADL;Equipment Evaluation, Education, & procurement;Strengthening; Endurance Training;Patient/Caregiver Education & Training

## 2020-12-16 NOTE — PLAN OF CARE
Problem: Falls - Risk of:  Goal: Will remain free from falls  Description: Will remain free from falls  Outcome: Ongoing  Goal: Absence of physical injury  Description: Absence of physical injury  Outcome: Ongoing     Problem: Skin Integrity:  Goal: Will show no infection signs and symptoms  Description: Will show no infection signs and symptoms  Outcome: Ongoing  Goal: Absence of new skin breakdown  Description: Absence of new skin breakdown  Outcome: Ongoing  Goal: Demonstration of wound healing without infection will improve  Description: Demonstration of wound healing without infection will improve  Outcome: Ongoing  Goal: Risk for impaired skin integrity will decrease  Description: Risk for impaired skin integrity will decrease  Outcome: Ongoing     Problem: Pain:  Goal: Pain level will decrease  Description: Pain level will decrease  Outcome: Ongoing  Goal: Control of acute pain  Description: Control of acute pain  Outcome: Ongoing  Goal: Control of chronic pain  Description: Control of chronic pain  Outcome: Ongoing     Problem: SAFETY  Goal: Free from accidental physical injury  Outcome: Ongoing  Goal: Free from intentional harm  Outcome: Ongoing     Problem: DAILY CARE  Goal: Daily care needs are met  Outcome: Ongoing     Problem: PAIN  Goal: Patient's pain/discomfort is manageable  Outcome: Ongoing  Goal: STG - Patient will verbalize an acceptable level of pain  Outcome: Ongoing     Problem: SKIN INTEGRITY  Goal: Skin integrity is maintained or improved  Outcome: Ongoing  Goal: STG - patient will maintain good skin integrity  Outcome: Ongoing  Goal: STG - Patient exhibits signs of wound healing. Outcome: Ongoing     Problem: KNOWLEDGE DEFICIT  Goal: Patient/S.O. demonstrates understanding of disease process, treatment plan, medications, and discharge instructions.   Outcome: Ongoing     Problem: DISCHARGE BARRIERS  Goal: Patient's continuum of care needs are met  Outcome: Ongoing Problem: Mobility - Impaired:  Goal: Mobility will improve  Description: Mobility will improve  Outcome: Ongoing     Problem:  Activity:  Goal: Ability to ambulate will improve  Description: Ability to ambulate will improve  Outcome: Ongoing  Goal: Ability to perform activities at highest level will improve  Description: Ability to perform activities at highest level will improve  Outcome: Ongoing     Problem: Nutritional:  Goal: Ability to attain and maintain optimal nutritional status will improve  Description: Ability to attain and maintain optimal nutritional status will improve  Outcome: Ongoing     Problem: Safety:  Goal: Ability to remain free from injury will improve  Description: Ability to remain free from injury will improve  Outcome: Ongoing     Problem: IP BLADDER/VOIDING  Goal: LTG - Patient will utilize adaptive techniques/equipment to complete bladder elimination  Outcome: Ongoing     Problem: IP BOWEL ELIMINATION  Goal: LTG - patient will have regular and routine bowel evacuation  Outcome: Ongoing     Problem: IP BREATHING  Goal: LTG - Patient/caregiver will demonstrate/perform proper techniques to maintain patent airway  Outcome: Ongoing     Problem: NUTRITION  Goal: Patient maintains adequate hydration  Outcome: Ongoing     Problem: Bleeding:  Goal: Will show no signs and symptoms of excessive bleeding  Description: Will show no signs and symptoms of excessive bleeding  Outcome: Ongoing     Problem: Infection - Surgical Site:  Goal: Will show no infection signs and symptoms  Description: Will show no infection signs and symptoms  Outcome: Ongoing     Problem: Serum Glucose Level - Abnormal:  Goal: Ability to maintain appropriate glucose levels has stabilized  Description: Ability to maintain appropriate glucose levels has stabilized  Outcome: Ongoing     Problem: Mood - Altered:  Goal: Mood stable  Description: Mood stable  Outcome: Ongoing

## 2020-12-16 NOTE — PLAN OF CARE
Problem: Falls - Risk of:  Goal: Will remain free from falls  Description: Will remain free from falls  12/16/2020 1414 by Sheila Gilbert RN  Outcome: Ongoing  12/16/2020 0128 by Ni Strickland LPN  Outcome: Ongoing  Goal: Absence of physical injury  Description: Absence of physical injury  12/16/2020 1414 by Sheila Gilbert RN  Outcome: Ongoing  12/16/2020 0128 by Ni Strickland LPN  Outcome: Ongoing

## 2020-12-16 NOTE — PROGRESS NOTES
Occupational Therapy     12/16/20 1345   General   Diagnosis R hip fx with nailing   Pain Assessment   Patient Currently in Pain No   Pain Assessment 0-10   Pain Level 0   ADL   Toileting Supervision   Balance   Sitting Balance Independent   Standing Balance Supervision   Functional Mobility   Functional - Mobility Device Wheelchair   Activity To/From therapy gym; Other   Assist Level Supervision   Transfers   Stand Pivot Transfers Supervision   Sit to stand Supervision   Stand to sit Supervision   Transfer Comments 1 cue for positioning w/c prior to transfer, did not require cue for locking brakes this tx session. Toilet Transfers   Toilet - Technique Stand pivot   Equipment Used Grab bars   Toilet Transfer Supervision   Type of ROM/Therapeutic Exercise   Type of ROM/Therapeutic Exercise Cane/Wand   Comment 2# 2 sets x 15 reps, within comfortable ROM. Assessment   Performance deficits / Impairments Decreased functional mobility ; Decreased strength;Decreased high-level IADLs;Decreased endurance;Decreased safe awareness;Decreased balance;Decreased ADL status   Treatment Diagnosis Right Femur Fx s/p IM nail   Prognosis Good   History clavicle fx with ORIF 6/22/20, Rapid response 12/5   Timed Code Treatment Minutes 45 Minutes   Activity Tolerance   Activity Tolerance Patient Tolerated treatment well   Safety Devices   Safety Devices in place Yes   Type of devices Call light within reach; Chair alarm in place   Plan   Current Treatment Recommendations Positioning; Safety Education & Training;Functional Mobility Training;Home Management Training;Balance Training;Self-Care / ADL;Equipment Evaluation, Education, & procurement;Strengthening; Endurance Training;Patient/Caregiver Education & Training

## 2020-12-16 NOTE — PROGRESS NOTES
12/16/20 0830 12/16/20 0831 12/16/20 0849   Pain Screening   Patient Currently in Pain Yes  --   --    Pain Assessment   Pain Level  --   --   --    Patient's Stated Pain Goal 5  --   --    Pain Location Shoulder;Arm  --   --    Pain Orientation Right;Left  --   --    Vital Signs   Pulse  --   --   --    Transfers   Car Transfer  --   --  Contact guard assistance   Propulsion 1   Propulsion  --  Manual  --    Level  --  Level Tile  --    Method  --  RUE;KEONE  --    Level of Assistance  --  Independent  --    Description/ Details  --  Incorporated turns. --    Distance  --  200'  --    Exercises   Comments  --   --   --    Conditions Requiring Skilled Therapeutic Intervention   Assessment  --   --   --    Activity Tolerance   Activity Tolerance  --   --   --    Safety Devices   Type of devices  --   --   --       12/16/20 0850 12/16/20 0858 12/16/20 0900   Pain Screening   Patient Currently in Pain  --   --   --    Pain Assessment   Pain Level  --  5  --    Patient's Stated Pain Goal  --   --   --    Pain Location  --   --   --    Pain Orientation  --   --   --    Vital Signs   Pulse  --   --  80   Transfers   Car Transfer  --   --   --    Propulsion 1   Propulsion  --   --   --    Level  --   --   --    Method  --   --   --    Level of Assistance  --   --   --    Description/ Details  --   --   --    Distance  --   --   --    Exercises   Comments Standing twice with RW working on RLE ROM.   --   --    Conditions Requiring Skilled Therapeutic Intervention   Assessment Completed car transfer with CGA and verbal cues  --   --    Activity Tolerance   Activity Tolerance Patient Tolerated treatment well  --   --    Safety Devices   Type of devices  --   --   --       12/16/20 0909   Pain Screening   Patient Currently in Pain  --    Pain Assessment   Pain Level  --    Patient's Stated Pain Goal  --    Pain Location  --    Pain Orientation  --    Vital Signs   Pulse  --    Transfers   Car Transfer  --    Propulsion 1 Propulsion  --    Level  --    Method  --    Level of Assistance  --    Description/ Details  --    Distance  --    Exercises   Comments  --    Conditions Requiring Skilled Therapeutic Intervention   Assessment  --    Activity Tolerance   Activity Tolerance  --    Safety Devices   Type of devices Call light within reach; Chair alarm in place   Electronically signed by Brett Aaron PTA on 12/16/2020 at 9:10 AM

## 2020-12-16 NOTE — PROGRESS NOTES
Physician Progress Note      PATIENT:               Morene Pool  CSN #:                  [id number]  :                       1935  ADMIT DATE:       12/3/2020 6:34 PM  Guicho Helm DATE:        2020 1:12 PM  RESPONDING  PROVIDER #:        Chon Shukla MD          QUERY TEXT:    Pt admitted with fracture of the right femur and has anemia documented. If   possible, please document in progress notes and discharge summary further   specificity regarding the acuity and type of anemia:    The medical record reflects the following:    Risk Factors: Femur fracture, Surgery  Clinical Indicators: Hemoglobin drop from 11.8 to 6.4 , hypotension 93/42  Treatment:  Labs, monitoring, 1 unit PRBC,  IVF 75 ml/hr    Thank you    Jay Elias RN, BSN, [facility]  [phone]  Options provided:  -- Anemia due to acute blood loss  -- Dilutional anemia  -- Precipitous drop in hemoglobin  -- Other - I will add my own diagnosis  -- Disagree - Not applicable / Not valid  -- Disagree - Clinically unable to determine / Unknown  -- Refer to Clinical Documentation Reviewer    PROVIDER RESPONSE TEXT:    This patient has acute blood loss anemia.     Query created by: Stephanie Bhatia on 2020 9:03 AM      Electronically signed by:  Chon Shukla MD 2020 11:11 AM

## 2020-12-17 LAB
ANION GAP SERPL CALCULATED.3IONS-SCNC: 8 MMOL/L (ref 7–19)
BASOPHILS ABSOLUTE: 0 K/UL (ref 0–0.2)
BASOPHILS RELATIVE PERCENT: 0.4 % (ref 0–1)
BUN BLDV-MCNC: 13 MG/DL (ref 8–23)
CALCIUM SERPL-MCNC: 9.5 MG/DL (ref 8.8–10.2)
CHLORIDE BLD-SCNC: 103 MMOL/L (ref 98–111)
CO2: 30 MMOL/L (ref 22–29)
CREAT SERPL-MCNC: 0.5 MG/DL (ref 0.5–0.9)
EOSINOPHILS ABSOLUTE: 0.1 K/UL (ref 0–0.6)
EOSINOPHILS RELATIVE PERCENT: 1.6 % (ref 0–5)
GFR AFRICAN AMERICAN: >59
GFR NON-AFRICAN AMERICAN: >60
GLUCOSE BLD-MCNC: 107 MG/DL (ref 70–99)
GLUCOSE BLD-MCNC: 120 MG/DL (ref 70–99)
GLUCOSE BLD-MCNC: 121 MG/DL (ref 74–109)
GLUCOSE BLD-MCNC: 122 MG/DL (ref 70–99)
GLUCOSE BLD-MCNC: 163 MG/DL (ref 70–99)
HCT VFR BLD CALC: 31.7 % (ref 37–47)
HEMOGLOBIN: 9.6 G/DL (ref 12–16)
IMMATURE GRANULOCYTES #: 0.1 K/UL
LYMPHOCYTES ABSOLUTE: 2.3 K/UL (ref 1.1–4.5)
LYMPHOCYTES RELATIVE PERCENT: 32.6 % (ref 20–40)
MCH RBC QN AUTO: 30.5 PG (ref 27–31)
MCHC RBC AUTO-ENTMCNC: 30.3 G/DL (ref 33–37)
MCV RBC AUTO: 100.6 FL (ref 81–99)
MONOCYTES ABSOLUTE: 0.6 K/UL (ref 0–0.9)
MONOCYTES RELATIVE PERCENT: 9.2 % (ref 0–10)
NEUTROPHILS ABSOLUTE: 3.9 K/UL (ref 1.5–7.5)
NEUTROPHILS RELATIVE PERCENT: 55.2 % (ref 50–65)
PDW BLD-RTO: 15.6 % (ref 11.5–14.5)
PERFORMED ON: ABNORMAL
PLATELET # BLD: 511 K/UL (ref 130–400)
PMV BLD AUTO: 9.5 FL (ref 9.4–12.3)
POTASSIUM REFLEX MAGNESIUM: 5 MMOL/L (ref 3.5–5)
RBC # BLD: 3.15 M/UL (ref 4.2–5.4)
SODIUM BLD-SCNC: 141 MMOL/L (ref 136–145)
WBC # BLD: 7 K/UL (ref 4.8–10.8)

## 2020-12-17 PROCEDURE — 1180000000 HC REHAB R&B

## 2020-12-17 PROCEDURE — 2580000003 HC RX 258: Performed by: INTERNAL MEDICINE

## 2020-12-17 PROCEDURE — 97530 THERAPEUTIC ACTIVITIES: CPT

## 2020-12-17 PROCEDURE — 97110 THERAPEUTIC EXERCISES: CPT

## 2020-12-17 PROCEDURE — 6370000000 HC RX 637 (ALT 250 FOR IP): Performed by: INTERNAL MEDICINE

## 2020-12-17 PROCEDURE — 6370000000 HC RX 637 (ALT 250 FOR IP): Performed by: PSYCHIATRY & NEUROLOGY

## 2020-12-17 PROCEDURE — 99232 SBSQ HOSP IP/OBS MODERATE 35: CPT | Performed by: PSYCHIATRY & NEUROLOGY

## 2020-12-17 PROCEDURE — 36415 COLL VENOUS BLD VENIPUNCTURE: CPT

## 2020-12-17 PROCEDURE — 85025 COMPLETE CBC W/AUTO DIFF WBC: CPT

## 2020-12-17 PROCEDURE — 80048 BASIC METABOLIC PNL TOTAL CA: CPT

## 2020-12-17 PROCEDURE — 82947 ASSAY GLUCOSE BLOOD QUANT: CPT

## 2020-12-17 PROCEDURE — 97535 SELF CARE MNGMENT TRAINING: CPT

## 2020-12-17 RX ADMIN — APIXABAN 2.5 MG: 2.5 TABLET, FILM COATED ORAL at 07:55

## 2020-12-17 RX ADMIN — Medication 150 MG: at 07:55

## 2020-12-17 RX ADMIN — METFORMIN HYDROCHLORIDE 500 MG: 500 TABLET ORAL at 07:55

## 2020-12-17 RX ADMIN — DONEPEZIL HYDROCHLORIDE 5 MG: 5 TABLET, FILM COATED ORAL at 07:55

## 2020-12-17 RX ADMIN — SODIUM CHLORIDE, PRESERVATIVE FREE 10 ML: 5 INJECTION INTRAVENOUS at 07:59

## 2020-12-17 RX ADMIN — OXYCODONE HYDROCHLORIDE AND ACETAMINOPHEN 1 TABLET: 5; 325 TABLET ORAL at 20:24

## 2020-12-17 RX ADMIN — MEMANTINE HYDROCHLORIDE 5 MG: 5 TABLET ORAL at 07:55

## 2020-12-17 RX ADMIN — APIXABAN 2.5 MG: 2.5 TABLET, FILM COATED ORAL at 20:25

## 2020-12-17 RX ADMIN — PANTOPRAZOLE SODIUM 40 MG: 40 TABLET, DELAYED RELEASE ORAL at 05:54

## 2020-12-17 RX ADMIN — ASPIRIN 81 MG: 81 TABLET, COATED ORAL at 07:55

## 2020-12-17 RX ADMIN — PSYLLIUM HUSK 1 PACKET: 3.4 POWDER ORAL at 07:55

## 2020-12-17 RX ADMIN — GABAPENTIN 300 MG: 300 CAPSULE ORAL at 20:24

## 2020-12-17 RX ADMIN — OXYCODONE HYDROCHLORIDE AND ACETAMINOPHEN 1 TABLET: 5; 325 TABLET ORAL at 13:55

## 2020-12-17 RX ADMIN — OXYCODONE HYDROCHLORIDE AND ACETAMINOPHEN 1 TABLET: 5; 325 TABLET ORAL at 07:54

## 2020-12-17 RX ADMIN — ATORVASTATIN CALCIUM 10 MG: 10 TABLET, FILM COATED ORAL at 07:54

## 2020-12-17 RX ADMIN — VENLAFAXINE HYDROCHLORIDE 37.5 MG: 37.5 CAPSULE, EXTENDED RELEASE ORAL at 07:55

## 2020-12-17 ASSESSMENT — PAIN DESCRIPTION - ONSET
ONSET: GRADUAL
ONSET: GRADUAL

## 2020-12-17 ASSESSMENT — PAIN DESCRIPTION - DESCRIPTORS
DESCRIPTORS: ACHING
DESCRIPTORS: ACHING

## 2020-12-17 ASSESSMENT — PAIN DESCRIPTION - LOCATION
LOCATION: HIP
LOCATION: HIP

## 2020-12-17 ASSESSMENT — PAIN - FUNCTIONAL ASSESSMENT: PAIN_FUNCTIONAL_ASSESSMENT: PREVENTS OR INTERFERES SOME ACTIVE ACTIVITIES AND ADLS

## 2020-12-17 ASSESSMENT — PAIN DESCRIPTION - FREQUENCY
FREQUENCY: INTERMITTENT
FREQUENCY: INTERMITTENT

## 2020-12-17 ASSESSMENT — PAIN DESCRIPTION - ORIENTATION
ORIENTATION: RIGHT
ORIENTATION: RIGHT

## 2020-12-17 ASSESSMENT — PAIN DESCRIPTION - PAIN TYPE
TYPE: SURGICAL PAIN
TYPE: SURGICAL PAIN

## 2020-12-17 ASSESSMENT — PAIN DESCRIPTION - PROGRESSION
CLINICAL_PROGRESSION: GRADUALLY IMPROVING
CLINICAL_PROGRESSION: GRADUALLY IMPROVING

## 2020-12-17 ASSESSMENT — PAIN SCALES - GENERAL
PAINLEVEL_OUTOF10: 2
PAINLEVEL_OUTOF10: 0
PAINLEVEL_OUTOF10: 2
PAINLEVEL_OUTOF10: 1

## 2020-12-17 NOTE — PROGRESS NOTES
Patient:   Tata Costello  MR#:    [id number]   Room:    0823/823-01   YOB: 1935  Date of Progress Note: 12/17/2020  Time of Note                           12:35 PM  Consulting Physician:   Marlene Robison M.D. Attending Physician:  Marlene Robison MD        CHIEF COMPLAINT: Right hip pain        Subjective: This 81 y.o. 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 She presented to [facility] ER on 12/3/20 after having a fall at home where she tripped and fell forward hitting her right side of her head and her right hip. She has a hematoma to her right frontotemporal aread and had been unable to bear weight on her right lower extremity since her fall. CT of head was negative for acute changes. X-ray done of her right femur revealed an acute traumatic displaced peritrochanteric fracture. She was admitted to the hospitalist service with consult for orthopedic surgery. She was seen by Dr. Swathi Maxwell, who recommended Cephalomedullary Nailing of her fracture. She was in agreement and went for surgery on 12/4/20. She tolerated the procedure well. Post op she had chest pain, rapid response was called. EKG and cardiac enzymes were unremarkable. CTA negative for pulmonary emboli. Echocardiogram ordered and reveals EF of 55-60%. Patient has had no further chest pain. She will be toe-touch weightbearing on her right lower extremity for 6 weeks. She will need DVT prophylaxis for 6 weeks. Has occasional palpitations and wants to resume atenolol.   REVIEW OF SYSTEMS:  Constitutional: No fevers No chills  Neck:No stiffness  Respiratory: No shortness of breath  Cardiovascular: No chest pain No palpitations  Gastrointestinal: No abdominal pain    Genitourinary: No Dysuria  Neurological: No headache, no confusion      PHYSICAL EXAM: /67   Pulse 66   Temp 97 °F (36.1 °C) (Temporal)   Resp 16   Ht 5' 3\" (1.6 m)   Wt 115 lb 4 oz (52.3 kg)   SpO2 94%   BMI 20.42 kg/m²     Constitutional: she appears well-developed and well-nourished. Eyes  conjunctiva normal.  Pupils react to light  Ear, nose, throat -hearing intact to voice. No scars, masses, or lesions over external nose or ears, no atrophy of tongue  Neck-symmetric, no masses noted, no jugular vein distension  Respiration- chest wall appears symmetric, good expansion,   normal effort without use of accessory muscles  Cardiovascular- RRR  Musculoskeletal  no significant wasting of muscles noted, no bony deformities, gait no gross ataxia  Extremities-no clubbing, cyanosis or edema  Skin  warm, dry, and intact. No rash, erythema, or pallor. Psychiatric  mood, affect, and behavior appear normal.      Neurology  NEUROLOGICAL EXAM:      Mental status   Awake, alert, fluent oriented x 3 appropriate affect  Attention and concentration appear appropriate  Recent and remote memory appears unremarkable  Speech normal without dysarthria  No clear issues with language       Cranial Nerves   CN II- Visual fields grossly unremarkable  CN III, IV,VI-EOMI, No nystagmus, conjugate eye movements, no ptosis  CN VII-right hemifacial spasm  CN VIII-Hearing intact      Motor function  Antigravity x 4     Sensory function Intact to light touch     Cerebellar F-N intact     Tremor None present     Gait                  Not tested           Nursing/pcp notes, imaging,labs and vitals reviewed.      PT,OT and/or speech notes reviewed    Lab Results   Component Value Date    WBC 7.0 12/17/2020    HGB 9.6 (L) 12/17/2020    HCT 31.7 (L) 12/17/2020    .6 (H) 12/17/2020     (H) 12/17/2020     Lab Results   Component Value Date     12/17/2020    K 5.0 12/17/2020     12/17/2020    CO2 30 (H) 12/17/2020    BUN 13 12/17/2020    CREATININE 0.5 12/17/2020    GLUCOSE 121 (H) 12/17/2020 CALCIUM 9.5 12/17/2020    PROT 6.4 (L) 12/03/2020    LABALBU 4.0 12/03/2020    BILITOT <0.2 12/03/2020    ALKPHOS 72 12/03/2020    AST 17 12/03/2020    ALT 15 12/03/2020    LABGLOM >60 12/17/2020    GFRAA >59 12/17/2020    GLOB 2.4 10/11/2016   No results found for: INRNo results found for: PHENYTOIN, ESR, CRP    PHYSICAL THERAPY  STRENGTH  Strength RLE  Strength RLE: Exception  Comment: HIP 2/5; KNEE 3/5; ANKLE 4/5  Strength LLE  Strength LLE: WFL  ROM  AROM RLE (degrees)  RLE AROM: Exceptions  RLE General AROM: DECREASED RIGHT HIP/KNEE  AROM LLE (degrees)  LLE AROM : WFL  BED MOBILITY  Bed Mobility  Supine to Sit: Minimal assistance  Sit to Supine: Minimal assistance  TRANSFERS  Transfers  Sit to Stand: Contact guard assistance  Stand to sit: Contact guard assistance, Stand by assistance  Bed to Chair: Contact guard assistance  Stand Pivot Transfers: Minimal Assistance  Lateral Transfers: Contact guard assistance(Toilet transfer. Ind with hygiene and clothing mgmt. CGA for standing balance.)  Car Transfer:  Moderate Assistance, Minimal Assistance  Comment: [place] [place]  WHEELCHAIR PROPULSION  Propulsion 1  Propulsion: Manual  Level: Level Tile  Method: TOBIAS MACHADO  Level of Assistance: Stand by assistance  Description/ Details: Some veering to the R. requires VC to correct  Distance: 200'  AMBULATION  Ambulation 1  Surface: level tile  Device: Parallel Bars  Other Apparatus: Wheelchair follow  Assistance: Contact guard assistance, Minimal assistance  Quality of Gait: Insufficient push force with UE's for unloading weight on LLE, but pt able to take 2-3 small hops barely clearing her L foot from the floor this date  Distance: 1'  Comments: Stayed in place to focus on pushing force with BUE and clearing LLE  STAIRS  GOALS:  Short term goals  Time Frame for Short term goals: 2 WEEKS  Short term goal 1: BED MOBILITY CGA  Short term goal 2: TRANSFERS MIN A Short term goal 3: PROPEL  FT SBA  Short term goal 4: AMBULATE 25 FT WITH RW CGA  Short term goal 5: UP/DOWN 6 IN STEP WITH RW MOD A     Long term goals  Time Frame for Long term goals : 4 WEEKS  Long term goal 1: INDEP BED MOB  Long term goal 2: INDEP TF SURFACE TO SURFACE  Long term goal 3: PROPEL  FT INDEP  Long term goal 4: AMBULATE 50 FT WITH RW INDEP  Long term goal 5: UP/DWON 6 IN STEP WITH RW CGA     ASSESSMENT:  Assessment: Assisted pt to bathroom at beginning of session.   Performed standing with walker and sitting BLE exercises.        SPEECH THERAPY        OCCUPATIONAL THERAPY     CURRENT IRF-LUCY SCORES  Eating: CARE Score: 5  Oral Hygiene: CARE Score: 5  Toileting: CARE Score: 4  Shower/Bathe: CARE Score: 3  Upper Body Dressing: CARE Score: 5  Lower Body Dressing: CARE Score: 4  Footwear: CARE Score: 4  Toilet Transfers: CARE Score: 4  Picking Up Object:  CARE Score: 88        UE Functioning:  WFLs (some pain L UE with certain movements- mostly abduction)     Pain Assessment:  Pain Level: 2  Pain Location: Shoulder, Hip     STGs:  Short term goals  Time Frame for Short term goals: 1 week  Short term goal 1: complete LB dressing with AE with min A  Short term goal 2: MET  Short term goal 3: MET  Short term goal 4: complete simple home making task using recommended mobility means without cues for WB protocol  Short term goal 5: MET  Short term goal 6: complete overall bathing with min A     LTGs:  Long term goals  Time Frame for Long term goals : 2 weeks  Long term goal 1: complet overall toileting with supervision  Long term goal 2: complete overall bathing with supervision  Long term goal 3: complete overall dressing with supervision  Long term goal 4: complete home making task using recommended mobility with supervision  Long term goal 5: complete HEP with independence  Long term goals 6: pt/family verbalize DME     Assessment: Decreased functional mobility ; Decreased strength; Decreased high-level IADLs; Decreased endurance; Decreased safe awareness; Decreased balance; Decreased ADL status                    NUTRITION  Current Wt: 115 lb 4 oz  Admission Wt:  115 lb 1.6 oz  Oral Diet Orders:   CARB CONTROL  Oral Nutrition Supplement (ONS) Orders:  Diabetic Oral Supplement BID. Pt remains stable with PO intake >50% most meals and stable wt status. Accuchecks ranging  this week. Pt with many questions r/t blood sugar control. DM diet education initiated. Please see nutrition note for details.       RECORD REVIEW: Previous medical records, medications were reviewed at today's visit    IMPRESSION:   1. Right hip fracture status post cephalomedullary nailing. Toe-touch weightbearing for 6 weeksPT/OT  2. DVT prophylaxis for 6 weeks. Low-dose Eliquis  3. Diabetescontinue sliding scale and resume home oral hypoglycemic  4. History of dementiacontinue Aricept and Namenda. Both doses are low. Discussed with patient. 5.  History of anxiety/depressioncontinue Effexor  6. Acute blood loss anemia improving. Niferex added  Percocet scheduled.   Atenolol resumed 12/17  Metamucil added    ELOS:12/22

## 2020-12-17 NOTE — PROGRESS NOTES
Comprehensive Nutrition Assessment    Type and Reason for Visit:  Reassess    Nutrition Recommendations/Plan: DM diet ed. Nutrition Assessment:  Pt provided with sample menus and educational materials for DM diet. We previously discussed blood glucose monitoring and the importance of regular meal/snack times. Questions asked and answered. Will continue to follow-up and monitor nutrition progression. Malnutrition Assessment:  Malnutrition Status:  No malnutrition    Context:  Acute Illness       Wounds:  Surgical Incision       Current Nutrition Therapies:    DIET CARB CONTROL;   Dietary Nutrition Supplements: Diabetic Oral Supplement    Anthropometric Measures:  · Height: 5' 3\" (160 cm)  · Current Body Weight: 115 lb 4 oz (52.3 kg)   · Usual Body Weight: 123 lb (55.8 kg)(stated)     · Ideal Body Weight: 115 lbs; % Ideal Body Weight 100.1 %   · BMI: 20.4  · BMI Categories: Underweight (BMI less than 22) age over 72       Nutrition Diagnosis:   · Increased nutrient needs related to acute injury/trauma as evidenced by wounds, weight loss      Nutrition Interventions:   Food and/or Nutrient Delivery:  Continue Current Diet, Continue Oral Nutrition Supplement  Nutrition Education/Counseling:  Education completed   Coordination of Nutrition Care:  Continue to monitor while inpatient    Goals:  PO intake >50%, wt stable, incision healing       Nutrition Monitoring and Evaluation:   Behavioral-Environmental Outcomes:  None Identified   Food/Nutrient Intake Outcomes:  Food and Nutrient Intake, Supplement Intake  Physical Signs/Symptoms Outcomes:  Biochemical Data, Nutrition Focused Physical Findings, Skin, Weight     Discharge Planning:    Continue current diet     Electronically signed by Alcides Islas RD, RICKIE on 12/17/20 at 12:57 PM CST    Contact: [phone]

## 2020-12-17 NOTE — PROGRESS NOTES
12/17/20 0828   Restrictions/Precautions   Restrictions/Precautions Weight Bearing; Fall Risk   Required Braces or Orthoses? No   Lower Extremity Weight Bearing Restrictions   Right Lower Extremity Weight Bearing Toe Touch Weight Bearing   Left Lower Extremity Weight Bearing Weight Bearing As Tolerated   Position Activity Restriction   Other position/activity restrictions L clavicle fx with ORIF 6/22/20   General   Chart Reviewed Yes   Subjective   Subjective Patient in [facility] ready for therapy   General Comment   Comments 12/4 FEMUR IM NAIL TARIQ INSERTION, 12/5/20 rapid response for chest pain   Pain Screening   Patient Currently in Pain No   Intervention List Patient able to continue with treatment   Oxygen Therapy   O2 Device None (Room air)   Orientation   Overall Orientation Status WNL   Transfers   Sit to Stand Supervision   Stand to sit Supervision   Bed to Chair Supervision   Stand Pivot Transfers Supervision  (Patient TR without RW)   Comment Patient did well with TRs   Ambulation   Ambulation? No   WB Status TTWB RLE   Propulsion 1   Propulsion Manual   Level Level Tile   Method RUE;LUE   Level of Assistance Independent   Description/ Details [employer] turns.    Distance 200'   Propulsion 2   Propulsion Manual   Level Level Tile   Method LUE;RUE   Level of Assistance Independent   Description/ Details Turns included   Distance 100'   Exercises   Heelslides 20   Hip Flexion 20   Hip Abduction 20   Knee Long Arc Quad 20   Knee Active Range of Motion yes   Ankle Pumps 20   Comments BL LE EX in sitting AROM   with 1  1/2#  on  L LE  x  2 sets   Activity Tolerance   Activity Tolerance Patient Tolerated treatment well   Occupational Therapy    Electronically signed by Omer Gonzales PTA on 12/17/2020 at 8:53 AM

## 2020-12-17 NOTE — PLAN OF CARE
Problem: Falls - Risk of:  Goal: Will remain free from falls  Description: Will remain free from falls  12/16/2020 2336 by Marcia Lawler RN  Outcome: Ongoing  12/16/2020 1414 by Grayson Crowley RN  Outcome: Ongoing  Goal: Absence of physical injury  Description: Absence of physical injury  12/16/2020 2336 by Marcia Lawler RN  Outcome: Ongoing  12/16/2020 1414 by Grayson Crowley RN  Outcome: Ongoing     Problem: Skin Integrity:  Goal: Will show no infection signs and symptoms  Description: Will show no infection signs and symptoms  12/16/2020 2336 by Marcia Lawler RN  Outcome: Ongoing  12/16/2020 1414 by Grayson Crowley RN  Outcome: Ongoing  Goal: Absence of new skin breakdown  Description: Absence of new skin breakdown  12/16/2020 2336 by Marcia Lawler RN  Outcome: Ongoing  12/16/2020 1414 by Grayson Crowley RN  Outcome: Ongoing  Goal: Demonstration of wound healing without infection will improve  Description: Demonstration of wound healing without infection will improve  12/16/2020 2336 by Marcia Lawler RN  Outcome: Ongoing  12/16/2020 1414 by Grayson Crowley RN  Outcome: Ongoing  Goal: Risk for impaired skin integrity will decrease  Description: Risk for impaired skin integrity will decrease  12/16/2020 2336 by Marcia Lawler RN  Outcome: Ongoing  12/16/2020 1414 by Grayson Crowley RN  Outcome: Ongoing     Problem: Pain:  Goal: Pain level will decrease  Description: Pain level will decrease  12/16/2020 2336 by Marcia Lawler RN  Outcome: Ongoing  12/16/2020 1414 by Grayson Crowley RN  Outcome: Ongoing  Goal: Control of acute pain  Description: Control of acute pain  12/16/2020 2336 by Marcia Lawler RN  Outcome: Ongoing  12/16/2020 1414 by Grayson Crowley RN  Outcome: Ongoing  Goal: Control of chronic pain  Description: Control of chronic pain  12/16/2020 2336 by Marcia Lawler RN  Outcome: Ongoing  12/16/2020 1414 by Grayson Crowley RN Outcome: Ongoing     Problem: SAFETY  Goal: Free from accidental physical injury  12/16/2020 2336 by Elysia Aldrich RN  Outcome: Ongoing  12/16/2020 1414 by Sherren Dickens, RN  Outcome: Ongoing  Goal: Free from intentional harm  12/16/2020 2336 by Elysia Aldrich RN  Outcome: Ongoing  12/16/2020 1414 by Sherren Dickens, RN  Outcome: Ongoing     Problem: DAILY CARE  Goal: Daily care needs are met  12/16/2020 2336 by Elysia Aldrich RN  Outcome: Ongoing  12/16/2020 1414 by Sherren Dickens, RN  Outcome: Ongoing     Problem: PAIN  Goal: Patient's pain/discomfort is manageable  12/16/2020 2336 by Elysia Aldrich RN  Outcome: Ongoing  12/16/2020 1414 by Sherren Dickens, RN  Outcome: Ongoing  Goal: STG - Patient will verbalize an acceptable level of pain  12/16/2020 2336 by Elysia Aldrich RN  Outcome: Ongoing  12/16/2020 1414 by Sherren Dickens, RN  Outcome: Ongoing     Problem: SKIN INTEGRITY  Goal: Skin integrity is maintained or improved  12/16/2020 2336 by Elysia Aldrich RN  Outcome: Ongoing  12/16/2020 1414 by Sherren Dickens, RN  Outcome: Ongoing  Goal: STG - patient will maintain good skin integrity  12/16/2020 2336 by Elysia Aldrich RN  Outcome: Ongoing  12/16/2020 1414 by Sherren Dickens, RN  Outcome: Ongoing  Goal: STG - Patient exhibits signs of wound healing. 12/16/2020 2336 by Elysia Aldrich RN  Outcome: Ongoing  12/16/2020 1414 by Sherren Dickens, RN  Outcome: Ongoing     Problem: KNOWLEDGE DEFICIT  Goal: Patient/S.O. demonstrates understanding of disease process, treatment plan, medications, and discharge instructions.   12/16/2020 2336 by Elysia Aldrich RN  Outcome: Ongoing  12/16/2020 1414 by Sherren Dickens, RN  Outcome: Ongoing     Problem: DISCHARGE BARRIERS  Goal: Patient's continuum of care needs are met  12/16/2020 2336 by Elysia Aldrich RN  Outcome: Ongoing  12/16/2020 1414 by Sherren Dickens, RN  Outcome: Ongoing     Problem: Mobility - Impaired: Goal: Mobility will improve  Description: Mobility will improve  12/16/2020 2336 by Yusra Cartwright RN  Outcome: Ongoing  12/16/2020 1414 by Rohith Gaming RN  Outcome: Ongoing     Problem:  Activity:  Goal: Ability to ambulate will improve  Description: Ability to ambulate will improve  12/16/2020 2336 by Yusra Cartwright RN  Outcome: Ongoing  12/16/2020 1414 by Rohith Gaming RN  Outcome: Ongoing  Goal: Ability to perform activities at highest level will improve  Description: Ability to perform activities at highest level will improve  12/16/2020 2336 by Yusra Cartwright RN  Outcome: Ongoing  12/16/2020 1414 by Rohith Gaming RN  Outcome: Ongoing     Problem: Nutritional:  Goal: Ability to attain and maintain optimal nutritional status will improve  Description: Ability to attain and maintain optimal nutritional status will improve  12/16/2020 2336 by Yusra Cartwright RN  Outcome: Ongoing  12/16/2020 1414 by Rohith Gaming RN  Outcome: Ongoing     Problem: Safety:  Goal: Ability to remain free from injury will improve  Description: Ability to remain free from injury will improve  12/16/2020 2336 by Yusra Cartwright RN  Outcome: Ongoing  12/16/2020 1414 by Rohith Gaming RN  Outcome: Ongoing     Problem: IP BLADDER/VOIDING  Goal: LTG - Patient will utilize adaptive techniques/equipment to complete bladder elimination  12/16/2020 2336 by Yusra Cartwright RN  Outcome: Ongoing  12/16/2020 1414 by Rohith Gaming RN  Outcome: Ongoing     Problem: IP BOWEL ELIMINATION  Goal: LTG - patient will have regular and routine bowel evacuation  12/16/2020 2336 by Yusra Cartwright RN  Outcome: Ongoing  12/16/2020 1414 by Rohith Gaming RN  Outcome: Ongoing     Problem: IP BREATHING  Goal: LTG - Patient/caregiver will demonstrate/perform proper techniques to maintain patent airway  12/16/2020 2336 by Yusra Cartwright RN  Outcome: Ongoing  12/16/2020 1414 by Rohith Gaming RN INTERVAL EVENTS: no acute events overnight, breathing improved, PO improved, still coughing, no other issues    MEDICATIONS  (STANDING):  amoxicillin  Oral Liquid - Peds 300 milliGRAM(s) Oral every 8 hours  dextrose 5% + sodium chloride 0.9% with potassium chloride 20 mEq/L. - Pediatric 1000 milliLiter(s) (40 mL/Hr) IV Continuous <Continuous>    MEDICATIONS  (PRN):  acetaminophen  Rectal Suppository - Peds. 120 milliGRAM(s) Rectal every 6 hours PRN Temp greater or equal to 38 C (100.4 F)  ibuprofen  Oral Liquid - Peds. 75 milliGRAM(s) Oral every 6 hours PRN Temp greater or equal to 38 C (100.4 F)      PHYSICAL EXAM:  Vital Signs Last 24 Hrs  T(C): 38 (22 Dec 2019 18:55), Max: 38 (22 Dec 2019 18:55)  T(F): 100.4 (22 Dec 2019 18:55), Max: 100.4 (22 Dec 2019 18:55)  HR: 136 (22 Dec 2019 18:55) (84 - 136)  BP: 88/65 (22 Dec 2019 18:55) (88/65 - 107/66)  BP(mean): 78 (22 Dec 2019 14:21) (73 - 78)  RR: 50 (22 Dec 2019 18:55) (30 - 52)  SpO2: 93% (22 Dec 2019 18:55) (93% - 98%)    Gen - NAD, comfortable, smiling, small and thin for age  HEENT - NC/AT, MMM, no nasal congestion, no rhinorrhea, no conjunctival injection  Neck - supple without MARCO A  CV - RRR, nml S1S2, no murmur  Lungs - intermittent mild tachypnea that self-resolves following coughing spell, no retractions, no WOB, left posterior lung field rales and rhonchi  Abd - S, ND, NT, no HSM, NABS  Ext - WWP  Skin - no rashes  Neuro - grossly nonfocal         ASSESSMENT & PLAN:    This is a 3y2m Male with developmental delays and a long history of poor weight gain/FTT who presents with a CA-PNA in the LLL who was transitioned from IV amp to Po amox today as his oral intake has picked up.  His respiratory distress is improving and he is stable off O2.  PO'ing well, no longer needing IVF.  Is starting to improve and can likely be discharged tomorrow morning to finish up course of amox at home.            ANTICIPATE DISCHARGE DATE: __12/23____  [ ] Social Work needs:  [ ] Case management needs:  [ ] Other discharge needs:      Bruce Palacios MD  Pediatric Hospitalist  #[phone] Outcome: Ongoing     Problem: NUTRITION  Goal: Patient maintains adequate hydration  12/16/2020 2336 by Anamika Vaz RN  Outcome: Ongoing  12/16/2020 1414 by Niels Negro RN  Outcome: Ongoing     Problem: Bleeding:  Goal: Will show no signs and symptoms of excessive bleeding  Description: Will show no signs and symptoms of excessive bleeding  12/16/2020 2336 by Anamika Vaz RN  Outcome: Ongoing  12/16/2020 1414 by Niels Negro RN  Outcome: Ongoing     Problem: Infection - Surgical Site:  Goal: Will show no infection signs and symptoms  Description: Will show no infection signs and symptoms  12/16/2020 2336 by Anamika Vaz RN  Outcome: Ongoing  12/16/2020 1414 by Niels Negro RN  Outcome: Ongoing     Problem: Serum Glucose Level - Abnormal:  Goal: Ability to maintain appropriate glucose levels has stabilized  Description: Ability to maintain appropriate glucose levels has stabilized  12/16/2020 2336 by Anamika Vaz RN  Outcome: Ongoing  12/16/2020 1414 by Niels Negro RN  Outcome: Ongoing     Problem: Mood - Altered:  Goal: Mood stable  Description: Mood stable  12/16/2020 2336 by Anamika Vaz RN  Outcome: Ongoing  12/16/2020 1414 by Niels Negro RN  Outcome: Ongoing see attending discharge note INTERVAL EVENTS: no acute events overnight, not taking much PO but then it is always a struggle to get him to PO.  Breathing seems more comfortable today but still with tachypnea/increased WOB and still on O2.  Not playful or active today.    MEDICATIONS  (STANDING):  ampicillin IV Intermittent - Peds 500 milliGRAM(s) IV Intermittent every 6 hours  dextrose 5% + sodium chloride 0.9% with potassium chloride 20 mEq/L. - Pediatric 1000 milliLiter(s) (40 mL/Hr) IV Continuous <Continuous>    MEDICATIONS  (PRN):  acetaminophen  Rectal Suppository - Peds. 120 milliGRAM(s) Rectal every 6 hours PRN Temp greater or equal to 38 C (100.4 F)  ibuprofen  Oral Liquid - Peds. 75 milliGRAM(s) Oral every 6 hours PRN Temp greater or equal to 38 C (100.4 F)      PHYSICAL EXAM:  Vital Signs Last 24 Hrs  T(C): 38.4 (21 Dec 2019 17:13), Max: 38.4 (21 Dec 2019 17:13)  T(F): 101.1 (21 Dec 2019 17:13), Max: 101.1 (21 Dec 2019 17:13)  HR: 151 (21 Dec 2019 15:01) (121 - 151)  BP: 94/61 (21 Dec 2019 15:01) (79/52 - 102/52)  BP(mean): --  RR: 44 (21 Dec 2019 15:01) (32 - 65)  SpO2: 100% (21 Dec 2019 15:01) (89% - 100%)  Gen - NAD, tired and slightly uncomfortable appearing but non-toxic; very small and thin for age  HEENT - NC/AT, MMM, no nasal congestion, no rhinorrhea, no conjunctival injection  Neck - supple without MARCO A  CV - RRR, nml S1S2, no murmur  Lungs - mild tachypnea with subcostal retractions, rhonchi/rales heard over left posterior lung fields only  Abd - S, ND, NT, no HSM, NABS  Ext - WWP  Skin - no rashes  Neuro - grossly nonfocal but not cooperative or interactive    CBC Full  -  ( 20 Dec 2019 01:03 )  WBC Count : 21.12 K/uL  RBC Count : 3.70 M/uL  Hemoglobin : 10.0 g/dL  Hematocrit : 30.3 %  Platelet Count - Automated : 314 K/uL  Mean Cell Volume : 81.9 fL  Mean Cell Hemoglobin : 27.0 pg  Mean Cell Hemoglobin Concentration : 33.0 %  Auto Neutrophil # : 11.81 K/uL  Auto Lymphocyte # : 4.18 K/uL  Auto Monocyte # : 3.12 K/uL  Auto Eosinophil # : 0.01 K/uL  Auto Basophil # : 0.10 K/uL  Auto Neutrophil % : 55.9 %  Auto Lymphocyte % : 19.8 %  Auto Monocyte % : 14.8 %  Auto Eosinophil % : 0.0 %  Auto Basophil % : 0.5 %    12-20    137  |  99  |  13  ----------------------------<  133<H>  5.1   |  22  |  0.36    Ca    9.4      20 Dec 2019 01:03  Phos  3.8     12-20  Mg     2.1     12-20    TPro  7.2  /  Alb  3.3  /  TBili  0.3  /  DBili  x   /  AST  38  /  ALT  28  /  AlkPhos  223  12-20      ASSESSMENT & PLAN:    This is a 3y2m Male with developmental delays and a long history of poor weight gain/FTT who presents with a CA-PNA in the LLL being treated with ampicillin after a dose of ceftriaxone in the ED.  His respiratory distress is improving but his PO intake remains poor and he is still requiring O2 via NC to maintain his sats.  Will try to wean off his O2 and encourage PO to be able to stop his IVF but given his overall appearance I do not think he is going to turn the corner quickly.    --  [ X] I reviewed lab results  [X ] I reviewed radiology results  [ X] I spoke with parents/guardian  [ ] I spoke with consultant    ANTICIPATE DISCHARGE DATE: _12/23_____  [ ] Social Work needs:  [ ] Case management needs:  [ ] Other discharge needs:      Bruce Palacios MD  Pediatric Hospitalist  #[phone]

## 2020-12-17 NOTE — PROGRESS NOTES
Patient states her heart feels like it beats fast and her right leg quivers her heart rate is 80 no signs or symptoms of distress. Reported to Dr. Corazon Cuellar.

## 2020-12-17 NOTE — PROGRESS NOTES
Occupational Therapy     12/17/20 0900   General   Additional Pertinent Hx L clavicle fx with ORIF 6/22/20   Diagnosis R hip fx with nailing   Pain Assessment   Patient Currently in Pain No   Pain Assessment 0-10   Pain Level 0   Balance   Sitting Balance Independent   Standing Balance Supervision   Functional Mobility   Functional - Mobility Device Wheelchair   Activity To/From therapy gym; To/from bathroom   Assist Level Supervision   Bed mobility   Supine to Sit Independent   Sit to Supine Modified independent  [facility] bed.)   Transfers   Stand Pivot Transfers Supervision   Sit to stand Supervision   Stand to sit Supervision   Toilet Transfers   Toilet - Technique Stand pivot   Equipment Used Grab bars   Toilet Transfer Supervision   Type of ROM/Therapeutic Exercise   Type of ROM/Therapeutic Exercise Free weights   Comment 1# 1 set x 20 reps, all planes, within comfortable ROM. Short term goals   Short term goal 1 MET   Long term goals   Long term goal 1 MET   Long term goal 3 MET   Long term goals 6 MET   Assessment   Performance deficits / Impairments Decreased functional mobility ; Decreased strength;Decreased high-level IADLs;Decreased endurance;Decreased safe awareness;Decreased balance;Decreased ADL status   Treatment Diagnosis Right Femur Fx s/p IM nail   Prognosis Good   Timed Code Treatment Minutes 60 Minutes   Activity Tolerance   Activity Tolerance Patient Tolerated treatment well   Safety Devices   Safety Devices in place Yes   Type of devices Call light within reach; Bed alarm in place   Plan   Current Treatment Recommendations Positioning; Safety Education & Training;Functional Mobility Training;Home Management Training;Balance Training;Self-Care / ADL;Equipment Evaluation, Education, & procurement;Strengthening; Endurance Training;Patient/Caregiver Education & Training      12/17/20 0900   Eating   Assistance Needed Independent   CARE Score 6   Oral Hygiene   Assistance Needed Independent CARE Score 6   [address] or touching assistance   Comment Supervision. CARE Score 4   Lower Body Dressing   Assistance Needed Supervision or touching assistance   Comment Supervision. CARE Score 4   Putting On/Taking Off Footwear   Assistance Needed Setup or clean-up assistance   Comment With AE PRN. CARE Score 5      12/17/20 0900   Toilet Transfer   Assistance Needed Supervision or touching assistance   Comment Supervision.    CARE Score 4

## 2020-12-17 NOTE — PLAN OF CARE
Problem: Falls - Risk of:  Goal: Will remain free from falls  Description: Will remain free from falls  12/17/2020 1009 by Joceline Keller RN  Outcome: Ongoing  12/16/2020 2336 by Anthony Bonilla RN  Outcome: Ongoing   Up with 1 assist

## 2020-12-17 NOTE — PROGRESS NOTES
Current Treatment Recommendations Positioning; Safety Education & Training;Functional Mobility Training;Home Management Training;Balance Training;Self-Care / ADL;Equipment Evaluation, Education, & procurement;Strengthening; Endurance Training;Patient/Caregiver Education & Training

## 2020-12-18 LAB
GLUCOSE BLD-MCNC: 104 MG/DL (ref 70–99)
GLUCOSE BLD-MCNC: 117 MG/DL (ref 70–99)
GLUCOSE BLD-MCNC: 121 MG/DL (ref 70–99)
GLUCOSE BLD-MCNC: 130 MG/DL (ref 70–99)
PERFORMED ON: ABNORMAL

## 2020-12-18 PROCEDURE — 6370000000 HC RX 637 (ALT 250 FOR IP): Performed by: PSYCHIATRY & NEUROLOGY

## 2020-12-18 PROCEDURE — 82947 ASSAY GLUCOSE BLOOD QUANT: CPT

## 2020-12-18 PROCEDURE — 97535 SELF CARE MNGMENT TRAINING: CPT

## 2020-12-18 PROCEDURE — 2580000003 HC RX 258: Performed by: INTERNAL MEDICINE

## 2020-12-18 PROCEDURE — 6370000000 HC RX 637 (ALT 250 FOR IP): Performed by: INTERNAL MEDICINE

## 2020-12-18 PROCEDURE — 1180000000 HC REHAB R&B

## 2020-12-18 PROCEDURE — 99232 SBSQ HOSP IP/OBS MODERATE 35: CPT | Performed by: PSYCHIATRY & NEUROLOGY

## 2020-12-18 PROCEDURE — 97530 THERAPEUTIC ACTIVITIES: CPT

## 2020-12-18 PROCEDURE — 97110 THERAPEUTIC EXERCISES: CPT

## 2020-12-18 RX ADMIN — APIXABAN 2.5 MG: 2.5 TABLET, FILM COATED ORAL at 08:06

## 2020-12-18 RX ADMIN — DONEPEZIL HYDROCHLORIDE 5 MG: 5 TABLET, FILM COATED ORAL at 08:06

## 2020-12-18 RX ADMIN — GABAPENTIN 300 MG: 300 CAPSULE ORAL at 21:05

## 2020-12-18 RX ADMIN — VENLAFAXINE HYDROCHLORIDE 37.5 MG: 37.5 CAPSULE, EXTENDED RELEASE ORAL at 08:06

## 2020-12-18 RX ADMIN — PSYLLIUM HUSK 1 PACKET: 3.4 POWDER ORAL at 08:05

## 2020-12-18 RX ADMIN — MEMANTINE HYDROCHLORIDE 5 MG: 5 TABLET ORAL at 08:06

## 2020-12-18 RX ADMIN — METFORMIN HYDROCHLORIDE 500 MG: 500 TABLET ORAL at 08:06

## 2020-12-18 RX ADMIN — ATORVASTATIN CALCIUM 10 MG: 10 TABLET, FILM COATED ORAL at 08:06

## 2020-12-18 RX ADMIN — ASPIRIN 81 MG: 81 TABLET, COATED ORAL at 08:06

## 2020-12-18 RX ADMIN — SODIUM CHLORIDE, PRESERVATIVE FREE 10 ML: 5 INJECTION INTRAVENOUS at 21:05

## 2020-12-18 RX ADMIN — OXYCODONE HYDROCHLORIDE AND ACETAMINOPHEN 1 TABLET: 5; 325 TABLET ORAL at 14:03

## 2020-12-18 RX ADMIN — PANTOPRAZOLE SODIUM 40 MG: 40 TABLET, DELAYED RELEASE ORAL at 06:05

## 2020-12-18 RX ADMIN — OXYCODONE HYDROCHLORIDE AND ACETAMINOPHEN 1 TABLET: 5; 325 TABLET ORAL at 21:05

## 2020-12-18 RX ADMIN — OXYCODONE HYDROCHLORIDE AND ACETAMINOPHEN 1 TABLET: 5; 325 TABLET ORAL at 08:05

## 2020-12-18 RX ADMIN — Medication 150 MG: at 08:06

## 2020-12-18 RX ADMIN — APIXABAN 2.5 MG: 2.5 TABLET, FILM COATED ORAL at 21:05

## 2020-12-18 RX ADMIN — SODIUM CHLORIDE, PRESERVATIVE FREE 10 ML: 5 INJECTION INTRAVENOUS at 08:07

## 2020-12-18 ASSESSMENT — PAIN DESCRIPTION - LOCATION
LOCATION: LEG;HIP
LOCATION: HIP
LOCATION: LEG;HIP
LOCATION: HIP
LOCATION: LEG;HIP

## 2020-12-18 ASSESSMENT — PAIN DESCRIPTION - FREQUENCY
FREQUENCY: CONTINUOUS

## 2020-12-18 ASSESSMENT — PAIN DESCRIPTION - PAIN TYPE
TYPE: SURGICAL PAIN
TYPE: SURGICAL PAIN
TYPE: SURGICAL PAIN;ACUTE PAIN

## 2020-12-18 ASSESSMENT — PAIN DESCRIPTION - ORIENTATION
ORIENTATION: RIGHT

## 2020-12-18 ASSESSMENT — PAIN DESCRIPTION - PROGRESSION: CLINICAL_PROGRESSION: NOT CHANGED

## 2020-12-18 ASSESSMENT — PAIN SCALES - GENERAL
PAINLEVEL_OUTOF10: 0
PAINLEVEL_OUTOF10: 2

## 2020-12-18 ASSESSMENT — PAIN DESCRIPTION - DESCRIPTORS
DESCRIPTORS: SORE

## 2020-12-18 ASSESSMENT — PAIN DESCRIPTION - ONSET: ONSET: ON-GOING

## 2020-12-18 NOTE — PLAN OF CARE
Problem: Falls - Risk of:  Goal: Will remain free from falls  Description: Will remain free from falls  12/17/2020 2254 by Antwon Simmons RN  Outcome: Ongoing  12/17/2020 1009 by Zuleyma Gomes RN  Outcome: Ongoing  Goal: Absence of physical injury  Description: Absence of physical injury  12/17/2020 2254 by Antwon Simmons RN  Outcome: Ongoing  12/17/2020 1009 by Zuleyma Gomes RN  Outcome: Ongoing     Problem: Skin Integrity:  Goal: Will show no infection signs and symptoms  Description: Will show no infection signs and symptoms  12/17/2020 2254 by Antwon Simmons RN  Outcome: Ongoing  12/17/2020 1009 by Zuleyma Gomes RN  Outcome: Ongoing  Goal: Absence of new skin breakdown  Description: Absence of new skin breakdown  12/17/2020 2254 by Antwon Simmons RN  Outcome: Ongoing  12/17/2020 1009 by Zuleyma Gomes RN  Outcome: Ongoing  Goal: Demonstration of wound healing without infection will improve  Description: Demonstration of wound healing without infection will improve  12/17/2020 2254 by Antwon Simmons RN  Outcome: Ongoing  12/17/2020 1009 by Zuleyma Gomes RN  Outcome: Ongoing  Goal: Risk for impaired skin integrity will decrease  Description: Risk for impaired skin integrity will decrease  12/17/2020 2254 by Antwon Simmons RN  Outcome: Ongoing  12/17/2020 1009 by Zuleyma Gomes RN  Outcome: Ongoing     Problem: Pain:  Goal: Pain level will decrease  Description: Pain level will decrease  12/17/2020 2254 by Antwon Simmons RN  Outcome: Ongoing  12/17/2020 1009 by Zuleyma Gomes RN  Outcome: Ongoing  Goal: Control of acute pain  Description: Control of acute pain  12/17/2020 2254 by Antwon Simmons RN  Outcome: Ongoing  12/17/2020 1009 by Zuleyma Gomes RN  Outcome: Ongoing  Goal: Control of chronic pain  Description: Control of chronic pain  12/17/2020 2254 by Antwon Simmons RN  Outcome: Ongoing 12/17/2020 1009 by Stoney Roman RN  Outcome: Ongoing     Problem: SAFETY  Goal: Free from accidental physical injury  12/17/2020 2254 by Jahaira Polanco RN  Outcome: Ongoing  12/17/2020 1009 by Stoney Roman RN  Outcome: Ongoing  Goal: Free from intentional harm  12/17/2020 2254 by Jahaira Polanco RN  Outcome: Ongoing  12/17/2020 1009 by Stoney Roman RN  Outcome: Ongoing     Problem: DAILY CARE  Goal: Daily care needs are met  12/17/2020 2254 by Jahaira Polanco RN  Outcome: Ongoing  12/17/2020 1009 by Stoney Roman RN  Outcome: Ongoing     Problem: PAIN  Goal: Patient's pain/discomfort is manageable  12/17/2020 2254 by Jahaira Polanco RN  Outcome: Ongoing  12/17/2020 1009 by Stoney Roman RN  Outcome: Ongoing  Goal: STG - Patient will verbalize an acceptable level of pain  12/17/2020 2254 by Jahaira Polanco RN  Outcome: Ongoing  12/17/2020 1009 by Stoney Roman RN  Outcome: Ongoing     Problem: SKIN INTEGRITY  Goal: Skin integrity is maintained or improved  12/17/2020 2254 by Jahaira Polanco RN  Outcome: Ongoing  12/17/2020 1009 by Stoney Roman RN  Outcome: Ongoing  Goal: STG - patient will maintain good skin integrity  12/17/2020 2254 by Jahaira Polanco RN  Outcome: Ongoing  12/17/2020 1009 by Stoney Roman RN  Outcome: Ongoing  Goal: STG - Patient exhibits signs of wound healing. 12/17/2020 2254 by Jahaira Polanco RN  Outcome: Ongoing  12/17/2020 1009 by Stoney Roman RN  Outcome: Ongoing     Problem: KNOWLEDGE DEFICIT  Goal: Patient/S.O. demonstrates understanding of disease process, treatment plan, medications, and discharge instructions.   12/17/2020 2254 by Jahaira Polanco RN  Outcome: Ongoing  12/17/2020 1009 by Stoney Roman RN  Outcome: Ongoing     Problem: DISCHARGE BARRIERS  Goal: Patient's continuum of care needs are met  12/17/2020 2254 by Jahaira Polanco RN  Outcome: Ongoing 12/17/2020 1009 by Shannon Valdovinos RN  Outcome: Ongoing     Problem: Mobility - Impaired:  Goal: Mobility will improve  Description: Mobility will improve  12/17/2020 2254 by Delphia Spurling, RN  Outcome: Ongoing  12/17/2020 1009 by Shannon Valdovinos RN  Outcome: Ongoing     Problem:  Activity:  Goal: Ability to ambulate will improve  Description: Ability to ambulate will improve  12/17/2020 2254 by Delphia Spurling, RN  Outcome: Ongoing  12/17/2020 1009 by Shannon Valdovinos RN  Outcome: Ongoing  Goal: Ability to perform activities at highest level will improve  Description: Ability to perform activities at highest level will improve  12/17/2020 2254 by Delphia Spurling, RN  Outcome: Ongoing  12/17/2020 1009 by Shannon Valdovinos RN  Outcome: Ongoing     Problem: Nutritional:  Goal: Ability to attain and maintain optimal nutritional status will improve  Description: Ability to attain and maintain optimal nutritional status will improve  12/17/2020 2254 by Delphia Spurling, RN  Outcome: Ongoing  12/17/2020 1009 by Shannon Valdovinos RN  Outcome: Ongoing     Problem: Safety:  Goal: Ability to remain free from injury will improve  Description: Ability to remain free from injury will improve  12/17/2020 2254 by Delphia Spurling, RN  Outcome: Ongoing  12/17/2020 1009 by Shannon Valdovinos RN  Outcome: Ongoing     Problem: IP BLADDER/VOIDING  Goal: LTG - Patient will utilize adaptive techniques/equipment to complete bladder elimination  12/17/2020 2254 by Delphia Spurling, RN  Outcome: Ongoing  12/17/2020 1009 by Shannon Valdovinos RN  Outcome: Ongoing     Problem: IP BOWEL ELIMINATION  Goal: LTG - patient will have regular and routine bowel evacuation  12/17/2020 2254 by Delphia Spurling, RN  Outcome: Ongoing  12/17/2020 1009 by Shannon Valdovinos RN  Outcome: Ongoing     Problem: IP BREATHING  Goal: LTG - Patient/caregiver will demonstrate/perform proper techniques to maintain patent airway 12/17/2020 2254 by Ab Cadena RN  Outcome: Ongoing  12/17/2020 1009 by Sheree Pham RN  Outcome: Ongoing     Problem: NUTRITION  Goal: Patient maintains adequate hydration  12/17/2020 2254 by Ab Cadena RN  Outcome: Ongoing  12/17/2020 1009 by Sheree Pham RN  Outcome: Ongoing     Problem: Bleeding:  Goal: Will show no signs and symptoms of excessive bleeding  Description: Will show no signs and symptoms of excessive bleeding  12/17/2020 2254 by Ab Cadena RN  Outcome: Ongoing  12/17/2020 1009 by Sheree Pham RN  Outcome: Ongoing     Problem: Infection - Surgical Site:  Goal: Will show no infection signs and symptoms  Description: Will show no infection signs and symptoms  12/17/2020 2254 by Ab Cadena RN  Outcome: Ongoing  12/17/2020 1009 by Sheree Pham RN  Outcome: Ongoing     Problem: Serum Glucose Level - Abnormal:  Goal: Ability to maintain appropriate glucose levels has stabilized  Description: Ability to maintain appropriate glucose levels has stabilized  12/17/2020 2254 by Ab Cadena RN  Outcome: Ongoing  12/17/2020 1009 by Sheree Pham RN  Outcome: Ongoing     Problem: Mood - Altered:  Goal: Mood stable  Description: Mood stable  12/17/2020 2254 by Ab Cadena RN  Outcome: Ongoing  12/17/2020 1009 by Sheree Pham RN  Outcome: Ongoing

## 2020-12-18 NOTE — PROGRESS NOTES
Name: Bryant Number  MRN:  [id number]  Date of service:  12/18/2020 12/18/20 1309   Restrictions/Precautions   Restrictions/Precautions Weight Bearing; Fall Risk   Required Braces or Orthoses? No   Lower Extremity Weight Bearing Restrictions   Right Lower Extremity Weight Bearing Toe Touch Weight Bearing   Left Lower Extremity Weight Bearing Weight Bearing As Tolerated   General   Chart Reviewed Yes   Subjective   Subjective Pt agrees to work with therapy this afternoon. Pain Screening   Patient Currently in Pain Yes   Intervention List Patient able to continue with treatment   Pain Assessment   Pain Assessment 0-10   Pain Level 2   Pain Type Surgical pain;Acute pain   Pain Location Leg;Hip   Pain Orientation Right   Pain Descriptors Sore   Pain Frequency Continuous   Transfers   Sit to Stand Supervision   Stand to sit Supervision   Comment STS x3 with rwx to perform RLE ROM   Exercises   Comments Seated BLE ther ex in all planes x20 reps each 2# weight on LLE and AAROM on RLE   Other exercises   Other exercises 1 Standing RLE ROM with rwx   Conditions Requiring Skilled Therapeutic Intervention   Body structures, Functions, Activity limitations Decreased functional mobility ; Decreased ADL status; Decreased ROM; Decreased strength;Decreased cognition;Decreased endurance;Decreased sensation;Decreased balance;Decreased posture;Decreased coordination   Assessment Pt cont to tolerate tx well, able to stand for longer periods this tx and perform RLE ROM with decreased fatigue present. Pt cont to be supervision with STS TF. Left with OT following tx.    Activity Tolerance   Activity Tolerance Patient Tolerated treatment well   Safety Devices   Type of devices Left in chair  (left with OT)         Electronically signed by Bandar Fox PTA on 12/18/2020 at 1:47 PM

## 2020-12-18 NOTE — PROGRESS NOTES
Name: Tata Costello  MRN:  [id number]  Date of service:  12/18/2020 12/18/20 1015   Restrictions/Precautions   Restrictions/Precautions Weight Bearing; Fall Risk   Required Braces or Orthoses? No   Lower Extremity Weight Bearing Restrictions   Right Lower Extremity Weight Bearing Toe Touch Weight Bearing   Left Lower Extremity Weight Bearing Weight Bearing As Tolerated   General   Chart Reviewed Yes   Subjective   Subjective Pt brought in by OT, agrees to work with PT. Says she is more stiff and sore today. Pain Screening   Patient Currently in Pain Yes   Intervention List Patient able to continue with treatment   Pain Assessment   Pain Assessment 0-10   Pain Level 2   Pain Type Surgical pain;Acute pain   Pain Location Leg;Hip   Pain Orientation Right   Pain Descriptors Sore   Pain Frequency Continuous   Transfers   Sit to Stand Supervision   Stand to sit Supervision   Stand Pivot Transfers Supervision   Comment Assisted pt to bathroom, able to TF and perform cleanup with supervision   Propulsion 1   Propulsion Manual   Level Level Tile   Method RUE;LUE   Level of Assistance Independent   Description/ [employer] turns. Distance 220'   Exercises   Comments Seated BLE ther ex in all planes x20 reps each 2# weight on LLE and AAROM on RLE   Other exercises   Other exercises 1 Standing RLE ROM with rwx   Conditions Requiring Skilled Therapeutic Intervention   Body structures, Functions, Activity limitations Decreased functional mobility ; Decreased ADL status; Decreased ROM; Decreased strength;Decreased cognition;Decreased endurance;Decreased sensation;Decreased balance;Decreased posture;Decreased coordination Assessment Pt tolerates tx well this date. Able to perform standing AROM with RLE while maintaining NWB precautions, requires AAROM for seated ther ex with RLE. Cont to improve with TF and shows independence with wc propulsion back to room. Pt would cont to benefit from skilled therapy services to address remaining strength and mobility deficits. Activity Tolerance   Activity Tolerance Patient Tolerated treatment well   Safety Devices   Type of devices Call light within reach; Chair alarm in place;Gait belt;Left in chair         Electronically signed by Graciela Camejo PTA on 12/18/2020 at 10:54 AM

## 2020-12-18 NOTE — PROGRESS NOTES
Occupational Therapy     12/18/20 0900   General   Diagnosis R hip fx with nailing   Pain Assessment   Patient Currently in Pain No   Pain Assessment 0-10   Pain Level 2   Pain Type Surgical pain;Acute pain   Pain Location Leg;Hip   Pain Orientation Right   Pain Descriptors Sore   Pain Frequency Continuous   Clinical Progression Not changed   Response to Pain Intervention Patient Satisfied   Balance   Sitting Balance Independent   Standing Balance Modified independent    Functional Mobility   Functional - Mobility Device Wheelchair   Activity To/from bathroom; To/From therapy gym;Retrieve items;Transport items   Assist Level Modified independent    Transfers   Sit to stand Modified independent   Stand to sit Modified independent   Toilet Transfers   Toilet - Technique Stand pivot   Equipment Used Grab bars   Toilet Transfer Modified independent   Short term goals   Short term goal 6 MET   Long term goals   Long term goal 2 MET   Assessment   Performance deficits / Impairments Decreased functional mobility ; Decreased strength;Decreased high-level IADLs;Decreased endurance;Decreased safe awareness;Decreased balance;Decreased ADL status   Treatment Diagnosis Right Femur Fx s/p IM nail   Prognosis Good   History clavicle fx with ORIF 6/22/20, Rapid response 12/5   Timed Code Treatment Minutes 60 Minutes   Activity Tolerance   Activity Tolerance Patient Tolerated treatment well   Safety Devices   Safety Devices in place Yes   Type of devices Gait belt  (Given to PT.)   Plan   Current Treatment Recommendations Positioning; Safety Education & Training;Functional Mobility Training;Home Management Training;Balance Training;Self-Care / ADL;Equipment Evaluation, Education, & procurement;Strengthening; Endurance Training;Patient/Caregiver Education & Training      12/18/20 0900   Toileting Hygiene   Assistance Needed Independent   CARE Score 6   Shower/Bathe Self   Assistance Needed Independent   Comment Shower.    CARE Score 6 Upper Body Dressing   Assistance Needed Independent   CARE Score 6   Lower Body Dressing   Assistance Needed Independent   Comment Using AE PRN. CARE Score 6   Putting On/Taking Off Footwear   Assistance Needed Independent   Comment Using AE PRN.    CARE Score 6      12/18/20 0900   Toilet Transfer   Assistance Needed Independent   CARE Score 6

## 2020-12-18 NOTE — PLAN OF CARE
Problem: Falls - Risk of:  Goal: Will remain free from falls  Description: Will remain free from falls  12/18/2020 1134 by Bandar Falcon LPN  Outcome: Ongoing  12/17/2020 2254 by Jessica Coleman RN  Outcome: Ongoing  Goal: Absence of physical injury  Description: Absence of physical injury  12/18/2020 1134 by Bandar Falcon LPN  Outcome: Ongoing  12/17/2020 2254 by Jessica Coleman RN  Outcome: Ongoing     Problem: Skin Integrity:  Goal: Will show no infection signs and symptoms  Description: Will show no infection signs and symptoms  12/18/2020 1134 by Bandar Falcon LPN  Outcome: Ongoing  12/17/2020 2254 by Jessica Coleman RN  Outcome: Ongoing  Goal: Absence of new skin breakdown  Description: Absence of new skin breakdown  12/18/2020 1134 by Bandar Falcon LPN  Outcome: Ongoing  12/17/2020 2254 by Jessica Coleman RN  Outcome: Ongoing  Goal: Demonstration of wound healing without infection will improve  Description: Demonstration of wound healing without infection will improve  12/18/2020 1134 by Bandar Falcon LPN  Outcome: Ongoing  12/17/2020 2254 by Jessica Coleman RN  Outcome: Ongoing  Goal: Risk for impaired skin integrity will decrease  Description: Risk for impaired skin integrity will decrease  12/18/2020 1134 by Bandar Falcon LPN  Outcome: Ongoing  12/17/2020 2254 by Jessica Coleman RN  Outcome: Ongoing     Problem: Pain:  Goal: Pain level will decrease  Description: Pain level will decrease  12/18/2020 1134 by Bandar Falcon LPN  Outcome: Ongoing  12/17/2020 2254 by Jessica Coleman RN  Outcome: Ongoing  Goal: Control of acute pain  Description: Control of acute pain  12/18/2020 1134 by Bandar Falcon LPN  Outcome: Ongoing  12/17/2020 2254 by Jessica Coleman RN  Outcome: Ongoing  Goal: Control of chronic pain  Description: Control of chronic pain  12/18/2020 1134 by Bandar Falcon LPN  Outcome: Ongoing 12/17/2020 2254 by Ab Cadena RN  Outcome: Ongoing     Problem: SAFETY  Goal: Free from accidental physical injury  12/18/2020 1134 by Angela Maki LPN  Outcome: Ongoing  12/17/2020 2254 by Ab Cadena RN  Outcome: Ongoing  Goal: Free from intentional harm  12/18/2020 1134 by Angela Maki LPN  Outcome: Ongoing  12/17/2020 2254 by Ab Cadena RN  Outcome: Ongoing     Problem: DAILY CARE  Goal: Daily care needs are met  12/18/2020 1134 by Angela Maki LPN  Outcome: Ongoing  12/17/2020 2254 by Ab Cadena RN  Outcome: Ongoing     Problem: PAIN  Goal: Patient's pain/discomfort is manageable  12/18/2020 1134 by Angela Maki LPN  Outcome: Ongoing  12/17/2020 2254 by Ab Cadena RN  Outcome: Ongoing  Goal: STG - Patient will verbalize an acceptable level of pain  12/18/2020 1134 by Angela Maki LPN  Outcome: Ongoing  12/17/2020 2254 by Ab Cadena RN  Outcome: Ongoing     Problem: SKIN INTEGRITY  Goal: Skin integrity is maintained or improved  12/18/2020 1134 by Angela Maki LPN  Outcome: Ongoing  12/17/2020 2254 by Ab Cadena RN  Outcome: Ongoing  Goal: STG - patient will maintain good skin integrity  12/18/2020 1134 by Angela Maki LPN  Outcome: Ongoing  12/17/2020 2254 by Ab Cadena RN  Outcome: Ongoing  Goal: STG - Patient exhibits signs of wound healing. 12/18/2020 1134 by Angela Maki LPN  Outcome: Ongoing  12/17/2020 2254 by Ab Cadena RN  Outcome: Ongoing     Problem: KNOWLEDGE DEFICIT  Goal: Patient/S.O. demonstrates understanding of disease process, treatment plan, medications, and discharge instructions.   12/18/2020 1134 by Angela Maki LPN  Outcome: Ongoing  12/17/2020 2254 by Ab Cadena RN  Outcome: Ongoing     Problem: DISCHARGE BARRIERS  Goal: Patient's continuum of care needs are met  12/18/2020 1134 by Angela Maki LPN  Outcome: Ongoing 12/18/2020 1134 by Lane Pugh LPN  Outcome: Ongoing  12/17/2020 2254 by Nilson Valencia RN  Outcome: Ongoing     Problem: NUTRITION  Goal: Patient maintains adequate hydration  12/18/2020 1134 by Lane Pugh LPN  Outcome: Ongoing  12/17/2020 2254 by Nilson Valencia RN  Outcome: Ongoing     Problem: Bleeding:  Goal: Will show no signs and symptoms of excessive bleeding  Description: Will show no signs and symptoms of excessive bleeding  12/18/2020 1134 by Lane Pugh LPN  Outcome: Ongoing  12/17/2020 2254 by Nilson Valencia RN  Outcome: Ongoing     Problem: Infection - Surgical Site:  Goal: Will show no infection signs and symptoms  Description: Will show no infection signs and symptoms  12/18/2020 1134 by Lane Pugh LPN  Outcome: Ongoing  12/17/2020 2254 by Nilson Valencia RN  Outcome: Ongoing     Problem: Serum Glucose Level - Abnormal:  Goal: Ability to maintain appropriate glucose levels has stabilized  Description: Ability to maintain appropriate glucose levels has stabilized  12/18/2020 1134 by Lane Pugh LPN  Outcome: Ongoing  12/17/2020 2254 by Nilson Valencia RN  Outcome: Ongoing     Problem: Mood - Altered:  Goal: Mood stable  Description: Mood stable  12/18/2020 1134 by Lane Pugh LPN  Outcome: Ongoing  12/17/2020 2254 by Nilson Valencia RN  Outcome: Ongoing

## 2020-12-18 NOTE — PROGRESS NOTES
Occupational Therapy     12/18/20 0855   Restrictions/Precautions   Restrictions/Precautions   (Ind w/c t/fs)   Lower Extremity Weight Bearing Restrictions   Right Lower Extremity Weight Bearing Toe Touch Weight Bearing   Position Activity Restriction   Other position/activity restrictions L clavicle fx with ORIF 6/22/20   General   Diagnosis R hip fx with nailing   Balance   Sitting Balance Independent   Standing Balance Modified independent    Functional Mobility   Functional - Mobility Device Wheelchair   Activity To/from bathroom; To/From therapy gym   Assist Level Modified independent    Transfers   Sit to stand Modified independent   Stand to sit Modified independent   Toilet Transfers   Toilet - Technique Stand pivot   Equipment Used Grab bars   Toilet Transfer Modified independent   Wheelchair Bed Transfers   Wheelchair/Bed - Technique Stand pivot   Equipment Used Bed; Wheelchair   Level of Asssistance Modified independent    Type of ROM/Therapeutic Exercise   Type of ROM/Therapeutic Exercise Cane/Wand   Comment 2#   Assessment   Performance deficits / Impairments Decreased functional mobility ; Decreased strength;Decreased high-level IADLs;Decreased endurance;Decreased safe awareness;Decreased balance;Decreased ADL status   Treatment Diagnosis Right Femur Fx s/p IM nail   History clavicle fx with ORIF 6/22/20, Rapid response 12/5   Timed Code Treatment Minutes 45 Minutes   Activity Tolerance   Activity Tolerance Patient Tolerated treatment well

## 2020-12-18 NOTE — CARE COORDINATION
[facility]  Test for Patient [place] in the Areas of Transfers/Ambulation    Ambulation/Transfers   · Independent ambulation in room with assistive device:  No       · Independent transfers from wheelchair to surface in room:  Yes     Bathroom Transfers  · Independent transfers in patient bathroom:  Yes         Inpatient Rehabilitation Nursing and Therapies feel as though the patient qualifies for an acute rehabilitation test for independence in the areas of transfers and ambulation prior to discharging from Inpatient Rehabilitation Unit at [facility].  This test for independence in the areas of transfers and ambulation must be agreed upon by the patient's physician, nurse, and therapists.         Nurse Approval:  Electronically signed by Max Villegas LPN on [id number] at 2:02 PM    Physical Therapist Approval:  Electronically signed by Manny Fuentes PTA on 12/18/2020 at 2:04 PM      Occupational Therapist Approval: Electronically signed by KING Marcum on 12/18/2020 at 9:59 AM

## 2020-12-19 LAB
GLUCOSE BLD-MCNC: 119 MG/DL (ref 70–99)
GLUCOSE BLD-MCNC: 150 MG/DL (ref 70–99)
GLUCOSE BLD-MCNC: 154 MG/DL (ref 70–99)
GLUCOSE BLD-MCNC: 157 MG/DL (ref 70–99)
GLUCOSE BLD-MCNC: 81 MG/DL (ref 70–99)
PERFORMED ON: ABNORMAL
PERFORMED ON: NORMAL

## 2020-12-19 PROCEDURE — 6370000000 HC RX 637 (ALT 250 FOR IP): Performed by: PSYCHIATRY & NEUROLOGY

## 2020-12-19 PROCEDURE — 82947 ASSAY GLUCOSE BLOOD QUANT: CPT

## 2020-12-19 PROCEDURE — 2580000003 HC RX 258: Performed by: INTERNAL MEDICINE

## 2020-12-19 PROCEDURE — 99232 SBSQ HOSP IP/OBS MODERATE 35: CPT | Performed by: PSYCHIATRY & NEUROLOGY

## 2020-12-19 PROCEDURE — 1180000000 HC REHAB R&B

## 2020-12-19 PROCEDURE — 6370000000 HC RX 637 (ALT 250 FOR IP): Performed by: INTERNAL MEDICINE

## 2020-12-19 RX ADMIN — OXYCODONE HYDROCHLORIDE AND ACETAMINOPHEN 1 TABLET: 5; 325 TABLET ORAL at 07:59

## 2020-12-19 RX ADMIN — GABAPENTIN 300 MG: 300 CAPSULE ORAL at 20:55

## 2020-12-19 RX ADMIN — SODIUM CHLORIDE, PRESERVATIVE FREE 10 ML: 5 INJECTION INTRAVENOUS at 20:56

## 2020-12-19 RX ADMIN — METFORMIN HYDROCHLORIDE 500 MG: 500 TABLET ORAL at 07:59

## 2020-12-19 RX ADMIN — APIXABAN 2.5 MG: 2.5 TABLET, FILM COATED ORAL at 20:55

## 2020-12-19 RX ADMIN — OXYCODONE HYDROCHLORIDE AND ACETAMINOPHEN 1 TABLET: 5; 325 TABLET ORAL at 20:55

## 2020-12-19 RX ADMIN — PANTOPRAZOLE SODIUM 40 MG: 40 TABLET, DELAYED RELEASE ORAL at 05:58

## 2020-12-19 RX ADMIN — Medication 150 MG: at 07:59

## 2020-12-19 RX ADMIN — SODIUM CHLORIDE, PRESERVATIVE FREE 10 ML: 5 INJECTION INTRAVENOUS at 08:02

## 2020-12-19 RX ADMIN — VENLAFAXINE HYDROCHLORIDE 37.5 MG: 37.5 CAPSULE, EXTENDED RELEASE ORAL at 07:59

## 2020-12-19 RX ADMIN — ATORVASTATIN CALCIUM 10 MG: 10 TABLET, FILM COATED ORAL at 07:59

## 2020-12-19 RX ADMIN — PSYLLIUM HUSK 1 PACKET: 3.4 POWDER ORAL at 07:58

## 2020-12-19 RX ADMIN — OXYCODONE HYDROCHLORIDE AND ACETAMINOPHEN 1 TABLET: 5; 325 TABLET ORAL at 14:24

## 2020-12-19 RX ADMIN — ASPIRIN 81 MG: 81 TABLET, COATED ORAL at 07:59

## 2020-12-19 RX ADMIN — MEMANTINE HYDROCHLORIDE 5 MG: 5 TABLET ORAL at 07:59

## 2020-12-19 RX ADMIN — APIXABAN 2.5 MG: 2.5 TABLET, FILM COATED ORAL at 07:59

## 2020-12-19 RX ADMIN — DONEPEZIL HYDROCHLORIDE 5 MG: 5 TABLET, FILM COATED ORAL at 07:59

## 2020-12-19 ASSESSMENT — PAIN DESCRIPTION - ORIENTATION
ORIENTATION: RIGHT
ORIENTATION: RIGHT

## 2020-12-19 ASSESSMENT — PAIN SCALES - GENERAL
PAINLEVEL_OUTOF10: 0
PAINLEVEL_OUTOF10: 2
PAINLEVEL_OUTOF10: 3

## 2020-12-19 ASSESSMENT — PAIN DESCRIPTION - PAIN TYPE
TYPE: SURGICAL PAIN
TYPE: SURGICAL PAIN

## 2020-12-19 ASSESSMENT — PAIN DESCRIPTION - DESCRIPTORS
DESCRIPTORS: SORE
DESCRIPTORS: SORE

## 2020-12-19 ASSESSMENT — PAIN DESCRIPTION - ONSET
ONSET: ON-GOING
ONSET: ON-GOING

## 2020-12-19 ASSESSMENT — PAIN DESCRIPTION - LOCATION
LOCATION: HIP
LOCATION: HIP

## 2020-12-19 ASSESSMENT — PAIN DESCRIPTION - PROGRESSION
CLINICAL_PROGRESSION: NOT CHANGED
CLINICAL_PROGRESSION: NOT CHANGED

## 2020-12-19 ASSESSMENT — PAIN DESCRIPTION - FREQUENCY
FREQUENCY: CONTINUOUS
FREQUENCY: CONTINUOUS

## 2020-12-19 ASSESSMENT — PAIN - FUNCTIONAL ASSESSMENT
PAIN_FUNCTIONAL_ASSESSMENT: ACTIVITIES ARE NOT PREVENTED
PAIN_FUNCTIONAL_ASSESSMENT: ACTIVITIES ARE NOT PREVENTED

## 2020-12-19 NOTE — PLAN OF CARE
12/18/2020 1134 by Bandar Falcon LPN  Outcome: Ongoing     Problem: SAFETY  Goal: Free from accidental physical injury  12/18/2020 2210 by Jessica Coleman RN  Outcome: Ongoing  12/18/2020 1134 by Bandar Falcon LPN  Outcome: Ongoing  Goal: Free from intentional harm  12/18/2020 2210 by Jessica Coleman RN  Outcome: Ongoing  12/18/2020 1134 by Bandar Falcon LPN  Outcome: Ongoing     Problem: DAILY CARE  Goal: Daily care needs are met  12/18/2020 2210 by Jessica Coleman RN  Outcome: Ongoing  12/18/2020 1134 by Bandar Falcon LPN  Outcome: Ongoing     Problem: PAIN  Goal: Patient's pain/discomfort is manageable  12/18/2020 2210 by Jessica Coleman RN  Outcome: Ongoing  12/18/2020 1134 by Bandar Falcon LPN  Outcome: Ongoing  Goal: STG - Patient will verbalize an acceptable level of pain  12/18/2020 2210 by Jessica Coleman RN  Outcome: Ongoing  12/18/2020 1134 by Bandar Falcon LPN  Outcome: Ongoing     Problem: SKIN INTEGRITY  Goal: Skin integrity is maintained or improved  12/18/2020 2210 by Jessica Coleman RN  Outcome: Ongoing  12/18/2020 1134 by Bandar Falcon LPN  Outcome: Ongoing  Goal: STG - patient will maintain good skin integrity  12/18/2020 2210 by Jessica Coleman RN  Outcome: Ongoing  12/18/2020 1134 by Bandar Falcon LPN  Outcome: Ongoing  Goal: STG - Patient exhibits signs of wound healing. 12/18/2020 2210 by Jessica Coleman RN  Outcome: Ongoing  12/18/2020 1134 by Bandar Falcon LPN  Outcome: Ongoing     Problem: KNOWLEDGE DEFICIT  Goal: Patient/S.O. demonstrates understanding of disease process, treatment plan, medications, and discharge instructions.   12/18/2020 2210 by Jessica Coleman RN  Outcome: Ongoing  12/18/2020 1134 by Bandar Falcon LPN  Outcome: Ongoing     Problem: DISCHARGE BARRIERS  Goal: Patient's continuum of care needs are met  12/18/2020 2210 by Jessica Coleman RN  Outcome: Ongoing 12/18/2020 2210 by Jose Angel Blankenship RN  Outcome: Ongoing  12/18/2020 1134 by Rangel Black LPN  Outcome: Ongoing     Problem: NUTRITION  Goal: Patient maintains adequate hydration  12/18/2020 2210 by Jose Angel Blankenship RN  Outcome: Ongoing  12/18/2020 1134 by Rangel Black LPN  Outcome: Ongoing     Problem: Bleeding:  Goal: Will show no signs and symptoms of excessive bleeding  Description: Will show no signs and symptoms of excessive bleeding  12/18/2020 2210 by Jose Angel Blankenship RN  Outcome: Ongoing  12/18/2020 1134 by Rangel Black LPN  Outcome: Ongoing     Problem: Infection - Surgical Site:  Goal: Will show no infection signs and symptoms  Description: Will show no infection signs and symptoms  12/18/2020 2210 by Jose Angel Blankenship RN  Outcome: Ongoing  12/18/2020 1134 by Rangel Black LPN  Outcome: Ongoing     Problem: Serum Glucose Level - Abnormal:  Goal: Ability to maintain appropriate glucose levels has stabilized  Description: Ability to maintain appropriate glucose levels has stabilized  12/18/2020 2210 by Jose Angel Blankenship RN  Outcome: Ongoing  12/18/2020 1134 by Rangel Black LPN  Outcome: Ongoing     Problem: Mood - Altered:  Goal: Mood stable  Description: Mood stable  12/18/2020 2210 by Jose Angel Blankenship RN  Outcome: Ongoing  12/18/2020 1134 by Rangel Black LPN  Outcome: Ongoing

## 2020-12-19 NOTE — PROGRESS NOTES
/60   Pulse 69   Temp 96.6 °F (35.9 °C) (Temporal)   Resp 12   Ht 5' 3\" (1.6 m)   Wt 118 lb 11.2 oz (53.8 kg)   SpO2 94%   BMI 21.03 kg/m²     Constitutional: she appears well-developed and well-nourished. Eyes  conjunctiva normal.  Pupils react to light  Ear, nose, throat -hearing intact to voice. No scars, masses, or lesions over external nose or ears, no atrophy of tongue  Neck-symmetric, no masses noted, no jugular vein distension  Respiration- chest wall appears symmetric, good expansion,   normal effort without use of accessory muscles  Cardiovascular- RRR  Musculoskeletal  no significant wasting of muscles noted, no bony deformities, gait no gross ataxia  Extremities-no clubbing, cyanosis or edema  Skin  warm, dry, and intact. No rash, erythema, or pallor. Psychiatric  mood, affect, and behavior appear normal.      Neurology  NEUROLOGICAL EXAM:      Mental status   Awake, alert, fluent oriented x 3 appropriate affect  Attention and concentration appear appropriate  Recent and remote memory appears unremarkable  Speech normal without dysarthria  No clear issues with language       Cranial Nerves   CN II- Visual fields grossly unremarkable  CN III, IV,VI-EOMI, No nystagmus, conjugate eye movements, no ptosis  CN VII-right hemifacial spasm  CN VIII-Hearing intact      Motor function  Antigravity x 4     Sensory function Intact to light touch     Cerebellar F-N intact     Tremor None present     Gait                  Not tested           Nursing/pcp notes, imaging,labs and vitals reviewed.      PT,OT and/or speech notes reviewed    Lab Results   Component Value Date    WBC 7.0 12/17/2020    HGB 9.6 (L) 12/17/2020    HCT 31.7 (L) 12/17/2020    .6 (H) 12/17/2020     (H) 12/17/2020     Lab Results   Component Value Date     12/17/2020    K 5.0 12/17/2020     12/17/2020    CO2 30 (H) 12/17/2020    BUN 13 12/17/2020    CREATININE 0.5 12/17/2020 Lower Extremity Weight Bearing Restrictions   Right Lower Extremity Weight Bearing Toe Touch Weight Bearing   Left Lower Extremity Weight Bearing Weight Bearing As Tolerated   General   Chart Reviewed Yes   Subjective   Subjective Pt agrees to work with therapy this afternoon. Pain Screening   Patient Currently in Pain Yes   Intervention List Patient able to continue with treatment   Pain Assessment   Pain Assessment 0-10   Pain Level 2   Pain Type Surgical pain;Acute pain   Pain Location Leg;Hip   Pain Orientation Right   Pain Descriptors Sore   Pain Frequency Continuous   Transfers   Sit to Stand Supervision   Stand to sit Supervision   Comment STS x3 with rwx to perform RLE ROM   Exercises   Comments Seated BLE ther ex in all planes x20 reps each 2# weight on LLE and AAROM on RLE   Other exercises   Other exercises 1 Standing RLE ROM with rwx   Conditions Requiring Skilled Therapeutic Intervention   Body structures, Functions, Activity limitations Decreased functional mobility ; Decreased ADL status; Decreased ROM; Decreased strength;Decreased cognition;Decreased endurance;Decreased sensation;Decreased balance;Decreased posture;Decreased coordination   Assessment Pt cont to tolerate tx well, able to stand for longer periods this tx and perform RLE ROM with decreased fatigue present. Pt cont to be supervision with STS TF. Left with OT following tx. Activity Tolerance   Activity Tolerance Patient Tolerated treatment well   Safety Devices   Type of devices Left in chair  (left with OT)          RECORD REVIEW: Previous medical records, medications were reviewed at today's visit    IMPRESSION:   1. Right hip fracture status post cephalomedullary nailing. Toe-touch weightbearing for 6 weeksPT/OT  2. DVT prophylaxis for 6 weeks. Low-dose Eliquis  3.   Diabetescontinue sliding scale and resume home oral hypoglycemic 4.  History of dementiacontinue Aricept and Namenda. Both doses are low. Discussed with patient. 5.  History of anxiety/depressioncontinue Effexor  6. Acute blood loss anemia improving. Niferex added  Percocet scheduled.   Atenolol held  Metamucil added    ELOS:12/22

## 2020-12-20 LAB
GLUCOSE BLD-MCNC: 111 MG/DL (ref 70–99)
GLUCOSE BLD-MCNC: 116 MG/DL (ref 70–99)
GLUCOSE BLD-MCNC: 117 MG/DL (ref 70–99)
GLUCOSE BLD-MCNC: 170 MG/DL (ref 70–99)
PERFORMED ON: ABNORMAL

## 2020-12-20 PROCEDURE — 6370000000 HC RX 637 (ALT 250 FOR IP): Performed by: INTERNAL MEDICINE

## 2020-12-20 PROCEDURE — 2580000003 HC RX 258: Performed by: INTERNAL MEDICINE

## 2020-12-20 PROCEDURE — 1180000000 HC REHAB R&B

## 2020-12-20 PROCEDURE — 82947 ASSAY GLUCOSE BLOOD QUANT: CPT

## 2020-12-20 PROCEDURE — 6370000000 HC RX 637 (ALT 250 FOR IP): Performed by: PSYCHIATRY & NEUROLOGY

## 2020-12-20 RX ADMIN — DONEPEZIL HYDROCHLORIDE 5 MG: 5 TABLET, FILM COATED ORAL at 07:57

## 2020-12-20 RX ADMIN — MEMANTINE HYDROCHLORIDE 5 MG: 5 TABLET ORAL at 08:01

## 2020-12-20 RX ADMIN — PSYLLIUM HUSK 1 PACKET: 3.4 POWDER ORAL at 07:57

## 2020-12-20 RX ADMIN — Medication 150 MG: at 07:57

## 2020-12-20 RX ADMIN — PANTOPRAZOLE SODIUM 40 MG: 40 TABLET, DELAYED RELEASE ORAL at 05:55

## 2020-12-20 RX ADMIN — ATORVASTATIN CALCIUM 10 MG: 10 TABLET, FILM COATED ORAL at 07:56

## 2020-12-20 RX ADMIN — POLYETHYLENE GLYCOL 3350 17 G: 17 POWDER, FOR SOLUTION ORAL at 12:58

## 2020-12-20 RX ADMIN — APIXABAN 2.5 MG: 2.5 TABLET, FILM COATED ORAL at 21:09

## 2020-12-20 RX ADMIN — METFORMIN HYDROCHLORIDE 500 MG: 500 TABLET ORAL at 07:56

## 2020-12-20 RX ADMIN — OXYCODONE HYDROCHLORIDE AND ACETAMINOPHEN 1 TABLET: 5; 325 TABLET ORAL at 21:09

## 2020-12-20 RX ADMIN — GABAPENTIN 300 MG: 300 CAPSULE ORAL at 21:09

## 2020-12-20 RX ADMIN — VENLAFAXINE HYDROCHLORIDE 37.5 MG: 37.5 CAPSULE, EXTENDED RELEASE ORAL at 07:57

## 2020-12-20 RX ADMIN — SODIUM CHLORIDE, PRESERVATIVE FREE 10 ML: 5 INJECTION INTRAVENOUS at 08:02

## 2020-12-20 RX ADMIN — ASPIRIN 81 MG: 81 TABLET, COATED ORAL at 07:56

## 2020-12-20 RX ADMIN — APIXABAN 2.5 MG: 2.5 TABLET, FILM COATED ORAL at 07:57

## 2020-12-20 RX ADMIN — OXYCODONE HYDROCHLORIDE AND ACETAMINOPHEN 1 TABLET: 5; 325 TABLET ORAL at 12:57

## 2020-12-20 RX ADMIN — OXYCODONE HYDROCHLORIDE AND ACETAMINOPHEN 1 TABLET: 5; 325 TABLET ORAL at 07:56

## 2020-12-20 ASSESSMENT — PAIN DESCRIPTION - FREQUENCY
FREQUENCY: INTERMITTENT
FREQUENCY: INTERMITTENT

## 2020-12-20 ASSESSMENT — PAIN SCALES - GENERAL
PAINLEVEL_OUTOF10: 0
PAINLEVEL_OUTOF10: 0
PAINLEVEL_OUTOF10: 2

## 2020-12-20 ASSESSMENT — PAIN DESCRIPTION - PROGRESSION
CLINICAL_PROGRESSION: GRADUALLY IMPROVING
CLINICAL_PROGRESSION: GRADUALLY IMPROVING

## 2020-12-20 ASSESSMENT — PAIN DESCRIPTION - ONSET
ONSET: GRADUAL
ONSET: GRADUAL

## 2020-12-20 ASSESSMENT — PAIN DESCRIPTION - LOCATION
LOCATION: HIP
LOCATION: HIP

## 2020-12-20 ASSESSMENT — PAIN DESCRIPTION - ORIENTATION
ORIENTATION: RIGHT
ORIENTATION: RIGHT

## 2020-12-20 ASSESSMENT — PAIN DESCRIPTION - PAIN TYPE
TYPE: SURGICAL PAIN
TYPE: SURGICAL PAIN

## 2020-12-20 ASSESSMENT — PAIN DESCRIPTION - DESCRIPTORS
DESCRIPTORS: ACHING
DESCRIPTORS: ACHING

## 2020-12-20 NOTE — PLAN OF CARE
Problem: Falls - Risk of:  Goal: Will remain free from falls  Description: Will remain free from falls  12/19/2020 2327 by Saba Moore RN  Outcome: Ongoing  12/19/2020 1440 by Aly Orellana RN  Outcome: Ongoing  Goal: Absence of physical injury  Description: Absence of physical injury  12/19/2020 2327 by Saba Moore RN  Outcome: Ongoing  12/19/2020 1440 by Aly Orellana RN  Outcome: Ongoing     Problem: Skin Integrity:  Goal: Will show no infection signs and symptoms  Description: Will show no infection signs and symptoms  12/19/2020 2327 by Saba Moore RN  Outcome: Ongoing  12/19/2020 1440 by Aly Orellana RN  Outcome: Ongoing  Goal: Absence of new skin breakdown  Description: Absence of new skin breakdown  12/19/2020 2327 by Saba Moore RN  Outcome: Ongoing  12/19/2020 1440 by Aly Orellana RN  Outcome: Ongoing  Goal: Demonstration of wound healing without infection will improve  Description: Demonstration of wound healing without infection will improve  12/19/2020 2327 by Saba Moore RN  Outcome: Ongoing  12/19/2020 1440 by Aly Orellana RN  Outcome: Ongoing  Goal: Risk for impaired skin integrity will decrease  Description: Risk for impaired skin integrity will decrease  12/19/2020 2327 by Saba Moore RN  Outcome: Ongoing  12/19/2020 1440 by Aly Orellana RN  Outcome: Ongoing     Problem: Pain:  Goal: Pain level will decrease  Description: Pain level will decrease  12/19/2020 2327 by Saba Moore RN  Outcome: Ongoing  12/19/2020 1440 by Aly Orellana RN  Outcome: Ongoing  Goal: Control of acute pain  Description: Control of acute pain  12/19/2020 2327 by Saba Moore RN  Outcome: Ongoing  12/19/2020 1440 by Aly Orellana RN  Outcome: Ongoing  Goal: Control of chronic pain  Description: Control of chronic pain  12/19/2020 2327 by Saba Moore RN  Outcome: Ongoing  12/19/2020 1440 by Aly Orellana RN  Outcome: Ongoing     Problem: SAFETY Goal: Free from accidental physical injury  12/19/2020 2327 by Elysia Kirby RN  Outcome: Ongoing  12/19/2020 1440 by Kenyatta Phillips RN  Outcome: Ongoing  Goal: Free from intentional harm  12/19/2020 2327 by Elysia Kirby RN  Outcome: Ongoing  12/19/2020 1440 by Kenyatta Phillips RN  Outcome: Ongoing

## 2020-12-21 ENCOUNTER — TELEPHONE (OUTPATIENT)
Dept: PRIMARY CARE CLINIC | Age: 85
End: 2020-12-21

## 2020-12-21 ENCOUNTER — TELEPHONE (OUTPATIENT)
Dept: INTERNAL MEDICINE | Age: 85
End: 2020-12-21

## 2020-12-21 LAB
ANION GAP SERPL CALCULATED.3IONS-SCNC: 8 MMOL/L (ref 7–19)
BASOPHILS ABSOLUTE: 0 K/UL (ref 0–0.2)
BASOPHILS RELATIVE PERCENT: 0.4 % (ref 0–1)
BILIRUBIN URINE: NEGATIVE
BLOOD, URINE: NEGATIVE
BUN BLDV-MCNC: 12 MG/DL (ref 8–23)
CALCIUM SERPL-MCNC: 9.3 MG/DL (ref 8.8–10.2)
CHLORIDE BLD-SCNC: 105 MMOL/L (ref 98–111)
CLARITY: CLEAR
CO2: 28 MMOL/L (ref 22–29)
COLOR: YELLOW
CREAT SERPL-MCNC: 0.5 MG/DL (ref 0.5–0.9)
EOSINOPHILS ABSOLUTE: 0.1 K/UL (ref 0–0.6)
EOSINOPHILS RELATIVE PERCENT: 1.7 % (ref 0–5)
GFR AFRICAN AMERICAN: >59
GFR NON-AFRICAN AMERICAN: >60
GLUCOSE BLD-MCNC: 104 MG/DL (ref 70–99)
GLUCOSE BLD-MCNC: 113 MG/DL (ref 70–99)
GLUCOSE BLD-MCNC: 117 MG/DL (ref 74–109)
GLUCOSE BLD-MCNC: 145 MG/DL (ref 70–99)
GLUCOSE BLD-MCNC: 149 MG/DL (ref 70–99)
GLUCOSE URINE: NEGATIVE MG/DL
HCT VFR BLD CALC: 31.5 % (ref 37–47)
HEMOGLOBIN: 9.6 G/DL (ref 12–16)
IMMATURE GRANULOCYTES #: 0 K/UL
KETONES, URINE: NEGATIVE MG/DL
LEUKOCYTE ESTERASE, URINE: NEGATIVE
LYMPHOCYTES ABSOLUTE: 2.3 K/UL (ref 1.1–4.5)
LYMPHOCYTES RELATIVE PERCENT: 30 % (ref 20–40)
MCH RBC QN AUTO: 30.4 PG (ref 27–31)
MCHC RBC AUTO-ENTMCNC: 30.5 G/DL (ref 33–37)
MCV RBC AUTO: 99.7 FL (ref 81–99)
MONOCYTES ABSOLUTE: 0.8 K/UL (ref 0–0.9)
MONOCYTES RELATIVE PERCENT: 10.9 % (ref 0–10)
NEUTROPHILS ABSOLUTE: 4.3 K/UL (ref 1.5–7.5)
NEUTROPHILS RELATIVE PERCENT: 56.6 % (ref 50–65)
NITRITE, URINE: NEGATIVE
PDW BLD-RTO: 15.5 % (ref 11.5–14.5)
PERFORMED ON: ABNORMAL
PH UA: 7 (ref 5–8)
PLATELET # BLD: 412 K/UL (ref 130–400)
PMV BLD AUTO: 9.3 FL (ref 9.4–12.3)
POTASSIUM REFLEX MAGNESIUM: 4.2 MMOL/L (ref 3.5–5)
PROTEIN UA: NEGATIVE MG/DL
RBC # BLD: 3.16 M/UL (ref 4.2–5.4)
SODIUM BLD-SCNC: 141 MMOL/L (ref 136–145)
SPECIFIC GRAVITY UA: 1 (ref 1–1.03)
UROBILINOGEN, URINE: 1 E.U./DL
WBC # BLD: 7.6 K/UL (ref 4.8–10.8)

## 2020-12-21 PROCEDURE — 6370000000 HC RX 637 (ALT 250 FOR IP): Performed by: INTERNAL MEDICINE

## 2020-12-21 PROCEDURE — 1180000000 HC REHAB R&B

## 2020-12-21 PROCEDURE — 97110 THERAPEUTIC EXERCISES: CPT

## 2020-12-21 PROCEDURE — 6370000000 HC RX 637 (ALT 250 FOR IP): Performed by: PSYCHIATRY & NEUROLOGY

## 2020-12-21 PROCEDURE — 2580000003 HC RX 258: Performed by: INTERNAL MEDICINE

## 2020-12-21 PROCEDURE — 82947 ASSAY GLUCOSE BLOOD QUANT: CPT

## 2020-12-21 PROCEDURE — 80048 BASIC METABOLIC PNL TOTAL CA: CPT

## 2020-12-21 PROCEDURE — 97535 SELF CARE MNGMENT TRAINING: CPT

## 2020-12-21 PROCEDURE — 93971 EXTREMITY STUDY: CPT

## 2020-12-21 PROCEDURE — 36415 COLL VENOUS BLD VENIPUNCTURE: CPT

## 2020-12-21 PROCEDURE — 85025 COMPLETE CBC W/AUTO DIFF WBC: CPT

## 2020-12-21 PROCEDURE — 97530 THERAPEUTIC ACTIVITIES: CPT

## 2020-12-21 PROCEDURE — 99232 SBSQ HOSP IP/OBS MODERATE 35: CPT | Performed by: PSYCHIATRY & NEUROLOGY

## 2020-12-21 PROCEDURE — 81003 URINALYSIS AUTO W/O SCOPE: CPT

## 2020-12-21 RX ADMIN — MEMANTINE HYDROCHLORIDE 5 MG: 5 TABLET ORAL at 07:33

## 2020-12-21 RX ADMIN — Medication 150 MG: at 07:33

## 2020-12-21 RX ADMIN — SODIUM CHLORIDE, PRESERVATIVE FREE 10 ML: 5 INJECTION INTRAVENOUS at 20:50

## 2020-12-21 RX ADMIN — DONEPEZIL HYDROCHLORIDE 5 MG: 5 TABLET, FILM COATED ORAL at 07:33

## 2020-12-21 RX ADMIN — POLYETHYLENE GLYCOL 3350 17 G: 17 POWDER, FOR SOLUTION ORAL at 07:34

## 2020-12-21 RX ADMIN — OXYCODONE HYDROCHLORIDE AND ACETAMINOPHEN 1 TABLET: 5; 325 TABLET ORAL at 20:50

## 2020-12-21 RX ADMIN — APIXABAN 2.5 MG: 2.5 TABLET, FILM COATED ORAL at 20:50

## 2020-12-21 RX ADMIN — SODIUM CHLORIDE, PRESERVATIVE FREE 10 ML: 5 INJECTION INTRAVENOUS at 07:31

## 2020-12-21 RX ADMIN — VENLAFAXINE HYDROCHLORIDE 37.5 MG: 37.5 CAPSULE, EXTENDED RELEASE ORAL at 07:33

## 2020-12-21 RX ADMIN — PSYLLIUM HUSK 1 PACKET: 3.4 POWDER ORAL at 07:33

## 2020-12-21 RX ADMIN — SODIUM CHLORIDE, PRESERVATIVE FREE 10 ML: 5 INJECTION INTRAVENOUS at 02:09

## 2020-12-21 RX ADMIN — ASPIRIN 81 MG: 81 TABLET, COATED ORAL at 07:34

## 2020-12-21 RX ADMIN — PANTOPRAZOLE SODIUM 40 MG: 40 TABLET, DELAYED RELEASE ORAL at 05:48

## 2020-12-21 RX ADMIN — ATORVASTATIN CALCIUM 10 MG: 10 TABLET, FILM COATED ORAL at 07:33

## 2020-12-21 RX ADMIN — OXYCODONE HYDROCHLORIDE AND ACETAMINOPHEN 1 TABLET: 5; 325 TABLET ORAL at 13:41

## 2020-12-21 RX ADMIN — APIXABAN 2.5 MG: 2.5 TABLET, FILM COATED ORAL at 07:33

## 2020-12-21 RX ADMIN — OXYCODONE HYDROCHLORIDE AND ACETAMINOPHEN 1 TABLET: 5; 325 TABLET ORAL at 07:33

## 2020-12-21 RX ADMIN — GABAPENTIN 300 MG: 300 CAPSULE ORAL at 20:50

## 2020-12-21 RX ADMIN — METFORMIN HYDROCHLORIDE 500 MG: 500 TABLET ORAL at 07:33

## 2020-12-21 ASSESSMENT — PAIN DESCRIPTION - DESCRIPTORS
DESCRIPTORS: ACHING
DESCRIPTORS: ACHING

## 2020-12-21 ASSESSMENT — PAIN SCALES - GENERAL
PAINLEVEL_OUTOF10: 3
PAINLEVEL_OUTOF10: 0
PAINLEVEL_OUTOF10: 2
PAINLEVEL_OUTOF10: 0
PAINLEVEL_OUTOF10: 2

## 2020-12-21 ASSESSMENT — PAIN DESCRIPTION - ONSET
ONSET: GRADUAL
ONSET: GRADUAL

## 2020-12-21 ASSESSMENT — PAIN DESCRIPTION - ORIENTATION
ORIENTATION: RIGHT
ORIENTATION: RIGHT

## 2020-12-21 ASSESSMENT — PAIN - FUNCTIONAL ASSESSMENT: PAIN_FUNCTIONAL_ASSESSMENT: ACTIVITIES ARE NOT PREVENTED

## 2020-12-21 ASSESSMENT — PAIN DESCRIPTION - LOCATION
LOCATION: HIP
LOCATION: HIP

## 2020-12-21 ASSESSMENT — PAIN DESCRIPTION - PAIN TYPE
TYPE: SURGICAL PAIN
TYPE: SURGICAL PAIN

## 2020-12-21 ASSESSMENT — PAIN DESCRIPTION - FREQUENCY
FREQUENCY: INTERMITTENT
FREQUENCY: INTERMITTENT

## 2020-12-21 ASSESSMENT — PAIN DESCRIPTION - PROGRESSION: CLINICAL_PROGRESSION: GRADUALLY IMPROVING

## 2020-12-21 NOTE — TELEPHONE ENCOUNTER
Shandra from [facility] called and needs to make a HFU for this patient   Please call her at ext [phone]   Thanks

## 2020-12-21 NOTE — PROGRESS NOTES
Occupational Therapy     12/21/20 1345   General   Diagnosis R hip fx with nailing   Pain Assessment   Patient Currently in Pain No   Pain Assessment 0-10   Pain Level 0   Balance   Sitting Balance Independent   Standing Balance Modified independent    Standing Balance   Time 3 minutes and 30 seconds x1 trial, 3 minutes x1 trial.   Activity 2 handed static standing task   Functional Mobility   Functional - Mobility Device Wheelchair   Activity To/From therapy gym; Other   Assist Level Modified independent    Transfers   Sit to stand Modified independent   Stand to sit Modified independent   Type of ROM/Therapeutic Exercise   Type of ROM/Therapeutic Exercise Cane/Wand   Comment Cane/wand: 2# 2 sets x 10 reps. Assessment   Performance deficits / Impairments Decreased functional mobility ; Decreased strength;Decreased high-level IADLs;Decreased endurance;Decreased safe awareness;Decreased balance;Decreased ADL status   Treatment Diagnosis Right Femur Fx s/p IM nail   Prognosis Good   History clavicle fx with ORIF 6/22/20, Rapid response 12/5   Timed Code Treatment Minutes 45 Minutes   Activity Tolerance   Activity Tolerance Patient Tolerated treatment well   Safety Devices   Safety Devices in place Yes   Type of devices Call light within reach  (Up ad crissy.)   Plan   Current Treatment Recommendations Positioning; Safety Education & Training;Functional Mobility Training;Home Management Training;Balance Training;Self-Care / ADL;Equipment Evaluation, Education, & procurement;Strengthening; Endurance Training;Patient/Caregiver Education & Training

## 2020-12-21 NOTE — PLAN OF CARE
Problem: Falls - Risk of:  Goal: Will remain free from falls  Description: Will remain free from falls  12/21/2020 0010 by Castillo Herrera LPN  Outcome: Ongoing  12/20/2020 1038 by Issa Boateng RN  Outcome: Ongoing  Goal: Absence of physical injury  Description: Absence of physical injury  12/21/2020 0010 by Castillo Herrera LPN  Outcome: Ongoing  12/20/2020 1038 by Issa Boateng RN  Outcome: Ongoing     Problem: Skin Integrity:  Goal: Will show no infection signs and symptoms  Description: Will show no infection signs and symptoms  12/21/2020 0010 by Castillo Herrera LPN  Outcome: Ongoing  12/20/2020 1038 by Issa Boateng RN  Outcome: Ongoing  Goal: Absence of new skin breakdown  Description: Absence of new skin breakdown  12/21/2020 0010 by Castillo Herrera LPN  Outcome: Ongoing  12/20/2020 1038 by Issa Boateng RN  Outcome: Ongoing  Goal: Demonstration of wound healing without infection will improve  Description: Demonstration of wound healing without infection will improve  12/21/2020 0010 by Castillo Herrera LPN  Outcome: Ongoing  12/20/2020 1038 by Issa Boateng RN  Outcome: Ongoing  Goal: Risk for impaired skin integrity will decrease  Description: Risk for impaired skin integrity will decrease  12/21/2020 0010 by Castillo Herrera LPN  Outcome: Ongoing  12/20/2020 1038 by Issa Boateng RN  Outcome: Ongoing     Problem: Pain:  Goal: Pain level will decrease  Description: Pain level will decrease  12/21/2020 0010 by Castillo Herrera LPN  Outcome: Ongoing  12/20/2020 1038 by Issa Boateng RN  Outcome: Ongoing  Goal: Control of acute pain  Description: Control of acute pain  12/21/2020 0010 by Castillo Herrera LPN  Outcome: Ongoing  12/20/2020 1038 by Issa Boateng RN  Outcome: Ongoing  Goal: Control of chronic pain  Description: Control of chronic pain  12/21/2020 0010 by Castillo Herrera LPN  Outcome: Ongoing  12/20/2020 1038 by Issa Boateng RN  Outcome: Ongoing     Problem: SAFETY Goal: Free from accidental physical injury  12/21/2020 0010 by Jerelene Severin, LPN  Outcome: Ongoing  12/20/2020 1038 by Shay Salinas RN  Outcome: Ongoing  Goal: Free from intentional harm  12/21/2020 0010 by Jerelene Severin, LPN  Outcome: Ongoing  12/20/2020 1038 by Shay Salinas RN  Outcome: Ongoing     Problem: DAILY CARE  Goal: Daily care needs are met  12/21/2020 0010 by Jerelene Severin, LPN  Outcome: Ongoing  12/20/2020 1038 by Shay Salinas RN  Outcome: Ongoing     Problem: PAIN  Goal: Patient's pain/discomfort is manageable  12/21/2020 0010 by Jerelene Severin, LPN  Outcome: Ongoing  12/20/2020 1038 by Shay Salinas RN  Outcome: Ongoing  Goal: STG - Patient will verbalize an acceptable level of pain  12/21/2020 0010 by Jerelene Severin, LPN  Outcome: Ongoing  12/20/2020 1038 by Shay Salinas RN  Outcome: Ongoing     Problem: SKIN INTEGRITY  Goal: Skin integrity is maintained or improved  12/21/2020 0010 by Jerelene Severin, LPN  Outcome: Ongoing  12/20/2020 1038 by Shay Salinas RN  Outcome: Ongoing  Goal: STG - patient will maintain good skin integrity  12/21/2020 0010 by Jerelene Severin, LPN  Outcome: Ongoing  12/20/2020 1038 by Shay Salinas RN  Outcome: Ongoing  Goal: STG - Patient exhibits signs of wound healing. 12/21/2020 0010 by Jerelene Severin, LPN  Outcome: Ongoing  12/20/2020 1038 by Shay Salinas RN  Outcome: Ongoing     Problem: KNOWLEDGE DEFICIT  Goal: Patient/S.O. demonstrates understanding of disease process, treatment plan, medications, and discharge instructions.   12/21/2020 0010 by Jerelene Severin, LPN  Outcome: Ongoing  12/20/2020 1038 by Shay Salinas RN  Outcome: Ongoing     Problem: DISCHARGE BARRIERS  Goal: Patient's continuum of care needs are met  12/21/2020 0010 by Jerelene Severin, LPN  Outcome: Ongoing  12/20/2020 1038 by Shay Salinas RN  Outcome: Ongoing     Problem: Mobility - Impaired:  Goal: Mobility will improve  Description: Mobility will improve 12/21/2020 0010 by Cinda Badillo LPN  Outcome: Ongoing  12/20/2020 1038 by Cydney Vail RN  Outcome: Ongoing     Problem:  Activity:  Goal: Ability to ambulate will improve  Description: Ability to ambulate will improve  12/21/2020 0010 by Cinda Badillo LPN  Outcome: Ongoing  12/20/2020 1038 by Cydney Vail RN  Outcome: Ongoing  Goal: Ability to perform activities at highest level will improve  Description: Ability to perform activities at highest level will improve  12/21/2020 0010 by Cinda Badillo LPN  Outcome: Ongoing  12/20/2020 1038 by Cydney Vail RN  Outcome: Ongoing     Problem: Nutritional:  Goal: Ability to attain and maintain optimal nutritional status will improve  Description: Ability to attain and maintain optimal nutritional status will improve  12/21/2020 0010 by Cinda Badillo LPN  Outcome: Ongoing  12/20/2020 1038 by Cydney Vail RN  Outcome: Ongoing     Problem: Safety:  Goal: Ability to remain free from injury will improve  Description: Ability to remain free from injury will improve  12/21/2020 0010 by Cinda Badillo LPN  Outcome: Ongoing  12/20/2020 1038 by Cydney Vail RN  Outcome: Ongoing     Problem: IP BLADDER/VOIDING  Goal: LTG - Patient will utilize adaptive techniques/equipment to complete bladder elimination  12/21/2020 0010 by Cinda Badillo LPN  Outcome: Ongoing  12/20/2020 1038 by Cydney Vail RN  Outcome: Ongoing     Problem: IP BOWEL ELIMINATION  Goal: LTG - patient will have regular and routine bowel evacuation  12/21/2020 0010 by Cinda Badillo LPN  Outcome: Ongoing  12/20/2020 1038 by Cydney Vail RN  Outcome: Ongoing     Problem: IP BREATHING  Goal: LTG - Patient/caregiver will demonstrate/perform proper techniques to maintain patent airway  12/21/2020 0010 by Cinda Badillo LPN  Outcome: Ongoing  12/20/2020 1038 by Cydney Vail RN  Outcome: Ongoing     Problem: NUTRITION  Goal: Patient maintains adequate hydration 12/21/2020 0010 by Hali Cruz LPN  Outcome: Ongoing  12/20/2020 1038 by Abril Aguilar RN  Outcome: Ongoing     Problem: Bleeding:  Goal: Will show no signs and symptoms of excessive bleeding  Description: Will show no signs and symptoms of excessive bleeding  12/21/2020 0010 by Hali Cruz LPN  Outcome: Ongoing  12/20/2020 1038 by Abril Aguilar RN  Outcome: Ongoing     Problem: Infection - Surgical Site:  Goal: Will show no infection signs and symptoms  Description: Will show no infection signs and symptoms  12/21/2020 0010 by Hali Cruz LPN  Outcome: Ongoing  12/20/2020 1038 by Abril Aguilar RN  Outcome: Ongoing     Problem: Serum Glucose Level - Abnormal:  Goal: Ability to maintain appropriate glucose levels has stabilized  Description: Ability to maintain appropriate glucose levels has stabilized  12/21/2020 0010 by Hali Cruz LPN  Outcome: Ongoing  12/20/2020 1038 by Abril Aguilar RN  Outcome: Ongoing     Problem: Mood - Altered:  Goal: Mood stable  Description: Mood stable  12/21/2020 0010 by Hali Cruz LPN  Outcome: Ongoing  12/20/2020 1038 by Abril Aguilar RN  Outcome: Ongoing

## 2020-12-21 NOTE — PROGRESS NOTES
12/21/20 1319 12/21/20 1328 12/21/20 1330   Pain Screening   Patient Currently in Pain No  --   --    Pain Assessment   Pain Level  --   --   --    Transfers   Sit to Stand  --  Independent  --    Stand to sit  --  Independent  --    Ambulation   Ambulation?  --  No  --    Propulsion 1   Propulsion  --  Manual  --    Level  --  Level Tile  --    Method  --  RUE;LUE  --    Level of Assistance  --  Independent  --    Description/ Details  --  Incorporated turns. --    Distance  --  200'  --    Conditions Requiring Skilled Therapeutic Intervention   Assessment  --   --  Pt. Ind with HEP given.    Activity Tolerance   Activity Tolerance  --   --   --       12/21/20 1341 12/21/20 1345   Pain Screening   Patient Currently in Pain  --   --    Pain Assessment   Pain Level 2  --    Transfers   Sit to Stand  --   --    Stand to sit  --   --    Ambulation   Ambulation?  --   --    Propulsion 1   Propulsion  --   --    Level  --   --    Method  --   --    Level of Assistance  --   --    Description/ Details  --   --    Distance  --   --    Conditions Requiring Skilled Therapeutic Intervention   Assessment  --   --    Activity Tolerance   Activity Tolerance  --  Patient Tolerated treatment well   Electronically signed by Angela Zamudio PTA on 12/21/2020 at 1:48 PM

## 2020-12-21 NOTE — PROGRESS NOTES
Patient:   Mariposa Araujo  MR#:    [id number]   Room:    1678/278-17   YOB: 1935  Date of Progress Note: 12/21/2020  Time of Note                           8:22 AM  Consulting Physician:   Ramirez Lynn M.D. Attending Physician:  Ramirez Lynn MD        CHIEF COMPLAINT: Right hip pain        Subjective: This 81 y.o. 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 She presented to [facility] ER on 12/3/20 after having a fall at home where she tripped and fell forward hitting her right side of her head and her right hip. She has a hematoma to her right frontotemporal aread and had been unable to bear weight on her right lower extremity since her fall. CT of head was negative for acute changes. X-ray done of her right femur revealed an acute traumatic displaced peritrochanteric fracture. She was admitted to the hospitalist service with consult for orthopedic surgery. She was seen by Dr. Silverio Nguyen, who recommended Cephalomedullary Nailing of her fracture. She was in agreement and went for surgery on 12/4/20. She tolerated the procedure well. Post op she had chest pain, rapid response was called. EKG and cardiac enzymes were unremarkable. CTA negative for pulmonary emboli. Echocardiogram ordered and reveals EF of 55-60%. Patient has had no further chest pain. She will be toe-touch weightbearing on her right lower extremity for 6 weeks. She will need DVT prophylaxis for 6 weeks.   Complains of some swelling of the right foot and frequent urination  REVIEW OF SYSTEMS:  Constitutional: No fevers No chills  Neck:No stiffness  Respiratory: No shortness of breath  Cardiovascular: No chest pain No palpitations  Gastrointestinal: No abdominal pain    Genitourinary: No Dysuria  Neurological: No headache, no confusion      PHYSICAL EXAM: /60   Pulse 76   Temp 97 °F (36.1 °C) (Temporal)   Resp 16   Ht 5' 3\" (1.6 m)   Wt 121 lb (54.9 kg)   SpO2 97%   BMI 21.43 kg/m²     Constitutional: she appears well-developed and well-nourished. Eyes  conjunctiva normal.  Pupils react to light  Ear, nose, throat -hearing intact to voice. No scars, masses, or lesions over external nose or ears, no atrophy of tongue  Neck-symmetric, no masses noted, no jugular vein distension  Respiration- chest wall appears symmetric, good expansion,   normal effort without use of accessory muscles  Cardiovascular- RRR  Musculoskeletal  no significant wasting of muscles noted, no bony deformities, gait no gross ataxia  Extremities-no clubbing, cyanosis or edema  Skin  warm, dry, and intact. No rash, erythema, or pallor. Psychiatric  mood, affect, and behavior appear normal.      Neurology  NEUROLOGICAL EXAM:      Mental status   Awake, alert, fluent oriented x 3 appropriate affect  Attention and concentration appear appropriate  Recent and remote memory appears unremarkable  Speech normal without dysarthria  No clear issues with language       Cranial Nerves   CN II- Visual fields grossly unremarkable  CN III, IV,VI-EOMI, No nystagmus, conjugate eye movements, no ptosis  CN VII-right hemifacial spasm  CN VIII-Hearing intact      Motor function  Antigravity x 4     Sensory function Intact to light touch     Cerebellar F-N intact     Tremor None present     Gait                  Not tested           Nursing/pcp notes, imaging,labs and vitals reviewed.      PT,OT and/or speech notes reviewed    Lab Results   Component Value Date    WBC 7.6 12/21/2020    HGB 9.6 (L) 12/21/2020    HCT 31.5 (L) 12/21/2020    MCV 99.7 (H) 12/21/2020     (H) 12/21/2020     Lab Results   Component Value Date     12/21/2020    K 4.2 12/21/2020     12/21/2020    CO2 28 12/21/2020    BUN 12 12/21/2020    CREATININE 0.5 12/21/2020    GLUCOSE 117 (H) 12/21/2020 CALCIUM 9.3 12/21/2020    PROT 6.4 (L) 12/03/2020    LABALBU 4.0 12/03/2020    BILITOT <0.2 12/03/2020    ALKPHOS 72 12/03/2020    AST 17 12/03/2020    ALT 15 12/03/2020    LABGLOM >60 12/21/2020    GFRAA >59 12/21/2020    GLOB 2.4 10/11/2016   No results found for: INRNo results found for: PHENYTOIN, ESR, CRP      12/18/20 1345   Restrictions/Precautions   Restrictions/Precautions    (Ind w/c t/fs)   Lower Extremity Weight Bearing Restrictions   Right Lower Extremity Weight Bearing Toe Touch Weight Bearing   Position Activity Restriction   Other position/activity restrictions L clavicle fx with ORIF 6/22/20   General   Diagnosis R hip fx with nailing   Balance   Sitting Balance Independent   Standing Balance Modified independent    Functional Mobility   Functional - Mobility Device Wheelchair   Activity To/from bathroom; To/From therapy gym   Assist Level Modified independent    Transfers   Sit to stand Modified independent   Stand to sit Modified independent   Toilet Transfers   Toilet - Technique Stand pivot   Equipment Used Grab bars   Toilet Transfer Modified independent   Wheelchair Bed Transfers   Wheelchair/Bed - Technique Stand pivot   Equipment Used Bed; Wheelchair   Level of Asssistance Modified independent    Type of ROM/Therapeutic Exercise   Type of ROM/Therapeutic Exercise Cane/Wand   Comment 2#   Assessment   Performance deficits / Impairments Decreased functional mobility ; Decreased strength;Decreased high-level IADLs;Decreased endurance;Decreased safe awareness;Decreased balance;Decreased ADL status   Treatment Diagnosis Right Femur Fx s/p IM nail   History clavicle fx with ORIF 6/22/20, Rapid response 12/5   Timed Code Treatment Minutes 45 Minutes   Activity Tolerance   Activity Tolerance Patient Tolerated treatment well        12/18/20 1309   Restrictions/Precautions   Restrictions/Precautions Weight Bearing; Fall Risk   Required Braces or Orthoses?  No Lower Extremity Weight Bearing Restrictions   Right Lower Extremity Weight Bearing Toe Touch Weight Bearing   Left Lower Extremity Weight Bearing Weight Bearing As Tolerated   General   Chart Reviewed Yes   Subjective   Subjective Pt agrees to work with therapy this afternoon. Pain Screening   Patient Currently in Pain Yes   Intervention List Patient able to continue with treatment   Pain Assessment   Pain Assessment 0-10   Pain Level 2   Pain Type Surgical pain;Acute pain   Pain Location Leg;Hip   Pain Orientation Right   Pain Descriptors Sore   Pain Frequency Continuous   Transfers   Sit to Stand Supervision   Stand to sit Supervision   Comment STS x3 with rwx to perform RLE ROM   Exercises   Comments Seated BLE ther ex in all planes x20 reps each 2# weight on LLE and AAROM on RLE   Other exercises   Other exercises 1 Standing RLE ROM with rwx   Conditions Requiring Skilled Therapeutic Intervention   Body structures, Functions, Activity limitations Decreased functional mobility ; Decreased ADL status; Decreased ROM; Decreased strength;Decreased cognition;Decreased endurance;Decreased sensation;Decreased balance;Decreased posture;Decreased coordination   Assessment Pt cont to tolerate tx well, able to stand for longer periods this tx and perform RLE ROM with decreased fatigue present. Pt cont to be supervision with STS TF. Left with OT following tx. Activity Tolerance   Activity Tolerance Patient Tolerated treatment well   Safety Devices   Type of devices Left in chair  (left with OT)          RECORD REVIEW: Previous medical records, medications were reviewed at today's visit    IMPRESSION:   1. Right hip fracture status post cephalomedullary nailing. Toe-touch weightbearing for 6 weeksPT/OT  2. DVT prophylaxis for 6 weeks. Low-dose Eliquis  3.   Diabetescontinue sliding scale and resume home oral hypoglycemic 4.  History of dementiacontinue Aricept and Namenda. Both doses are low. Discussed with patient. 5.  History of anxiety/depressioncontinue Effexor  6. Acute blood loss anemia improving. Niferex added  Percocet scheduled. Atenolol held  Metamucil added  Check ultrasound right leg to rule out DVT. Urinalysis ordered as well.   ELOS:12/22

## 2020-12-21 NOTE — PROGRESS NOTES
12/21/20 0900   OT Individual Minutes   Time In 0900   Time Out 0900   Minutes 0   Minute Variance   Variance 45  (Taken by vascular for procedure.)   Reason Procedure   Time Code Minutes    Timed Code Treatment Minutes 0 Minutes

## 2020-12-21 NOTE — PROGRESS NOTES
12/21/20 1042 12/21/20 1043 12/21/20 1044   Transfers   Sit to Stand Independent  --   --    Stand to sit Independent  --   --    Bed to Chair  --   --   --    Car Transfer  --   --   --    Ambulation   Ambulation?  --  No  --    WB Status  --  TTWB RLE  --    Wheelchair Activities   Wheelchair Parts Management  --  Yes  --    All Wheelchair Parts Management  --  Not using leg rests. --    Left Brakes Level of Assistance  --  Independent  --    Right Brakes Level of Assistance  --  Independent  --    Propulsion  --  Yes  --    Propulsion 1   Propulsion  --  Manual  --    Level  --  Level Tile  --    Method  --  RUE;LUE  --    Level of Assistance  --  Independent  --    Description/ Details  --  Incorporated turns. --    Distance  --  200'  --    Other exercises   Other exercises 1  --  Standing twice with RW ind, working on ROM RLE.  --    Conditions Requiring Skilled Therapeutic Intervention   Assessment  --   --  FTD with pt's friend, Lanell Goldmann, observing activities. Discussed and practiced bed mob, transfers, car transfers, and W/C up/down ramp.    Activity Tolerance   Activity Tolerance  --   --  Patient Tolerated treatment well      12/21/20 1055   Transfers   Sit to Stand  --    Stand to sit  --    Bed to Chair Independent   Car Transfer Independent   Ambulation   Ambulation?  --    WB Status  --    Wheelchair Activities   Wheelchair Parts Management  --    All Wheelchair Parts Management  --    Left Brakes Level of Assistance  --    Right Brakes Level of Assistance  --    Propulsion  --    Propulsion 1   Propulsion  --    Level  --    Method  --    Level of Assistance  --    Description/ Details  --    Distance  --    Other exercises   Other exercises 1  --    Conditions Requiring Skilled Therapeutic Intervention   Assessment  --    Activity Tolerance   Activity Tolerance  --    Electronically signed by John Navarro PTA on 12/21/2020 at 11:03 AM

## 2020-12-21 NOTE — PROGRESS NOTES
Occupational Therapy     12/21/20 2121   General   Additional Pertinent Hx L clavicle fx with ORIF 6/22/20   Family / Caregiver Present Yes  (Pt. caregiver, Kitty Acuna.)   Diagnosis R hip fx with nailing   Pain Assessment   Patient Currently in Pain No   Pain Assessment 0-10   Pain Level 0   Balance   Sitting Balance Independent   Standing Balance Modified independent    Functional Mobility   Functional - Mobility Device Wheelchair   Activity To/From therapy gym; To/from bathroom   Assist Level Modified independent    Transfers   Stand Pivot Transfers Modified independent   Sit to stand Modified independent   Stand to sit Modified independent   Toilet Transfers   Toilet - Technique Stand pivot   Equipment Used Standard bedside commode   Toilet Transfer Modified independent   Shower Transfers   Shower - Transfer From Wheelchair   Shower - Transfer Type To and From   [employer] - Transfer To Transfer tub bench   Shower - Technique Stand pivot   Shower Transfers Supervision   Shower Transfers Comments Home setup, cues for positioning w/c in small space. Assessment   Performance deficits / Impairments Decreased functional mobility ; Decreased strength;Decreased high-level IADLs;Decreased endurance;Decreased safe awareness;Decreased balance;Decreased ADL status   Assessment Pt. caregiver present for FTD, pt. completed bathroom transfers, educated on DME and home setup for safety. Pt. caregiver demonstrated good understanding of education.    Treatment Diagnosis Right Femur Fx s/p IM nail   Prognosis Good   History clavicle fx with ORIF 6/22/20, Rapid response 12/5   Discharge Recommendations Home with Home health OT   Timed Code Treatment Minutes 15 Minutes   Activity Tolerance   Activity Tolerance Patient Tolerated treatment well   Safety Devices   Safety Devices in place Yes   Type of devices Gait belt  (Given to PT.)   Plan Current Treatment Recommendations Positioning; Safety Education & Training;Functional Mobility Training;Home Management Training;Balance Training;Self-Care / ADL;Equipment Evaluation, Education, & procurement;Strengthening; Endurance Training;Patient/Caregiver Education & Training

## 2020-12-21 NOTE — PLAN OF CARE
Problem: Falls - Risk of:  Goal: Will remain free from falls  Description: Will remain free from falls  12/21/2020 0950 by Harjeet Andujar RN  Outcome: Ongoing   Independent in room  Problem: Pain:  Description: Pain management should include both nonpharmacologic and pharmacologic interventions.   Goal: Pain level will decrease  Description: Pain level will decrease  12/21/2020 0950 by Harjeet Andujar RN  Outcome: Ongoing  12/21/2020 0010 by Tono Suh LPN  Outcome: Ongoing   Scheduled pain medication has been working well for patient

## 2020-12-21 NOTE — TELEPHONE ENCOUNTER
Spoke with [employer]. I will be calling patient tomorrow after her discharge summary is available for TCM call. Yaa Muhammad will relay this to Andie.  Jose Alfredo PARK

## 2020-12-22 ENCOUNTER — TELEPHONE (OUTPATIENT)
Dept: INTERNAL MEDICINE | Age: 85
End: 2020-12-22

## 2020-12-22 VITALS
WEIGHT: 121 LBS | BODY MASS INDEX: 21.44 KG/M2 | DIASTOLIC BLOOD PRESSURE: 55 MMHG | HEART RATE: 70 BPM | RESPIRATION RATE: 16 BRPM | HEIGHT: 63 IN | SYSTOLIC BLOOD PRESSURE: 110 MMHG | TEMPERATURE: 96.9 F | OXYGEN SATURATION: 96 %

## 2020-12-22 LAB
GLUCOSE BLD-MCNC: 118 MG/DL (ref 70–99)
PERFORMED ON: ABNORMAL

## 2020-12-22 PROCEDURE — 6370000000 HC RX 637 (ALT 250 FOR IP): Performed by: INTERNAL MEDICINE

## 2020-12-22 PROCEDURE — 6370000000 HC RX 637 (ALT 250 FOR IP): Performed by: PSYCHIATRY & NEUROLOGY

## 2020-12-22 PROCEDURE — 99239 HOSP IP/OBS DSCHRG MGMT >30: CPT | Performed by: PSYCHIATRY & NEUROLOGY

## 2020-12-22 PROCEDURE — 2580000003 HC RX 258: Performed by: INTERNAL MEDICINE

## 2020-12-22 PROCEDURE — 82947 ASSAY GLUCOSE BLOOD QUANT: CPT

## 2020-12-22 RX ORDER — BLOOD-GLUCOSE METER
1 KIT MISCELLANEOUS 3 TIMES DAILY
Qty: 1 KIT | Refills: 0 | Status: SHIPPED | OUTPATIENT
Start: 2020-12-22 | End: 2021-05-19 | Stop reason: ALTCHOICE

## 2020-12-22 RX ORDER — PANTOPRAZOLE SODIUM 40 MG/1
40 TABLET, DELAYED RELEASE ORAL
Qty: 30 TABLET | Refills: 3 | Status: SHIPPED | OUTPATIENT
Start: 2020-12-23 | End: 2021-05-19

## 2020-12-22 RX ORDER — ASPIRIN 81 MG/1
81 TABLET ORAL DAILY
Qty: 30 TABLET | Refills: 3 | Status: SHIPPED | OUTPATIENT
Start: 2020-12-23

## 2020-12-22 RX ORDER — LANCETS 30 GAUGE
1 EACH MISCELLANEOUS 3 TIMES DAILY
Qty: 600 EACH | Refills: 1 | Status: SHIPPED | OUTPATIENT
Start: 2020-12-22 | End: 2021-05-19

## 2020-12-22 RX ORDER — GABAPENTIN 300 MG/1
300 CAPSULE ORAL NIGHTLY
Qty: 90 CAPSULE | Refills: 3 | Status: SHIPPED | OUTPATIENT
Start: 2020-12-22 | End: 2021-12-07

## 2020-12-22 RX ORDER — ATORVASTATIN CALCIUM 10 MG/1
10 TABLET, FILM COATED ORAL DAILY
Qty: 30 TABLET | Refills: 3 | Status: SHIPPED | OUTPATIENT
Start: 2020-12-23 | End: 2021-01-06 | Stop reason: SDUPTHER

## 2020-12-22 RX ORDER — OXYCODONE HYDROCHLORIDE AND ACETAMINOPHEN 5; 325 MG/1; MG/1
1 TABLET ORAL 3 TIMES DAILY
Qty: 60 TABLET | Refills: 0 | Status: SHIPPED | OUTPATIENT
Start: 2020-12-22 | End: 2021-01-21

## 2020-12-22 RX ORDER — IRON POLYSACCHARIDE COMPLEX 150 MG
150 CAPSULE ORAL DAILY
Qty: 60 CAPSULE | Refills: 3 | Status: SHIPPED | OUTPATIENT
Start: 2020-12-23 | End: 2021-01-06 | Stop reason: SDUPTHER

## 2020-12-22 RX ORDER — PRENATAL WITH FERROUS FUM AND FOLIC ACID 3080; 920; 120; 400; 22; 1.84; 3; 20; 10; 1; 12; 200; 27; 25; 2 [IU]/1; [IU]/1; MG/1; [IU]/1; MG/1; MG/1; MG/1; MG/1; MG/1; MG/1; UG/1; MG/1; MG/1; MG/1; MG/1
1 TABLET ORAL WEEKLY
Qty: 90 TABLET | Refills: 0 | Status: SHIPPED | OUTPATIENT
Start: 2020-12-22 | End: 2021-01-06 | Stop reason: ALTCHOICE

## 2020-12-22 RX ADMIN — ATORVASTATIN CALCIUM 10 MG: 10 TABLET, FILM COATED ORAL at 07:53

## 2020-12-22 RX ADMIN — MEMANTINE HYDROCHLORIDE 5 MG: 5 TABLET ORAL at 07:53

## 2020-12-22 RX ADMIN — SODIUM CHLORIDE, PRESERVATIVE FREE 10 ML: 5 INJECTION INTRAVENOUS at 07:56

## 2020-12-22 RX ADMIN — PSYLLIUM HUSK 1 PACKET: 3.4 POWDER ORAL at 07:53

## 2020-12-22 RX ADMIN — DONEPEZIL HYDROCHLORIDE 5 MG: 5 TABLET, FILM COATED ORAL at 07:53

## 2020-12-22 RX ADMIN — METFORMIN HYDROCHLORIDE 500 MG: 500 TABLET ORAL at 07:53

## 2020-12-22 RX ADMIN — VENLAFAXINE HYDROCHLORIDE 37.5 MG: 37.5 CAPSULE, EXTENDED RELEASE ORAL at 07:53

## 2020-12-22 RX ADMIN — OXYCODONE HYDROCHLORIDE AND ACETAMINOPHEN 1 TABLET: 5; 325 TABLET ORAL at 07:53

## 2020-12-22 RX ADMIN — PANTOPRAZOLE SODIUM 40 MG: 40 TABLET, DELAYED RELEASE ORAL at 06:04

## 2020-12-22 RX ADMIN — Medication 150 MG: at 07:53

## 2020-12-22 RX ADMIN — ASPIRIN 81 MG: 81 TABLET, COATED ORAL at 07:53

## 2020-12-22 RX ADMIN — APIXABAN 2.5 MG: 2.5 TABLET, FILM COATED ORAL at 07:53

## 2020-12-22 ASSESSMENT — PAIN SCALES - GENERAL
PAINLEVEL_OUTOF10: 3
PAINLEVEL_OUTOF10: 0

## 2020-12-22 NOTE — DISCHARGE SUMMARY
Neurology Discharge Summary     Patient Identification:  Juliane Barker is a 81 y.o. female. :  1935  Admit Date:  2020  Discharge date : 20   Attending Provider: Jake Roque MD     Account Number: [de-identified]                                   Admission Diagnoses:   rt hip fx  Closed nondisplaced fracture of lesser trochanter of right femur with routine healing [S72.124D]  Closed nondisplaced fracture of lesser trochanter of right femur with routine healing [S72.124D]    Discharge Diagnoses: Active Problems:    Closed nondisplaced fracture of lesser trochanter of right femur with routine healing  Resolved Problems:    * No resolved hospital problems. *      Discharge Medications:    Current Discharge Medication List           Details   aspirin 81 MG EC tablet Take 1 tablet by mouth daily  Qty: 30 tablet, Refills: 3      atorvastatin (LIPITOR) 10 MG tablet Take 1 tablet by mouth daily  Qty: 30 tablet, Refills: 3      iron polysaccharides (NIFEREX) 150 MG capsule Take 1 capsule by mouth daily  Qty: 60 capsule, Refills: 3      pantoprazole (PROTONIX) 40 MG tablet Take 1 tablet by mouth every morning (before breakfast)  Qty: 30 tablet, Refills: 3      Blood Glucose Monitoring Suppl (FREESTYLE FREEDOM) KIT 1 kit by Does not apply route 3 times daily  Qty: 1 kit, Refills: 0              Details   blood glucose test strips (ASCENSIA AUTODISC VI;ONE TOUCH ULTRA TEST VI) strip 1 each by In Vitro route daily As needed.   Qty: 100 each, Refills: 3    Associated Diagnoses: Diabetic peripheral neuropathy associated with type 2 diabetes mellitus ([facility] [address]      Diabetic Shoe MISC by Does not apply route DISPENSE ONE PAIR DIABETIC SHOES AND THREE PAIRS OF HEAT MOLDED INSERTS  Qty: 1 each, Refills: 0      Prenatal Vit-Fe Fumarate-FA (PRENATAL VITAMIN) 27-1 MG TABS tablet Take 1 tablet by mouth once a week  Qty: 90 tablet, Refills: 0 oxyCODONE-acetaminophen (PERCOCET) 5-325 MG per tablet Take 1 tablet by mouth 3 times daily for 30 days. Qty: 60 tablet, Refills: 0    Comments: Reduce doses taken as pain becomes manageable  Associated Diagnoses: Closed nondisplaced fracture of lesser trochanter of right femur with routine healing      apixaban (ELIQUIS) 2.5 MG TABS tablet Take 1 tablet by mouth 2 times daily  Qty: 60 tablet, Refills: 0      gabapentin (NEURONTIN) 300 MG capsule Take 1 capsule by mouth nightly for 31 days. Qty: 90 capsule, Refills: 3    Associated Diagnoses: Closed nondisplaced fracture of lesser trochanter of right femur with routine healing      metFORMIN (GLUCOPHAGE) 500 MG tablet Take 1 tablet by mouth daily (with breakfast)  Qty: 60 tablet, Refills: 3      Lancets MISC 1 each by Does not apply route 3 times daily  Qty: 600 each, Refills: 1              Details   ondansetron (ZOFRAN) 4 MG tablet Take 1 tablet by mouth every 8 hours as needed for Nausea or Vomiting  Qty: 20 tablet, Refills: 1      docusate sodium (COLACE) 100 MG capsule Take 1 capsule by mouth 2 times daily  Qty: 60 capsule, Refills: 1      vitamin D (ERGOCALCIFEROL) 1.25 MG (95064 UT) CAPS capsule Take 1 capsule by mouth once a week  Qty: 4 capsule, Refills: 1      memantine (NAMENDA) 5 MG tablet Take 5 mg by mouth daily      donepezil (ARICEPT) 5 MG tablet Take 5 mg by mouth daily       nitroGLYCERIN (NITROSTAT) 0.4 MG SL tablet Place 1 tablet under the tongue every 5 minutes as needed for Chest pain up to max of 3 total doses. If no relief after 1 dose, call 911. Qty: 25 tablet, Refills: 3      Psyllium (METAMUCIL PO) Take by mouth daily      Tetrahydrozoline HCl (EYE DROPS OP) Apply 2 drops to eye 2 times daily Indications: systane       venlafaxine (EFFEXOR-XR) 37.5 MG XR capsule Take 37.5 mg by mouth daily.            Current Discharge Medication List      STOP taking these medications       cyanocobalamin 1000 MCG/ML injection Comments: Reason for Stopping:         simvastatin (ZOCOR) 10 MG tablet Comments:   Reason for Stopping:         LORazepam (ATIVAN) 0.5 MG tablet Comments:   Reason for Stopping:         atenolol (TENORMIN) 25 MG tablet Comments:   Reason for Stopping:         UNABLE TO FIND Comments:   Reason for Stopping:         Fructose-Dextrose-Phosphor Acd (EMETROL PO) Comments:   Reason for Stopping:                 Consults:   none    Hospital Course: The patient did well during her stay in the rehab unit. Niferex was added for acute blood loss anemia. Counts are stable and improving. Blood sugar was okay. She had no medical complications. She will continue on low-dose Eliquis for now and follow-up with orthopedics clinic this afternoon for further instructions. Atenolol was held for some orthostatic hypotension. This can be addressed as an outpatient. She had a negative ultrasound of the right leg showing no sign of DVT. Negative urinalysis and the day before admission. Disposition upon dischargeimproved        Discharge Instructions     Patient Instructions:   Home  Therapy orders: PT and OT   Discharge lab work: none  Code status: Full Code   Activity: activity as tolerated  Diet: DIET CARB CONTROL;   Dietary Nutrition Supplements: Diabetic Oral Supplement    Wound Care: as directed  Equipment: as per therapy      Claudia Gutierrez MD    At least 35 minutes were spent in discharging the patient

## 2020-12-22 NOTE — PLAN OF CARE
Problem: Falls - Risk of:  Goal: Will remain free from falls  Description: Will remain free from falls  12/22/2020 0908 by Nova De La O RN  Outcome: Completed  12/22/2020 0024 by Sri Serna LPN  Outcome: Ongoing  Goal: Absence of physical injury  Description: Absence of physical injury  12/22/2020 0908 by Nova De La O RN  Outcome: Completed  12/22/2020 0024 by Sri Serna LPN  Outcome: Ongoing

## 2020-12-22 NOTE — TELEPHONE ENCOUNTER
Is it ok for home care to obtain a HgA1C? Most recent is June and we need to have one on file for the last 60 days.

## 2020-12-22 NOTE — PLAN OF CARE
Problem: Falls - Risk of:  Goal: Will remain free from falls  Description: Will remain free from falls  12/22/2020 0024 by Ag Su LPN  Outcome: Ongoing  12/22/2020 0024 by Ag Su LPN  Outcome: Ongoing  Goal: Absence of physical injury  Description: Absence of physical injury  12/22/2020 0024 by Ag Su LPN  Outcome: Ongoing  12/22/2020 0024 by Ag Su LPN  Outcome: Ongoing     Problem: Skin Integrity:  Goal: Will show no infection signs and symptoms  Description: Will show no infection signs and symptoms  12/22/2020 0024 by Ag Su LPN  Outcome: Ongoing  12/22/2020 0024 by Ag Su LPN  Outcome: Ongoing  Goal: Absence of new skin breakdown  Description: Absence of new skin breakdown  Outcome: Ongoing  Goal: Demonstration of wound healing without infection will improve  Description: Demonstration of wound healing without infection will improve  Outcome: Ongoing  Goal: Risk for impaired skin integrity will decrease  Description: Risk for impaired skin integrity will decrease  Outcome: Ongoing     Problem: Pain:  Goal: Pain level will decrease  Description: Pain level will decrease  Outcome: Ongoing  Goal: Control of acute pain  Description: Control of acute pain  Outcome: Ongoing  Goal: Control of chronic pain  Description: Control of chronic pain  Outcome: Ongoing     Problem: SAFETY  Goal: Free from accidental physical injury  Outcome: Ongoing  Goal: Free from intentional harm  Outcome: Ongoing     Problem: DAILY CARE  Goal: Daily care needs are met  Outcome: Ongoing     Problem: PAIN  Goal: Patient's pain/discomfort is manageable  Outcome: Ongoing  Goal: STG - Patient will verbalize an acceptable level of pain  Outcome: Ongoing     Problem: SKIN INTEGRITY  Goal: Skin integrity is maintained or improved  Outcome: Ongoing  Goal: STG - patient will maintain good skin integrity  Outcome: Ongoing  Goal: STG - Patient exhibits signs of wound healing.   Outcome: Ongoing Problem: KNOWLEDGE DEFICIT  Goal: Patient/S.O. demonstrates understanding of disease process, treatment plan, medications, and discharge instructions. Outcome: Ongoing     Problem: DISCHARGE BARRIERS  Goal: Patient's continuum of care needs are met  Outcome: Ongoing     Problem: Mobility - Impaired:  Goal: Mobility will improve  Description: Mobility will improve  Outcome: Ongoing     Problem:  Activity:  Goal: Ability to ambulate will improve  Description: Ability to ambulate will improve  Outcome: Ongoing  Goal: Ability to perform activities at highest level will improve  Description: Ability to perform activities at highest level will improve  Outcome: Ongoing     Problem: Nutritional:  Goal: Ability to attain and maintain optimal nutritional status will improve  Description: Ability to attain and maintain optimal nutritional status will improve  Outcome: Ongoing     Problem: Safety:  Goal: Ability to remain free from injury will improve  Description: Ability to remain free from injury will improve  Outcome: Ongoing     Problem: IP BLADDER/VOIDING  Goal: LTG - Patient will utilize adaptive techniques/equipment to complete bladder elimination  Outcome: Ongoing     Problem: IP BOWEL ELIMINATION  Goal: LTG - patient will have regular and routine bowel evacuation  Outcome: Ongoing     Problem: IP BREATHING  Goal: LTG - Patient/caregiver will demonstrate/perform proper techniques to maintain patent airway  Outcome: Ongoing     Problem: NUTRITION  Goal: Patient maintains adequate hydration  Outcome: Ongoing     Problem: Bleeding:  Goal: Will show no signs and symptoms of excessive bleeding  Description: Will show no signs and symptoms of excessive bleeding  Outcome: Ongoing     Problem: Infection - Surgical Site:  Goal: Will show no infection signs and symptoms  Description: Will show no infection signs and symptoms  12/22/2020 0024 by Christal Nelson LPN  Outcome: Ongoing  12/22/2020 0024 by Christal Nelson LPN Outcome: Ongoing     Problem: Serum Glucose Level - Abnormal:  Goal: Ability to maintain appropriate glucose levels has stabilized  Description: Ability to maintain appropriate glucose levels has stabilized  Outcome: Ongoing     Problem: Mood - Altered:  Goal: Mood stable  Description: Mood stable  Outcome: Ongoing

## 2020-12-23 ENCOUNTER — TELEPHONE (OUTPATIENT)
Dept: PRIMARY CARE CLINIC | Age: 85
End: 2020-12-23

## 2020-12-23 ENCOUNTER — CARE COORDINATION (OUTPATIENT)
Dept: CASE MANAGEMENT | Age: 85
End: 2020-12-23

## 2020-12-23 PROCEDURE — 1111F DSCHRG MED/CURRENT MED MERGE: CPT | Performed by: FAMILY MEDICINE

## 2020-12-23 NOTE — CARE COORDINATION
medications. Advised obtaining a 90-day supply of all daily and as-needed medications. Covid Risk Education    Patient has following risk factors of: diabetes. Education provided regarding infection prevention, and signs and symptoms of COVID-19 and when to seek medical attention with patient who verbalized understanding. Discussed exposure protocols and quarantine From [facility]: Are you at higher risk for severe illness?   and given an opportunity for questions and concerns. The patient agrees to contact the COVID-19 hotline [phone] or PCP office for questions related to COVID-19. For more information on steps you can take to protect yourself, see CDC's How to Protect Yourself     Patient/family/caregiver given information for [employer] and agrees to enroll no    Discussed follow-up appointments. If no appointment was previously scheduled, appointment scheduling offered: Yes. Is follow up appointment scheduled within 7 days of discharge? Office to call patient  Non-[facility] follow up appointment(s):     Plan for follow-up call in 1-2 days based on severity of symptoms and risk factors. Plan for next call: Eliquis script, mobility, pain  CTN provided contact information for future needs. Care Transitions 24 Hour Call    Do you have any ongoing symptoms?: No  Do you have a copy of your discharge instructions?: Yes  Do you have all of your prescriptions and are they filled?: No  Have you been contacted by a [facility]?: No  Were you discharged with any Home Care or Post Acute Services: No  Do you feel like you have everything you need to keep you well at home?: Yes  Care Transitions Interventions         Follow Up : Spoke with patient today for initial CTC/COVID call after discharge from [place]. She says she is doing well. Says incision looks good, no redness, swelling, or drainage. She has steri-strips applied to the incision site.  She did review medications, did not get a script for Eliquis for some reason. She says she has contacted Dr. Vikas Padron office to see about it, but has not heard back from it. 1111F order added to chart. She says appetite is good. She does not have a glucometer, and so therefore has not checked her blood sugar. She has her follow up scheduled with DR. Cheng, and does NOT have Home Care. Says Dr. Manuela Junior felt as if she was well enough to come to  for outpatient therapy, she starts on January 3rd. She has contacted Dr. Jessica Butler nurse as of yesterday, and nurse is to call to schedule her HFU. CTN will place call to Dr. Vikas Padron office and see about getting her script for Eliquis sent in to [facility]. Will follow up with patient at a later time. No future appointments.     Simon Webster RN

## 2020-12-23 NOTE — TELEPHONE ENCOUNTER
[facility] Transitions Initial Follow Up Call    Outreach made within 2 business days of discharge: Yes    Patient: Vinayak Zhong Patient : 1935   MRN: [id number]    Reason for Admission: Closed nondisplaced fracture of lesser trochanter of right femur with routine healing [S72.124D]  Discharge Date: 20       Spoke with: Patient    Discharge department/facility: [facility]    Spoke with Janee Clarke. She is settling in well at home. She is taking Eliquis. She has help at home. She will not be having home health. TCM Interactive Patient Contact:  Was patient able to fill all prescriptions: Yes  Was patient instructed to bring all medications to the follow-up visit: Yes  Is patient taking all medications as directed in the discharge summary?  Yes  Does patient understand their discharge instructions: Yes  Does patient have questions or concerns that need addressed prior to 7-14 day follow up office visit: no    Scheduled appointment with PCP within 7-14 days- scheduled w/PCP on   Follow Up  Future Appointments   Date Time Provider Ernie Plascencia   2021  2:30 PM Mariana Wang  [address]

## 2020-12-23 NOTE — DISCHARGE SUMMARY
Occupational Therapy Discharge Summary         Date: 2020  Patient Name: Tracy Kim        MRN: [id number]    : 1935  (81 y.o.)  Gender: female   Referring Practitioner: Dr. Jemima Arredondo  Diagnosis: Abdiazizfuad Joleens  Restrictions/Precautions  Restrictions/Precautions: (Ind w/c t/fs)  Required Braces or Orthoses?: No      Discharge Date: 20      UE Functioning:  L UE WFLs  R UE WNLs    Home Management:  Functional Mobility  Functional - Mobility Device: Wheelchair  Activity: To/From therapy gym, Other  Assist Level: Modified independent   Functional Mobility Comments: Light homemaking tasks. Adaptive Equipment/DME Status:  Ordered 16\"narrow w/c, w/c cushion, semi electric hospital bed, BSC, and Transfer Tub bench    Pain Assessment:  Pain Level: 0  Pain Location: Leg, Hip    Remaining Problems:  Decreased functional mobility ;  Decreased strength; Decreased high-level IADLs; Decreased endurance; Decreased safe awareness; Decreased balance; Decreased ADL status    STGs:  Short term goals  Time Frame for Short term goals: 1 week  Short term goal 1: complete LB dressing with AE with min A  Short term goal 2: complete toilet transfers with min A  Short term goal 3: complete toilet hygiene/clothing mgmt with min A  Short term goal 4: complete simple home making task using recommended mobility means without cues for WB protocol  Short term goal 5: complete 1 handed static act for 2 mins with min A  Short term goal 6: complete overall bathing with min A    LTGs:  Long term goals  Time Frame for Long term goals : 2 weeks  Long term goal 1: complet overall toileting with supervision  Long term goal 2: complete overall bathing with supervision  Long term goal 3: complete overall dressing with supervision  Long term goal 4: complete home making task using recommended mobility with supervision  Long term goal 5: complete HEP with independence  Long term goals 6: pt/family verbalize DME

## 2020-12-23 NOTE — CARE COORDINATION
Placed call to Dr. Gerald Novak office at [facility], left message on [facility], nurseline stating patient did not have her Eliquis script at her pharmacy and needed it called to [address] [place] [address] as soon as possible. Did leave patient contact information as well as CTN confirmation. CTN will follow up with patient at a later time.

## 2020-12-23 NOTE — CARE COORDINATION
Left message for patient stating CTN had contact Dr. Marcelle Hicks office and left VM for Eliquis to be sent to [facility]. CTN will follow up at a later time

## 2020-12-23 NOTE — DISCHARGE SUMMARY
Physical Therapy DISCHARGE Note  DATE:  2020  NAME:  Tata Costello  :  1935  (85 y.o.,female)  MRN:  [id number]    HEIGHT:  Height: 5' 3\" (160 cm)  WEIGHT:  Weight: 121 lb (54.9 kg)    PATIENT DIAGNOSIS(ES):    Diagnosis: RIGHT HIP CEPHALOMEDULLARY NAILING    Additional Pertinent Hx:  DM type II,neuropathy,HTN,Bell's Palsy,depression,anxiety,late onset Azheimer's disease   RESTRICTIONS/PRECAUTIONS:    Restrictions/Precautions  Restrictions/Precautions: (Ind w/c t/fs)  Required Braces or Orthoses?: No  Position Activity Restriction  Other position/activity restrictions: L clavicle fx with ORIF 20  OVERALL  ORIENTATION STATUS:  Overall Orientation Status: Within Normal Limits  PAIN:  Pain Level: 3  Pain Type: Surgical pain    Pain Location: Hip     Pain Orientation: Right      NEUROLOGICAL                       Sensation  Overall Sensation Status: Impaired  STRENGTH  Strength RLE  Strength RLE: Exception  Comment: HIP 2/5; KNEE 3/5; ANKLE 4/5  Strength LLE  Strength LLE: WFL  ROM  AROM RLE (degrees)  RLE AROM: Exceptions  RLE General AROM: DECREASED RIGHT HIP/KNEE  AROM LLE (degrees)  LLE AROM : WFL  POSTURE/BALANCE  Balance  Posture: Good  Sitting - Static: Good  Sitting - Dynamic: Good  Standing - Static: Fair  Standing - Dynamic: Fair       ACTIVITY TOLERANCE  Activity Tolerance  Activity Tolerance: Patient Tolerated treatment well      BED MOBILITY  Bed Mobility  Supine to Sit: Independent  Sit to Supine: Independent  Scooting: Modified independent  TRANSFERS  Sit to Stand: Independent      Bed to Chair: Independent  Car Transfer: Independent     WHEELCHAIR PROPULSION 1  Propulsion 1  Propulsion: Manual  Level: Level Tile  Method: TOBIAS MACHADO  Level of Assistance: Independent  Description/ Details: Incorporated turns.   Distance: 200'  WHEELCHAIR PROPULSION 2  Propulsion 2  Propulsion: Manual  Level: Level Tile  Method: CARTER COLLADO  Level of Assistance: Independent  Description/ Details: Turns included Distance: 150'  AMBULATION 1  Ambulation 1  Surface: level tile  Device: Parallel Bars  Other Apparatus: Wheelchair follow  Assistance: Contact guard assistance, Minimal assistance  Quality of Gait: Insufficient push force with UE's for unloading weight on LLE, but pt able to take 2-3 small hops barely clearing her L foot from the floor this date  Distance: 1'  Comments: Pt. unable to maintain TTWB RLE to amb. AMBULATION 2     STAIRS       GOALS:  Short term goals  Time Frame for Short term goals: 2 WEEKS  Short term goal 1: BED MOBILITY CGA  Short term goal 2: TRANSFERS MIN A  Short term goal 3: PROPEL  FT SBA  Short term goal 4: AMBULATE 25 FT WITH RW CGA  Short term goal 5: UP/DOWN 6 IN STEP WITH RW MOD A    Long term goals  Time Frame for Long term goals : 4 WEEKS  Long term goal 1: INDEP BED MOB  Long term goal 2: INDEP TF SURFACE TO SURFACE  Long term goal 3: PROPEL  FT INDEP  Long term goal 4: AMBULATE 50 FT WITH RW INDEP  Long term goal 5: UP/DWON 6 IN STEP WITH RW CGA  HOME LIVING:     Type of Home: Apartment  Home Layout: One level           ASSESSMENT (IMPAIRMENTS/BARRIERS): Body structures, Functions, Activity limitations: Decreased functional mobility , Decreased ADL status, Decreased ROM, Decreased strength, Decreased cognition, Decreased endurance, Decreased sensation, Decreased balance, Decreased posture, Decreased coordination  Assessment: Pt. Ind with HEP given.   Activity Tolerance: Patient Tolerated treatment well     PLAN:  Plan  Times per week: 5-6  Times per day: (1-2)  Plan weeks: 4  Current Treatment Recommendations: Strengthening, ROM, Balance Training, Functional Mobility Training, Transfer Training, Endurance Training, Wheelchair Mobility Training, Gait Training, Stair training, Pain Management, Home Exercise Program, Safety Education & Training, Patient/Caregiver Education & Training, Equipment Evaluation, Education, & procurement Discharge Recommendations: Continue to assess pending progress  PATIENT REQUIRES AND IS REASONABLY EXPECTED TO ACTIVELY PARTICIPATE IN AT LEAST 3 HOURS OF INTENSIVE THERAPY PER DAY AT LEAST 5 DAYS PER WEEK, AND BE EXPECTED TO MAKE MEASURABLE IMPROVEMENT THAT WILL BE OF PRACTICAL VALUE TO IMPROVE THE PATIENT'S FUNCTIONAL CAPACITY OR ADAPTATION TO IMPAIRMENTS.    PATIENT GOAL FOR REHAB:  RETURN TO PRIOR LEVEL OF FUNCTION       IRF/LUCY  Roll Left and Right  Assistance Needed: Independent  CARE Score: 6  Discharge Goal: Independent    Sit to Lying  Assistance Needed: Independent  CARE Score: 6  Discharge Goal: Independent    Lying to Sitting on Side of Bed  Assistance Needed: Independent  CARE Score: 6  Discharge Goal: Independent    Sit to Stand  Assistance Needed: Independent  CARE Score: 6  Discharge Goal: Independent    Chair/Bed-to-Chair Transfer  Assistance Needed: Independent  CARE Score: 6  Discharge Goal: Independent    Car Transfer  Assistance Needed: Independent  CARE Score: 6  Discharge Goal: Independent    Walk 10 Feet  Assistance Needed: Partial/moderate assistance  Reason if not Attempted: Not attempted due to medical condition or safety concerns  CARE Score: 88  Discharge Goal: Independent    Walk 50 Feet with Two Turns  Reason if not Attempted: Not attempted due to medical condition or safety concerns  CARE Score: 88  Discharge Goal: Independent    Walk 150 Feet  Reason if not Attempted: Not attempted due to medical condition or safety concerns  CARE Score: 88  Discharge Goal: Not Attempted    Walking 10 Feet on Uneven Surfaces  Reason if not Attempted: Not attempted due to medical condition or safety concerns  CARE Score: 88  Discharge Goal: Not Attempted    1 Step (Curb)  Reason if not Attempted: Not attempted due to medical condition or safety concerns  CARE Score: 88  Discharge Goal: Supervision or touching assistance    4 Steps Reason if not Attempted: Not attempted due to medical condition or safety concerns  CARE Score: 88  Discharge Goal: Not Attempted    12 Steps  Reason if not Attempted: Not attempted due to medical condition or safety concerns  CARE Score: 88  Discharge Goal: Not Attempted    Wheel 50 Feet with Two Turns  Assistance Needed: Independent  CARE Score: 6  Discharge Goal: Independent    Wheel 150 Feet  Assistance Needed: Independent  CARE Score: 6  Discharge Goal: Independent      LAST TREATMENT TIME  PT Individual Minutes  Time In: 3410  Time Out: [phone]  Minutes: 40

## 2020-12-28 ENCOUNTER — TELEPHONE (OUTPATIENT)
Dept: CARDIOLOGY | Facility: CLINIC | Age: 85
End: 2020-12-28

## 2020-12-28 ENCOUNTER — CARE COORDINATION (OUTPATIENT)
Dept: CASE MANAGEMENT | Age: 85
End: 2020-12-28

## 2020-12-28 NOTE — TELEPHONE ENCOUNTER
Patient called and left a voicemail wanting to verify her appt time on 12/30/2020.  Left a voicemail with the appt date and time with her.

## 2020-12-28 NOTE — CARE COORDINATION
[facility] Transitions Follow Up Call    2020    Patient: Tiffany Frias  Patient : 1935   MRN: [id number]  Reason for Admission:   Discharge Date: 20 RARS: Readmission Risk Score: 25         Spoke with: Bella Gaston Transitions Subsequent and Final Call    Subsequent and Final Calls  Do you have any ongoing symptoms?: No  Have your medications changed?: No  Do you have any questions related to your medications?: No  Do you currently have any active services?: No  Do you have any needs or concerns that I can assist you with?: No  Identified Barriers: None  Care Transitions Interventions  Other Interventions: Follow Up : Spoke with patient today for follow up CTC call. She says she is doing well. Says she was able to get her Eliquis filled and taking. She says appetite is good, she is walking with walker, doing well. She is able to get herself into bed and back and forth to the bathroom. She has her follow up with Dr. Grover Queen  and  with Dr. Vic Flores, and Dr. David Mao at [facility] at the end of December. She says incision looks good, no problems with swelling redness or drainage. She had her post op xray and it was good. No issues now with bowels or bladder, says bit of constipation resolved. Encouraged to call with prn needs. Will follow up at a later time. .   Future Appointments   Date Time Provider Ernie Plascencia   2021  2:30 PM MD [facility]       Raji Calhoun RN

## 2020-12-30 ENCOUNTER — OFFICE VISIT (OUTPATIENT)
Dept: CARDIOLOGY | Facility: CLINIC | Age: 85
End: 2020-12-30

## 2020-12-30 VITALS
WEIGHT: 118 LBS | DIASTOLIC BLOOD PRESSURE: 70 MMHG | HEIGHT: 65 IN | BODY MASS INDEX: 19.66 KG/M2 | HEART RATE: 78 BPM | OXYGEN SATURATION: 99 % | SYSTOLIC BLOOD PRESSURE: 130 MMHG

## 2020-12-30 DIAGNOSIS — R94.31 ABNORMAL ECG: ICD-10-CM

## 2020-12-30 DIAGNOSIS — I10 ESSENTIAL HYPERTENSION: ICD-10-CM

## 2020-12-30 DIAGNOSIS — R01.1 HEART MURMUR: ICD-10-CM

## 2020-12-30 DIAGNOSIS — E11.9 CONTROLLED TYPE 2 DIABETES MELLITUS WITHOUT COMPLICATION, WITHOUT LONG-TERM CURRENT USE OF INSULIN (HCC): Primary | ICD-10-CM

## 2020-12-30 DIAGNOSIS — R00.2 PALPITATIONS: ICD-10-CM

## 2020-12-30 DIAGNOSIS — E78.2 MIXED HYPERLIPIDEMIA: ICD-10-CM

## 2020-12-30 PROCEDURE — 99214 OFFICE O/P EST MOD 30 MIN: CPT | Performed by: INTERNAL MEDICINE

## 2020-12-30 PROCEDURE — 93000 ELECTROCARDIOGRAM COMPLETE: CPT | Performed by: INTERNAL MEDICINE

## 2020-12-30 RX ORDER — IRON POLYSACCHARIDE COMPLEX 150 MG
150 CAPSULE ORAL DAILY
COMMUNITY
Start: 2020-12-23 | End: 2021-12-04

## 2020-12-30 RX ORDER — PANTOPRAZOLE SODIUM 40 MG/1
40 TABLET, DELAYED RELEASE ORAL
COMMUNITY
Start: 2020-12-23 | End: 2021-03-04

## 2020-12-30 RX ORDER — DOCUSATE SODIUM 100 MG/1
100 CAPSULE, LIQUID FILLED ORAL 2 TIMES DAILY
COMMUNITY
Start: 2020-12-04

## 2020-12-30 RX ORDER — OXYCODONE HYDROCHLORIDE AND ACETAMINOPHEN 5; 325 MG/1; MG/1
1 TABLET ORAL
COMMUNITY
Start: 2020-12-22 | End: 2021-01-21

## 2020-12-30 RX ORDER — ATORVASTATIN CALCIUM 10 MG/1
10 TABLET, FILM COATED ORAL DAILY
COMMUNITY
Start: 2020-12-23 | End: 2021-12-06 | Stop reason: SDUPTHER

## 2020-12-30 NOTE — PROGRESS NOTES
Dalia Betts  [id number]  1935  85 y.o.  female    Referring Provider: Les Henderson MD    Reason for Follow-up Visit: Here for routine follow up   Prior palpitations, now better  Essential Hypertension  Recent accidental fall and fracture of right hip s/p surgery Lali      Subjective    Overall feeling well   No chest pain or shortness of breath   No significant pedal edema    Compliant with medications and dietary advice  Effort tolerance limited more by orthopedic rather than cardiac related issues, therefore difficult to assess functional capacity.     On wheel chair now     No presyncope or syncope  Compliant with medications    Tolerating current medications well with no untoward side effects   Compliant with prescribed medication regimen. Tries to adhere to cardiac diet.      For one day had episodes of recurrent palpitations   Occurred several times   No associated dizziness presyncope or syncope   No recurrence  Palpitations lasted for less than 10 mins    Sometimes feels has irregular pulse   Not checking BP regularly at home        History of present illness:  Dalia Betts is a 85 y.o. yo female with history of Essential Hypertension   who presents today for   Chief Complaint   Patient presents with   • Palpitations     6 mo f/u   .    History  Past Medical History:   Diagnosis Date   • Bell palsy    • Chest pain    • Diabetes mellitus (CMS/HCC)    • HLD (hyperlipidemia)    • HTN (hypertension)    • MI (myocardial infarction) (CMS/HCC)    • Palpitations    ,   Past Surgical History:   Procedure Laterality Date   • APPENDECTOMY     • CHOLECYSTECTOMY     • ELBOW FUSION     • EYE SURGERY      cataract extraction x 2   • HERNIA REPAIR     • HYSTERECTOMY     • SINUSOTOMY     ,   Family History   Problem Relation Age of Onset   • Pancreatitis Mother    • Heart disease Father    ,   Social History     Tobacco Use   • Smoking status: Never Smoker   • Smokeless tobacco: Never Used   Substance Use  Topics   • Alcohol use: No   • Drug use: No   ,     Medications  Current Outpatient Medications   Medication Sig Dispense Refill   • apixaban (ELIQUIS) 2.5 MG tablet tablet Take 2.5 mg by mouth 2 (two) times a day.     • aspirin 81 MG EC tablet Take 81 mg by mouth Daily.     • atorvastatin (LIPITOR) 10 MG tablet Take 10 mg by mouth Daily.     • docusate sodium (COLACE) 100 MG capsule Take 100 mg by mouth 2 (two) times a day.     • donepezil (ARICEPT) 5 MG tablet Take 5 mg by mouth As Needed.     • Fructose-Dextrose-Phosphor Acd (EMETROL PO) Take 10 mL by mouth As Needed.     • gabapentin (NEURONTIN) 300 MG capsule Take 300 mg by mouth Daily.     • iron polysaccharides (NIFEREX) 150 MG capsule Take 150 mg by mouth Daily.     • LORazepam (ATIVAN) 0.5 MG tablet Take 0.5 mg by mouth Every 8 (Eight) Hours As Needed for anxiety.     • memantine (NAMENDA) 5 MG tablet Take 5 mg by mouth Daily.     • metFORMIN (GLUCOPHAGE) 500 MG tablet Take 500 mg by mouth Daily.     • nitroglycerin (NITROSTAT) 0.4 MG SL tablet Place 1 tablet under the tongue Every 5 (Five) Minutes As Needed for Chest Pain. Take no more than 3 doses in 15 minutes. 100 tablet 11   • oxyCODONE-acetaminophen (PERCOCET) 5-325 MG per tablet Take 1 tablet by mouth.     • pantoprazole (PROTONIX) 40 MG EC tablet Take 40 mg by mouth.     • polyethyl glycol-propyl glycol (SYSTANE) 0.4-0.3 % solution ophthalmic solution Every 1 (One) Hour As Needed.     • Prenatal Vit-Fe Fumarate-FA (TRINATAL RX 1 PO) Take  by mouth.     • Psyllium 30.9 % powder Take  by mouth As Needed.     • venlafaxine XR (EFFEXOR-XR) 37.5 MG 24 hr capsule Take 37.5 mg by mouth Daily.     • vitamin D (ERGOCALCIFEROL) 74478 units capsule capsule Take 50,000 Units by mouth 1 (One) Time Per Week.     • atenolol (TENORMIN) 25 MG tablet TAKE ONE TABLET BY MOUTH EVERY DAY 90 tablet 4   • Bisacodyl (DUCODYL PO) Take  by mouth.     • Cyanocobalamin (B-12 COMPLIANCE INJECTION IJ) Inject  as directed Every  "30 (Thirty) Days.     • FOLIC ACID PO Take  by mouth.     • Prenatal Vit-Fe Fumarate-FA (VOL-PLUS) 27-1 MG tablet Take 1 tablet by mouth.       No current facility-administered medications for this visit.        Allergies:  Phenergan [promethazine hcl]    Review of Systems  Review of Systems   Constitution: Negative for malaise/fatigue.   HENT: Negative.    Eyes: Negative.    Cardiovascular: Positive for palpitations. Negative for chest pain, claudication, cyanosis, dyspnea on exertion, irregular heartbeat, leg swelling, near-syncope, orthopnea, paroxysmal nocturnal dyspnea and syncope.   Respiratory: Negative.    Endocrine: Negative.    Hematologic/Lymphatic: Negative.    Skin: Negative.    Gastrointestinal: Positive for abdominal pain. Negative for anorexia.   Genitourinary: Negative.    Neurological: Negative for weakness.   Psychiatric/Behavioral: Negative.        Objective     Physical Exam:  /70   Pulse 78   Ht 165.1 cm (65\")   Wt 53.5 kg (118 lb)   SpO2 99%   BMI 19.64 kg/m²   Physical Exam   Constitutional: She appears well-developed.   HENT:   Head: Normocephalic.   Eyes: Lids are normal.   Neck: Normal carotid pulses and no JVD present. No tracheal tenderness present. Carotid bruit is not present. No tracheal deviation and no edema present.   Cardiovascular: Regular rhythm, S1 normal, S2 normal and normal pulses.   Murmur heard.   Systolic murmur is present with a grade of 2/6.  Pulmonary/Chest: Effort normal. No stridor.   Abdominal: Soft. Normal appearance. She exhibits no distension. There is no abdominal tenderness.   Neurological: She is alert. No cranial nerve deficit or sensory deficit.   Skin: Skin is warm.   Psychiatric: Her speech is normal and behavior is normal. Thought content normal.       Results Review:    Results for orders placed during the hospital encounter of 10/16/17   Adult Stress Echo W/ Cont or Stress Agent if Necessary Per Protocol    Narrative · Left ventricular " systolic function is normal. Estimated EF = 55%.  · Normal stress echo with no significant echocardiographic evidence for   myocardial ischemia.        LEXISCAN CARDIOLITE STRESS TEST-     DATE OF EXAM- 5/10/2016     INDICATION- Chest pain.     PROCEDURE- The patient underwent Lexiscan Cardiolite stress test. Rest   dose of Cardiolite 10.5 mCi and stress dose 32.7 mCi. Injection of   Lexiscan did not cause chest pain and had shortness of breath. No   diagnostic electrocardiographic abnormalities noted. Raw data shows   slight anterior soft tissue attenuation artifact. Overall image quality   is good. Perfusion scan shows a small area of decreased uptake in the   inferior apical region seen in stress not in rest suggesting a small   inferior apical ischemia. Gated scan shows ejection fraction above 65%.     IMPRESSION-  1. Ejection fraction above 65%.  2. Small inferior apical ischemia.  3. Anterior soft tissue attenuation.           Reading Radiologist- GEORGETTE SIMPSON       Releasing Radiologist- GEORGETTE SIMPSON       Released Date Time- 05/12/16 1347       - C.L.A.   ------------------------------------------------------------------------------            ECG 12 Lead    Date/Time: 12/30/2020 9:53 AM  Performed by: Georgette Simpson MD  Authorized by: Georgette Simpson MD   Comparison: compared with previous ECG from 6/10/2020  Comparison to previous ECG: Ventricular rate increased from  48  to 70  beats per minute    Rhythm: sinus rhythm  Rate: normal  Conduction: conduction normal  QRS axis: normal  Other findings: non-specific ST-T wave changes    Clinical impression: abnormal EKG        ____________________________________________________________________________________________________________________________________________  Health maintenance and recommendations  Similar recommendations as last visit     Offered to give patient  a copy      Questions were encouraged, asked and answered to the patient's   understanding and satisfaction. Questions if any regarding current medications and side effects, need for refills and importance of compliance to medications stressed.    Reviewed available prior notes, consults, prior visits, laboratory findings, radiology and cardiology relevant reports. Updated chart as applicable. I have reviewed the patient's medical history in detail and updated the computerized patient record as relevant.      Updated patient regarding any new or relevant abnormalities on review of records or any new findings on physical exam. Mentioned to patient about purpose of visit and desirable health short and long term goals and objectives.    Primary to monitor CBC CMP Lipid panel and TSH as applicable    ___________________________________________________________________________________________________________________________________________      Assessment/Plan   Diagnoses and all orders for this visit:    1. Controlled type 2 diabetes mellitus without complication, without long-term current use of insulin (CMS/[facility]) (Primary)    2. Palpitations  -     Holter Monitor - 72 Hour Up To 21 Days; Future    3. Essential hypertension    4. Mixed hyperlipidemia    5. Abnormal ECG    6. Heart murmur  -     Adult Transthoracic Echo Complete w/ Color, Spectral and Contrast if necessary per protocol; Future    Other orders  -     ECG 12 Lead           Plan:    Orders Placed This Encounter   Procedures   • Holter Monitor - 72 Hour Up To 21 Days     Standing Status:   Future     Standing Expiration Date:   12/30/2021     Order Specific Question:   Reason for exam?     Answer:   Palpitations   • ECG 12 Lead     This order was created via procedure documentation   • Adult Transthoracic Echo Complete w/ Color, Spectral and Contrast if necessary per protocol     Myocardial strain to be performed during echocardiogram as long as technically feasible     Standing Status:   Future     Standing Expiration Date:    12/30/2021     Order Specific Question:   Reason for exam?     Answer:   Murmur or Click        She is on Eliquis that will be stopped by ortho  Monitor for any signs of bleeding including red or dark stools as well as easy bruisabilty. Fall precautions.       For now stop Aspirin as on Eliquis   Buy BP machine and monitor BP    Check BP and heart rates twice daily at least 3x / week at home and bring a recording for me to review next visit  If BP >130/85 or < 100/60 persistently over 3 reading 30 mins apart call sooner            Return in about 6 weeks (around 2/10/2021).

## 2021-01-05 ENCOUNTER — CARE COORDINATION (OUTPATIENT)
Dept: CASE MANAGEMENT | Age: 86
End: 2021-01-05

## 2021-01-05 NOTE — CARE COORDINATION
[facility] Transitions Follow Up Call    2021    Patient: Miller Boss  Patient : 1935   MRN: [id number]  Reason for Admission:   Discharge Date: 20 RARS: Readmission Risk Score: 25         Spoke with: [address] Transitions Subsequent and Final Call    Subsequent and Final Calls  Do you have any ongoing symptoms?: No  Have your medications changed?: No  Do you have any questions related to your medications?: No  Do you currently have any active services?: Yes  Are you currently active with any services?: Home Health  Do you have any needs or concerns that I can assist you with?: No  Identified Barriers: None  Care Transitions Interventions  Other Interventions: Follow Up : Spoke with patient today for follow up CT call. She says she is doing pretty good. Says she is eating and staying hydrated. She has a follow up with DR. Sharma Babinski tomorrow. She is continuing her exercises she learned on the rehab unit, as well as \"sit and be fit\" where she sits in a chair to work different parts of her body. She says her mobility is getting better. Dylan Huffman is taking good care of her, says [place]. She is in really good spirits today, good progress being made physically as well as mentally. Encouraged to call with prn needs. Will follow up with her later next week.   Future Appointments   Date Time Provider Ernie Plascencia   2021  2:30 PM [facility]       Homer Garrido RN

## 2021-01-06 ENCOUNTER — VIRTUAL VISIT (OUTPATIENT)
Dept: PRIMARY CARE CLINIC | Age: 86
End: 2021-01-06
Payer: MEDICARE

## 2021-01-06 DIAGNOSIS — E11.42 DIABETIC PERIPHERAL NEUROPATHY ASSOCIATED WITH TYPE 2 DIABETES MELLITUS (HCC): ICD-10-CM

## 2021-01-06 DIAGNOSIS — F02.80 LATE ONSET ALZHEIMER'S DISEASE WITHOUT BEHAVIORAL DISTURBANCE (HCC): Primary | Chronic | ICD-10-CM

## 2021-01-06 DIAGNOSIS — S72.124S: ICD-10-CM

## 2021-01-06 DIAGNOSIS — I73.9 PERIPHERAL VASCULAR DISEASE, UNSPECIFIED (HCC): ICD-10-CM

## 2021-01-06 DIAGNOSIS — D50.8 IRON DEFICIENCY ANEMIA SECONDARY TO INADEQUATE DIETARY IRON INTAKE: ICD-10-CM

## 2021-01-06 DIAGNOSIS — K59.04 FUNCTIONAL CONSTIPATION: ICD-10-CM

## 2021-01-06 DIAGNOSIS — G30.1 LATE ONSET ALZHEIMER'S DISEASE WITHOUT BEHAVIORAL DISTURBANCE (HCC): Primary | Chronic | ICD-10-CM

## 2021-01-06 DIAGNOSIS — E55.9 VITAMIN D DEFICIENCY: ICD-10-CM

## 2021-01-06 DIAGNOSIS — E53.8 FOLATE DEFICIENCY: ICD-10-CM

## 2021-01-06 DIAGNOSIS — Z91.81 AT HIGH RISK FOR FALLS: ICD-10-CM

## 2021-01-06 DIAGNOSIS — F33.42 RECURRENT MAJOR DEPRESSIVE DISORDER, IN FULL REMISSION (HCC): ICD-10-CM

## 2021-01-06 PROCEDURE — 99214 OFFICE O/P EST MOD 30 MIN: CPT | Performed by: FAMILY MEDICINE

## 2021-01-06 PROCEDURE — 1111F DSCHRG MED/CURRENT MED MERGE: CPT | Performed by: FAMILY MEDICINE

## 2021-01-06 PROCEDURE — G8484 FLU IMMUNIZE NO ADMIN: HCPCS | Performed by: FAMILY MEDICINE

## 2021-01-06 PROCEDURE — 1123F ACP DISCUSS/DSCN MKR DOCD: CPT | Performed by: FAMILY MEDICINE

## 2021-01-06 PROCEDURE — 1090F PRES/ABSN URINE INCON ASSESS: CPT | Performed by: FAMILY MEDICINE

## 2021-01-06 PROCEDURE — G8428 CUR MEDS NOT DOCUMENT: HCPCS | Performed by: FAMILY MEDICINE

## 2021-01-06 PROCEDURE — 1036F TOBACCO NON-USER: CPT | Performed by: FAMILY MEDICINE

## 2021-01-06 PROCEDURE — 4040F PNEUMOC VAC/ADMIN/RCVD: CPT | Performed by: FAMILY MEDICINE

## 2021-01-06 PROCEDURE — G8420 CALC BMI NORM PARAMETERS: HCPCS | Performed by: FAMILY MEDICINE

## 2021-01-06 PROCEDURE — G8400 PT W/DXA NO RESULTS DOC: HCPCS | Performed by: FAMILY MEDICINE

## 2021-01-06 RX ORDER — IRON POLYSACCHARIDE COMPLEX 150 MG
150 CAPSULE ORAL DAILY
Qty: 90 CAPSULE | Refills: 3 | Status: SHIPPED | OUTPATIENT
Start: 2021-01-06

## 2021-01-06 RX ORDER — DOCUSATE SODIUM 100 MG/1
100 CAPSULE, LIQUID FILLED ORAL 2 TIMES DAILY
Qty: 180 CAPSULE | Refills: 3 | Status: SHIPPED | OUTPATIENT
Start: 2021-01-06 | End: 2022-04-18 | Stop reason: SDUPTHER

## 2021-01-06 RX ORDER — LANOLIN ALCOHOL/MO/W.PET/CERES
400 CREAM (GRAM) TOPICAL DAILY
Qty: 30 TABLET | Refills: 3 | Status: SHIPPED | OUTPATIENT
Start: 2021-01-06

## 2021-01-06 RX ORDER — ERGOCALCIFEROL 1.25 MG/1
50000 CAPSULE ORAL WEEKLY
Qty: 12 CAPSULE | Refills: 3 | Status: SHIPPED | OUTPATIENT
Start: 2021-01-06

## 2021-01-06 RX ORDER — HYDROCODONE BITARTRATE AND ACETAMINOPHEN 5; 325 MG/1; MG/1
1 TABLET ORAL EVERY 8 HOURS PRN
Qty: 42 TABLET | Refills: 0 | Status: SHIPPED | OUTPATIENT
Start: 2021-01-06 | End: 2021-01-20

## 2021-01-06 RX ORDER — ATORVASTATIN CALCIUM 10 MG/1
10 TABLET, FILM COATED ORAL DAILY
Qty: 90 TABLET | Refills: 3 | Status: SHIPPED | OUTPATIENT
Start: 2021-01-06

## 2021-01-06 NOTE — PROGRESS NOTES
Shirlene Morataya is a 81 y.o. female who presents today for No chief complaint on file. HPI  Patient is here for f/u recent troch lesser fx R hip in Dec and f/u rehab. Healing well. She had subsequent f/u w/ Dr. Phuong Davis and doing well. recently had echo w/ EF 55 and recent holter. currenlty on eliquis for postop hip fx. No bleeding noted. [place] No change in PMH, family, social, or surgical history unless mentioned above. Review of Systems   Constitutional: Negative for chills and fever. Respiratory: Negative for cough, chest tightness, shortness of breath and wheezing. Cardiovascular: Negative for chest pain, palpitations and leg swelling. Gastrointestinal: Positive for constipation. Negative for abdominal pain, diarrhea, nausea and vomiting. Genitourinary: Negative for difficulty urinating, dysuria and frequency. Musculoskeletal: Positive for arthralgias, back pain and gait problem. Neurological: Negative for weakness and numbness.        Past Medical History:   Diagnosis Date    Abdominal pain     Anxiety     Dr Mikael Molina Bell's palsy 2003    Colon polyp     Depression     Dr Mikael Molina Diverticulosis     Female bladder prolapse     Hernia, abdominal     History of adenomatous polyp of colon     Hypertriglyceridemia     Neuropathy     Osteopenia     Type II or unspecified type diabetes mellitus without mention of complication, not stated as uncontrolled     Vitamin B 12 deficiency        Current Outpatient Medications   Medication Sig Dispense Refill    metFORMIN (GLUCOPHAGE) 500 MG tablet Take 1 tablet by mouth daily (with breakfast) 180 tablet 3    docusate sodium (COLACE) 100 MG capsule Take 1 capsule by mouth 2 times daily 180 capsule 3    vitamin D (ERGOCALCIFEROL) 1.25 MG (95371 UT) CAPS capsule Take 1 capsule by mouth once a week 12 capsule 3    folic acid (V-R FOLIC ACID) 276 MCG tablet Take 1 tablet by mouth daily 30 tablet 3  iron polysaccharides (NIFEREX) 150 MG capsule Take 1 capsule by mouth daily 90 capsule 3    atorvastatin (LIPITOR) 10 MG tablet Take 1 tablet by mouth daily 90 tablet 3    HYDROcodone-acetaminophen (NORCO) 5-325 MG per tablet Take 1 tablet by mouth every 8 hours as needed for Pain for up to 14 days. Intended supply: 30 days 42 tablet 0    blood glucose test strips (ASCENSIA AUTODISC VI;ONE TOUCH ULTRA TEST VI) strip 1 each by In Vitro route daily As needed. (Patient not taking: Reported on 12/23/2020) 100 each 3    Diabetic Shoe MISC by Does not apply route DISPENSE ONE PAIR DIABETIC SHOES AND THREE PAIRS OF HEAT MOLDED INSERTS 1 each 0    oxyCODONE-acetaminophen (PERCOCET) 5-325 MG per tablet Take 1 tablet by mouth 3 times daily for 30 days. 60 tablet 0    aspirin 81 MG EC tablet Take 1 tablet by mouth daily 30 tablet 3    apixaban (ELIQUIS) 2.5 MG TABS tablet Take 1 tablet by mouth 2 times daily 60 tablet 0    gabapentin (NEURONTIN) 300 MG capsule Take 1 capsule by mouth nightly for 31 days. 90 capsule 3    Lancets MISC 1 each by Does not apply route 3 times daily 600 each 1    pantoprazole (PROTONIX) 40 MG tablet Take 1 tablet by mouth every morning (before breakfast) 30 tablet 3    Blood Glucose Monitoring Suppl (FREESTYLE FREEDOM) KIT 1 kit by Does not apply route 3 times daily 1 kit 0    ondansetron (ZOFRAN) 4 MG tablet Take 1 tablet by mouth every 8 hours as needed for Nausea or Vomiting 20 tablet 1    memantine (NAMENDA) 5 MG tablet Take 5 mg by mouth daily      donepezil (ARICEPT) 5 MG tablet Take 5 mg by mouth daily       nitroGLYCERIN (NITROSTAT) 0.4 MG SL tablet Place 1 tablet under the tongue every 5 minutes as needed for Chest pain up to max of 3 total doses.  If no relief after 1 dose, call 911. 25 tablet 3    Psyllium (METAMUCIL PO) Take by mouth daily      Tetrahydrozoline HCl (EYE DROPS OP) Apply 2 drops to eye 2 times daily Indications: systane  venlafaxine (EFFEXOR-XR) 37.5 MG XR capsule Take 37.5 mg by mouth daily. No current facility-administered medications for this visit.         Allergies   Allergen Reactions    Phenergan [Promethazine] Other (See Comments)     Makes \"Crazy\"       Past Surgical History:   Procedure Laterality Date    APPENDECTOMY      BLADDER REPAIR  1984    states stiched her bladder up but was not a suspension-Dr Garcia    CATARACT REMOVAL  12/2014    CATARACT REMOVAL  01/2015    CHOLECYSTECTOMY  age 22    CLAVICLE SURGERY Left 6/22/2020    OPEN REDUCTION INTERNAL FIXATION LEFT CLAVICLE performed by Ananth Montero MD at [address] [place] [address]  02/15/2012    Dr Jolene Lawson AP, diverticulosis coli, small internal hemorrhoids, 5 yr recall    COLONOSCOPY  3/1998    negative    COLONOSCOPY  5/1/07     Dr Ana Klein, diverticulosis coli/small internal hemorrhoids    COLONOSCOPY  9/16/04    Dr Blood-diverticulosis coli, ileocecal valve lipoma    COLONOSCOPY N/A 1/25/2016    Dr Shweta Hunt AP, 5 yr recall    ELBOW FRACTURE SURGERY  1970s    EYE SURGERY Bilateral     cataract    FEMUR FRACTURE SURGERY Right 12/4/2020    FEMUR IM NAIL TARIQ INSERTION performed by Kimberley Bennett MD at [facility]    Dr Javier Klein partner   [address] [facility]    total-Dr. Hunter Herrera [address] N/A 2/15/2016    OPEN REPAIR OF RECURRENT INCISIONAL HERNIA WITH MESH  performed by Pete Dodd MD at [address] [place] [address]  02/16/2012    Dr White-Barretts/gastritis    UPPER GASTROINTESTINAL ENDOSCOPY  2/1998    Dr Ana Klein, negative    UPPER GASTROINTESTINAL ENDOSCOPY  1/2/07    Dr Ana Klein, gastritis    UPPER GASTROINTESTINAL ENDOSCOPY N/A 1/11/2016    Dr Erna Vanessa junction w/minimal chronic inflammation       Social History     Tobacco Use    Smoking status: Never Smoker    Smokeless tobacco: Never Used Substance Use Topics    Alcohol use: No     Frequency: Never    Drug use: No       Family History   Problem Relation Age of Onset   Ravi Olsen Cancer Mother         Pancreatic    Other Mother     Cancer Father         melanoma    Colon Cancer Neg Hx     Colon Polyps Neg Hx     Esophageal Cancer Neg Hx     Liver Cancer Neg Hx     Liver Disease Neg Hx     Rectal Cancer Neg Hx     Stomach Cancer Neg Hx        There were no vitals taken for this visit. Physical Exam  Vitals signs and nursing note reviewed. Constitutional:       General: She is not in acute distress. Appearance: She is well-developed. She is not diaphoretic. HENT:      Head: Normocephalic and atraumatic. Cardiovascular:      Rate and Rhythm: Normal rate and regular rhythm. Heart sounds: Normal heart sounds. No murmur. Pulmonary:      Effort: Pulmonary effort is normal. No respiratory distress. Breath sounds: Normal breath sounds. No wheezing or rales. Abdominal:      General: Bowel sounds are normal. There is no distension. Palpations: Abdomen is soft. Tenderness: There is no abdominal tenderness. Musculoskeletal:      Comments: No pretibial edema b/l. Skin:     General: Skin is warm and dry. Neurological:      Mental Status: She is alert and oriented to person, place, and time. Assessment:    ICD-10-CM    1. Late onset Alzheimer's disease without behavioral disturbance (HCC)  G30.1     F02.80    2. Closed nondisplaced fracture of lesser trochanter of right femur, sequela  S72.124S HYDROcodone-acetaminophen (NORCO) 5-325 MG per tablet   3. Recurrent major depressive disorder, in full remission ([facility] [address]  F33.42    4. 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)  E11.42 metFORMIN (GLUCOPHAGE) 500 MG tablet   5. Peripheral vascular disease, unspecified (HCC)  I73.9 atorvastatin (LIPITOR) 10 MG tablet   6.  At high risk for falls  Z91.81 7. Iron deficiency anemia secondary to inadequate dietary iron intake  D50.8 iron polysaccharides (NIFEREX) 150 MG capsule   8. Vitamin D deficiency  E55.9 vitamin D (ERGOCALCIFEROL) 1.25 MG (07787 UT) CAPS capsule   9. Folate deficiency  S84.5 folic acid (V-R FOLIC ACID) 719 MCG tablet   10. Functional constipation  K59.04 docusate sodium (COLACE) 100 MG capsule       Plan:   Pt had recent echo that was unremarkable, suspect arrhythmia issues w/o major structural origin other than atrial enlargement. Finishing eliquis soon. No orders of the defined types were placed in this encounter. Orders Placed This Encounter   Medications    metFORMIN (GLUCOPHAGE) 500 MG tablet     Sig: Take 1 tablet by mouth daily (with breakfast)     Dispense:  180 tablet     Refill:  3    docusate sodium (COLACE) 100 MG capsule     Sig: Take 1 capsule by mouth 2 times daily     Dispense:  180 capsule     Refill:  3    vitamin D (ERGOCALCIFEROL) 1.25 MG (06356 UT) CAPS capsule     Sig: Take 1 capsule by mouth once a week     Dispense:  12 capsule     Refill:  3    folic acid (V-R FOLIC ACID) 012 MCG tablet     Sig: Take 1 tablet by mouth daily     Dispense:  30 tablet     Refill:  3    iron polysaccharides (NIFEREX) 150 MG capsule     Sig: Take 1 capsule by mouth daily     Dispense:  90 capsule     Refill:  3    atorvastatin (LIPITOR) 10 MG tablet     Sig: Take 1 tablet by mouth daily     Dispense:  90 tablet     Refill:  3    HYDROcodone-acetaminophen (NORCO) 5-325 MG per tablet     Sig: Take 1 tablet by mouth every 8 hours as needed for Pain for up to 14 days.  Intended supply: 30 days     Dispense:  42 tablet     Refill:  0     Reduce doses taken as pain becomes manageable     Medications Discontinued During This Encounter   Medication Reason    Prenatal Vit-Fe Fumarate-FA (PRENATAL VITAMIN) 27-1 MG TABS tablet Therapy completed    vitamin D (ERGOCALCIFEROL) 1.25 MG (63259 UT) CAPS capsule REORDER  docusate sodium (COLACE) 100 MG capsule REORDER    metFORMIN (GLUCOPHAGE) 500 MG tablet REORDER    atorvastatin (LIPITOR) 10 MG tablet REORDER    iron polysaccharides (NIFEREX) 150 MG capsule REORDER     There are no Patient Instructions on file for this visit. Patient given educational handouts and has had all questions answered. Patient voices understanding and agrees to plans along with risks and benefits of plan. Patient isinstructed to continue prior meds, diet, and exercise plans unless instructed otherwise. Patient agrees to follow up as instructed and sooner if needed. Patient agrees to go to ER if condition becomes emergent. Notesmay be completed with dictation device and spelling errors may occur. Materials may be copied and pasted from a notepad outside of EMR, all of which, I, Dr. Lj Tolliver MD, take sole intellectual ownership of and have approved adding to my note. Return in about 4 months (around 5/6/2021) for OV (M-Wam) , HTN. On the basis of positive falls risk screening, assessment and plan is as follows: home safety tips provided.

## 2021-01-08 ENCOUNTER — OFFICE VISIT (OUTPATIENT)
Dept: PRIMARY CARE CLINIC | Age: 86
End: 2021-01-08
Payer: MEDICARE

## 2021-01-08 DIAGNOSIS — E53.8 VITAMIN B 12 DEFICIENCY: Primary | ICD-10-CM

## 2021-01-08 PROCEDURE — 96372 THER/PROPH/DIAG INJ SC/IM: CPT | Performed by: STUDENT IN AN ORGANIZED HEALTH CARE EDUCATION/TRAINING PROGRAM

## 2021-01-08 RX ORDER — CYANOCOBALAMIN 1000 UG/ML
1000 INJECTION INTRAMUSCULAR; SUBCUTANEOUS ONCE
Status: COMPLETED | OUTPATIENT
Start: 2021-01-08 | End: 2021-01-08

## 2021-01-08 RX ADMIN — CYANOCOBALAMIN 1000 MCG: 1000 INJECTION INTRAMUSCULAR; SUBCUTANEOUS at 12:17

## 2021-01-08 NOTE — PROGRESS NOTES
After obtaining consent, and per orders of Dr. Rafaela Oleary, injection of B-12 given in Left deltoid by Donta Mathews. Patient instructed to remain in clinic for 20 minutes afterwards, and to report any adverse reaction to me immediately.

## 2021-01-11 ENCOUNTER — TELEPHONE (OUTPATIENT)
Dept: CARDIOLOGY | Facility: CLINIC | Age: 86
End: 2021-01-11

## 2021-01-11 NOTE — TELEPHONE ENCOUNTER
Patient called and left a voicemail stating she needed to speak with someone regarding her upcoming ECHO.  She can be reached at [phone].  Thank you!

## 2021-01-12 ENCOUNTER — CARE COORDINATION (OUTPATIENT)
Dept: CASE MANAGEMENT | Age: 86
End: 2021-01-12

## 2021-01-12 NOTE — CARE COORDINATION
[facility] Transitions Follow Up Call    2021    Patient: Shirlene Morataya  Patient : 1935   MRN: [id number]  Reason for Admission:   Discharge Date: 20 RARS: Readmission Risk Score: 25         Spoke with: AmandaMarvin Hope [address] Transitions Subsequent and Final Call    Subsequent and Final Calls  Do you have any ongoing symptoms?: No  Have your medications changed?: No  Do you have any questions related to your medications?: No  Do you currently have any active services?: No  Are you currently active with any services?: Home Health  Do you have any needs or concerns that I can assist you with?: No  Identified Barriers: None  Care Transitions Interventions  Other Interventions: Follow Up : Spoke with patient today for follow up CT call. She says she is getting along quite well. Says she is making good food choices, trying to hold off on very much spice in her foods, staying hydrated. She says that she has had an xray of her knee, ortho says it is worn a bit, bone to bone. She has followed up with DR. Shivam Oleary as well. He told her she could stop her Eliquis but he is going to hold off on doing that until she hears back from the cardiology nurse at [place]. She saw Dr. Karen Huitron and he put a Zio patch on her and she has taken it off and mailed it back to them today, says when she hears back from that, she will decide whether to stop the Eliquis or not for sure. She says she is still doing her exercises, does the 'Sit and be Fit\" and knows her limitations. She is mobile and uses her walker, says she has not totally gotten to do on her own, but is getting close. She is no longer on pain medications, says she is going to talk to her doctor about her gabapentin and increasing the 300mg dose from daily to BID. She says since she is not on pain medications, she feels it would benefit her. She is very appreciative of the calls, says she is going to continue to stay in.  She is waiting to get her COVID vaccine. Says no problems or complaints today. Encouraged to call with prn issues or needs. Discussed that if she is still progressing and doing well, next week will be her final call.    Future Appointments   Date Time Provider Ernie Plascencia   5/10/2021 10:00 AM Sherine Moreno MD [facility] Kelvin Diaz RN

## 2021-01-18 PROBLEM — F33.42 RECURRENT MAJOR DEPRESSIVE DISORDER, IN FULL REMISSION (HCC): Status: ACTIVE | Noted: 2021-01-18

## 2021-01-18 ASSESSMENT — ENCOUNTER SYMPTOMS
SHORTNESS OF BREATH: 0
NAUSEA: 0
CHEST TIGHTNESS: 0
VOMITING: 0
ABDOMINAL PAIN: 0
COUGH: 0
CONSTIPATION: 1
DIARRHEA: 0
WHEEZING: 0
BACK PAIN: 1

## 2021-01-19 ENCOUNTER — CARE COORDINATION (OUTPATIENT)
Dept: CASE MANAGEMENT | Age: 86
End: 2021-01-19

## 2021-01-19 NOTE — CARE COORDINATION
[facility] Transitions Follow Up Call    2021    Patient: Dayami Nino  Patient : 1935   MRN: [id number]  Reason for Admission:   Discharge Date: 20 RARS: Readmission Risk Score: 22         Spoke with: Marika Hope Transitions Subsequent and Final Call    Subsequent and Final Calls  Are you currently active with any services?: Home Health  Care Transitions Interventions  Other Interventions: Follow Up : Spoke with patient to relay that Dr. Damien Keene office will be contacting her regarding setting up a follow up appointment, and to be anticipating the call. She is doing well, progressing towards her goals, and not having any difficulties at this time. Discussed any problems she might have, and she went over her list of issues, and had appointments made, testing scheduled, and no further concerns. Will discharge from CTN services at this time.    Future Appointments   Date Time Provider Ernie Plascencia   5/10/2021 10:00 AM Jose Scanlon  [facility] Dakotah Harris RN

## 2021-01-19 NOTE — CARE COORDINATION
Spoke with Pillo Dong at Dr. Zina Martin office regarding patient request for appointment Feb 10th in PM and she says Dr. Jn Mayo is booked for the week. She will forward request to his nurse to call patient and see about setting up the appointment. CTN will make patient aware.

## 2021-01-19 NOTE — CARE COORDINATION
[facility] Transitions Follow Up Call    2021    Patient: Nivia Lamar  Patient : 1935   MRN: [id number]  Reason for Admission:   Discharge Date: 20 RARS: Readmission Risk Score: 25         Spoke with: [address] Transitions Subsequent and Final Call    Subsequent and Final Calls  Do you have any ongoing symptoms?: No  Have your medications changed?: No  Do you have any questions related to your medications?: No  Do you currently have any active services?: No  Are you currently active with any services?: Home Health  Do you have any needs or concerns that I can assist you with?: No  Identified Barriers: None  Care Transitions Interventions  Other Interventions: Follow Up : Spoke with patient today for follow up CT call. She says she is doing very well. Says she has had a follow up with DR. Apoorva Garcia, Dr. Kallie Roberts and is requesting a follow up with Dr. Xavier Manning. She would like CTN to see if it could be around  in the pm, as she is seeing Dr. Ca Schultz at [facility] in the am.She says she is using her walker for mobility, does not have any home care or outpt physical therapy. She says she is doing well, eating and drinking normally. She did stop the Pantoprazole as she says it was making her stools dark and not setting well with her. She has gotten her Gabapentin increased to 300mg BID by Dr. Kallie Roberts, and she feels it will do her some good, as she is not taking any pain medications. She is scheduled for a 2D echo tomorrow for Dr. Ca Schultz. She says DR. Apoorva Garcia has taken her off of Eliquis. CTN will make contact with Dr. Tellez Round office in regards to appointment for her, and make her aware. Will follow up afterwards.    Future Appointments   Date Time Provider Ernie Plascencia   5/10/2021 10:00 AM Chaim Todd  [facility] Nasir Vora RN

## 2021-01-20 ENCOUNTER — APPOINTMENT (OUTPATIENT)
Dept: CARDIOLOGY | Facility: HOSPITAL | Age: 86
End: 2021-01-20

## 2021-01-20 ENCOUNTER — HOSPITAL ENCOUNTER (OUTPATIENT)
Dept: CARDIOLOGY | Facility: HOSPITAL | Age: 86
Discharge: HOME | End: 2021-01-20
Admitting: INTERNAL MEDICINE

## 2021-01-20 VITALS
BODY MASS INDEX: 19.66 KG/M2 | DIASTOLIC BLOOD PRESSURE: 70 MMHG | HEIGHT: 65 IN | SYSTOLIC BLOOD PRESSURE: 130 MMHG | WEIGHT: 118 LBS

## 2021-01-20 DIAGNOSIS — R01.1 HEART MURMUR: ICD-10-CM

## 2021-01-20 PROCEDURE — 93306 TTE W/DOPPLER COMPLETE: CPT | Performed by: INTERNAL MEDICINE

## 2021-01-20 PROCEDURE — 25010000002 PERFLUTREN 6.52 MG/ML SUSPENSION: Performed by: INTERNAL MEDICINE

## 2021-01-20 PROCEDURE — 93306 TTE W/DOPPLER COMPLETE: CPT

## 2021-01-20 RX ADMIN — PERFLUTREN 8.48 MG: 6.52 INJECTION, SUSPENSION INTRAVENOUS at 11:04

## 2021-01-21 ENCOUNTER — APPOINTMENT (OUTPATIENT)
Dept: CARDIOLOGY | Facility: HOSPITAL | Age: 86
End: 2021-01-21

## 2021-01-22 ENCOUNTER — TELEPHONE (OUTPATIENT)
Dept: CARDIOLOGY | Facility: CLINIC | Age: 86
End: 2021-01-22

## 2021-01-22 LAB
BH CV ECHO MEAS - AO MAX PG (FULL): 5.1 MMHG
BH CV ECHO MEAS - AO MAX PG: 8.8 MMHG
BH CV ECHO MEAS - AO MEAN PG (FULL): 3 MMHG
BH CV ECHO MEAS - AO MEAN PG: 5 MMHG
BH CV ECHO MEAS - AO ROOT AREA (BSA CORRECTED): 1.7
BH CV ECHO MEAS - AO ROOT AREA: 5.7 CM^2
BH CV ECHO MEAS - AO ROOT DIAM: 2.7 CM
BH CV ECHO MEAS - AO V2 MAX: 148 CM/SEC
BH CV ECHO MEAS - AO V2 MEAN: 105 CM/SEC
BH CV ECHO MEAS - AO V2 VTI: 35.2 CM
BH CV ECHO MEAS - AVA(I,A): 2.7 CM^2
BH CV ECHO MEAS - AVA(I,D): 2.7 CM^2
BH CV ECHO MEAS - AVA(V,A): 2.2 CM^2
BH CV ECHO MEAS - AVA(V,D): 2.2 CM^2
BH CV ECHO MEAS - BSA(HAYCOCK): 1.6 M^2
BH CV ECHO MEAS - BSA: 1.6 M^2
BH CV ECHO MEAS - BZI_BMI: 20.3 KILOGRAMS/M^2
BH CV ECHO MEAS - BZI_METRIC_HEIGHT: 162.6 CM
BH CV ECHO MEAS - BZI_METRIC_WEIGHT: 53.5 KG
BH CV ECHO MEAS - EDV(CUBED): 22 ML
BH CV ECHO MEAS - EDV(MOD-SP4): 70 ML
BH CV ECHO MEAS - EDV(TEICH): 29.6 ML
BH CV ECHO MEAS - EF(CUBED): 57.8 %
BH CV ECHO MEAS - EF(MOD-SP4): 57.1 %
BH CV ECHO MEAS - EF(TEICH): 51.3 %
BH CV ECHO MEAS - ESV(CUBED): 9.3 ML
BH CV ECHO MEAS - ESV(MOD-SP4): 30 ML
BH CV ECHO MEAS - ESV(TEICH): 14.4 ML
BH CV ECHO MEAS - FS: 25 %
BH CV ECHO MEAS - IVS/LVPW: 1
BH CV ECHO MEAS - IVSD: 1.3 CM
BH CV ECHO MEAS - LA DIMENSION: 4.1 CM
BH CV ECHO MEAS - LA/AO: 1.5
BH CV ECHO MEAS - LAT PEAK E' VEL: 8.5 CM/SEC
BH CV ECHO MEAS - LV DIASTOLIC VOL/BSA (35-75): 44.8 ML/M^2
BH CV ECHO MEAS - LV MASS(C)D: 113.3 GRAMS
BH CV ECHO MEAS - LV MASS(C)DI: 72.5 GRAMS/M^2
BH CV ECHO MEAS - LV MAX PG: 3.6 MMHG
BH CV ECHO MEAS - LV MEAN PG: 2 MMHG
BH CV ECHO MEAS - LV SYSTOLIC VOL/BSA (12-30): 19.2 ML/M^2
BH CV ECHO MEAS - LV V1 MAX: 95.5 CM/SEC
BH CV ECHO MEAS - LV V1 MEAN: 73.7 CM/SEC
BH CV ECHO MEAS - LV V1 VTI: 27.7 CM
BH CV ECHO MEAS - LVIDD: 2.8 CM
BH CV ECHO MEAS - LVIDS: 2.1 CM
BH CV ECHO MEAS - LVLD AP4: 7.3 CM
BH CV ECHO MEAS - LVLS AP4: 5.9 CM
BH CV ECHO MEAS - LVOT AREA (M): 3.5 CM^2
BH CV ECHO MEAS - LVOT AREA: 3.5 CM^2
BH CV ECHO MEAS - LVOT DIAM: 2.1 CM
BH CV ECHO MEAS - LVPWD: 1.3 CM
BH CV ECHO MEAS - MED PEAK E' VEL: 4.8 CM/SEC
BH CV ECHO MEAS - MR MAX PG: 62.4 MMHG
BH CV ECHO MEAS - MR MAX VEL: 395 CM/SEC
BH CV ECHO MEAS - MV A MAX VEL: 89.8 CM/SEC
BH CV ECHO MEAS - MV DEC TIME: 0.29 SEC
BH CV ECHO MEAS - MV E MAX VEL: 60.2 CM/SEC
BH CV ECHO MEAS - MV E/A: 0.67
BH CV ECHO MEAS - RAP SYSTOLE: 10 MMHG
BH CV ECHO MEAS - RVSP: 38.5 MMHG
BH CV ECHO MEAS - SI(AO): 128.9 ML/M^2
BH CV ECHO MEAS - SI(CUBED): 8.1 ML/M^2
BH CV ECHO MEAS - SI(LVOT): 61.4 ML/M^2
BH CV ECHO MEAS - SI(MOD-SP4): 25.6 ML/M^2
BH CV ECHO MEAS - SI(TEICH): 9.7 ML/M^2
BH CV ECHO MEAS - SV(AO): 201.5 ML
BH CV ECHO MEAS - SV(CUBED): 12.7 ML
BH CV ECHO MEAS - SV(LVOT): 95.9 ML
BH CV ECHO MEAS - SV(MOD-SP4): 40 ML
BH CV ECHO MEAS - SV(TEICH): 15.1 ML
BH CV ECHO MEAS - TR MAX VEL: 267 CM/SEC
BH CV ECHO MEASUREMENTS AVERAGE E/E' RATIO: 9.05
LEFT ATRIUM VOLUME INDEX: 23.7 ML/M2
LEFT ATRIUM VOLUME: 37 CM3
MAXIMAL PREDICTED HEART RATE: 136 BPM
STRESS TARGET HR: 116 BPM

## 2021-01-22 NOTE — TELEPHONE ENCOUNTER
I pulled the report off late yesterday.  I had clinic this morning and have not scanned it in yet.  It will be done today for Dr. Cordova to read.  Let pt know it will be ready after he reads it and someone will call her with results.  It takes at least 2 weeks after they mail it for us to get the results.  They are told this when its put on.  Keven Peralta, CMA

## 2021-01-22 NOTE — TELEPHONE ENCOUNTER
I called and spoke with patient she is going to come get some eliquis 2.5 because she is currently out. Holter results are not scanned in for  to Review at this time.

## 2021-01-22 NOTE — TELEPHONE ENCOUNTER
Patient called requesting the results of her Holter.  She states she called the company and they are telling her they sent us the results on 01/21/2021.  She can be reached at [phone].  Thank you!

## 2021-01-28 ENCOUNTER — APPOINTMENT (OUTPATIENT)
Dept: CARDIOLOGY | Facility: HOSPITAL | Age: 86
End: 2021-01-28

## 2021-02-08 ENCOUNTER — OFFICE VISIT (OUTPATIENT)
Dept: PRIMARY CARE CLINIC | Age: 86
End: 2021-02-08
Payer: MEDICARE

## 2021-02-08 DIAGNOSIS — E53.8 VITAMIN B12 DEFICIENCY: ICD-10-CM

## 2021-02-08 DIAGNOSIS — E53.8 VITAMIN B 12 DEFICIENCY: Primary | ICD-10-CM

## 2021-02-08 PROCEDURE — 96372 THER/PROPH/DIAG INJ SC/IM: CPT | Performed by: FAMILY MEDICINE

## 2021-02-08 RX ORDER — CYANOCOBALAMIN 1000 UG/ML
1000 INJECTION INTRAMUSCULAR; SUBCUTANEOUS ONCE
Qty: 1 ML | Refills: 0 | Status: SHIPPED | OUTPATIENT
Start: 2021-02-08 | End: 2021-02-08

## 2021-02-08 RX ORDER — CYANOCOBALAMIN 1000 UG/ML
1000 INJECTION INTRAMUSCULAR; SUBCUTANEOUS ONCE
Status: COMPLETED | OUTPATIENT
Start: 2021-02-08 | End: 2021-02-08

## 2021-02-08 RX ADMIN — CYANOCOBALAMIN 1000 MCG: 1000 INJECTION INTRAMUSCULAR; SUBCUTANEOUS at 10:34

## 2021-02-08 NOTE — PROGRESS NOTES
After obtaining consent, and per orders of Magaly Gonzalez MD, injection of B12 given in Left deltoid by Pj Bhatt. Patient instructed to remain in clinic for 20 minutes afterwards, and to report any adverse reaction to me immediately.

## 2021-02-10 ENCOUNTER — VIRTUAL VISIT (OUTPATIENT)
Dept: PRIMARY CARE CLINIC | Age: 86
End: 2021-02-10
Payer: MEDICARE

## 2021-02-10 DIAGNOSIS — D50.8 IRON DEFICIENCY ANEMIA SECONDARY TO INADEQUATE DIETARY IRON INTAKE: Primary | ICD-10-CM

## 2021-02-10 DIAGNOSIS — K14.6 BURNING TONGUE SYNDROME: ICD-10-CM

## 2021-02-10 DIAGNOSIS — Z71.9 COUNSELING, UNSPECIFIED: ICD-10-CM

## 2021-02-10 DIAGNOSIS — F41.8 ANXIETY ABOUT HEALTH: ICD-10-CM

## 2021-02-10 PROCEDURE — 1123F ACP DISCUSS/DSCN MKR DOCD: CPT | Performed by: FAMILY MEDICINE

## 2021-02-10 PROCEDURE — G8484 FLU IMMUNIZE NO ADMIN: HCPCS | Performed by: FAMILY MEDICINE

## 2021-02-10 PROCEDURE — 4040F PNEUMOC VAC/ADMIN/RCVD: CPT | Performed by: FAMILY MEDICINE

## 2021-02-10 PROCEDURE — 1090F PRES/ABSN URINE INCON ASSESS: CPT | Performed by: FAMILY MEDICINE

## 2021-02-10 PROCEDURE — 1036F TOBACCO NON-USER: CPT | Performed by: FAMILY MEDICINE

## 2021-02-10 PROCEDURE — G8420 CALC BMI NORM PARAMETERS: HCPCS | Performed by: FAMILY MEDICINE

## 2021-02-10 PROCEDURE — 99214 OFFICE O/P EST MOD 30 MIN: CPT | Performed by: FAMILY MEDICINE

## 2021-02-10 PROCEDURE — G8428 CUR MEDS NOT DOCUMENT: HCPCS | Performed by: FAMILY MEDICINE

## 2021-02-10 PROCEDURE — G8400 PT W/DXA NO RESULTS DOC: HCPCS | Performed by: FAMILY MEDICINE
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
## 2021-03-01 ASSESSMENT — ENCOUNTER SYMPTOMS
SHORTNESS OF BREATH: 0
VOMITING: 0
ABDOMINAL PAIN: 0
WHEEZING: 0
DIARRHEA: 0
COUGH: 0
CHEST TIGHTNESS: 0
NAUSEA: 0
CONSTIPATION: 0

## 2021-03-04 ENCOUNTER — OFFICE VISIT (OUTPATIENT)
Dept: CARDIOLOGY | Facility: CLINIC | Age: 86
End: 2021-03-04

## 2021-03-04 VITALS
OXYGEN SATURATION: 97 % | HEIGHT: 63 IN | WEIGHT: 110 LBS | HEART RATE: 70 BPM | BODY MASS INDEX: 19.49 KG/M2 | SYSTOLIC BLOOD PRESSURE: 162 MMHG | DIASTOLIC BLOOD PRESSURE: 84 MMHG

## 2021-03-04 DIAGNOSIS — K21.9 GASTROESOPHAGEAL REFLUX DISEASE WITHOUT ESOPHAGITIS: ICD-10-CM

## 2021-03-04 DIAGNOSIS — R10.84 GENERALIZED ABDOMINAL PAIN: ICD-10-CM

## 2021-03-04 DIAGNOSIS — E78.2 MIXED HYPERLIPIDEMIA: ICD-10-CM

## 2021-03-04 DIAGNOSIS — R07.2 PRECORDIAL CHEST PAIN: ICD-10-CM

## 2021-03-04 DIAGNOSIS — R00.1 SINUS BRADYCARDIA: ICD-10-CM

## 2021-03-04 DIAGNOSIS — R94.31 ABNORMAL ECG: ICD-10-CM

## 2021-03-04 DIAGNOSIS — R00.2 PALPITATIONS: ICD-10-CM

## 2021-03-04 DIAGNOSIS — I10 ESSENTIAL HYPERTENSION: ICD-10-CM

## 2021-03-04 DIAGNOSIS — E11.9 CONTROLLED TYPE 2 DIABETES MELLITUS WITHOUT COMPLICATION, WITHOUT LONG-TERM CURRENT USE OF INSULIN (HCC): Primary | ICD-10-CM

## 2021-03-04 PROCEDURE — 99214 OFFICE O/P EST MOD 30 MIN: CPT | Performed by: INTERNAL MEDICINE

## 2021-03-04 RX ORDER — ATENOLOL 25 MG/1
12.5 TABLET ORAL DAILY
Qty: 45 TABLET | Refills: 3 | Status: SHIPPED | OUTPATIENT
Start: 2021-03-04 | End: 2021-12-06

## 2021-03-04 RX ORDER — AMLODIPINE BESYLATE 5 MG/1
5 TABLET ORAL DAILY
Qty: 90 TABLET | Refills: 3 | Status: SHIPPED | OUTPATIENT
Start: 2021-03-04 | End: 2021-05-07 | Stop reason: SDUPTHER

## 2021-03-04 NOTE — PROGRESS NOTES
Dalia Betts  [id number]  1935  85 y.o.  female    Referring Provider: Les Henderson MD    Reason for Follow-up Visit: Here for routine follow up   Prior palpitations, now better  Essential Hypertension  Recent accidental fall and fracture of right hip s/p surgery [facility]   Cardiac workup test results as below : echo and 14-day outpatient cardiac telemetry        Subjective    Overall feeling well   No chest pain or shortness of breath now   Effort tolerance limited more by orthopedic rather than cardiac related issues, therefore difficult to assess functional capacity.       Had moderate precordial after surgery   Lasted for 30 mins   No radiation   No nausea or sweating   No recurrence     No significant pedal edema    Compliant with medications and dietary advice  Good effort tolerance    No presyncope or syncope  Compliant with medications    Tolerating current medications well with no untoward side effects   Compliant with prescribed medication regimen. Tries to adhere to cardiac diet.    Intermittent palpitations, once every several days to several weeks lasting for less than 1 minute  No associated symptoms of dizziness, weakness, chest pain,  shortness of breath      BP elevated at home   Was taken off Atenolol due to sinus bradycardia      Has had some abdominal discomfort in the past      History of present illness:  Dalia Betts is a 85 y.o. yo female with history of Essential Hypertension   who presents today for   Chief Complaint   Patient presents with   • Hypertension     3 month follow up    • Palpitations   .    History  Past Medical History:   Diagnosis Date   • Bell palsy    • Chest pain    • Diabetes mellitus (CMS/HCC)    • HLD (hyperlipidemia)    • HTN (hypertension)    • MI (myocardial infarction) (CMS/HCC)    • Palpitations    ,   Past Surgical History:   Procedure Laterality Date   • APPENDECTOMY     • CHOLECYSTECTOMY     • ELBOW FUSION     • EYE SURGERY      cataract extraction  "x 2   • HERNIA REPAIR     • HIP SURGERY      right side \" Nail \" she stated    • HYSTERECTOMY     • SINUSOTOMY     ,   Family History   Problem Relation Age of Onset   • Pancreatitis Mother    • Heart disease Father    ,   Social History     Tobacco Use   • Smoking status: Never Smoker   • Smokeless tobacco: Never Used   Substance Use Topics   • Alcohol use: No   • Drug use: No   ,     Medications  Current Outpatient Medications   Medication Sig Dispense Refill   • apixaban (ELIQUIS) 2.5 MG tablet tablet Take 2.5 mg by mouth 2 (two) times a day.     • aspirin 81 MG EC tablet Take 81 mg by mouth Daily.     • atorvastatin (LIPITOR) 10 MG tablet Take 10 mg by mouth Daily.     • Cyanocobalamin (B-12 COMPLIANCE INJECTION IJ) Inject  as directed Every 30 (Thirty) Days.     • docusate sodium (COLACE) 100 MG capsule Take 100 mg by mouth 2 (two) times a day.     • donepezil (ARICEPT) 5 MG tablet Take 5 mg by mouth As Needed.     • FOLIC ACID PO Take  by mouth.     • gabapentin (NEURONTIN) 300 MG capsule Take 300 mg by mouth Daily.     • iron polysaccharides (NIFEREX) 150 MG capsule Take 150 mg by mouth Daily.     • LORazepam (ATIVAN) 0.5 MG tablet Take 0.5 mg by mouth Every 8 (Eight) Hours As Needed for anxiety.     • memantine (NAMENDA) 5 MG tablet Take 5 mg by mouth Daily.     • metFORMIN (GLUCOPHAGE) 500 MG tablet Take 500 mg by mouth Daily.     • nitroglycerin (NITROSTAT) 0.4 MG SL tablet Place 1 tablet under the tongue Every 5 (Five) Minutes As Needed for Chest Pain. Take no more than 3 doses in 15 minutes. 100 tablet 11   • pantoprazole (PROTONIX) 40 MG EC tablet Take 40 mg by mouth.     • polyethyl glycol-propyl glycol (SYSTANE) 0.4-0.3 % solution ophthalmic solution Every 1 (One) Hour As Needed.     • Psyllium 30.9 % powder Take  by mouth As Needed.     • venlafaxine XR (EFFEXOR-XR) 37.5 MG 24 hr capsule Take 37.5 mg by mouth Daily.     • vitamin D (ERGOCALCIFEROL) 30195 units capsule capsule Take 50,000 Units by " "mouth 1 (One) Time Per Week.     • amLODIPine (NORVASC) 5 MG tablet Take 1 tablet by mouth Daily. 90 tablet 3   • atenolol (TENORMIN) 25 MG tablet Take 0.5 tablets by mouth Daily. 45 tablet 3   • Bisacodyl (DUCODYL PO) Take  by mouth.     • Fructose-Dextrose-Phosphor Acd (EMETROL PO) Take 10 mL by mouth As Needed.     • Prenatal Vit-Fe Fumarate-FA (TRINATAL RX 1 PO) Take  by mouth.     • Prenatal Vit-Fe Fumarate-FA (VOL-PLUS) 27-1 MG tablet Take 1 tablet by mouth.       No current facility-administered medications for this visit.        Allergies:  Phenergan [promethazine hcl]    Review of Systems  Review of Systems   Constitution: Negative for malaise/fatigue.   HENT: Negative.    Eyes: Negative.    Cardiovascular: Positive for palpitations. Negative for chest pain, claudication, cyanosis, dyspnea on exertion, irregular heartbeat, leg swelling, near-syncope, orthopnea, paroxysmal nocturnal dyspnea and syncope.   Respiratory: Negative.    Endocrine: Negative.    Hematologic/Lymphatic: Negative.    Skin: Negative.    Gastrointestinal: Positive for abdominal pain. Negative for anorexia.   Genitourinary: Negative.    Neurological: Negative for weakness.   Psychiatric/Behavioral: Negative.        Objective     Physical Exam:  /84   Pulse 70   Ht 160 cm (63\")   Wt 49.9 kg (110 lb)   SpO2 97%   BMI 19.49 kg/m²   Physical Exam   Constitutional: She appears well-developed.   HENT:   Head: Normocephalic.   Eyes: Lids are normal.   Neck: Normal carotid pulses and no JVD present. No tracheal tenderness present. Carotid bruit is not present. No tracheal deviation and no edema present.   Cardiovascular: Regular rhythm, S1 normal, S2 normal and normal pulses.   Murmur heard.   Systolic murmur is present with a grade of 2/6.  Pulmonary/Chest: Effort normal. No stridor.   Abdominal: Soft. Normal appearance. She exhibits no distension. There is no abdominal tenderness.   Neurological: She is alert. No cranial nerve " deficit or sensory deficit.   Skin: Skin is warm.   Psychiatric: Her speech is normal and behavior is normal. Thought content normal.       Results Review:        Dalia Betts  Holter Monitor 72Hr-21Day (6T/298T)  Order# [id number]  Reading physician: Selvin Cordova MD Ordering physician: Selvin Cordova MD Study date: 20   Patient Information    Patient Name   Dalia Betts MRN   [id number] Sex   Female  (Age)   1935 (85 y.o.)   Interpretation Summary       · Average HR: 76. Min HR: 53. Max HR: 162.     · Entire report was reviewed.  Monitoring in days: ~13  · The predominant rhythm noted during the testing period was sinus rhythm.     · Rare supraventricular ectopics with an APC burden of: < 1%    · Rare premature ventricular contractions with a PVC burden of: < 1%     · No correlated arrhythmia  · No significant pauses                  Conclusion:      Baseline rhythm is sinus.  No significant ectopy   46 runs of supraventricular tachycardia longest 14.6 seconds at a maximum rate of 162 bpm and an average of 124 bpm  Single 4 beat run of nonsustained ventricular tachycardia at a maximum rate of 135 bpm and an average of 118 bpm             Results for orders placed during the hospital encounter of 21   Adult Transthoracic Echo Complete w/ Color, Spectral and Contrast if necessary per protocol    Narrative · Left ventricular wall thickness is consistent with mild concentric   hypertrophy.  · Left ventricular ejection fraction appears to be 56 - 60%.  · Left ventricular diastolic function was normal.  · No evidence of pulmonary hypertension is present.        LEXISCAN CARDIOLITE STRESS TEST-     DATE OF EXAM- 5/10/2016     INDICATION- Chest pain.     PROCEDURE- The patient underwent Lexiscan Cardiolite stress test. Rest   dose of Cardiolite 10.5 mCi and stress dose 32.7 mCi. Injection of   Lexiscan did not cause chest pain and had shortness of breath. No   diagnostic electrocardiographic  abnormalities noted. Raw data shows   slight anterior soft tissue attenuation artifact. Overall image quality   is good. Perfusion scan shows a small area of decreased uptake in the   inferior apical region seen in stress not in rest suggesting a small   inferior apical ischemia. Gated scan shows ejection fraction above 65%.     IMPRESSION-  1. Ejection fraction above 65%.  2. Small inferior apical ischemia.  3. Anterior soft tissue attenuation.           Reading Radiologist- GEORGETTE SIMPSON       Releasing Radiologist- GEORGETTE SIMPSON       Released Date Time- 05/12/16 1347       - [facility]   ------------------------------------------------------------------------------          Procedures____________________________________________________________________________________________________________________________________________  Health maintenance and recommendations  Similar recommendations as last visit     Offered to give patient  a copy      Questions were encouraged, asked and answered to the patient's  understanding and satisfaction. Questions if any regarding current medications and side effects, need for refills and importance of compliance to medications stressed.    Reviewed available prior notes, consults, prior visits, laboratory findings, radiology and cardiology relevant reports. Updated chart as applicable. I have reviewed the patient's medical history in detail and updated the computerized patient record as relevant.      Updated patient regarding any new or relevant abnormalities on review of records or any new findings on physical exam. Mentioned to patient about purpose of visit and desirable health short and long term goals and objectives.    Primary to monitor CBC CMP Lipid panel and TSH as applicable    ___________________________________________________________________________________________________________________________________________      Assessment/Plan   Diagnoses and all orders  for this visit:    1. Controlled type 2 diabetes mellitus without complication, without long-term current use of insulin (CMS/[facility]) (Primary)    2. Palpitations    3. Abnormal ECG    4. Gastroesophageal reflux disease without esophagitis    5. Sinus bradycardia    6. Essential hypertension    7. Mixed hyperlipidemia    8. Precordial chest pain  -     Stress Test With Myocardial Perfusion (1 Day); Future    9. Generalized abdominal pain  -     US AAA Screen Limited; Future    Other orders  -     amLODIPine (NORVASC) 5 MG tablet; Take 1 tablet by mouth Daily.  Dispense: 90 tablet; Refill: 3  -     atenolol (TENORMIN) 25 MG tablet; Take 0.5 tablets by mouth Daily.  Dispense: 45 tablet; Refill: 3           Plan:       Patient expressed understanding  Encouraged and answered all questions   Discussed with the patient and all questioned fully answered. She will call me if any problems arise.   Discussed results of prior testing with patient: echo and 14-day outpatient cardiac telemetry   This showed SVT and NSVT     Start Atenolol 12.5 mg daily and start Norvasc 5 mg daily   Requested Prescriptions     Signed Prescriptions Disp Refills   • amLODIPine (NORVASC) 5 MG tablet 90 tablet 3     Sig: Take 1 tablet by mouth Daily.   • atenolol (TENORMIN) 25 MG tablet 45 tablet 3     Sig: Take 0.5 tablets by mouth Daily.          Check BP and heart rates twice daily at least 3x / week, week a month  at home and bring a recording for me to review next visit  If BP >130/85 or < 100/60 persistently over 3 reading 30 mins apart call sooner      Orders Placed This Encounter   Procedures   • US AAA Screen Limited     Standing Status:   Future     Standing Expiration Date:   3/4/2022     Order Specific Question:   Is the patient a male between 65-75 years old and have smoked at least 100 cigarettes in their lifetime OR a male or female Medicare patient who has a family history of abdominal aoritc aneurysm?     Answer:   Yes     Order  Specific Question:   Reason for Exam:     Answer:   screening   • Stress Test With Myocardial Perfusion (1 Day)     Standing Status:   Future     Standing Expiration Date:   3/4/2022     Order Specific Question:   What stress agent will be used?     Answer:   Exercise with possible pharmacologic     Order Specific Question:   Reason for exam?     Answer:   Chest Pain      She is on Eliquis that will be stopped by ortho  Monitor for any signs of bleeding including red or dark stools as well as easy bruisabilty. Fall precautions.       For now stop Aspirin as on Eliquis   Buy BP machine and monitor BP    Check BP and heart rates twice daily at least 3x / week at home and bring a recording for me to review next visit  If BP >130/85 or < 100/60 persistently over 3 reading 30 mins apart call sooner      I support the patient's decision to take the Covid -19 vaccine     Follow up with ELIZABETH Longo to address interim issues     OK to drive             Return in about 4 weeks (around 4/1/2021).

## 2021-03-15 ENCOUNTER — VIRTUAL VISIT (OUTPATIENT)
Dept: PRIMARY CARE CLINIC | Age: 86
End: 2021-03-15
Payer: MEDICARE

## 2021-03-15 ENCOUNTER — OFFICE VISIT (OUTPATIENT)
Dept: PRIMARY CARE CLINIC | Age: 86
End: 2021-03-15
Payer: MEDICARE

## 2021-03-15 DIAGNOSIS — G30.1 LATE ONSET ALZHEIMER'S DISEASE WITHOUT BEHAVIORAL DISTURBANCE (HCC): Primary | ICD-10-CM

## 2021-03-15 DIAGNOSIS — I73.9 PERIPHERAL VASCULAR DISEASE, UNSPECIFIED (HCC): ICD-10-CM

## 2021-03-15 DIAGNOSIS — E11.42 DIABETIC PERIPHERAL NEUROPATHY ASSOCIATED WITH TYPE 2 DIABETES MELLITUS (HCC): ICD-10-CM

## 2021-03-15 DIAGNOSIS — E53.8 VITAMIN B12 DEFICIENCY: Primary | ICD-10-CM

## 2021-03-15 DIAGNOSIS — F02.80 LATE ONSET ALZHEIMER'S DISEASE WITHOUT BEHAVIORAL DISTURBANCE (HCC): Primary | ICD-10-CM

## 2021-03-15 DIAGNOSIS — F33.42 RECURRENT MAJOR DEPRESSIVE DISORDER, IN FULL REMISSION (HCC): ICD-10-CM

## 2021-03-15 DIAGNOSIS — S72.124S: ICD-10-CM

## 2021-03-15 PROCEDURE — 4040F PNEUMOC VAC/ADMIN/RCVD: CPT | Performed by: FAMILY MEDICINE

## 2021-03-15 PROCEDURE — 1036F TOBACCO NON-USER: CPT | Performed by: FAMILY MEDICINE

## 2021-03-15 PROCEDURE — 96372 THER/PROPH/DIAG INJ SC/IM: CPT | Performed by: FAMILY MEDICINE

## 2021-03-15 PROCEDURE — G8428 CUR MEDS NOT DOCUMENT: HCPCS | Performed by: FAMILY MEDICINE

## 2021-03-15 PROCEDURE — 1123F ACP DISCUSS/DSCN MKR DOCD: CPT | Performed by: FAMILY MEDICINE

## 2021-03-15 PROCEDURE — G8484 FLU IMMUNIZE NO ADMIN: HCPCS | Performed by: FAMILY MEDICINE

## 2021-03-15 PROCEDURE — 99214 OFFICE O/P EST MOD 30 MIN: CPT | Performed by: FAMILY MEDICINE

## 2021-03-15 PROCEDURE — G8420 CALC BMI NORM PARAMETERS: HCPCS | Performed by: FAMILY MEDICINE

## 2021-03-15 PROCEDURE — 1090F PRES/ABSN URINE INCON ASSESS: CPT | Performed by: FAMILY MEDICINE

## 2021-03-15 PROCEDURE — G8400 PT W/DXA NO RESULTS DOC: HCPCS | Performed by: FAMILY MEDICINE

## 2021-03-15 RX ORDER — CYANOCOBALAMIN 1000 UG/ML
1000 INJECTION INTRAMUSCULAR; SUBCUTANEOUS ONCE
Status: COMPLETED | OUTPATIENT
Start: 2021-03-15 | End: 2021-03-15

## 2021-03-15 RX ADMIN — CYANOCOBALAMIN 1000 MCG: 1000 INJECTION INTRAMUSCULAR; SUBCUTANEOUS at 10:02

## 2021-03-15 ASSESSMENT — ENCOUNTER SYMPTOMS
SHORTNESS OF BREATH: 0
WHEEZING: 0
ABDOMINAL PAIN: 0
CONSTIPATION: 0
DIARRHEA: 0
NAUSEA: 0
CHEST TIGHTNESS: 0
VOMITING: 0
COUGH: 0

## 2021-03-15 NOTE — PROGRESS NOTES
After obtaining consent, and per orders of Dr Roberto Hyatt  injection of Vit B12 was given in the left deltoid by Malik Bravo  Pt tolerated well and had no reactions.

## 2021-03-15 NOTE — PROGRESS NOTES
3/15/2021    TELEHEALTH EVALUATION -- Audio/Visual (During [facility])    HPI:    Adina Jarrett (:  1935) has requested an audio/video evaluation for the following concern(s):    Patient presents today via video visit for the need of driving license privileges to be examined. Patient has been driving for a long time without any significant issue. She did break a hip previously and at that time was not driving but has had a good full recovery. Otherwise she does have a history of stable chronic disease including Alzheimer's as well as diabetes and vascular disease but no major issues. Review of Systems   Constitutional: Negative for chills and fever. Respiratory: Negative for cough, chest tightness, shortness of breath and wheezing. Cardiovascular: Negative for chest pain, palpitations and leg swelling. Gastrointestinal: Negative for abdominal pain, constipation, diarrhea, nausea and vomiting. Genitourinary: Negative for difficulty urinating, dysuria and frequency. Prior to Visit Medications    Medication Sig Taking? Authorizing Provider   metFORMIN (GLUCOPHAGE) 500 MG tablet Take 1 tablet by mouth daily (with breakfast)  Nghia Martinez MD   docusate sodium (COLACE) 100 MG capsule Take 1 capsule by mouth 2 times daily  Nghia Martinez MD   vitamin D (ERGOCALCIFEROL) 1.25 MG (50049 UT) CAPS capsule Take 1 capsule by mouth once a week  Nghia Martinez MD   folic acid (V-R FOLIC ACID) 335 MCG tablet Take 1 tablet by mouth daily  Nghia Martinez MD   iron polysaccharides (NIFEREX) 150 MG capsule Take 1 capsule by mouth daily  Nghia Martinez MD   atorvastatin (LIPITOR) 10 MG tablet Take 1 tablet by mouth daily  Nghia Martinez MD   blood glucose test strips (ASCENSIA AUTODISC VI;ONE TOUCH ULTRA TEST VI) strip 1 each by In Vitro route daily As needed.   Patient not taking: Reported on 2020  Hilda Hernandez MD   Diabetic Shoe MISC by Does not apply route DISPENSE ONE PAIR abdominal     History of adenomatous polyp of colon     Hypertriglyceridemia     Neuropathy     Osteopenia     Type II or unspecified type diabetes mellitus without mention of complication, not stated as uncontrolled     Vitamin B 12 deficiency    ,   Past Surgical History:   Procedure Laterality Date    APPENDECTOMY      BLADDER REPAIR  1984    states stiched her bladder up but was not a suspension-Dr Garcia    CATARACT REMOVAL  12/2014    CATARACT REMOVAL  01/2015    CHOLECYSTECTOMY  age 22    CLAVICLE SURGERY Left 6/22/2020    OPEN REDUCTION INTERNAL FIXATION LEFT CLAVICLE performed by Enma Mcfadden MD at [address] [place] [address]  02/15/2012    Dr Thalia Juarez AP, diverticulosis coli, small internal hemorrhoids, 5 yr recall    COLONOSCOPY  3/1998    negative    COLONOSCOPY  5/1/07     Dr Eileen Hewitt, diverticulosis coli/small internal hemorrhoids    COLONOSCOPY  9/16/04    Dr Blood-diverticulosis coli, ileocecal valve lipoma    COLONOSCOPY N/A 1/25/2016    Dr Sam Anthony AP, 5 yr recall    ELBOW FRACTURE SURGERY  1970s    EYE SURGERY Bilateral     cataract    FEMUR FRACTURE SURGERY Right 12/4/2020    FEMUR IM NAIL TARIQ INSERTION performed by Susi Cuellar MD at [facility]     [facility]     Dr. Eileen Hewitt partner   [address] N/A 2/15/2016    OPEN REPAIR OF RECURRENT INCISIONAL HERNIA WITH MESH  performed by Yi Lloyd MD at [facility].  02/16/2012    Dr White-Barretts/gastritis    UPPER GASTROINTESTINAL ENDOSCOPY  2/1998    Dr Eileen Hewitt, negative    UPPER GASTROINTESTINAL ENDOSCOPY  1/2/07    Dr Eileen Hewitt, gastritis    UPPER GASTROINTESTINAL ENDOSCOPY N/A 1/11/2016    Dr Obey Johnson junction w/minimal chronic inflammation   ,   Social History     Tobacco Use    Smoking status: Never Smoker    Smokeless tobacco: Never Used Substance Use Topics    Alcohol use: No     Frequency: Never    Drug use: No   ,   Family History   Problem Relation Age of Onset    Cancer Mother         Pancreatic    Other Mother     Cancer Father         melanoma    Colon Cancer Neg Hx     Colon Polyps Neg Hx     Esophageal Cancer Neg Hx     Liver Cancer Neg Hx     Liver Disease Neg Hx     Rectal Cancer Neg Hx     Stomach Cancer Neg Hx    ,   Immunization History   Administered Date(s) Administered    Influenza Whole 10/21/2015    Influenza, High Dose (Fluzone 65 yrs and older) 12/18/2018    Influenza, [place], IM, (6 mo and older Fluzone, Flulaval, Fluarix and 3 yrs and older Afluria) 10/13/2016, 10/05/2017    Influenza, Quadv, IM, PF (6 mo and older Fluzone, Flulaval, Fluarix, and 3 yrs and older Afluria) 10/07/2019    Pneumococcal Conjugate 13-valent (Miguelito Silence) 10/21/2015, 10/18/2016    Pneumococcal Polysaccharide ([id number]) 11/08/2017    Tdap (Boostrix, Adacel) 12/18/2016    Zoster Live (Zostavax) 06/19/2019, 08/28/2019    Zoster Recombinant (Shingrix) 01/01/2020   ,   Health Maintenance   Topic Date Due    COVID-19 Vaccine (1) Never done    Shingles Vaccine (3 of 3) 02/26/2020    Colon cancer screen colonoscopy  01/25/2021    Lipid screen  06/10/2021    Annual Wellness Visit (AWV)  10/14/2021    Potassium monitoring  12/21/2021    Creatinine monitoring  12/21/2021    DTaP/Tdap/Td vaccine (2 - Td) 12/18/2026    DEXA (modify frequency per FRAX score)  Completed    Flu vaccine  Completed    Pneumococcal 65+ years Vaccine  Completed    Hepatitis A vaccine  Aged Out    Hib vaccine  Aged Out    Meningococcal (ACWY) vaccine  Aged Out       PHYSICAL EXAMINATION:  [ INSTRUCTIONS:  \"[x]\" Indicates a positive item  \"[]\" Indicates a negative item  -- DELETE ALL ITEMS NOT EXAMINED]  Vital Signs: (As obtained by patient/caregiver or practitioner observation)    Blood pressure-  Heart rate-    Respiratory rate-    Temperature-  Pulse oximetry-     Constitutional: [x] Appears well-developed and well-nourished [] No apparent distress      [] Abnormal-   Mental status  [x] Alert and awake  [x] Oriented to person/place/time [x]Able to follow commands      Eyes:  EOM    [x]  Normal  [] Abnormal-  Sclera  [x]  Normal  [] Abnormal -         Discharge []  None visible  [] Abnormal -    HENT:   [x] Normocephalic, atraumatic. [] Abnormal   [x] Mouth/Throat: Mucous membranes are moist.     External Ears [x] Normal  [] Abnormal-     Neck: [x] No visualized mass     Pulmonary/Chest: [x] Respiratory effort normal.  [x] No visualized signs of difficulty breathing or respiratory distress        [] Abnormal-      Musculoskeletal:   [x] Normal gait with no signs of ataxia         [x] Normal range of motion of neck        [] Abnormal-       Neurological:        [x] No Facial Asymmetry (Cranial nerve 7 motor function) (limited exam to video visit)          [x] No gaze palsy        [] Abnormal-         Skin:        [x] No significant exanthematous lesions or discoloration noted on facial skin         [] Abnormal-            Psychiatric:       [x] Normal Affect [x] No Hallucinations        [] Abnormal-     Other pertinent observable physical exam findings-     ASSESSMENT/PLAN:    ICD-10-CM    1. Late onset Alzheimer's disease without behavioral disturbance (HCC)  G30.1     F02.80    2. Closed nondisplaced fracture of lesser trochanter of right femur, sequela  S72.124S    3. 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)  E11.42    4. Recurrent major depressive disorder, in full remission ([facility] [address]  F33.42    5. Peripheral vascular disease, unspecified ([facility] [address]  I73.9        Patient is allowed to continue to drive. She is let me know if there are any accidents or any issues at all. I filled out the appropriate paperwork. Total time of paperwork and visit is 35 minutes    No orders of the defined types were placed in this encounter.       No orders of the defined types were placed in this encounter. No follow-ups on file. Bert Desouza is a 81 y.o. female being evaluated by a Virtual Visit (video visit) encounter to address concerns as mentioned above. A caregiver was present when appropriate. Due to this being a TeleHealth encounter (During [facility]-83 public health emergency), evaluation of the following organ systems was limited: Vitals/Constitutional/EENT/Resp/CV/GI//MS/Neuro/Skin/Heme-Lymph-Imm. Pursuant to the emergency declaration under the [address] [place] [address], [address] [place] [address] authority and the [employer] Act, this Virtual Visit was conducted with patient's (and/or legal guardian's) consent, to reduce the patient's risk of exposure to COVID-19 and provide necessary medical care. The patient (and/or legal guardian) has also been advised to contact this office for worsening conditions or problems, and seek emergency medical treatment and/or call 911 if deemed necessary. Services were provided through a video synchronous discussion virtually to substitute for in-person clinic visit. Patient and provider were located at their individual homes or provider at secure site for business. It is possible that material may be posted from a notepad that is used for a Dragon dictation device for dictating notes outside the EMR and I am the original author of all of this content. --David Carr MD on 3/15/2021 at 2:50 PM    An electronic signature was used to authenticate this note.

## 2021-03-16 ENCOUNTER — HOSPITAL ENCOUNTER (OUTPATIENT)
Dept: CARDIOLOGY | Facility: HOSPITAL | Age: 86
Discharge: HOME | End: 2021-03-16

## 2021-03-16 ENCOUNTER — TELEPHONE (OUTPATIENT)
Dept: CARDIOLOGY | Facility: CLINIC | Age: 86
End: 2021-03-16

## 2021-03-16 ENCOUNTER — TELEPHONE (OUTPATIENT)
Dept: PRIMARY CARE CLINIC | Age: 86
End: 2021-03-16

## 2021-03-16 ENCOUNTER — HOSPITAL ENCOUNTER (OUTPATIENT)
Dept: ULTRASOUND IMAGING | Facility: HOSPITAL | Age: 86
Discharge: HOME | End: 2021-03-16
Admitting: INTERNAL MEDICINE

## 2021-03-16 VITALS
SYSTOLIC BLOOD PRESSURE: 113 MMHG | BODY MASS INDEX: 18.78 KG/M2 | HEIGHT: 64 IN | HEART RATE: 46 BPM | DIASTOLIC BLOOD PRESSURE: 59 MMHG | WEIGHT: 110 LBS

## 2021-03-16 DIAGNOSIS — R07.2 PRECORDIAL CHEST PAIN: ICD-10-CM

## 2021-03-16 DIAGNOSIS — R10.84 GENERALIZED ABDOMINAL PAIN: ICD-10-CM

## 2021-03-16 PROCEDURE — 25010000002 REGADENOSON 0.4 MG/5ML SOLUTION: Performed by: INTERNAL MEDICINE

## 2021-03-16 PROCEDURE — 93018 CV STRESS TEST I&R ONLY: CPT | Performed by: INTERNAL MEDICINE

## 2021-03-16 PROCEDURE — 93017 CV STRESS TEST TRACING ONLY: CPT

## 2021-03-16 PROCEDURE — A9500 TC99M SESTAMIBI: HCPCS | Performed by: INTERNAL MEDICINE

## 2021-03-16 PROCEDURE — 0 TECHNETIUM SESTAMIBI: Performed by: INTERNAL MEDICINE

## 2021-03-16 PROCEDURE — 76706 US ABDL AORTA SCREEN AAA: CPT

## 2021-03-16 PROCEDURE — 78452 HT MUSCLE IMAGE SPECT MULT: CPT

## 2021-03-16 PROCEDURE — 78452 HT MUSCLE IMAGE SPECT MULT: CPT | Performed by: INTERNAL MEDICINE

## 2021-03-16 RX ADMIN — REGADENOSON 0.4 MG: 0.08 INJECTION, SOLUTION INTRAVENOUS at 10:02

## 2021-03-16 RX ADMIN — TECHNETIUM TC 99M SESTAMIBI 1 DOSE: 1 INJECTION INTRAVENOUS at 09:01

## 2021-03-16 RX ADMIN — TECHNETIUM TC 99M SESTAMIBI 1 DOSE: 1 INJECTION INTRAVENOUS at 10:11

## 2021-03-16 NOTE — TELEPHONE ENCOUNTER
Patient has a Iphone and she wants to know what kind of watch do you recommend that she get to keep up with her heart rate.       Patient needs a letter stating she can drive and needs it faxed to  office     Fax [phone]

## 2021-03-16 NOTE — TELEPHONE ENCOUNTER
Left voicemail for patient informing her that her paperwork has been completed and is available for pickup at the . If she has any questions to call the office.

## 2021-03-17 LAB
BH CV REST NUCLEAR ISOTOPE DOSE: 11 MCI
BH CV STRESS BP STAGE 1: NORMAL
BH CV STRESS COMMENTS STAGE 1: NORMAL
BH CV STRESS DOSE REGADENOSON STAGE 1: 0.4
BH CV STRESS DURATION MIN STAGE 1: 0
BH CV STRESS DURATION SEC STAGE 1: 10
BH CV STRESS HR STAGE 1: 71
BH CV STRESS NUCLEAR ISOTOPE DOSE: 33.5 MCI
BH CV STRESS PROTOCOL 1: NORMAL
BH CV STRESS RECOVERY BP: NORMAL MMHG
BH CV STRESS RECOVERY HR: 61 BPM
BH CV STRESS STAGE 1: 1
LV EF NUC BP: 65 %
MAXIMAL PREDICTED HEART RATE: 136 BPM
PERCENT MAX PREDICTED HR: 52.21 %
STRESS BASELINE BP: NORMAL MMHG
STRESS BASELINE HR: 46 BPM
STRESS PERCENT HR: 61 %
STRESS POST EXERCISE DUR SEC: 10 SEC
STRESS POST PEAK BP: NORMAL MMHG
STRESS POST PEAK HR: 71 BPM
STRESS TARGET HR: 116 BPM

## 2021-03-18 ENCOUNTER — TELEPHONE (OUTPATIENT)
Dept: CARDIOLOGY | Facility: CLINIC | Age: 86
End: 2021-03-18

## 2021-03-18 NOTE — TELEPHONE ENCOUNTER
Pt came into office today requesting a copy of her recent AAA screening & stress test. Please advise regarding results.    (notified pt to speak with cell phone retailers regarding HR capability with phone & watches, pt agreeable)

## 2021-04-05 ENCOUNTER — TELEPHONE (OUTPATIENT)
Dept: PRIMARY CARE CLINIC | Age: 86
End: 2021-04-05

## 2021-05-07 ENCOUNTER — OFFICE VISIT (OUTPATIENT)
Dept: CARDIOLOGY | Facility: CLINIC | Age: 86
End: 2021-05-07

## 2021-05-07 VITALS
HEART RATE: 54 BPM | DIASTOLIC BLOOD PRESSURE: 70 MMHG | HEIGHT: 62 IN | BODY MASS INDEX: 20.06 KG/M2 | SYSTOLIC BLOOD PRESSURE: 130 MMHG | WEIGHT: 109 LBS

## 2021-05-07 DIAGNOSIS — E11.9 CONTROLLED TYPE 2 DIABETES MELLITUS WITHOUT COMPLICATION, WITHOUT LONG-TERM CURRENT USE OF INSULIN (HCC): Primary | ICD-10-CM

## 2021-05-07 DIAGNOSIS — R00.1 SINUS BRADYCARDIA: ICD-10-CM

## 2021-05-07 DIAGNOSIS — E78.2 MIXED HYPERLIPIDEMIA: ICD-10-CM

## 2021-05-07 DIAGNOSIS — I10 ESSENTIAL HYPERTENSION: ICD-10-CM

## 2021-05-07 DIAGNOSIS — K21.9 GASTROESOPHAGEAL REFLUX DISEASE WITHOUT ESOPHAGITIS: ICD-10-CM

## 2021-05-07 PROCEDURE — 93000 ELECTROCARDIOGRAM COMPLETE: CPT | Performed by: INTERNAL MEDICINE

## 2021-05-07 PROCEDURE — 99214 OFFICE O/P EST MOD 30 MIN: CPT | Performed by: INTERNAL MEDICINE

## 2021-05-07 RX ORDER — AMLODIPINE BESYLATE 10 MG/1
10 TABLET ORAL DAILY
Qty: 90 TABLET | Refills: 3 | Status: SHIPPED | OUTPATIENT
Start: 2021-05-07 | End: 2021-12-06 | Stop reason: SDUPTHER

## 2021-05-07 NOTE — PROGRESS NOTES
Dalia Betts  [id number]  1935  85 y.o.  female    Referring Provider: Les Henderson MD    Reason for Follow-up Visit: Here for routine follow up   Prior palpitations, now better  Essential Hypertension  Recent accidental fall and fracture of right hip s/p surgery [facility]   Cardiac workup test results as below : echo, myocardial perfusion scan   and 14-day outpatient cardiac telemetry        Subjective      Overall feels the same   No new events or complaints since last visit   Overall the patient feels no major change from baseline symptoms   Similar symptoms as during last visit     No chest pain or shortness of breath now   Effort tolerance limited more by orthopedic rather than cardiac related issues, therefore difficult to assess functional capacity.       Had moderate precordial after surgery   Lasted for 30 mins in past   No recurrence  No significant pedal edema    Compliant with medications and dietary advice  Fair effort tolerance  Uses cane for support and balance     No presyncope or syncope  Compliant with medications    Tolerating current medications well with no untoward side effects   Compliant with prescribed medication regimen. Tries to adhere to cardiac diet.    No palpitations     BP well controlled at home mostly  Has had some abdominal discomfort in the past  No abdominal aortic aneurysm as below     No bleeding, excessive bruising, gait instability or fall risks        History of present illness:  Dalia Betts is a 85 y.o. yo female with history of Essential Hypertension   who presents today for   Chief Complaint   Patient presents with   • Follow-up   .    History  Past Medical History:   Diagnosis Date   • Bell palsy    • Chest pain    • Diabetes mellitus (CMS/HCC)    • HLD (hyperlipidemia)    • HTN (hypertension)    • MI (myocardial infarction) (CMS/HCC)    • Palpitations    ,   Past Surgical History:   Procedure Laterality Date   • APPENDECTOMY     • CHOLECYSTECTOMY     •  "ELBOW FUSION     • EYE SURGERY      cataract extraction x 2   • HERNIA REPAIR     • HIP SURGERY      right side \" Nail \" she stated    • HYSTERECTOMY     • SINUSOTOMY     ,   Family History   Problem Relation Age of Onset   • Pancreatitis Mother    • Heart disease Father    ,   Social History     Tobacco Use   • Smoking status: Never Smoker   • Smokeless tobacco: Never Used   Substance Use Topics   • Alcohol use: No   • Drug use: No   ,     Medications  Current Outpatient Medications   Medication Sig Dispense Refill   • amLODIPine (NORVASC) 10 MG tablet Take 1 tablet by mouth Daily. 90 tablet 3   • apixaban (ELIQUIS) 2.5 MG tablet tablet Take 2.5 mg by mouth 2 (two) times a day.     • aspirin 81 MG EC tablet Take 81 mg by mouth Daily.     • atenolol (TENORMIN) 25 MG tablet Take 0.5 tablets by mouth Daily. 45 tablet 3   • atorvastatin (LIPITOR) 10 MG tablet Take 10 mg by mouth Daily.     • Bisacodyl (DUCODYL PO) Take  by mouth.     • Cyanocobalamin (B-12 COMPLIANCE INJECTION IJ) Inject  as directed Every 30 (Thirty) Days.     • docusate sodium (COLACE) 100 MG capsule Take 100 mg by mouth 2 (two) times a day.     • donepezil (ARICEPT) 5 MG tablet Take 5 mg by mouth As Needed.     • FOLIC ACID PO Take  by mouth.     • Fructose-Dextrose-Phosphor Acd (EMETROL PO) Take 10 mL by mouth As Needed.     • gabapentin (NEURONTIN) 300 MG capsule Take 300 mg by mouth Daily.     • iron polysaccharides (NIFEREX) 150 MG capsule Take 150 mg by mouth Daily.     • LORazepam (ATIVAN) 0.5 MG tablet Take 0.5 mg by mouth Every 8 (Eight) Hours As Needed for anxiety.     • memantine (NAMENDA) 5 MG tablet Take 5 mg by mouth Daily.     • metFORMIN (GLUCOPHAGE) 500 MG tablet Take 500 mg by mouth Daily.     • nitroglycerin (NITROSTAT) 0.4 MG SL tablet Place 1 tablet under the tongue Every 5 (Five) Minutes As Needed for Chest Pain. Take no more than 3 doses in 15 minutes. 100 tablet 11   • polyethyl glycol-propyl glycol (SYSTANE) 0.4-0.3 % " "solution ophthalmic solution Every 1 (One) Hour As Needed.     • Prenatal Vit-Fe Fumarate-FA (VOL-PLUS) 27-1 MG tablet Take 1 tablet by mouth.     • Psyllium 30.9 % powder Take  by mouth As Needed.     • venlafaxine XR (EFFEXOR-XR) 37.5 MG 24 hr capsule Take 37.5 mg by mouth Daily.     • vitamin D (ERGOCALCIFEROL) 12484 units capsule capsule Take 50,000 Units by mouth 1 (One) Time Per Week.       No current facility-administered medications for this visit.       Allergies:  Protonix [pantoprazole sodium] and Phenergan [promethazine hcl]    Review of Systems  Review of Systems   Constitutional: Negative for malaise/fatigue.   HENT: Negative.    Eyes: Negative.    Cardiovascular: Positive for palpitations. Negative for chest pain, claudication, cyanosis, dyspnea on exertion, irregular heartbeat, leg swelling, near-syncope, orthopnea, paroxysmal nocturnal dyspnea and syncope.   Respiratory: Negative.    Endocrine: Negative.    Hematologic/Lymphatic: Negative.    Skin: Negative.    Gastrointestinal: Positive for abdominal pain. Negative for anorexia.   Genitourinary: Negative.    Neurological: Negative for weakness.   Psychiatric/Behavioral: Negative.        Objective     Physical Exam:  /70   Pulse 54   Ht 157.5 cm (62\")   Wt 49.4 kg (109 lb)   BMI 19.94 kg/m²   Physical Exam   Constitutional: She appears well-developed.   HENT:   Head: Normocephalic.   Eyes: Lids are normal.   Neck: Normal carotid pulses and no JVD present. No tracheal tenderness present. Carotid bruit is not present. No tracheal deviation present.   Cardiovascular: Regular rhythm, S1 normal, S2 normal and normal pulses.   Murmur heard.   Systolic murmur is present with a grade of 2/6.  Pulmonary/Chest: Effort normal. No stridor.   Abdominal: Soft. Normal appearance. She exhibits no distension. There is no abdominal tenderness.   Neurological: She is alert. No cranial nerve deficit or sensory deficit.   Skin: Skin is warm.   Psychiatric: Her " speech is normal and behavior is normal. Thought content normal.       Results Review:    Dalia Betts  Regadenoson Stress Test With Myocardial Perfusion SPECT (Multi Study)  Order# [id number]  Reading physician: Selvin Cordova MD Ordering physician: Selvin Cordova MD Study date: 3/16/21   Patient Information    Patient Name   Dalia Betts MRN   [id number] Legal Sex   Female  (Age)   1935 (85 y.o.)   Interpretation Summary       · Raw images reviewed with the following abnormalities noted: vertical motion.  · Left ventricular ejection fraction is normal. (Calculated EF = 65%).  · Myocardial perfusion imaging indicates a normal myocardial perfusion study with no evidence of ischemia.  · Physiological apical thinning noted  · Impressions are consistent with a low risk study.            Dalia Betts  Holter Monitor 72Hr-21Day (0296T/0298T)  Order# [id number]  Reading physician: Selvin Cordova MD Ordering physician: Selvin Cordova MD Study date: 20   Patient Information    Patient Name   Dalia Betts MRN   [id number] Legal Sex   Female  (Age)   1935 (85 y.o.)   Interpretation Summary       · Average HR: 76. Min HR: 53. Max HR: 162.     · Entire report was reviewed.  Monitoring in days: ~13  · The predominant rhythm noted during the testing period was sinus rhythm.     · Rare supraventricular ectopics with an APC burden of: < 1%    · Rare premature ventricular contractions with a PVC burden of: < 1%     · No correlated arrhythmia  · No significant pauses                  Conclusion:      Baseline rhythm is sinus.  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  This report was finalized on 2021 09:52 by Dr. Jason Castillo MD.    Results for orders placed during the hospital encounter of 21    Adult Transthoracic Echo Complete w/ Color, Spectral and Contrast if necessary per protocol    Interpretation Summary  · Left ventricular wall thickness is consistent with mild concentric hypertrophy.  · Left ventricular ejection fraction appears to be 56 - 60%.  · Left ventricular diastolic function was normal.  · No evidence of pulmonary hypertension is present.       Dalia Betts  Holter Monitor 72Hr-21Day (0296T/0298T)  Order# [id number]  Reading physician: Selvin Cordova MD Ordering physician: Selvin Cordova MD Study date: 20   Patient Information    Patient Name   Dalia Betts MRN   [id number] Sex   Female  (Age)   1935 (85 y.o.)   Interpretation Summary       · Average HR: 76. Min HR: 53. Max HR: 162.     · Entire report was reviewed.  Monitoring in days: ~13  · The predominant rhythm noted during the testing period was sinus rhythm.     · Rare supraventricular ectopics with an APC burden of: < 1%    · Rare premature ventricular contractions with a PVC burden of: < 1%     · No correlated arrhythmia  · No significant pauses                  Conclusion:      Baseline rhythm is sinus.  No significant ectopy   46 runs of supraventricular tachycardia longest 14.6 seconds at a maximum rate of 162 bpm and an average of 124 bpm  Single 4 beat run of nonsustained ventricular tachycardia at a maximum rate of 135 bpm and an average of 118 bpm             Results for orders placed during the hospital encounter of 21    Adult Transthoracic Echo Complete w/ Color, Spectral and Contrast if necessary per protocol    Interpretation Summary  · Left ventricular wall thickness is consistent with mild concentric hypertrophy.  · Left ventricular ejection fraction appears to be 56 - 60%.  · Left ventricular diastolic function was  normal.  · No evidence of pulmonary hypertension is present.    LEXISCAN CARDIOLITE STRESS TEST-     DATE OF EXAM- 5/10/2016     INDICATION- Chest pain.     PROCEDURE- The patient underwent Lexiscan Cardiolite stress test. Rest   dose of Cardiolite 10.5 mCi and stress dose 32.7 mCi. Injection of   Lexiscan did not cause chest pain and had shortness of breath. No   diagnostic electrocardiographic abnormalities noted. Raw data shows   slight anterior soft tissue attenuation artifact. Overall image quality   is good. Perfusion scan shows a small area of decreased uptake in the   inferior apical region seen in stress not in rest suggesting a small   inferior apical ischemia. Gated scan shows ejection fraction above 65%.     IMPRESSION-  1. Ejection fraction above 65%.  2. Small inferior apical ischemia.  3. Anterior soft tissue attenuation.           Reading Radiologist- GEORGETTE SIMPSON       Releasing Radiologist- GEORGETTE SIMPSON       Released Date Time- 05/12/16 1347       - [facility]   ------------------------------------------------------------------------------            ECG 12 Lead    Date/Time: 5/7/2021 8:53 AM  Performed by: Georgette Simpson MD  Authorized by: Georgette Simpson MD   Comparison: compared with previous ECG from 12/30/2020  Comparison to previous ECG: Ventricular rate decreased from 70  to 45  beats per minute    Rhythm: sinus bradycardia  Rate: bradycardic  T inversion: III and aVF  QRS axis: normal    Clinical impression: abnormal EKG        ____________________________________________________________________________________________________________________________________________  Health maintenance and recommendations  Similar recommendations as last visit     Offered to give patient  a copy      Questions were encouraged, asked and answered to the patient's  understanding and satisfaction. Questions if any regarding current medications and side effects, need for refills and importance of  compliance to medications stressed.    Reviewed available prior notes, consults, prior visits, laboratory findings, radiology and cardiology relevant reports. Updated chart as applicable. I have reviewed the patient's medical history in detail and updated the computerized patient record as relevant.      Updated patient regarding any new or relevant abnormalities on review of records or any new findings on physical exam. Mentioned to patient about purpose of visit and desirable health short and long term goals and objectives.    Primary to monitor CBC CMP Lipid panel and TSH as applicable    ___________________________________________________________________________________________________________________________________________      Assessment/Plan   Diagnoses and all orders for this visit:    1. Controlled type 2 diabetes mellitus without complication, without long-term current use of insulin (CMS/[facility]) (Primary)    2. Essential hypertension    3. Mixed hyperlipidemia    4. Sinus bradycardia    5. Gastroesophageal reflux disease without esophagitis    Other orders  -     ECG 12 Lead  -     amLODIPine (NORVASC) 10 MG tablet; Take 1 tablet by mouth Daily.  Dispense: 90 tablet; Refill: 3           Plan:       Patient expressed understanding  Encouraged and answered all questions   Discussed with the patient and all questioned fully answered. She will call me if any problems arise.   Discussed results of prior testing with patient: echo and 14-day outpatient cardiac telemetry   This showed SVT and NSVT     Stop Atenolol  daily and increase Norvasc 10 mg daily     Check BP and heart rates twice daily at least 3x / week, week a month  at home and bring a recording for me to review next visit  If BP >130/85 or < 100/60 persistently over 3 reading 30 mins apart call sooner      Orders Placed This Encounter   Procedures   • ECG 12 Lead     This order was created via procedure documentation     Order Specific Question:    Release to patient     Answer:   Immediate      Monitor for any signs of bleeding including red or dark stools as well as easy bruisabilty. Fall precautions.       Conservative medical therapy per patient. Risks, benefits and alternatives explained. The patient understands and wishes to proceed with only conservative therapy.  I concur and think this is reasonable given no major limiting ischemic symptoms and low risk ischemia workup results      I support the patient's decision to take the Covid -19 vaccine   Had both dose   No major issues   Now fully immunized          Return in about 4 months (around 9/7/2021).

## 2021-05-13 ENCOUNTER — OFFICE VISIT (OUTPATIENT)
Dept: PRIMARY CARE CLINIC | Age: 86
End: 2021-05-13
Payer: MEDICARE

## 2021-05-13 DIAGNOSIS — E53.8 VITAMIN B12 DEFICIENCY: Primary | ICD-10-CM

## 2021-05-13 PROCEDURE — 96372 THER/PROPH/DIAG INJ SC/IM: CPT | Performed by: STUDENT IN AN ORGANIZED HEALTH CARE EDUCATION/TRAINING PROGRAM

## 2021-05-13 RX ORDER — CYANOCOBALAMIN 1000 UG/ML
1000 INJECTION INTRAMUSCULAR; SUBCUTANEOUS ONCE
Status: COMPLETED | OUTPATIENT
Start: 2021-05-13 | End: 2021-05-13

## 2021-05-13 RX ADMIN — CYANOCOBALAMIN 1000 MCG: 1000 INJECTION INTRAMUSCULAR; SUBCUTANEOUS at 14:08

## 2021-05-13 NOTE — PROGRESS NOTES
Per orders of  Dr. Sebas Tejada  A vaccine of         b12  was given in the Right Deltoid . Pt tolerated well and was released from the office with no concerns.

## 2021-05-19 ENCOUNTER — OFFICE VISIT (OUTPATIENT)
Dept: PRIMARY CARE CLINIC | Age: 86
End: 2021-05-19
Payer: MEDICARE

## 2021-05-19 ENCOUNTER — TELEPHONE (OUTPATIENT)
Dept: PRIMARY CARE CLINIC | Age: 86
End: 2021-05-19

## 2021-05-19 ENCOUNTER — HOSPITAL ENCOUNTER (OUTPATIENT)
Dept: GENERAL RADIOLOGY | Age: 86
Discharge: HOME | End: 2021-05-19
Payer: MEDICARE

## 2021-05-19 VITALS
OXYGEN SATURATION: 96 % | SYSTOLIC BLOOD PRESSURE: 118 MMHG | HEART RATE: 65 BPM | TEMPERATURE: 97.6 F | HEIGHT: 63 IN | BODY MASS INDEX: 20.16 KG/M2 | WEIGHT: 113.8 LBS | DIASTOLIC BLOOD PRESSURE: 72 MMHG

## 2021-05-19 DIAGNOSIS — M25.472 EDEMA OF LEFT ANKLE: ICD-10-CM

## 2021-05-19 DIAGNOSIS — K59.00 CONSTIPATION, UNSPECIFIED CONSTIPATION TYPE: ICD-10-CM

## 2021-05-19 DIAGNOSIS — E78.00 HYPERCHOLESTEREMIA: ICD-10-CM

## 2021-05-19 DIAGNOSIS — E11.42 DIABETIC PERIPHERAL NEUROPATHY ASSOCIATED WITH TYPE 2 DIABETES MELLITUS (HCC): Chronic | ICD-10-CM

## 2021-05-19 DIAGNOSIS — I10 ESSENTIAL HYPERTENSION: Chronic | ICD-10-CM

## 2021-05-19 DIAGNOSIS — M79.672 LEFT FOOT PAIN: ICD-10-CM

## 2021-05-19 DIAGNOSIS — S92.502A CLOSED FRACTURE OF PHALANX OF LEFT FOURTH TOE, INITIAL ENCOUNTER: Primary | ICD-10-CM

## 2021-05-19 DIAGNOSIS — R93.7 PERIOSTEAL ELEVATION DETERMINED BY X-RAY: ICD-10-CM

## 2021-05-19 DIAGNOSIS — M79.672 LEFT FOOT PAIN: Primary | ICD-10-CM

## 2021-05-19 PROCEDURE — 1123F ACP DISCUSS/DSCN MKR DOCD: CPT | Performed by: NURSE PRACTITIONER

## 2021-05-19 PROCEDURE — 73630 X-RAY EXAM OF FOOT: CPT

## 2021-05-19 PROCEDURE — 1036F TOBACCO NON-USER: CPT | Performed by: NURSE PRACTITIONER

## 2021-05-19 PROCEDURE — 4040F PNEUMOC VAC/ADMIN/RCVD: CPT | Performed by: NURSE PRACTITIONER

## 2021-05-19 PROCEDURE — G8427 DOCREV CUR MEDS BY ELIG CLIN: HCPCS | Performed by: NURSE PRACTITIONER

## 2021-05-19 PROCEDURE — G8420 CALC BMI NORM PARAMETERS: HCPCS | Performed by: NURSE PRACTITIONER

## 2021-05-19 PROCEDURE — 99214 OFFICE O/P EST MOD 30 MIN: CPT | Performed by: NURSE PRACTITIONER

## 2021-05-19 PROCEDURE — G8400 PT W/DXA NO RESULTS DOC: HCPCS | Performed by: NURSE PRACTITIONER

## 2021-05-19 PROCEDURE — 1090F PRES/ABSN URINE INCON ASSESS: CPT | Performed by: NURSE PRACTITIONER

## 2021-05-19 PROCEDURE — 74018 RADEX ABDOMEN 1 VIEW: CPT

## 2021-05-19 RX ORDER — LORAZEPAM 0.5 MG/1
TABLET ORAL
COMMUNITY

## 2021-05-19 RX ORDER — AMLODIPINE BESYLATE 10 MG/1
10 TABLET ORAL DAILY
COMMUNITY
Start: 2021-05-07

## 2021-05-19 SDOH — ECONOMIC STABILITY: FOOD INSECURITY: WITHIN THE PAST 12 MONTHS, YOU WORRIED THAT YOUR FOOD WOULD RUN OUT BEFORE YOU GOT MONEY TO BUY MORE.: NEVER TRUE

## 2021-05-19 SDOH — ECONOMIC STABILITY: FOOD INSECURITY: WITHIN THE PAST 12 MONTHS, THE FOOD YOU BOUGHT JUST DIDN'T LAST AND YOU DIDN'T HAVE MONEY TO GET MORE.: NEVER TRUE

## 2021-05-19 NOTE — TELEPHONE ENCOUNTER
Called patient,informed of xray of abdomen and foot xray results.    Informed of appointment with [address] [place] [address]

## 2021-05-19 NOTE — TELEPHONE ENCOUNTER
Called [facility] to schedule an appt related to foot xray results per Sherwin Diaz request  Appointment made for 5-26-21 at 220 pm

## 2021-05-19 NOTE — PROGRESS NOTES
ChiefComplaint:   Chief Complaint   Patient presents with    Joint Swelling     left ankle    Constipation     left side discomfort       History of Present Illness:  Manuel Goyal is a 81 y.o. female who presents for evaluation of left ankle swelling for this past week and constipation issue with abdominal discomfort. Patient reports that after eating her tongue burns and this has been since had hip surgery in December 2020  Patient due for colon screening but will wait until comes back from [facility] in August. Patient has had abdominal cramps on left lower quadrant for 3 weeks. Reports bowel movement every morning, and has felt better, after having a bowel movement. Drinks about 6 bottles of water per day. Drinks coffee and tea, drink milk, walks about 3,000 steps per day. Patient denies any kind of burping or cough, or bad taste in her mouth in the morning. Patient reports she has a history of constipation and takes colace and metamucil daily. Patient reports her last bowel movement was yesterday and it was soft and formed. Patient also reports her left ankle is sore and swollen and this started 5 days ago but is a bit better now. Patient reports she stubbed her 4th toe 5 days ago.      History:  Past Medical History:   Diagnosis Date    Abdominal pain     Anxiety     Dr Segundo Ba palsy 2003    Colon polyp     Depression     Dr Mavis Hellerutz Diverticulosis     Female bladder prolapse     Hernia, abdominal     History of adenomatous polyp of colon     Hypertriglyceridemia     Neuropathy     Osteopenia     Type II or unspecified type diabetes mellitus without mention of complication, not stated as uncontrolled     Vitamin B 12 deficiency        Family History   Problem Relation Age of Onset    Cancer Mother         Pancreatic    Other Mother     Cancer Father         melanoma    Colon Cancer Neg Hx     Colon Polyps Neg Hx     Esophageal Cancer Neg Hx     Liver Cancer Neg Hx     metFORMIN (GLUCOPHAGE) 500 MG tablet Take 1 tablet by mouth daily (with breakfast) 180 tablet 3    docusate sodium (COLACE) 100 MG capsule Take 1 capsule by mouth 2 times daily 180 capsule 3    vitamin D (ERGOCALCIFEROL) 1.25 MG (51761 UT) CAPS capsule Take 1 capsule by mouth once a week 12 capsule 3    folic acid (V-R FOLIC ACID) 424 MCG tablet Take 1 tablet by mouth daily 30 tablet 3    iron polysaccharides (NIFEREX) 150 MG capsule Take 1 capsule by mouth daily 90 capsule 3    atorvastatin (LIPITOR) 10 MG tablet Take 1 tablet by mouth daily 90 tablet 3    Diabetic Shoe MISC by Does not apply route DISPENSE ONE PAIR DIABETIC SHOES AND THREE PAIRS OF HEAT MOLDED INSERTS 1 each 0    aspirin 81 MG EC tablet Take 1 tablet by mouth daily (Patient taking differently: Take 81 mg by mouth daily Takes off and on) 30 tablet 3    memantine (NAMENDA) 5 MG tablet Take 5 mg by mouth daily      donepezil (ARICEPT) 5 MG tablet Take 5 mg by mouth daily       Psyllium (METAMUCIL PO) Take by mouth daily      Tetrahydrozoline HCl (EYE DROPS OP) Apply 2 drops to eye 2 times daily Indications: systane       venlafaxine (EFFEXOR-XR) 37.5 MG XR capsule Take 37.5 mg by mouth daily.  gabapentin (NEURONTIN) 300 MG capsule Take 1 capsule by mouth nightly for 31 days. 90 capsule 3    nitroGLYCERIN (NITROSTAT) 0.4 MG SL tablet Place 1 tablet under the tongue every 5 minutes as needed for Chest pain up to max of 3 total doses. If no relief after 1 dose, call 911. 25 tablet 3     No current facility-administered medications on file prior to visit. Review of Systems:  Review of Systems   Constitutional: Negative for activity change and fever. HENT: Negative for congestion, ear pain and sore throat. Respiratory: Negative for cough, chest tightness and shortness of breath. Cardiovascular: Negative for chest pain. Gastrointestinal: Positive for constipation.  Negative for abdominal pain, diarrhea, nausea and vomiting. Genitourinary: Negative for frequency and urgency. Musculoskeletal: Positive for arthralgias and joint swelling. Negative for myalgias. Skin: Negative for color change. Neurological: Negative for dizziness, weakness and numbness. Psychiatric/Behavioral: Negative for agitation. The patient is not nervous/anxious. Vital Signs:  /72   Pulse 65   Temp 97.6 °F (36.4 °C) (Temporal)   Ht 5' 3\" (1.6 m)   Wt 113 lb 12.8 oz (51.6 kg)   SpO2 96%   BMI 20.16 kg/m²     Physical Exam:  Physical Exam  Vitals reviewed. Constitutional:       Appearance: Normal appearance. HENT:      Head: Normocephalic. Right Ear: Tympanic membrane normal.      Left Ear: Tympanic membrane normal.      Nose: Nose normal.      Mouth/Throat:      Mouth: Mucous membranes are moist.      Pharynx: Oropharynx is clear. Eyes:      Extraocular Movements: Extraocular movements intact. Pupils: Pupils are equal, round, and reactive to light. Cardiovascular:      Rate and Rhythm: Normal rate and regular rhythm. Pulses: Normal pulses. Dorsalis pedis pulses are 2+ on the right side and 2+ on the left side. Posterior tibial pulses are 2+ on the right side and 2+ on the left side. Heart sounds: Normal heart sounds. Pulmonary:      Effort: Pulmonary effort is normal.      Breath sounds: Normal breath sounds. Abdominal:      General: Bowel sounds are normal. There is no distension. Palpations: Abdomen is soft. Tenderness: There is abdominal tenderness. Musculoskeletal:         General: Swelling present. Normal range of motion. Cervical back: Normal range of motion. Feet:    Skin:     General: Skin is warm and dry. Neurological:      Mental Status: She is alert and oriented to person, place, and time. Psychiatric:         Mood and Affect: Mood normal.         Behavior: Behavior normal.          Assessment & Plan    1.  Left foot pain    - XR FOOT LEFT

## 2021-05-20 DIAGNOSIS — D50.8 IRON DEFICIENCY ANEMIA SECONDARY TO INADEQUATE DIETARY IRON INTAKE: ICD-10-CM

## 2021-05-20 DIAGNOSIS — I10 ESSENTIAL HYPERTENSION: Chronic | ICD-10-CM

## 2021-05-20 DIAGNOSIS — E11.42 DIABETIC PERIPHERAL NEUROPATHY ASSOCIATED WITH TYPE 2 DIABETES MELLITUS (HCC): Chronic | ICD-10-CM

## 2021-05-20 DIAGNOSIS — E78.00 HYPERCHOLESTEREMIA: ICD-10-CM

## 2021-05-20 DIAGNOSIS — K59.00 CONSTIPATION, UNSPECIFIED CONSTIPATION TYPE: ICD-10-CM

## 2021-05-20 LAB
BASOPHILS ABSOLUTE: 0.1 K/UL (ref 0–0.2)
BASOPHILS RELATIVE PERCENT: 0.6 % (ref 0–1)
CHOLESTEROL, TOTAL: 158 MG/DL (ref 160–199)
EOSINOPHILS ABSOLUTE: 0.1 K/UL (ref 0–0.6)
EOSINOPHILS RELATIVE PERCENT: 0.7 % (ref 0–5)
HBA1C MFR BLD: 5.5 % (ref 4–6)
HCT VFR BLD CALC: 40.3 % (ref 37–47)
HDLC SERPL-MCNC: 59 MG/DL (ref 65–121)
HEMOGLOBIN: 13 G/DL (ref 12–16)
IMMATURE GRANULOCYTES #: 0 K/UL
IRON SATURATION: 35 % (ref 14–50)
IRON: 104 UG/DL (ref 37–145)
LDL CHOLESTEROL CALCULATED: 71 MG/DL
LYMPHOCYTES ABSOLUTE: 2.8 K/UL (ref 1.1–4.5)
LYMPHOCYTES RELATIVE PERCENT: 34.2 % (ref 20–40)
MCH RBC QN AUTO: 30.6 PG (ref 27–31)
MCHC RBC AUTO-ENTMCNC: 32.3 G/DL (ref 33–37)
MCV RBC AUTO: 94.8 FL (ref 81–99)
MONOCYTES ABSOLUTE: 0.6 K/UL (ref 0–0.9)
MONOCYTES RELATIVE PERCENT: 6.9 % (ref 0–10)
NEUTROPHILS ABSOLUTE: 4.6 K/UL (ref 1.5–7.5)
NEUTROPHILS RELATIVE PERCENT: 57.4 % (ref 50–65)
PDW BLD-RTO: 13.5 % (ref 11.5–14.5)
PLATELET # BLD: 344 K/UL (ref 130–400)
PMV BLD AUTO: 10.8 FL (ref 9.4–12.3)
RBC # BLD: 4.25 M/UL (ref 4.2–5.4)
TOTAL IRON BINDING CAPACITY: 293 UG/DL (ref 250–400)
TRIGL SERPL-MCNC: 142 MG/DL (ref 0–149)
WBC # BLD: 8.1 K/UL (ref 4.8–10.8)

## 2021-05-20 ASSESSMENT — ENCOUNTER SYMPTOMS
DIARRHEA: 0
COLOR CHANGE: 0
ABDOMINAL PAIN: 0
CHEST TIGHTNESS: 0
CONSTIPATION: 1
NAUSEA: 0
SORE THROAT: 0
SHORTNESS OF BREATH: 0
COUGH: 0
VOMITING: 0

## 2021-07-29 DIAGNOSIS — E78.00 HYPERCHOLESTEREMIA: Primary | ICD-10-CM

## 2021-07-29 RX ORDER — SIMVASTATIN 10 MG
TABLET ORAL
Qty: 90 TABLET | Refills: 3 | Status: SHIPPED | OUTPATIENT
Start: 2021-07-29 | End: 2021-12-07 | Stop reason: ALTCHOICE

## 2021-07-29 RX ORDER — FOLIC ACID 1 MG/1
TABLET ORAL
Qty: 90 TABLET | Refills: 3 | Status: SHIPPED | OUTPATIENT
Start: 2021-07-29

## 2021-07-29 NOTE — TELEPHONE ENCOUNTER
Gely Collet called to request a refill on her medication.       Last office visit : 5/19/2021   Next office visit : 9/20/2021     Requested Prescriptions     Pending Prescriptions Disp Refills    simvastatin (ZOCOR) 10 MG tablet [Pharmacy Med Name: SIMVASTATIN TABS 10MG] 90 tablet 3     Sig: TAKE 1 TABLET EVERY EVENING    folic acid (FOLVITE) 1 MG tablet [Pharmacy Med Name: FOLIC ACID TABS 1MG] 90 tablet 3     Sig: TAKE 1 TABLET DAILY            Lolis Bob

## 2021-09-07 ENCOUNTER — TELEPHONE (OUTPATIENT)
Dept: PRIMARY CARE CLINIC | Age: 86
End: 2021-09-07

## 2021-09-17 ENCOUNTER — TELEPHONE (OUTPATIENT)
Dept: PRIMARY CARE CLINIC | Age: 86
End: 2021-09-17

## 2021-09-17 NOTE — TELEPHONE ENCOUNTER
----- Message from Suzi Cruz MA sent at 9/13/2021  9:01 AM CDT -----  Subject: Message to Provider    QUESTIONS  Information for Provider? Would like to speak to Torres Schultz or Manpreet Sy   regarding bloodwork she has at [facility]. had appt with Dr. Bairon Gtz and   has bloodwork. Wants all the test together. ---------------------------------------------------------------------------  --------------  Chanell TAN  What is the best way for the office to contact you? OK to leave message on   voicemail  Preferred Call Back Phone Number? [phone]  ---------------------------------------------------------------------------  --------------  SCRIPT ANSWERS  Relationship to Patient?  Self

## 2021-09-20 NOTE — TELEPHONE ENCOUNTER
Radha Perez,     Can you find out when the patient is planning to come home and what blood work Dr. Abhijeet Chery is wanting? We can order for it to all be done at the same time.

## 2021-09-22 NOTE — TELEPHONE ENCOUNTER
Called patient and Mily Gandhi is unsure when she is coming home related to Covid and \"[place] and [place] don't have any hospital beds. \" Discussed previous low RBC back in December and informed had CBC done in May and was normal. VU  Patient reports hasn't called  yet and will do that today. Patient also reports no longer taking metformin-\" I couldn't tolerate it. \"

## 2021-10-06 ENCOUNTER — NURSE TRIAGE (OUTPATIENT)
Dept: OTHER | Facility: CLINIC | Age: 86
End: 2021-10-06

## 2021-10-08 ENCOUNTER — TELEPHONE (OUTPATIENT)
Dept: PRIMARY CARE CLINIC | Age: 86
End: 2021-10-08

## 2021-10-08 NOTE — TELEPHONE ENCOUNTER
----- Message from Liu Castrejon sent at 10/6/2021  9:50 AM CDT -----  Subject: Message to Provider    QUESTIONS  Information for Provider? Diabetic Shoe exam to get her shoes, does she   need appt or just forms?  ---------------------------------------------------------------------------  --------------  CALL BACK INFO  What is the best way for the office to contact you? OK to leave message on   voicemail  Preferred Call Back Phone Number? [phone]  ---------------------------------------------------------------------------  --------------  SCRIPT ANSWERS  Relationship to Patient?  Self

## 2021-12-01 DIAGNOSIS — E11.42 DIABETIC PERIPHERAL NEUROPATHY ASSOCIATED WITH TYPE 2 DIABETES MELLITUS (HCC): ICD-10-CM

## 2021-12-01 DIAGNOSIS — D50.8 IRON DEFICIENCY ANEMIA SECONDARY TO INADEQUATE DIETARY IRON INTAKE: ICD-10-CM

## 2021-12-01 DIAGNOSIS — I10 ESSENTIAL HYPERTENSION: ICD-10-CM

## 2021-12-01 DIAGNOSIS — E53.8 VITAMIN B 12 DEFICIENCY: ICD-10-CM

## 2021-12-01 DIAGNOSIS — R09.89 POOR PERIPHERAL CIRCULATION: ICD-10-CM

## 2021-12-01 LAB
ALBUMIN SERPL-MCNC: 4.7 G/DL (ref 3.5–5.2)
ALP BLD-CCNC: 98 U/L (ref 35–104)
ALT SERPL-CCNC: 20 U/L (ref 5–33)
ANION GAP SERPL CALCULATED.3IONS-SCNC: 14 MMOL/L (ref 7–19)
AST SERPL-CCNC: 17 U/L (ref 5–32)
BILIRUB SERPL-MCNC: 0.4 MG/DL (ref 0.2–1.2)
BUN BLDV-MCNC: 12 MG/DL (ref 8–23)
CALCIUM SERPL-MCNC: 10.1 MG/DL (ref 8.8–10.2)
CHLORIDE BLD-SCNC: 101 MMOL/L (ref 98–111)
CHOLESTEROL, TOTAL: 170 MG/DL (ref 160–199)
CO2: 26 MMOL/L (ref 22–29)
CREAT SERPL-MCNC: 0.6 MG/DL (ref 0.5–0.9)
CREATININE URINE: 112.2 MG/DL (ref 4.2–622)
GFR AFRICAN AMERICAN: >59
GFR NON-AFRICAN AMERICAN: >60
GLUCOSE BLD-MCNC: 93 MG/DL (ref 74–109)
HBA1C MFR BLD: 6.3 % (ref 4–6)
HCT VFR BLD CALC: 44.4 % (ref 37–47)
HDLC SERPL-MCNC: 66 MG/DL (ref 65–121)
HEMOGLOBIN: 14 G/DL (ref 12–16)
LDL CHOLESTEROL CALCULATED: 78 MG/DL
MCH RBC QN AUTO: 29.7 PG (ref 27–31)
MCHC RBC AUTO-ENTMCNC: 31.5 G/DL (ref 33–37)
MCV RBC AUTO: 94.3 FL (ref 81–99)
MICROALBUMIN UR-MCNC: <1.2 MG/DL (ref 0–19)
MICROALBUMIN/CREAT UR-RTO: NORMAL MG/G
PDW BLD-RTO: 13.2 % (ref 11.5–14.5)
PLATELET # BLD: 351 K/UL (ref 130–400)
PMV BLD AUTO: 10.8 FL (ref 9.4–12.3)
POTASSIUM SERPL-SCNC: 3.9 MMOL/L (ref 3.5–5)
RBC # BLD: 4.71 M/UL (ref 4.2–5.4)
SODIUM BLD-SCNC: 141 MMOL/L (ref 136–145)
TOTAL PROTEIN: 7.3 G/DL (ref 6.6–8.7)
TRIGL SERPL-MCNC: 130 MG/DL (ref 0–149)
TSH SERPL DL<=0.05 MIU/L-ACNC: 1.97 UIU/ML (ref 0.27–4.2)
WBC # BLD: 9.2 K/UL (ref 4.8–10.8)

## 2021-12-04 PROBLEM — I47.1 PSVT (PAROXYSMAL SUPRAVENTRICULAR TACHYCARDIA) (HCC): Chronic | Status: ACTIVE | Noted: 2021-12-04

## 2021-12-04 PROBLEM — E11.9 CONTROLLED TYPE 2 DIABETES MELLITUS WITHOUT COMPLICATION (HCC): Chronic | Status: ACTIVE | Noted: 2017-10-09

## 2021-12-04 PROBLEM — I51.7 LEFT VENTRICULAR HYPERTROPHY: Status: ACTIVE | Noted: 2021-12-04

## 2021-12-04 PROBLEM — I51.7 LEFT VENTRICULAR HYPERTROPHY: Chronic | Status: ACTIVE | Noted: 2021-12-04

## 2021-12-04 PROBLEM — I47.1 PSVT (PAROXYSMAL SUPRAVENTRICULAR TACHYCARDIA): Status: ACTIVE | Noted: 2021-12-04

## 2021-12-04 NOTE — PROGRESS NOTES
"Chief Complaint  Slow Heart Rate (6mo F/U. Has been well. No chest pain or palpitations. Has had a dizzy spell that made her fall. She feels like it was due to metformin)    Subjective          Dalia Betts presents to [facility] for routine follow-up of medication adjustments.  Atenolol was discontinued and Norvasc was increased to 10 mg daily at her last office visit on 5/7/2021 due to bradycardia. She has paroxysmal supraventricular tachycardia, mild concentric hypertrophy, hypertension, hyperlipidemia, type 2 diabetes mellitus and Bell's palsy. She reports one episode of dizziness that resulted in a fall several months ago. She contributes this to metformin. Patient denies chest pain, shortness of breath, palpitations, syncope, orthopnea, PND, edema or decreased stamina.  Patient denies any signs of bleeding.    Hypertension  This is a chronic problem. The current episode started more than 1 year ago. The problem is controlled. Pertinent negatives include no anxiety, blurred vision, chest pain, headaches, malaise/fatigue, neck pain, orthopnea, palpitations, peripheral edema, PND, shortness of breath or sweats. Risk factors for coronary artery disease include post-menopausal state and dyslipidemia. Current antihypertension treatment includes calcium channel blockers and beta blockers. The current treatment provides significant improvement. Hypertensive end-organ damage includes left ventricular hypertrophy.   Hyperlipidemia  This is a chronic problem. The current episode started more than 1 year ago. Exacerbating diseases include diabetes. Pertinent negatives include no chest pain or shortness of breath. Current antihyperlipidemic treatment includes statins. Risk factors for coronary artery disease include post-menopausal, hypertension, dyslipidemia and diabetes mellitus.       Objective   Vital Signs:   /62   Pulse 60   Ht 157.5 cm (62\")   Wt 50.8 kg (112 lb)   SpO2 98%   " BMI 20.49 kg/m²     Vitals and nursing note reviewed.   Constitutional:       General: Not in acute distress.     Appearance: Normal and healthy appearance. Well-developed, normal weight and not in distress. Not diaphoretic.   Eyes:      General: Lids are normal.         Right eye: No discharge.         Left eye: No discharge.      Conjunctiva/sclera: Conjunctivae normal.      Pupils: Pupils are equal, round, and reactive to light.   HENT:      Head: Normocephalic and atraumatic.      Jaw: There is normal jaw occlusion.      Right Ear: External ear normal.      Left Ear: External ear normal.      Nose: Nose normal.   Neck:      Thyroid: No thyromegaly.      Vascular: No carotid bruit, JVD or JVR. JVD normal.      Trachea: Trachea normal. No tracheal deviation.   Pulmonary:      Effort: Pulmonary effort is normal. No respiratory distress.      Breath sounds: Normal breath sounds. No decreased breath sounds. No wheezing. No rhonchi. No rales.   Chest:      Chest wall: Not tender to palpatation.   Cardiovascular:      PMI at left midclavicular line. Normal rate. Regular rhythm. Normal S1. Normal S2.      Murmurs: There is no murmur.      No gallop. No click. No rub.   Pulses:     Intact distal pulses. No decreased pulses.   Edema:     Peripheral edema absent.   Abdominal:      General: Bowel sounds are normal. There is no distension.      Palpations: Abdomen is soft.      Tenderness: There is no abdominal tenderness.   Musculoskeletal: Normal range of motion.         General: No tenderness or deformity.      Cervical back: Normal range of motion and neck supple. Skin:     General: Skin is warm and dry.      Coloration: Skin is not pale.      Findings: No erythema or rash.   Neurological:      General: No focal deficit present.      Mental Status: Alert, oriented to person, place, and time and oriented to person, place and time.   Psychiatric:         Attention and Perception: Attention and perception normal.          Mood and Affect: Mood and affect normal.         Speech: Speech normal.         Behavior: Behavior normal.         Thought Content: Thought content normal.         Cognition and Memory: Cognition and memory normal.         Judgment: Judgment normal.        Result Review :   The following data was reviewed by: ELIZABETH Silveira on 12/06/2021:  Common labs    Common Labsle 12/21/20 5/20/21 5/20/21 5/20/21 12/1/21 12/1/21 12/1/21 12/1/21     [id number] 0856 1328 1328 1328 1328   Glucose      93     BUN      12     Creatinine      0.6     eGFR Non  Am      >60     eGFR African Am      >59     Sodium      141     Potassium      3.9     Chloride      101     Calcium      10.1     Albumin      4.7     Total Bilirubin      0.4     Alkaline Phosphatase      98     AST (SGOT)      17     ALT (SGPT)      20     WBC 7.6 8.1   9.2      Hemoglobin 9.6 (A) 13.0   14.0      Hematocrit 31.5 (A) 40.3   44.4      Platelets 412 (A) 344   351      Total Cholesterol   158 (A)    170    Triglycerides   142    130    HDL Cholesterol   59 (A)    66    LDL Cholesterol    71    78    Hemoglobin A1C    5.5    6.3 (A)   (A) Abnormal value       Comments are available for some flowsheets but are not being displayed.           Data reviewed: Cardiology studies 2d echo 1/20/21, holter monitor 12/30/20 and stress test 3/16/21     ECG 12 Lead    Date/Time: 12/6/2021 10:27 AM  Performed by: Melissa Peraza APRN  Authorized by: Melissa Peraza APRN   Comparison: compared with previous ECG from 5/7/2021  Rhythm: sinus rhythm  Rate: normal  BPM: 60  T inversion: III  Other findings: left ventricular hypertrophy    Clinical impression: abnormal EKG              Assessment and Plan    Diagnoses and all orders for this visit:    1. PSVT (paroxysmal supraventricular tachycardia) (HCC) (Primary)-46 mg with longest duration of 14.6 seconds at a maximum rate of 162 bpm on Holter monitor 12/30/2020.  Betablocker discontinued due to  bradycardia.     2. Left ventricular hypertrophy-mild on 2D echo 1/20/2021.  No clinical signs or symptoms of congestive heart failure. Reviewed signs and symptoms of CHF and what to report with the patient. Patient instructed to restrict sodium and weigh daily. Report weight gain of greater than 2 lbs overnight or 5 lbs in 1 week. Pt verbalized understanding of instructions and plan of care.     3. Primary hypertension-blood pressures well controlled.  Continue amlodipine and atenolol.  Monitor and record daily blood pressure. Report readings consistently higher than 130/90 or consistently lower than 100/60.     4. Mixed hyperlipidemia-management per PCP.  Continue atorvastatin.    5. Controlled type 2 diabetes mellitus without complication, without long-term current use of insulin (HCC)-management per PCP.    6. Sinus bradycardia-heart rates improved after discontinuing betablocker.     Advance Care Planning   ACP discussion was held with the patient during this visit. Patient has an advance directive (not in EMR), copy requested.        Follow Up   Return in about 6 months (around 6/6/2022) for Next scheduled follow up with Dr. Cordova.  Patient was given instructions and counseling regarding her condition or for health maintenance advice. Please see specific information pulled into the AVS if appropriate.

## 2021-12-06 ENCOUNTER — OFFICE VISIT (OUTPATIENT)
Dept: CARDIOLOGY | Facility: CLINIC | Age: 86
End: 2021-12-06

## 2021-12-06 VITALS
DIASTOLIC BLOOD PRESSURE: 62 MMHG | WEIGHT: 112 LBS | HEART RATE: 60 BPM | BODY MASS INDEX: 20.61 KG/M2 | SYSTOLIC BLOOD PRESSURE: 118 MMHG | OXYGEN SATURATION: 98 % | HEIGHT: 62 IN

## 2021-12-06 DIAGNOSIS — I10 PRIMARY HYPERTENSION: Chronic | ICD-10-CM

## 2021-12-06 DIAGNOSIS — I51.7 LEFT VENTRICULAR HYPERTROPHY: Chronic | ICD-10-CM

## 2021-12-06 DIAGNOSIS — R00.1 SINUS BRADYCARDIA: ICD-10-CM

## 2021-12-06 DIAGNOSIS — E78.2 MIXED HYPERLIPIDEMIA: Chronic | ICD-10-CM

## 2021-12-06 DIAGNOSIS — I47.1 PSVT (PAROXYSMAL SUPRAVENTRICULAR TACHYCARDIA) (HCC): Primary | Chronic | ICD-10-CM

## 2021-12-06 DIAGNOSIS — E11.9 CONTROLLED TYPE 2 DIABETES MELLITUS WITHOUT COMPLICATION, WITHOUT LONG-TERM CURRENT USE OF INSULIN (HCC): Chronic | ICD-10-CM

## 2021-12-06 PROCEDURE — 93000 ELECTROCARDIOGRAM COMPLETE: CPT | Performed by: NURSE PRACTITIONER

## 2021-12-06 PROCEDURE — 99214 OFFICE O/P EST MOD 30 MIN: CPT | Performed by: NURSE PRACTITIONER

## 2021-12-06 RX ORDER — ATORVASTATIN CALCIUM 10 MG/1
10 TABLET, FILM COATED ORAL DAILY
Qty: 90 TABLET | Refills: 3 | Status: SHIPPED | OUTPATIENT
Start: 2021-12-06

## 2021-12-06 RX ORDER — AMLODIPINE BESYLATE 10 MG/1
10 TABLET ORAL DAILY
Qty: 90 TABLET | Refills: 3 | Status: SHIPPED | OUTPATIENT
Start: 2021-12-06

## 2021-12-06 NOTE — PATIENT INSTRUCTIONS
Advance Care Planning and Advance Directives     You make decisions on a daily basis - decisions about where you want to live, your career, your home, your life. Perhaps one of the most important decisions you face is your choice for future medical care. Take time to talk with your family and your healthcare team and start planning today.  Advance Care Planning is a process that can help you:  · Understand possible future healthcare decisions in light of your own experiences  · Reflect on those decision in light of your goals and values  · Discuss your decisions with those closest to you and the healthcare professionals that care for you  · Make a plan by creating a document that reflects your wishes    Surrogate Decision Maker  In the event of a medical emergency, which has left you unable to communicate or to make your own decisions, you would need someone to make decisions for you.  It is important to discuss your preferences for medical treatment with this person while you are in good health.     Qualities of a surrogate decision maker:  • Willing to take on this role and responsibility  • Knows what you want for future medical care  • Willing to follow your wishes even if they don't agree with them  • Able to make difficult medical decisions under stressful circumstances    Advance Directives  These are legal documents you can create that will guide your healthcare team and decision maker(s) when needed. These documents can be stored in the electronic medical record.    · Living Will - a legal document to guide your care if you have a terminal condition or a serious illness and are unable to communicate. States vary by statute in document names/types, but most forms may include one or more of the following:        -  Directions regarding life-prolonging treatments        -  Directions regarding artificially provided nutrition/hydration        -  Choosing a healthcare decision maker        -  Direction  regarding organ/tissue donation    · Durable Power of  for Healthcare - this document names an -in-fact to make medical decisions for you, but it may also allow this person to make personal and financial decisions for you. Please seek the advice of an  if you need this type of document.    **Advance Directives are not required and no one may discriminate against you if you do not sign one.    Medical Orders  Many states allow specific forms/orders signed by your physician to record your wishes for medical treatment in your current state of health. This form, signed in personal communication with your physician, addresses resuscitation and other medical interventions that you may or may not want.      For more information or to schedule a time with a [facility] Advance Care Planning Facilitator contact: [email or web address] or call [phone] and someone will contact you directly.

## 2021-12-07 ENCOUNTER — OFFICE VISIT (OUTPATIENT)
Dept: PRIMARY CARE CLINIC | Age: 86
End: 2021-12-07
Payer: MEDICARE

## 2021-12-07 VITALS
HEIGHT: 63 IN | BODY MASS INDEX: 20.09 KG/M2 | WEIGHT: 113.4 LBS | SYSTOLIC BLOOD PRESSURE: 130 MMHG | HEART RATE: 65 BPM | TEMPERATURE: 98.6 F | OXYGEN SATURATION: 97 % | DIASTOLIC BLOOD PRESSURE: 70 MMHG

## 2021-12-07 DIAGNOSIS — I47.1 PSVT (PAROXYSMAL SUPRAVENTRICULAR TACHYCARDIA) (HCC): ICD-10-CM

## 2021-12-07 DIAGNOSIS — M21.621 TAILOR'S BUNION OF BOTH FEET: ICD-10-CM

## 2021-12-07 DIAGNOSIS — E11.42 DIABETIC PERIPHERAL NEUROPATHY ASSOCIATED WITH TYPE 2 DIABETES MELLITUS (HCC): ICD-10-CM

## 2021-12-07 DIAGNOSIS — M21.622 TAILOR'S BUNION OF BOTH FEET: ICD-10-CM

## 2021-12-07 DIAGNOSIS — Z00.00 ANNUAL PHYSICAL EXAM: ICD-10-CM

## 2021-12-07 DIAGNOSIS — Z00.00 ROUTINE GENERAL MEDICAL EXAMINATION AT A HEALTH CARE FACILITY: Primary | ICD-10-CM

## 2021-12-07 PROCEDURE — 1123F ACP DISCUSS/DSCN MKR DOCD: CPT | Performed by: FAMILY MEDICINE

## 2021-12-07 PROCEDURE — 1036F TOBACCO NON-USER: CPT | Performed by: FAMILY MEDICINE

## 2021-12-07 PROCEDURE — 4040F PNEUMOC VAC/ADMIN/RCVD: CPT | Performed by: FAMILY MEDICINE

## 2021-12-07 PROCEDURE — G8427 DOCREV CUR MEDS BY ELIG CLIN: HCPCS | Performed by: FAMILY MEDICINE

## 2021-12-07 PROCEDURE — G0439 PPPS, SUBSEQ VISIT: HCPCS | Performed by: FAMILY MEDICINE

## 2021-12-07 PROCEDURE — G8484 FLU IMMUNIZE NO ADMIN: HCPCS | Performed by: FAMILY MEDICINE

## 2021-12-07 PROCEDURE — G8400 PT W/DXA NO RESULTS DOC: HCPCS | Performed by: FAMILY MEDICINE

## 2021-12-07 PROCEDURE — 99213 OFFICE O/P EST LOW 20 MIN: CPT | Performed by: FAMILY MEDICINE

## 2021-12-07 PROCEDURE — G8420 CALC BMI NORM PARAMETERS: HCPCS | Performed by: FAMILY MEDICINE

## 2021-12-07 PROCEDURE — 1090F PRES/ABSN URINE INCON ASSESS: CPT | Performed by: FAMILY MEDICINE

## 2021-12-07 RX ORDER — NITROGLYCERIN 0.4 MG/1
0.4 TABLET SUBLINGUAL
Qty: 25 TABLET | Refills: 3 | Status: SHIPPED | OUTPATIENT
Start: 2021-12-07

## 2021-12-07 RX ORDER — DOXYCYCLINE 100 MG/1
100 CAPSULE ORAL 2 TIMES DAILY
Qty: 20 CAPSULE | Refills: 0 | Status: SHIPPED | OUTPATIENT
Start: 2021-12-07 | End: 2021-12-17

## 2021-12-07 ASSESSMENT — PATIENT HEALTH QUESTIONNAIRE - PHQ9
SUM OF ALL RESPONSES TO PHQ QUESTIONS 1-9: 0
SUM OF ALL RESPONSES TO PHQ9 QUESTIONS 1 & 2: 0
SUM OF ALL RESPONSES TO PHQ QUESTIONS 1-9: 0
2. FEELING DOWN, DEPRESSED OR HOPELESS: 0
1. LITTLE INTEREST OR PLEASURE IN DOING THINGS: 0
SUM OF ALL RESPONSES TO PHQ QUESTIONS 1-9: 0

## 2021-12-07 ASSESSMENT — LIFESTYLE VARIABLES: HOW OFTEN DO YOU HAVE A DRINK CONTAINING ALCOHOL: 0

## 2021-12-07 NOTE — PATIENT INSTRUCTIONS
Personalized Preventive Plan for Totacos Coombs - 12/7/2021  Medicare offers a range of preventive health benefits. Some of the tests and screenings are paid in full while other may be subject to a deductible, co-insurance, and/or copay. Some of these benefits include a comprehensive review of your medical history including lifestyle, illnesses that may run in your family, and various assessments and screenings as appropriate. After reviewing your medical record and screening and assessments performed today your provider may have ordered immunizations, labs, imaging, and/or referrals for you. A list of these orders (if applicable) as well as your Preventive Care list are included within your After Visit Summary for your review. Other Preventive Recommendations:    · A preventive eye exam performed by an eye specialist is recommended every 1-2 years to screen for glaucoma; cataracts, macular degeneration, and other eye disorders. · A preventive dental visit is recommended every 6 months. · Try to get at least 150 minutes of exercise per week or 10,000 steps per day on a pedometer . · Order or download the FREE \"Exercise & Physical Activity: Your Everyday Guide\" from [employer]. Call 3-234.165.2234 or search [employer] online. · You need 5584-7946 mg of calcium and 1637-8564 IU of vitamin D per day. It is possible to meet your calcium requirement with diet alone, but a vitamin D supplement is usually necessary to meet this goal.  · When exposed to the sun, use a sunscreen that protects against both UVA and UVB radiation with an SPF of 30 or greater. Reapply every 2 to 3 hours or after sweating, drying off with a towel, or swimming. · Always wear a seat belt when traveling in a car. Always wear a helmet when riding a bicycle or motorcycle.

## 2021-12-07 NOTE — PROGRESS NOTES
Medicare Annual Wellness Visit  Name: Sheri Serna Date: 2021   MRN: [id number] Sex: Female   Age: 81 y.o. Ethnicity: Non- / Non    : 1935 Race: White (non-)      Tosin Barr is here for Medicare AWV    Screenings for behavioral, psychosocial and functional/safety risks, and cognitive dysfunction are all negative except as indicated below. These results, as well as other patient data from the [email or web address] form, are documented in Flowsheets linked to this Encounter. Allergies   Allergen Reactions    Phenergan [Promethazine] Other (See Comments)     Makes \"Crazy\"         Prior to Visit Medications    Medication Sig Taking? Authorizing Provider   Diabetic Shoe MISC by Does not apply route DISPENSE ONE PAIR DIABETIC SHOES AND THREE PAIRS OF HEAT MOLDED INSERTS Yes Hansel High MD   doxycycline monohydrate (MONODOX) 100 MG capsule Take 1 capsule by mouth 2 times daily for 10 days Yes Hansel High MD   folic acid (FOLVITE) 1 MG tablet TAKE 1 TABLET DAILY Yes Hansel High MD   amLODIPine (NORVASC) 10 MG tablet Take 10 mg by mouth daily Yes Historical Provider, MD   LORazepam (ATIVAN) 0.5 MG tablet  Yes Historical Provider, MD   docusate sodium (COLACE) 100 MG capsule Take 1 capsule by mouth 2 times daily Yes Hansel High MD   vitamin D (ERGOCALCIFEROL) 1.25 MG (30398 UT) CAPS capsule Take 1 capsule by mouth once a week Yes Hansel High MD   folic acid (V-R FOLIC ACID) 401 MCG tablet Take 1 tablet by mouth daily Yes Hansel High MD   iron polysaccharides (NIFEREX) 150 MG capsule Take 1 capsule by mouth daily Yes Hansel High MD   atorvastatin (LIPITOR) 10 MG tablet Take 1 tablet by mouth daily Yes Hansel High MD   aspirin 81 MG EC tablet Take 1 tablet by mouth daily  Patient taking differently: Take 81 mg by mouth daily Takes off and on Yes Timi Thompson MD   gabapentin (NEURONTIN) 300 MG capsule Take 1 capsule by mouth nightly for 31 days. Yes Jacqueline Nino MD   memantine (NAMENDA) 5 MG tablet Take 5 mg by mouth daily Yes Historical Provider, MD   donepezil (ARICEPT) 5 MG tablet Take 5 mg by mouth daily  Yes Historical Provider, MD   nitroGLYCERIN (NITROSTAT) 0.4 MG SL tablet Place 1 tablet under the tongue every 5 minutes as needed for Chest pain up to max of 3 total doses. If no relief after 1 dose, call 911. Yes Hillary Ng MD   Psyllium (METAMUCIL PO) Take by mouth daily Yes Historical Provider, MD   Tetrahydrozoline HCl (EYE DROPS OP) Apply 2 drops to eye 2 times daily Indications: systane  Yes Historical Provider, MD   venlafaxine (EFFEXOR-XR) 37.5 MG XR capsule Take 37.5 mg by mouth daily.  Yes Historical Provider, MD         Past Medical History:   Diagnosis Date    Abdominal pain     Anxiety     Dr Kylah Rodriguez Reis's palsy 2003    Colon polyp     Depression     Dr Kylah Rodriguez Diverticulosis     Female bladder prolapse     Hernia, abdominal     History of adenomatous polyp of colon     Hypertriglyceridemia     Neuropathy     Osteopenia     Type II or unspecified type diabetes mellitus without mention of complication, not stated as uncontrolled     Vitamin B 12 deficiency        Past Surgical History:   Procedure Laterality Date    APPENDECTOMY      BLADDER REPAIR  1984    states stiched her bladder up but was not a suspension-Dr Garcia    CATARACT REMOVAL  12/2014    CATARACT REMOVAL  01/2015    CHOLECYSTECTOMY  age 22    CLAVICLE SURGERY Left 6/22/2020    OPEN REDUCTION INTERNAL FIXATION LEFT CLAVICLE performed by Heather Sauer MD at [address] [place] [address]  02/15/2012    Dr Michael To AP, diverticulosis coli, small internal hemorrhoids, 5 yr recall    COLONOSCOPY  3/1998    negative    COLONOSCOPY  5/1/07     Dr Shena Duong, diverticulosis coli/small internal hemorrhoids    COLONOSCOPY  9/16/04    Dr Blood-diverticulosis coli, ileocecal valve lipoma    COLONOSCOPY N/A 1/25/2016    Dr Shelby Tolliver AP, 5 yr recall    ELBOW FRACTURE SURGERY  1970s    EYE SURGERY Bilateral     cataract    FEMUR FRACTURE SURGERY Right 12/4/2020    FEMUR IM NAIL TARIQ INSERTION performed by Rose Smith MD at [facility]    Dr Anna Patel partner   [facility]    total-Dr. [address] UMBILICAL HERNIA REPAIR N/A 2/15/2016    OPEN REPAIR OF RECURRENT INCISIONAL HERNIA WITH MESH  performed by Randal Bailon MD at [facility] ENDOSCOPY  02/16/2012    Dr White-Barretts/gastritis    UPPER GASTROINTESTINAL ENDOSCOPY  2/1998    Dr Luz Maria Patel, negative    UPPER GASTROINTESTINAL ENDOSCOPY  1/2/07    Dr Luz Maria Patel, gastritis    UPPER GASTROINTESTINAL ENDOSCOPY N/A 1/11/2016    Dr Bunn Amber junction w/minimal chronic inflammation         Family History   Problem Relation Age of Onset    Cancer Mother         Pancreatic    Other Mother     Cancer Father         melanoma    Colon Cancer Neg Hx     Colon Polyps Neg Hx     Esophageal Cancer Neg Hx     Liver Cancer Neg Hx     Liver Disease Neg Hx     Rectal Cancer Neg Hx     Stomach Cancer Neg Hx        CareTeam (Including outside providers/suppliers regularly involved in providing care):   Patient Care Team:  Obey Judd MD as PCP - General (Family Medicine)  Obey Judd MD as PCP - [facility] Empaneled Provider  DILIA Mcgee as Nurse Practitioner (Family Nurse Practitioner)  Pita Waldron MD as Consulting Physician (Cardiology)    Wt Readings from Last 3 Encounters:   12/07/21 113 lb 6.4 oz (51.4 kg)   05/19/21 113 lb 12.8 oz (51.6 kg)   12/21/20 121 lb (54.9 kg)     Vitals:    12/07/21 1413   BP: 130/70   Site: Left Upper Arm   Pulse: 65   Temp: 98.6 °F (37 °C)   SpO2: 97%   Weight: 113 lb 6.4 oz (51.4 kg)   Height: 5' 3\" (1.6 m)     Body mass index is 20.09 kg/m².     Based upon direct observation of the patient, evaluation of cognition reveals recent and remote memory intact. Patient's complete Health Risk Assessment and screening values have been reviewed and are found in Flowsheets. The following problems were reviewed today and where indicated follow up appointments were made and/or referrals ordered.     Positive Risk Factor Screenings with Interventions:     Fall Risk:  2 or more falls in past year?: (!) yes  Fall with injury in past year?: no  Fall Risk Interventions:    · Home safety tips provided         Health Habits/Nutrition:  Health Habits/Nutrition  Do you exercise for at least 20 minutes 2-3 times per week?: Yes  Have you lost any weight without trying in the past 3 months?: (!) Yes  Do you eat only one meal per day?: No  Have you seen the dentist within the past year?: Yes  Body mass index: 20.09  Health Habits/Nutrition Interventions:  · Inadequate physical activity:  educational materials provided to promote increased physical activity  · Nutritional issues:  educational materials to promote weight loss provided    Hearing/Vision:  No exam data present  Hearing/Vision  Do you or your family notice any trouble with your hearing that hasn't been managed with hearing aids?: (!) Yes  Do you have difficulty driving, watching TV, or doing any of your daily activities because of your eyesight?: No  Have you had an eye exam within the past year?: Yes  Hearing/Vision Interventions:  · Hearing concerns:  patient declines any further evaluation/treatment for hearing issues      Personalized Preventive Plan   Current Health Maintenance Status  Immunization History   Administered Date(s) Administered    Influenza Whole 10/21/2015    Influenza, High Dose (Fluzone 65 yrs and older) 12/18/2018    Influenza, Frances Arena, IM, (6 mo and older Fluzone, Flulaval, Fluarix and 3 yrs and older Afluria) 10/13/2016, 10/05/2017    Influenza, Quadv, IM, PF (6 mo and older Fluzone, Flulaval, Fluarix, and 3 yrs and older Afluria) 10/07/2019    Pneumococcal Conjugate 13-valent (Asmita Angelo) 10/21/2015, 10/18/2016    Pneumococcal Polysaccharide ([id number]) 11/08/2017    Tdap (Boostrix, Adacel) 12/18/2016    Zoster Live (Zostavax) 06/19/2019, 08/28/2019    Zoster Recombinant (Shingrix) 01/01/2020        Health Maintenance   Topic Date Due    COVID-19 Vaccine (1) Never done    Shingles Vaccine (3 of 3) 02/26/2020    Colon cancer screen colonoscopy  01/25/2021    Flu vaccine (1) 09/01/2021    Annual Wellness Visit (AWV)  10/14/2021    Lipid screen  12/01/2022    Potassium monitoring  12/01/2022    Creatinine monitoring  12/01/2022    DTaP/Tdap/Td vaccine (2 - Td or Tdap) 12/18/2026    DEXA (modify frequency per FRAX score)  Completed    Pneumococcal 65+ years Vaccine  Completed    Hepatitis A vaccine  Aged Out    Hib vaccine  Aged Out    Meningococcal (ACWY) vaccine  Aged Out     Recommendations for [employer] [facility] Due: see orders and patient instructions/AVS.  . Recommended screening schedule for the next 5-10 years is provided to the patient in written form: see Patient Omar Vernon was seen today for medicare awv. Diagnoses and all orders for this visit:    Routine general medical examination at a health care facility    Diabetic peripheral neuropathy associated with type 2 diabetes mellitus ([facility] [address]  -      DIABETES FOOT EXAM  -     Diabetic Shoe MISC; by Does not apply route DISPENSE ONE PAIR DIABETIC SHOES AND THREE PAIRS OF HEAT MOLDED INSERTS    PSVT (paroxysmal supraventricular tachycardia) (HCC)    Tailor's bunion of both feet    Other orders  -     doxycycline monohydrate (MONODOX) 100 MG capsule;  Take 1 capsule by mouth 2 times daily for 10 days

## 2021-12-07 NOTE — PROGRESS NOTES
Waleska Mejia is a 81 y.o. female who presents today for   Chief Complaint   Patient presents with    Medicare AWV       HPI  Patient is here for 2 separate visits: annual wellness visit as well as acute and chronic issues. The below review of systems and physical exam applies to both encounters. Annual HPI:  Patient is here for annual physical exam.  Patient denies any major changes in recent screenings or family medical history changes. Up-to-date on COVID vaccine    Acute/Chronic HPI:  Patient is here for follow-up chronic conditions. She notes that she would like to have diabetic shoes possible she has a history of diabetes and peripheral neuropathy secondary to this. She also has bilateral foot pain as well. She notes some deformity of the foot. She has not had any history of this before paroxysmal supraventricular tachycardia and continues to use her gabapentin. No change in PMH, family, social, or surgical history unless mentioned above. I have reviewed the above chief complaint and HPI details charted by staff and claim ownership of the documentation. Review of Systems   Constitutional: Negative for chills, fever and unexpected weight change. Respiratory: Negative for cough, chest tightness, shortness of breath and wheezing. Cardiovascular: Negative for chest pain, palpitations and leg swelling. Gastrointestinal: Negative for abdominal pain, constipation, diarrhea, nausea and vomiting. Genitourinary: Negative for difficulty urinating, dysuria and frequency. Musculoskeletal: Positive for arthralgias and gait problem.        Past Medical History:   Diagnosis Date    Abdominal pain     Anxiety     Dr Tyra Rosenberg Reis's palsy 2003    Colon polyp     Depression     Dr Tyra Rosenberg Diverticulosis     Female bladder prolapse     Hernia, abdominal     History of adenomatous polyp of colon     Hypertriglyceridemia     Neuropathy     Osteopenia     Type II or unspecified type diabetes mellitus without mention of complication, not stated as uncontrolled     Vitamin B 12 deficiency        Current Outpatient Medications   Medication Sig Dispense Refill    Diabetic Shoe MISC by Does not apply route DISPENSE ONE PAIR DIABETIC SHOES AND THREE PAIRS OF HEAT MOLDED INSERTS 1 each 0    doxycycline monohydrate (MONODOX) 100 MG capsule Take 1 capsule by mouth 2 times daily for 10 days 20 capsule 0    folic acid (FOLVITE) 1 MG tablet TAKE 1 TABLET DAILY 90 tablet 3    amLODIPine (NORVASC) 10 MG tablet Take 10 mg by mouth daily      LORazepam (ATIVAN) 0.5 MG tablet       docusate sodium (COLACE) 100 MG capsule Take 1 capsule by mouth 2 times daily 180 capsule 3    vitamin D (ERGOCALCIFEROL) 1.25 MG (11357 UT) CAPS capsule Take 1 capsule by mouth once a week 12 capsule 3    folic acid (V-R FOLIC ACID) 628 MCG tablet Take 1 tablet by mouth daily 30 tablet 3    iron polysaccharides (NIFEREX) 150 MG capsule Take 1 capsule by mouth daily 90 capsule 3    atorvastatin (LIPITOR) 10 MG tablet Take 1 tablet by mouth daily 90 tablet 3    aspirin 81 MG EC tablet Take 1 tablet by mouth daily (Patient taking differently: Take 81 mg by mouth daily Takes off and on) 30 tablet 3    gabapentin (NEURONTIN) 300 MG capsule Take 1 capsule by mouth nightly for 31 days. 90 capsule 3    memantine (NAMENDA) 5 MG tablet Take 5 mg by mouth daily      donepezil (ARICEPT) 5 MG tablet Take 5 mg by mouth daily       nitroGLYCERIN (NITROSTAT) 0.4 MG SL tablet Place 1 tablet under the tongue every 5 minutes as needed for Chest pain up to max of 3 total doses. If no relief after 1 dose, call 911. 25 tablet 3    Psyllium (METAMUCIL PO) Take by mouth daily      Tetrahydrozoline HCl (EYE DROPS OP) Apply 2 drops to eye 2 times daily Indications: systane       venlafaxine (EFFEXOR-XR) 37.5 MG XR capsule Take 37.5 mg by mouth daily. No current facility-administered medications for this visit.        Allergies Allergen Reactions    Phenergan [Promethazine] Other (See Comments)     Makes \"Crazy\"       Past Surgical History:   Procedure Laterality Date    APPENDECTOMY      BLADDER REPAIR  1984    states stiched her bladder up but was not a suspension-Dr Garcia    CATARACT REMOVAL  12/2014    CATARACT REMOVAL  01/2015    CHOLECYSTECTOMY  age 22    CLAVICLE SURGERY Left 6/22/2020    OPEN REDUCTION INTERNAL FIXATION LEFT CLAVICLE performed by Héctor Lindquist MD at [address] [place] [address]  02/15/2012    Dr Elizabeth Conteh AP, diverticulosis coli, small internal hemorrhoids, 5 yr recall    COLONOSCOPY  3/1998    negative    COLONOSCOPY  5/1/07     Dr Ted Quevedo, diverticulosis coli/small internal hemorrhoids    COLONOSCOPY  9/16/04    Dr Blood-diverticulosis coli, ileocecal valve lipoma    COLONOSCOPY N/A 1/25/2016    Dr Cuong Brandon AP, 5 yr recall    ELBOW FRACTURE SURGERY  1970s    EYE SURGERY Bilateral     cataract    FEMUR FRACTURE SURGERY Right 12/4/2020    FEMUR IM NAIL TARIQ INSERTION performed by Adrian Christianson MD at [facility]    Dr [facility]     Dr. Ted Quevedo partner   [facility]    total-Dr. Hunter Herrera [address]. N/A 2/15/2016    OPEN REPAIR OF RECURRENT INCISIONAL HERNIA WITH MESH  performed by Pura Goodell, MD at [facility] ENDOSCOPY  02/16/2012    Dr White-Barretts/gastritis    UPPER GASTROINTESTINAL ENDOSCOPY  2/1998    Dr Ted Quevedo, negative    UPPER GASTROINTESTINAL ENDOSCOPY  1/2/07    Dr Ted Quevedo, gastritis    UPPER GASTROINTESTINAL ENDOSCOPY N/A 1/11/2016    Dr Garza Mcguire junction w/minimal chronic inflammation       Social History     Tobacco Use    Smoking status: Never Smoker    Smokeless tobacco: Never Used   Vaping Use    Vaping Use: Never used   Substance Use Topics    Alcohol use: No    Drug use: No       Family History   Problem Relation Age of Onset    Cancer Mother Pancreatic    Other Mother     Cancer Father         melanoma    Colon Cancer Neg Hx     Colon Polyps Neg Hx     Esophageal Cancer Neg Hx     Liver Cancer Neg Hx     Liver Disease Neg Hx     Rectal Cancer Neg Hx     Stomach Cancer Neg Hx        /70 (Site: Left Upper Arm)   Pulse 65   Temp 98.6 °F (37 °C)   Ht 5' 3\" (1.6 m)   Wt 113 lb 6.4 oz (51.4 kg)   SpO2 97%   BMI 20.09 kg/m²     Physical Exam  Vitals and nursing note reviewed. Constitutional:       General: She is not in acute distress. Appearance: She is well-developed. She is not toxic-appearing or diaphoretic. HENT:      Head: Normocephalic and atraumatic. Not macrocephalic and not microcephalic. Right Ear: External ear normal. No decreased hearing noted. No drainage. Left Ear: External ear normal. No decreased hearing noted. No drainage. Nose: Nose normal. No nasal deformity or rhinorrhea. Mouth/Throat:      Mouth: Mucous membranes are not pale and not dry. Pharynx: Uvula midline. Eyes:      General: Lids are normal. No scleral icterus. Right eye: No discharge. Left eye: No discharge. Conjunctiva/sclera: Conjunctivae normal.      Pupils: Pupils are equal, round, and reactive to light. Neck:      Thyroid: No thyromegaly. Trachea: Phonation normal. No tracheal deviation. Cardiovascular:      Rate and Rhythm: Normal rate and regular rhythm. No extrasystoles are present. Heart sounds: Normal heart sounds, S1 normal and S2 normal. No murmur heard. Pulmonary:      Effort: Pulmonary effort is normal. No respiratory distress. Breath sounds: Normal breath sounds. No wheezing, rhonchi or rales. Chest:   Breasts:      Right: No supraclavicular adenopathy. Left: No supraclavicular adenopathy. Abdominal:      General: Bowel sounds are normal. There is no distension. Palpations: Abdomen is soft. Tenderness: There is no abdominal tenderness. There is no guarding. Musculoskeletal:         General: Deformity (b/l bunino first toe b/l) present. No tenderness. Normal range of motion. Cervical back: Normal range of motion and neck supple. No tenderness or bony tenderness. Thoracic back: Normal. No tenderness or bony tenderness. Lumbar back: Normal. No tenderness or bony tenderness. Right lower leg: No swelling. No edema. Left lower leg: No swelling. No edema. Lymphadenopathy:      Head:      Right side of head: No submental, submandibular or tonsillar adenopathy. Left side of head: No submental, submandibular or tonsillar adenopathy. Cervical: No cervical adenopathy. Upper Body:      Right upper body: No supraclavicular adenopathy. Left upper body: No supraclavicular adenopathy. Skin:     General: Skin is dry. Coloration: Skin is not pale. Findings: No erythema (on head, neck, face, extremities) or rash (on extremities, head, neck, face). Nails: There is no clubbing. Neurological:      Mental Status: She is alert and oriented to person, place, and time. Motor: No tremor or seizure activity. Gait: Gait normal.      Deep Tendon Reflexes: Reflexes are normal and symmetric. Psychiatric:         Speech: Speech normal.         Behavior: Behavior normal.         Thought Content: Thought content normal.         Judgment: Judgment normal.         Assessment:    ICD-10-CM    1. Routine general medical examination at a health care facility  Z00.00 External Referral To Primary Care   2. Diabetic peripheral neuropathy associated with type 2 diabetes mellitus ([facility]  E11.42  DIABETES FOOT EXAM     Diabetic Shoe MISC     External Referral To Primary Care   3. PSVT (paroxysmal supraventricular tachycardia) ([facility])  I47.1 External Referral To Primary Care   4. Tailor's bunion of both feet  M21.621 External Referral To Primary Care    M21.622    5.  Annual physical exam  Z00.00        Plan:   Plan applicable) as well as your Preventive Care list are included within your After Visit Summary for your review. Other Preventive Recommendations:    · A preventive eye exam performed by an eye specialist is recommended every 1-2 years to screen for glaucoma; cataracts, macular degeneration, and other eye disorders. · A preventive dental visit is recommended every 6 months. · Try to get at least 150 minutes of exercise per week or 10,000 steps per day on a pedometer . · Order or download the FREE \"Exercise & Physical Activity: Your Everyday Guide\" from [employer]. Call 0-885.150.7606 or search [employer] online. · You need 3455-9494 mg of calcium and 8772-8100 IU of vitamin D per day. It is possible to meet your calcium requirement with diet alone, but a vitamin D supplement is usually necessary to meet this goal.  · When exposed to the sun, use a sunscreen that protects against both UVA and UVB radiation with an SPF of 30 or greater. Reapply every 2 to 3 hours or after sweating, drying off with a towel, or swimming. · Always wear a seat belt when traveling in a car. Always wear a helmet when riding a bicycle or motorcycle. Patient given educational handouts and has had all questions answered. Patient voices understanding and agrees to plans along with risks and benefits of plan. Patient isinstructed to continue prior meds, diet, and exercise plans unless instructed otherwise. Patient agrees to follow up as instructed and sooner if needed. Patient agrees to go to ER if condition becomes emergent. Notesmay be completed with dictation device and spelling errors may occur. Materials may be copied and pasted from a notepad outside of EMR, all of which, I, Dr. Cecily Villa MD, take sole intellectual ownership of and have approved adding to my note. Return in about 1 year (around 12/7/2022) for , medicare AW 2 time slots.

## 2021-12-15 ASSESSMENT — ENCOUNTER SYMPTOMS
CONSTIPATION: 0
WHEEZING: 0
VOMITING: 0
ABDOMINAL PAIN: 0
COUGH: 0
DIARRHEA: 0
NAUSEA: 0
CHEST TIGHTNESS: 0
SHORTNESS OF BREATH: 0

## 2022-04-18 ENCOUNTER — OFFICE VISIT (OUTPATIENT)
Dept: PRIMARY CARE CLINIC | Age: 87
End: 2022-04-18
Payer: MEDICARE

## 2022-04-18 VITALS
SYSTOLIC BLOOD PRESSURE: 128 MMHG | HEIGHT: 63 IN | WEIGHT: 114 LBS | HEART RATE: 67 BPM | BODY MASS INDEX: 20.2 KG/M2 | TEMPERATURE: 96.9 F | OXYGEN SATURATION: 97 % | DIASTOLIC BLOOD PRESSURE: 74 MMHG

## 2022-04-18 DIAGNOSIS — E53.8 B12 DEFICIENCY: ICD-10-CM

## 2022-04-18 DIAGNOSIS — I73.9 PERIPHERAL VASCULAR DISEASE, UNSPECIFIED (HCC): ICD-10-CM

## 2022-04-18 DIAGNOSIS — I47.1 PSVT (PAROXYSMAL SUPRAVENTRICULAR TACHYCARDIA) (HCC): ICD-10-CM

## 2022-04-18 DIAGNOSIS — F33.42 RECURRENT MAJOR DEPRESSIVE DISORDER, IN FULL REMISSION (HCC): ICD-10-CM

## 2022-04-18 DIAGNOSIS — F02.80 LATE ONSET ALZHEIMER'S DISEASE WITHOUT BEHAVIORAL DISTURBANCE (HCC): ICD-10-CM

## 2022-04-18 DIAGNOSIS — K59.04 FUNCTIONAL CONSTIPATION: ICD-10-CM

## 2022-04-18 DIAGNOSIS — Z91.81 AT HIGH RISK FOR FALLS: ICD-10-CM

## 2022-04-18 DIAGNOSIS — E11.42 DIABETIC PERIPHERAL NEUROPATHY ASSOCIATED WITH TYPE 2 DIABETES MELLITUS (HCC): Primary | ICD-10-CM

## 2022-04-18 DIAGNOSIS — G30.1 LATE ONSET ALZHEIMER'S DISEASE WITHOUT BEHAVIORAL DISTURBANCE (HCC): ICD-10-CM

## 2022-04-18 PROBLEM — I47.10 PSVT (PAROXYSMAL SUPRAVENTRICULAR TACHYCARDIA): Status: ACTIVE | Noted: 2022-04-18

## 2022-04-18 PROCEDURE — G8400 PT W/DXA NO RESULTS DOC: HCPCS | Performed by: FAMILY MEDICINE

## 2022-04-18 PROCEDURE — 99214 OFFICE O/P EST MOD 30 MIN: CPT | Performed by: FAMILY MEDICINE

## 2022-04-18 PROCEDURE — 1036F TOBACCO NON-USER: CPT | Performed by: FAMILY MEDICINE

## 2022-04-18 PROCEDURE — 4040F PNEUMOC VAC/ADMIN/RCVD: CPT | Performed by: FAMILY MEDICINE

## 2022-04-18 PROCEDURE — 96372 THER/PROPH/DIAG INJ SC/IM: CPT | Performed by: FAMILY MEDICINE

## 2022-04-18 PROCEDURE — 1123F ACP DISCUSS/DSCN MKR DOCD: CPT | Performed by: FAMILY MEDICINE

## 2022-04-18 PROCEDURE — 1090F PRES/ABSN URINE INCON ASSESS: CPT | Performed by: FAMILY MEDICINE

## 2022-04-18 PROCEDURE — G8427 DOCREV CUR MEDS BY ELIG CLIN: HCPCS | Performed by: FAMILY MEDICINE

## 2022-04-18 PROCEDURE — G8420 CALC BMI NORM PARAMETERS: HCPCS | Performed by: FAMILY MEDICINE

## 2022-04-18 RX ORDER — CYANOCOBALAMIN 1000 UG/ML
1000 INJECTION INTRAMUSCULAR; SUBCUTANEOUS ONCE
Status: DISCONTINUED | OUTPATIENT
Start: 2022-04-18 | End: 2022-04-25

## 2022-04-18 RX ORDER — DOCUSATE SODIUM 100 MG/1
100 CAPSULE, LIQUID FILLED ORAL 2 TIMES DAILY
Qty: 180 CAPSULE | Refills: 3 | Status: SHIPPED | OUTPATIENT
Start: 2022-04-18

## 2022-04-18 RX ORDER — CYANOCOBALAMIN 1000 UG/ML
1000 INJECTION INTRAMUSCULAR; SUBCUTANEOUS ONCE
Status: COMPLETED | OUTPATIENT
Start: 2022-04-18 | End: 2022-04-18

## 2022-04-18 RX ADMIN — CYANOCOBALAMIN 1000 MCG: 1000 INJECTION INTRAMUSCULAR; SUBCUTANEOUS at 13:29

## 2022-04-18 ASSESSMENT — PATIENT HEALTH QUESTIONNAIRE - PHQ9
1. LITTLE INTEREST OR PLEASURE IN DOING THINGS: 0
4. FEELING TIRED OR HAVING LITTLE ENERGY: 0
SUM OF ALL RESPONSES TO PHQ QUESTIONS 1-9: 0
SUM OF ALL RESPONSES TO PHQ QUESTIONS 1-9: 0
3. TROUBLE FALLING OR STAYING ASLEEP: 0
SUM OF ALL RESPONSES TO PHQ QUESTIONS 1-9: 0
5. POOR APPETITE OR OVEREATING: 0
10. IF YOU CHECKED OFF ANY PROBLEMS, HOW DIFFICULT HAVE THESE PROBLEMS MADE IT FOR YOU TO DO YOUR WORK, TAKE CARE OF THINGS AT HOME, OR GET ALONG WITH OTHER PEOPLE: 0
8. MOVING OR SPEAKING SO SLOWLY THAT OTHER PEOPLE COULD HAVE NOTICED. OR THE OPPOSITE, BEING SO FIGETY OR RESTLESS THAT YOU HAVE BEEN MOVING AROUND A LOT MORE THAN USUAL: 0
9. THOUGHTS THAT YOU WOULD BE BETTER OFF DEAD, OR OF HURTING YOURSELF: 0
2. FEELING DOWN, DEPRESSED OR HOPELESS: 0
7. TROUBLE CONCENTRATING ON THINGS, SUCH AS READING THE NEWSPAPER OR WATCHING TELEVISION: 0
6. FEELING BAD ABOUT YOURSELF - OR THAT YOU ARE A FAILURE OR HAVE LET YOURSELF OR YOUR FAMILY DOWN: 0
SUM OF ALL RESPONSES TO PHQ9 QUESTIONS 1 & 2: 0
SUM OF ALL RESPONSES TO PHQ QUESTIONS 1-9: 0

## 2022-04-18 NOTE — PROGRESS NOTES
Tata Costello is a 81 y.o. female who presents today for   Chief Complaint   Patient presents with    Diabetes     foot exam       HPI  Patient is here for follow-up for diabetes and complications including vascular issues. Patient is not having any wounds but notes that she has some neuropathy. She is battling some constipation lately as well. She notes otherwise that she continues to have some fatigue and B12 shots helped with this    No change in PMH, family, social, or surgical history unless mentioned above. I have reviewed the above chief complaint and HPI details charted by staff and claim ownership of the documentation. Review of Systems   Gastrointestinal: Positive for constipation. Musculoskeletal: Positive for arthralgias and gait problem. Neurological: Positive for numbness.        Past Medical History:   Diagnosis Date    Abdominal pain     Anxiety     Dr Kerri Spicer Bell's palsy 2003    Colon polyp     Depression     Dr Kerri Spicer Diverticulosis     Female bladder prolapse     Hernia, abdominal     History of adenomatous polyp of colon     Hypertriglyceridemia     Neuropathy     Osteopenia     Type II or unspecified type diabetes mellitus without mention of complication, not stated as uncontrolled     Vitamin B 12 deficiency        Current Outpatient Medications   Medication Sig Dispense Refill    docusate sodium (COLACE) 100 MG capsule Take 1 capsule by mouth 2 times daily 180 capsule 3    Diabetic Shoe MISC by Does not apply route DISPENSE ONE PAIR DIABETIC SHOES AND THREE PAIRS OF HEAT MOLDED INSERTS 1 each 0    folic acid (FOLVITE) 1 MG tablet TAKE 1 TABLET DAILY 90 tablet 3    amLODIPine (NORVASC) 10 MG tablet Take 10 mg by mouth daily      LORazepam (ATIVAN) 0.5 MG tablet       vitamin D (ERGOCALCIFEROL) 1.25 MG (24017 UT) CAPS capsule Take 1 capsule by mouth once a week 12 capsule 3    folic acid (V-R FOLIC ACID) 807 MCG tablet Take 1 tablet by mouth daily 30 tablet 3    iron polysaccharides (NIFEREX) 150 MG capsule Take 1 capsule by mouth daily 90 capsule 3    atorvastatin (LIPITOR) 10 MG tablet Take 1 tablet by mouth daily 90 tablet 3    aspirin 81 MG EC tablet Take 1 tablet by mouth daily (Patient taking differently: Take 81 mg by mouth daily Takes off and on) 30 tablet 3    memantine (NAMENDA) 5 MG tablet Take 5 mg by mouth daily      donepezil (ARICEPT) 5 MG tablet Take 5 mg by mouth daily       Psyllium (METAMUCIL PO) Take by mouth daily      Tetrahydrozoline HCl (EYE DROPS OP) Apply 2 drops to eye 2 times daily Indications: systane       venlafaxine (EFFEXOR-XR) 37.5 MG XR capsule Take 37.5 mg by mouth daily.  gabapentin (NEURONTIN) 300 MG capsule Take 1 capsule by mouth nightly for 31 days. 90 capsule 3    nitroGLYCERIN (NITROSTAT) 0.4 MG SL tablet Place 1 tablet under the tongue every 5 minutes as needed for Chest pain up to max of 3 total doses. If no relief after 1 dose, call 911. (Patient not taking: Reported on 4/18/2022) 25 tablet 3     No current facility-administered medications for this visit.        Allergies   Allergen Reactions    Phenergan [Promethazine] Other (See Comments)     Makes \"Crazy\"       Past Surgical History:   Procedure Laterality Date    APPENDECTOMY      BLADDER REPAIR  1984    states stiched her bladder up but was not a suspension-Dr Garcia    CATARACT REMOVAL  12/2014    CATARACT REMOVAL  01/2015    CHOLECYSTECTOMY  age 22    CLAVICLE SURGERY Left 6/22/2020    OPEN REDUCTION INTERNAL FIXATION LEFT CLAVICLE performed by Matt Galvan MD at [address] [place] [address], [place] [address]  02/15/2012    Dr Santa SALCIDO, diverticulosis coli, small internal hemorrhoids, 5 yr recall    COLONOSCOPY  3/1998    negative    COLONOSCOPY  5/1/07     Dr Yudi Jon, diverticulosis coli/small internal hemorrhoids    COLONOSCOPY  9/16/04    Dr Blood-diverticulosis coli, ileocecal valve lipoma    COLONOSCOPY N/A 1/25/2016    Dr Diego SALCIDO, 5 yr recall    ELBOW FRACTURE SURGERY  1970s    EYE SURGERY Bilateral     cataract    FEMUR FRACTURE SURGERY Right 12/4/2020    FEMUR IM NAIL TARIQ INSERTION performed by Denver Fields, MD at [facility]    Dr Remedios Yost partner   [address] [employer] UMBILICAL HERNIA REPAIR N/A 2/15/2016    OPEN REPAIR OF RECURRENT INCISIONAL HERNIA WITH MESH  performed by Reta Ormond, MD at [facility]  02/16/2012    Dr White-Barretts/gastritis    UPPER GASTROINTESTINAL ENDOSCOPY  2/1998    Dr Nehemias Yost, negative    UPPER GASTROINTESTINAL ENDOSCOPY  1/2/07    Dr Nehemias Yost, gastritis    UPPER GASTROINTESTINAL ENDOSCOPY N/A 1/11/2016    Dr Wilson Karlie junction w/minimal chronic inflammation       Social History     Tobacco Use    Smoking status: Never Smoker    Smokeless tobacco: Never Used   Vaping Use    Vaping Use: Never used   Substance Use Topics    Alcohol use: No    Drug use: No       Family History   Problem Relation Age of Onset    Cancer Mother         Pancreatic    Other Mother     Cancer Father         melanoma    Colon Cancer Neg Hx     Colon Polyps Neg Hx     Esophageal Cancer Neg Hx     Liver Cancer Neg Hx     Liver Disease Neg Hx     Rectal Cancer Neg Hx     Stomach Cancer Neg Hx        /74   Pulse 67   Temp 96.9 °F (36.1 °C) (Temporal)   Ht 5' 3\" (1.6 m)   Wt 114 lb (51.7 kg)   SpO2 97%   BMI 20.19 kg/m²     Physical Exam  Vitals and nursing note reviewed. Constitutional:       General: She is not in acute distress. Appearance: She is well-developed. She is not diaphoretic. HENT:      Head: Normocephalic and atraumatic. Eyes:      General: No scleral icterus. Cardiovascular:      Rate and Rhythm: Normal rate and regular rhythm. Heart sounds: Normal heart sounds. No murmur heard.       Pulmonary:      Effort: Pulmonary effort is normal. No respiratory distress. Breath sounds: Normal breath sounds. No wheezing or rales. Abdominal:      General: Bowel sounds are normal. There is no distension. Palpations: Abdomen is soft. Tenderness: There is no abdominal tenderness. Hernia: No hernia is present. Musculoskeletal:      Right lower leg: No edema. Left lower leg: No edema. Skin:     General: Skin is warm and dry. Neurological:      Mental Status: She is alert and oriented to person, place, and time. Gait: Gait abnormal (dependent on cane for balance, slow, unstable). Psychiatric:         Mood and Affect: Mood is not anxious or depressed. Cognition and Memory: Cognition is not impaired. Assessment:    ICD-10-CM    1. Diabetic peripheral neuropathy associated with type 2 diabetes mellitus ([facility])  E11.42    2. Functional constipation  K59.04 docusate sodium (COLACE) 100 MG capsule   3. PSVT (paroxysmal supraventricular tachycardia) ([facility])  I47.1    4. Late onset Alzheimer's disease without behavioral disturbance ([facility])  G30.1     F02.80    5. Recurrent major depressive disorder, in full remission ([facility]  F33.42    6. Peripheral vascular disease, unspecified ([facility]  I73.9    7. At high risk for falls  Z91.81    8. B12 deficiency  E53.8 DISCONTINUED: cyanocobalamin injection 1,000 mcg     Monofilament Exam Reveals:  Pulses: normal  Edema:normal  Skin Lesions:b/l calluses 1st metatarsal heads    Right Foot:    Left Foot:  Normal sensation at 0   Normal sensation at 0   Diminished sensation at 1-10   Diminished sensation at 1-10   No sensation at 0    No sensation at 0       Plan:   Start tx for constipation. Use DM shoes due to findings  No orders of the defined types were placed in this encounter.     Orders Placed This Encounter   Medications    docusate sodium (COLACE) 100 MG capsule     Sig: Take 1 capsule by mouth 2 times daily     Dispense:  180 capsule     Refill:  3    cyanocobalamin injection 1,000 mcg    DISCONTD: cyanocobalamin injection 1,000 mcg     Medications Discontinued During This Encounter   Medication Reason    docusate sodium (COLACE) 100 MG capsule REORDER    cyanocobalamin injection 1,000 mcg      Patient Instructions          Patient given educational handouts and has had all questions answered. Patient voices understanding and agrees to plans along with risks and benefits of plan. Patient isinstructed to continue prior meds, diet, and exercise plans unless instructed otherwise. Patient agrees to follow up as instructed and sooner if needed. Patient agrees to go to ER if condition becomes emergent. Notesmay be completed with dictation device and spelling errors may occur. Materials may be copied and pasted from a notepad outside of EMR, all of which, I, Dr. Theresa Hernandez MD, take sole intellectual ownership of and have approved adding to my note. No follow-ups on file. On the basis of positive falls risk screening, assessment and plan is as follows: in-office gait and balance testing performed using The [employer] Test was positive for increased falls risk.

## 2022-04-25 ASSESSMENT — ENCOUNTER SYMPTOMS: CONSTIPATION: 1

## 2022-06-06 ENCOUNTER — OFFICE VISIT (OUTPATIENT)
Dept: CARDIOLOGY | Facility: CLINIC | Age: 87
End: 2022-06-06

## 2022-06-06 VITALS
BODY MASS INDEX: 20.38 KG/M2 | DIASTOLIC BLOOD PRESSURE: 52 MMHG | HEIGHT: 63 IN | HEART RATE: 61 BPM | WEIGHT: 115 LBS | SYSTOLIC BLOOD PRESSURE: 112 MMHG

## 2022-06-06 DIAGNOSIS — K21.9 GASTROESOPHAGEAL REFLUX DISEASE WITHOUT ESOPHAGITIS: ICD-10-CM

## 2022-06-06 DIAGNOSIS — R00.2 PALPITATIONS: ICD-10-CM

## 2022-06-06 DIAGNOSIS — E78.2 MIXED HYPERLIPIDEMIA: Chronic | ICD-10-CM

## 2022-06-06 DIAGNOSIS — I10 PRIMARY HYPERTENSION: Primary | Chronic | ICD-10-CM

## 2022-06-06 DIAGNOSIS — E11.9 CONTROLLED TYPE 2 DIABETES MELLITUS WITHOUT COMPLICATION, WITHOUT LONG-TERM CURRENT USE OF INSULIN: Chronic | ICD-10-CM

## 2022-06-06 DIAGNOSIS — I47.1 PSVT (PAROXYSMAL SUPRAVENTRICULAR TACHYCARDIA): Chronic | ICD-10-CM

## 2022-06-06 DIAGNOSIS — R94.31 ABNORMAL ECG: ICD-10-CM

## 2022-06-06 PROCEDURE — 93000 ELECTROCARDIOGRAM COMPLETE: CPT | Performed by: INTERNAL MEDICINE

## 2022-06-06 PROCEDURE — 99214 OFFICE O/P EST MOD 30 MIN: CPT | Performed by: INTERNAL MEDICINE

## 2022-06-06 RX ORDER — NYSTATIN 100000 U/G
CREAM TOPICAL
COMMUNITY
Start: 2022-03-16

## 2022-06-06 NOTE — PROGRESS NOTES
Dalia Betts  [id number]  1935  86 y.o.  female    Referring Provider: Les Henderson MD    Reason for Follow-up Visit: Here for routine follow up   Prior palpitations, now better  Essential Hypertension  Prior accidental fall and fracture of right hip s/p surgery [facility]   Cardiac workup test results as below : echo, myocardial perfusion scan   and 14-day outpatient cardiac telemetry      Subjective    No new events or complaints since last visit   Overall feels well     BP well controlled at home.     No chest pain or shortness of breath now   Effort tolerance limited more by orthopedic rather than cardiac related issues, therefore difficult to assess functional capacity.       No exertional chest pain   No significant pedal edema    Compliant with medications and dietary advice  Fair effort tolerance  Was using cane for support and balance in past now walking on her own and quite active    No presyncope or syncope  Compliant with medications    Tolerating current medications well with no untoward side effects   Compliant with prescribed medication regimen. Tries to adhere to cardiac diet.    No palpitations but feel heart rates increase when she gets excited or exerts, no irregularity    BP well controlled at home mostly  Has had some abdominal discomfort in the past  No abdominal aortic aneurysm as below  Blood sugars well controlled at home      No bleeding, excessive bruising, gait instability or fall risks        History of present illness:  Dalia Betts is a 86 y.o. yo female with Essential Hypertension   who presents today for   Chief Complaint   Patient presents with   • PSVT     6 mo f/u   • Edema     ankle/feet     .    History  Past Medical History:   Diagnosis Date   • Bell palsy    • Chest pain    • Diabetes mellitus (HCC)    • HLD (hyperlipidemia)    • HTN (hypertension)    • Left ventricular hypertrophy 12/4/2021   • MI (myocardial infarction) ([facility])    • Palpitations    • PSVT  "(paroxysmal supraventricular tachycardia) ([facility]) 12/4/2021   ,   Past Surgical History:   Procedure Laterality Date   • APPENDECTOMY     • CHOLECYSTECTOMY     • ELBOW FUSION     • EYE SURGERY      cataract extraction x 2   • HERNIA REPAIR     • HIP SURGERY      right side \" Nail \" she stated    • HYSTERECTOMY     • SINUSOTOMY     ,   Family History   Problem Relation Age of Onset   • Pancreatitis Mother    • Heart disease Father    ,   Social History     Tobacco Use   • Smoking status: Never Smoker   • Smokeless tobacco: Never Used   Vaping Use   • Vaping Use: Never used   Substance Use Topics   • Alcohol use: No   • Drug use: No   ,     Medications  Current Outpatient Medications   Medication Sig Dispense Refill   • amLODIPine (NORVASC) 10 MG tablet Take 1 tablet by mouth Daily. 90 tablet 3   • aspirin 81 MG EC tablet Take 81 mg by mouth Daily.     • atorvastatin (LIPITOR) 10 MG tablet Take 1 tablet by mouth Daily. 90 tablet 3   • Cyanocobalamin (B-12 COMPLIANCE INJECTION IJ) Inject  as directed Every 30 (Thirty) Days.     • docusate sodium (COLACE) 100 MG capsule Take 100 mg by mouth 2 (two) times a day.     • donepezil (ARICEPT) 5 MG tablet Take 5 mg by mouth As Needed.     • FOLIC ACID PO Take  by mouth.     • gabapentin (NEURONTIN) 300 MG capsule Take 300 mg by mouth Daily.     • LORazepam (ATIVAN) 0.5 MG tablet Take 0.5 mg by mouth Every 8 (Eight) Hours As Needed for anxiety.     • memantine (NAMENDA) 5 MG tablet Take 5 mg by mouth Daily.     • metFORMIN (GLUCOPHAGE) 500 MG tablet      • nitroglycerin (NITROSTAT) 0.4 MG SL tablet Place 1 tablet under the tongue Every 5 (Five) Minutes As Needed for Chest Pain. Take no more than 3 doses in 15 minutes. 25 tablet 3   • nystatin (MYCOSTATIN) 011750 UNIT/GM cream      • polyethyl glycol-propyl glycol (SYSTANE) 0.4-0.3 % solution ophthalmic solution Every 1 (One) Hour As Needed.     • venlafaxine XR (EFFEXOR-XR) 37.5 MG 24 hr capsule Take 37.5 mg by mouth Daily.     • " "vitamin D (ERGOCALCIFEROL) 31484 units capsule capsule Take 50,000 Units by mouth 1 (One) Time Per Week.       No current facility-administered medications for this visit.       Allergies:  Protonix [pantoprazole sodium] and Phenergan [promethazine hcl]    Review of Systems  Review of Systems   Constitutional: Negative for malaise/fatigue.   HENT: Negative.    Eyes: Negative.    Cardiovascular: Positive for leg swelling and palpitations. Negative for chest pain, claudication, cyanosis, dyspnea on exertion, irregular heartbeat, near-syncope, orthopnea, paroxysmal nocturnal dyspnea and syncope.   Respiratory: Negative.    Endocrine: Negative.    Hematologic/Lymphatic: Negative.    Skin: Negative.    Gastrointestinal: Positive for abdominal pain. Negative for anorexia.   Genitourinary: Negative.    Neurological: Negative for weakness.   Psychiatric/Behavioral: Negative.        Objective     Physical Exam:  /52   Pulse 61   Ht 160 cm (63\")   Wt 52.2 kg (115 lb)   BMI 20.37 kg/m²   Physical Exam   Constitutional: She appears well-developed.   HENT:   Head: Normocephalic.   Eyes: Lids are normal.   Neck: Normal carotid pulses and no JVD present. No tracheal tenderness present. Carotid bruit is not present. No tracheal deviation present.   Cardiovascular: Regular rhythm, S1 normal, S2 normal and normal pulses.   Murmur heard.   Systolic murmur is present with a grade of 2/6.  Pulmonary/Chest: Effort normal. No stridor.   Abdominal: Soft. Normal appearance. She exhibits no distension. There is no abdominal tenderness.   Neurological: She is alert. No cranial nerve deficit or sensory deficit.   Skin: Skin is warm.   Psychiatric: Her speech is normal and behavior is normal. Thought content normal.       Results Review:    Dalia Betts  Regadenoson Stress Test With Myocardial Perfusion SPECT (Multi Study)  Order# [id number]  Reading physician: Selvin Cordova MD Ordering physician: Selvin Cordova MD Study date: 3/16/21 "   Patient Information    Patient Name   Dalia Betts MRN   [id number] Legal Sex   Female  (Age)   1935 (85 y.o.)   Interpretation Summary       · Raw images reviewed with the following abnormalities noted: vertical motion.  · Left ventricular ejection fraction is normal. (Calculated EF = 65%).  · Myocardial perfusion imaging indicates a normal myocardial perfusion study with no evidence of ischemia.  · Physiological apical thinning noted  · Impressions are consistent with a low risk study.            Dalia Betts  Holter Monitor 72Hr-21Day (0296T/0298T)  Order# [id number]  Reading physician: Selvin Cordova MD Ordering physician: Selvin Cordova MD Study date: 20   Patient Information    Patient Name   Dalia Betts MRN   [id number] Legal Sex   Female  (Age)   1935 (85 y.o.)   Interpretation Summary       · Average HR: 76. Min HR: 53. Max HR: 162.     · Entire report was reviewed.  Monitoring in days: ~13  · The predominant rhythm noted during the testing period was sinus rhythm.     · Rare supraventricular ectopics with an APC burden of: < 1%    · Rare premature ventricular contractions with a PVC burden of: < 1%     · No correlated arrhythmia  · No significant pauses                  Conclusion:      Baseline rhythm is sinus.  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  This report was finalized on 2021 09:52 by Dr. Jason Castillo MD.    Results for orders placed during the hospital encounter of 21    Adult Transthoracic Echo Complete w/ Color, Spectral and Contrast if necessary per  protocol    Interpretation Summary  · Left ventricular wall thickness is consistent with mild concentric hypertrophy.  · Left ventricular ejection fraction appears to be 56 - 60%.  · Left ventricular diastolic function was normal.  · No evidence of pulmonary hypertension is present.       Dalia Betts  Holter Monitor 72Hr-21Day (0296T/0298T)  Order# [id number]  Reading physician: Selvin Cordova MD Ordering physician: Selvin Cordova MD Study date: 20   Patient Information    Patient Name   Dalia Betts MRN   [id number] Sex   Female  (Age)   1935 (85 y.o.)   Interpretation Summary       · Average HR: 76. Min HR: 53. Max HR: 162.     · Entire report was reviewed.  Monitoring in days: ~13  · The predominant rhythm noted during the testing period was sinus rhythm.     · Rare supraventricular ectopics with an APC burden of: < 1%    · Rare premature ventricular contractions with a PVC burden of: < 1%     · No correlated arrhythmia  · No significant pauses                  Conclusion:      Baseline rhythm is sinus.  No significant ectopy   46 runs of supraventricular tachycardia longest 14.6 seconds at a maximum rate of 162 bpm and an average of 124 bpm  Single 4 beat run of nonsustained ventricular tachycardia at a maximum rate of 135 bpm and an average of 118 bpm             LEXISCAN CARDIOLITE STRESS TEST-     DATE OF EXAM- 5/10/2016     INDICATION- Chest pain.     PROCEDURE- The patient underwent Lexiscan Cardiolite stress test. Rest   dose of Cardiolite 10.5 mCi and stress dose 32.7 mCi. Injection of   Lexiscan did not cause chest pain and had shortness of breath. No   diagnostic electrocardiographic abnormalities noted. Raw data shows   slight anterior soft tissue attenuation artifact. Overall image quality   is good. Perfusion scan shows a small area of decreased uptake in the   inferior apical region seen in stress not in rest suggesting a small   inferior apical ischemia. Gated scan shows ejection  fraction above 65%.     IMPRESSION-  1. Ejection fraction above 65%.  2. Small inferior apical ischemia.  3. Anterior soft tissue attenuation.           Reading Radiologist- GEORGETTE SIMPSON       Releasing Radiologist- GEORGETTE SIMPSON       Released Date Time- 05/12/16 1347       - [facility]   ------------------------------------------------------------------------------            ECG 12 Lead    Date/Time: 6/6/2022 10:36 AM  Performed by: Georgette Simpson MD  Authorized by: Georgette Simpson MD   Comparison: compared with previous ECG from 12/6/2021  Comparison to previous ECG: Poor R wave progression   Rhythm: sinus rhythm  Rate: normal  Conduction: conduction normal  Other findings: poor R wave progression    Clinical impression: abnormal EKG        ____________________________________________________________________________________________________________________________________________  Health maintenance and recommendations  Similar recommendations as last visit     Offered to give patient  a copy      Questions were encouraged, asked and answered to the patient's  understanding and satisfaction. Questions if any regarding current medications and side effects, need for refills and importance of compliance to medications stressed.    Reviewed available prior notes, consults, prior visits, laboratory findings, radiology and cardiology relevant reports. Updated chart as applicable. I have reviewed the patient's medical history in detail and updated the computerized patient record as relevant.      Updated patient regarding any new or relevant abnormalities on review of records or any new findings on physical exam. Mentioned to patient about purpose of visit and desirable health short and long term goals and objectives.    Primary to monitor CBC CMP Lipid panel and TSH as  applicable    ___________________________________________________________________________________________________________________________________________      Assessment & Plan   Diagnoses and all orders for this visit:    1. Primary hypertension (Primary)    2. Mixed hyperlipidemia    3. Controlled type 2 diabetes mellitus without complication, without long-term current use of insulin (HCC)    4. Abnormal ECG    5. Gastroesophageal reflux disease without esophagitis    6. PSVT (paroxysmal supraventricular tachycardia) (HCC)    7. Palpitations    Other orders  -     ECG 12 Lead           Plan:       Patient expressed understanding  Encouraged and answered all questions   Discussed with the patient and all questioned fully answered. She will call me if any problems arise.   Discussed results of prior testing with patient: echo and 14-day outpatient cardiac telemetry     She declined any cardiac testing   Will therefore not order any tests    Stopped Atenolol and increased Norvasc 10 mg daily, BP under good control     Continue checking BP and heart rates twice daily at least 3x / week, week a month  at home and bring a recording for me to review next visit  If BP >130/85 or < 100/60 persistently over 3 reading 30 mins apart call sooner      Monitor for any signs of bleeding including red or dark stools as well as easy bruisabilty. Fall precautions.      I support the patient's decision to take the Covid -19 vaccine   Had required complete course   No major issues   Now fully immunized    Had booster too      Keep A1c less than 7 Primary to monitor  Keep LDL below 70 mg/dl. Monitor liver and renal functions.   Monitor CBC, CMP, TSH (as indicated) and Lipid Panel by primary      Monitor for any signs of bleeding including red or dark stools as well as easy bruisabilty. Fall precautions.   Follow up with Melissa JOHNSON , Randall JOHNSON, Bonnie Ng PA-C  or myself          Return in about 6 months (around  12/6/2022).

## 2022-06-17 ENCOUNTER — NURSE ONLY (OUTPATIENT)
Dept: PRIMARY CARE CLINIC | Age: 87
End: 2022-06-17
Payer: MEDICARE

## 2022-06-17 DIAGNOSIS — E53.8 B12 DEFICIENCY: Primary | ICD-10-CM

## 2022-06-17 PROCEDURE — 96372 THER/PROPH/DIAG INJ SC/IM: CPT | Performed by: NURSE PRACTITIONER

## 2022-06-17 RX ORDER — CYANOCOBALAMIN 1000 UG/ML
1000 INJECTION INTRAMUSCULAR; SUBCUTANEOUS ONCE
Status: COMPLETED | OUTPATIENT
Start: 2022-06-17 | End: 2022-06-17

## 2022-06-17 RX ADMIN — CYANOCOBALAMIN 1000 MCG: 1000 INJECTION INTRAMUSCULAR; SUBCUTANEOUS at 14:08

## 2022-06-17 NOTE — PROGRESS NOTES
After obtaining consent, and per orders of Tommy Franks  injection of cyanocobalamin was given in the right deltoid by [facility] LPN  Pt tolerated well and had no reactions.

## 2023-08-23 NOTE — PROGRESS NOTES
(1.6 m)   Wt 115 lb 1.6 oz (52.2 kg)   SpO2 92%   BMI 20.39 kg/m²     Constitutional: she appears well-developed and well-nourished. Eyes - conjunctiva normal.  Pupils react to light  Ear, nose, throat -hearing intact to voice. No scars, masses, or lesions over external nose or ears, no atrophy of tongue  Neck-symmetric, no masses noted, no jugular vein distension  Respiration- chest wall appears symmetric, good expansion,   normal effort without use of accessory muscles  Cardiovascular- RRR  Musculoskeletal - no significant wasting of muscles noted, no bony deformities, gait no gross ataxia  Extremities-no clubbing, cyanosis or edema  Skin - warm, dry, and intact. No rash, erythema, or pallor. Psychiatric - mood, affect, and behavior appear normal.      Neurology  NEUROLOGICAL EXAM:      Mental status   Awake, alert, fluent oriented x 3 appropriate affect  Attention and concentration appear appropriate  Recent and remote memory appears unremarkable  Speech normal without dysarthria  No clear issues with language       Cranial Nerves   CN II- Visual fields grossly unremarkable  CN III, IV,VI-EOMI, No nystagmus, conjugate eye movements, no ptosis  CN VII-right hemifacial spasm  CN VIII-Hearing intact      Motor function  Antigravity x 4     Sensory function Intact to light touch     Cerebellar F-N intact     Tremor None present     Gait                  Not tested           Nursing/pcp notes, imaging,labs and vitals reviewed.      PT,OT and/or speech notes reviewed    Lab Results   Component Value Date    WBC 7.9 12/10/2020    HGB 8.3 (L) 12/10/2020    HCT 26.6 (L) 12/10/2020    MCV 97.1 12/10/2020     12/10/2020     Lab Results   Component Value Date     12/10/2020    K 5.1 (H) 12/10/2020     12/10/2020    CO2 30 (H) 12/10/2020    BUN 16 12/10/2020    CREATININE 0.4 (L) 12/10/2020    GLUCOSE 116 (H) 12/10/2020    CALCIUM 9.2 12/10/2020    PROT 6.4 (L) 12/03/2020    LABALBU 4.0 12/03/2020 BILITOT <0.2 12/03/2020    ALKPHOS 72 12/03/2020    AST 17 12/03/2020    ALT 15 12/03/2020    LABGLOM >60 12/10/2020    GFRAA >59 12/10/2020    GLOB 2.4 10/11/2016   No results found for: INRNo results found for: PHENYTOIN, ESR, CRP    PHYSICAL THERAPY  STRENGTH  Strength RLE  Strength RLE: Exception  Comment: HIP 2/5; KNEE 3/5; ANKLE 4/5  Strength LLE  Strength LLE: WFL  ROM  AROM RLE (degrees)  RLE AROM: Exceptions  RLE General AROM: DECREASED RIGHT HIP/KNEE  AROM LLE (degrees)  LLE AROM : WFL  BED MOBILITY  Bed Mobility  Supine to Sit: Moderate assistance, Minimal assistance(TRIPLANAR TO LEFT, ASSIST WITH LEFT )  Sit to Supine: Moderate assistance, Minimal assistance(TRIPLANAR )  TRANSFERS  Transfers  Sit to Stand: Moderate Assistance, Minimal Assistance(PULL TO STAND PARALLLE BARS)  Stand to sit: Moderate Assistance, Minimal Assistance  Bed to Chair: Moderate assistance, Minimal assistance(PARTIAL STAND, MOVE HAND TO FAR SURFACE SQT PIVOT)  Car Transfer: Moderate Assistance, Minimal Assistance  WHEELCHAIR PROPULSION  Propulsion 1  Propulsion: Manual  Level: Level Tile  Method: TOBIAS MACHADO  Level of Assistance: Contact guard assistance  Distance: 150 FT  AMBULATION  Ambulation 1  Device: Parallel Bars  Assistance: Moderate assistance, Minimal assistance  Quality of Gait: HANDS, RIGHT TOES, LEFT SWING. NONCONTINUOUS SWING WITH PATIENT TOUCHING TOES DOWN BEFORE THEY WERE EVEN WITH HANDS. RQUIRED UPWARD FORCE BEFORE WOULD ATTEMPT SWING.    Distance: 12 FT  STAIRS  GOALS:  Short term goals  Time Frame for Short term goals: 2 WEEKS  Short term goal 1: BED MOBILITY CGA  Short term goal 2: TRANSFERS MIN A  Short term goal 3: PROPEL  FT SBA  Short term goal 4: AMBULATE 25 FT WITH RW CGA  Short term goal 5: UP/DOWN 6 IN STEP WITH RW MOD A     Long term goals  Time Frame for Long term goals : 4 WEEKS  Long term goal 1: INDEP BED MOB  Long term goal 2: INDEP TF SURFACE TO SURFACE  Long term goal 3: PROPEL  FT INDEP  Long term goal 4: AMBULATE 50 FT WITH RW INDEP  Long term goal 5: UP/DWON 6 IN STEP WITH RW CGA     ASSESSMENT:  Assessment: REQUIRES ASSIST WITH RIGHT LE FOR TRIPLANAR BED MOBILITY TOWARDS LEFT SIDE. MAINTAINES TTWB DURING TRANSFERS AND AMB.       SPEECH THERAPY        OCCUPATIONAL THERAPY     CURRENT IRF-LUCY SCORES  Eating: CARE Score: 5  Oral Hygiene: CARE Score: 5  Toileting: CARE Score: 2  Shower/Bathe: CARE Score: 3  Upper Body Dressing: CARE Score: 4  Lower Body Dressing: CARE Score: 2  Footwear: CARE Score: 2  Toilet Transfers: CARE Score: 3  Picking Up Object:  CARE Score: 88              STGs:  Short term goals  Time Frame for Short term goals: 1 week  Short term goal 1: complete LB dressing with AE with min A  Short term goal 2: complete toilet transfers with min A  Short term goal 3: complete toilet hygiene/clothing mgmt with min A  Short term goal 4: complete simple home making task using recommended mobility means without cues for WB protocol  Short term goal 5: complete 1 handed static act for 2 mins with min A  Short term goal 6: complete overall bathing with min A     LTGs:  Long term goals  Time Frame for Long term goals : 2 weeks  Long term goal 1: complet overall toileting with supervision  Long term goal 2: complete overall bathing with supervision  Long term goal 3: complete overall dressing with supervision  Long term goal 4: complete home making task using recommended mobility with supervision  Long term goal 5: complete HEP with independence  Long term goals 6: pt/family verbalize DME     Assessment:    12/08/20 1305   Assessment   Performance deficits / Impairments Decreased functional mobility ; Decreased strength;Decreased high-level IADLs;Decreased endurance;Decreased safe awareness;Decreased balance;Decreased ADL status   Assessment Pt benefits from further skilled therapy to address ADLs, WB protocol, AE training, functional mobility, home management, and home safety for increased independence at home   Treatment Diagnosis Right Femur Fx s/p IM nail   Prognosis Good   Decision Making Low Complexity   History clavicle fx with ORIF 6/22/20, Rapid response 12/5   Discharge Recommendations Home with Home health OT                        NUTRITION  Current Wt: Weight: 115 lb 1.6 oz (52.2 kg) / Body mass index is 20.39 kg/m². Admission Wt:   115 lb 1.6 oz (52.2 kg)  Oral Diet Orders:   CARB CONTROL  Oral Nutrition Supplement (ONS) Orders:  Diabetic Oral Supplement BID  Pt presents adequately nourished with PO intake >50%. Reports good appetite. Please see nutrition note for details. RECORD REVIEW: Previous medical records, medications were reviewed at today's visit    IMPRESSION:   1. Right hip fracture status post cephalomedullary nailing. Toe-touch weightbearing for 6 weeks-PT/OT  2. DVT prophylaxis for 6 weeks. Low-dose Eliquis  3. Diabetes-continue sliding scale and resume home oral hypoglycemic  4. History of dementia-continue Aricept and Namenda. Both doses are low. Discussed with patient. 5.  History of anxiety/depression-continue Effexor  6. Acute blood loss anemia -improving. Niferex added  Percocet scheduled.   Atenolol held  Continue current treatment  ELOS:  restaff next week Bill For Treatment Planning: Yes- (Complex Planning- 3 Sites or more: 17357) Bill For Treatment Planning: Yes- (Complex Planning- 3 Sites or more: 20232)

## (undated) DEVICE — Z INACTIVE USE 2660664 SOLUTION IRRIG 3000ML 0.9% SOD CHL USP UROMATIC PLAS CONT

## (undated) DEVICE — IMPLANTABLE DEVICE: Type: IMPLANTABLE DEVICE | Site: CLAVICLE | Status: NON-FUNCTIONAL

## (undated) DEVICE — GLOVE SURG SZ 85 L12IN FNGR THK94MIL TRNSLUC YEL LTX

## (undated) DEVICE — GLOVE SURG SZ 75 CRM LTX FREE POLYISOPRENE POLYMER BEAD ANTI

## (undated) DEVICE — WRAP AROUND LENS-STERLIE

## (undated) DEVICE — TUBE ET 7MM NSL ORAL BASIC CUF INTMED MURPHY EYE RADPQ MRK

## (undated) DEVICE — SUTURE VCRL SZ 2-0 L36IN ABSRB UD L36MM CT-1 1/2 CIR J945H

## (undated) DEVICE — PACK,SHOULDER SPLIT: Brand: MEDLINE

## (undated) DEVICE — GUIDEWIRE ORTH 32X475 MM FOR NAILING SYS STRL TFN ADV

## (undated) DEVICE — IMMOBILIZER SHLDR M L15IN D8IN UNIV POLY COT W/ FOAM STRP

## (undated) DEVICE — BIT DRL L100MM DIA2MM QUIK CONN W/O STP N RADLUC REUSE

## (undated) DEVICE — Z DISCONTINUED USE 2429233 DRESSING FOAM W10XL10CM 5 LAYR SELF ADH VERSATILE SAFETAC

## (undated) DEVICE — GLOVE SURG SZ 9 L12IN FNGR THK13MIL BRN LTX SYN POLYMER W

## (undated) DEVICE — SUTURE VCRL SZ 3-0 L27IN ABSRB UD L26MM SH 1/2 CIR J416H

## (undated) DEVICE — GLOVE SURG SZ 7 L12IN FNGR THK79MIL GRN LTX FREE

## (undated) DEVICE — LARYNGOSCOPE BLDE MAC HNDL M SZ 35 ST CURAPLEX CURAVIEW LED

## (undated) DEVICE — 6617 IOBAN II PATIENT ISOLATION DRAPE 5/BX,4BX/CS: Brand: STERI-DRAPE™ IOBAN™ 2

## (undated) DEVICE — SUTURE VCRL SZ 0 L27IN ABSRB UD L36MM CT-1 1/2 CIR J260H

## (undated) DEVICE — C-ARM: Brand: UNBRANDED

## (undated) DEVICE — ROD RMR L950MM DIA2.5MM W/ EXTN BALL TIP

## (undated) DEVICE — SOLUTION IV IRRIG POUR BRL 0.9% SODIUM CHL 2F7124

## (undated) DEVICE — GLOVE SURG SZ 65 L12IN FNGR THK79MIL GRN LTX FREE

## (undated) DEVICE — 3M™ IOBAN™ 2 ANTIMICROBIAL INCISE DRAPE 6650EZ: Brand: IOBAN™ 2

## (undated) DEVICE — SHEET,DRAPE,53X77,STERILE: Brand: MEDLINE

## (undated) DEVICE — DRAPE,U/ SHT,SPLIT,PLAS,STERIL: Brand: MEDLINE

## (undated) DEVICE — SURGICAL PROCEDURE PACK LOWER EXTREMITY LOURDES HOSP

## (undated) DEVICE — BIT DRL L300MM DIA10MM CANN TAPR L QUIK CPL FOR DH DC TFN

## (undated) DEVICE — SCREW BNE L16MM DIA2.7MM LNG CORT FT ANK S STL ST
Type: IMPLANTABLE DEVICE | Site: CLAVICLE | Status: NON-FUNCTIONAL
Removed: 2020-06-22

## (undated) DEVICE — GUIDEWIRE ORTH L400MM DIA3.2MM FOR TFN

## (undated) DEVICE — BIT DRL L145MM DIA4.2MM NONSTERILE 3 FLUT NDL PNT QUIK CPL

## (undated) DEVICE — C-ARMOR C-ARM EQUIPMENT COVERS CLEAR STERILE UNIVERSAL FIT 12 PER CASE: Brand: C-ARMOR

## (undated) DEVICE — GLOVE SURG SZ 65 CRM LTX FREE POLYISOPRENE POLYMER BEAD ANTI

## (undated) DEVICE — STERILE POLYISOPRENE POWDER-FREE SURGICAL GLOVES: Brand: PROTEXIS

## (undated) DEVICE — SHOULDER CDS

## (undated) DEVICE — BLADE LARYNSCP HNDL MAC 3 DISP CURAVIEW LED

## (undated) DEVICE — SYSTEM SKIN CLSR 22CM DERMBND PRINEO